# Patient Record
Sex: FEMALE | Race: WHITE | NOT HISPANIC OR LATINO | Employment: OTHER | URBAN - METROPOLITAN AREA
[De-identification: names, ages, dates, MRNs, and addresses within clinical notes are randomized per-mention and may not be internally consistent; named-entity substitution may affect disease eponyms.]

---

## 2017-01-25 ENCOUNTER — HOSPITAL ENCOUNTER (OUTPATIENT)
Dept: CT IMAGING | Facility: HOSPITAL | Age: 82
Discharge: HOME | End: 2017-01-25
Attending: INTERNAL MEDICINE | Admitting: INTERNAL MEDICINE

## 2017-01-25 DIAGNOSIS — C18.9 ADENOCARCINOMA OF COLON METASTATIC TO LIVER (HCC): ICD-10-CM

## 2017-01-25 DIAGNOSIS — C78.7 ADENOCARCINOMA OF COLON METASTATIC TO LIVER (HCC): ICD-10-CM

## 2017-01-25 DIAGNOSIS — C20 RECTAL CANCER (HCC): ICD-10-CM

## 2017-01-25 PROCEDURE — 74177 CT ABD & PELVIS W/CONTRAST: CPT

## 2017-01-25 PROCEDURE — 82565 ASSAY OF CREATININE: CPT

## 2017-01-25 PROCEDURE — 71260 CT THORAX DX C+: CPT

## 2017-01-25 PROCEDURE — 0 IOPAMIDOL 61 % SOLUTION: Performed by: INTERNAL MEDICINE

## 2017-01-25 RX ADMIN — IOPAMIDOL 95 ML: 612 INJECTION, SOLUTION INTRAVENOUS at 15:30

## 2017-01-26 LAB — CREAT BLDA-MCNC: 0.7 MG/DL (ref 0.6–1.3)

## 2017-02-01 ENCOUNTER — LAB (OUTPATIENT)
Dept: LAB | Facility: HOSPITAL | Age: 82
End: 2017-02-01

## 2017-02-01 ENCOUNTER — OFFICE VISIT (OUTPATIENT)
Dept: ONCOLOGY | Facility: CLINIC | Age: 82
End: 2017-02-01

## 2017-02-01 VITALS
SYSTOLIC BLOOD PRESSURE: 195 MMHG | HEIGHT: 59 IN | BODY MASS INDEX: 24.39 KG/M2 | HEART RATE: 84 BPM | RESPIRATION RATE: 16 BRPM | WEIGHT: 121 LBS | DIASTOLIC BLOOD PRESSURE: 82 MMHG | TEMPERATURE: 98.3 F

## 2017-02-01 DIAGNOSIS — C78.7 ADENOCARCINOMA OF COLON METASTATIC TO LIVER (HCC): ICD-10-CM

## 2017-02-01 DIAGNOSIS — C20 RECTAL CANCER (HCC): Primary | ICD-10-CM

## 2017-02-01 DIAGNOSIS — C18.9 ADENOCARCINOMA OF COLON METASTATIC TO LIVER (HCC): ICD-10-CM

## 2017-02-01 DIAGNOSIS — C20 RECTAL CANCER (HCC): ICD-10-CM

## 2017-02-01 LAB
ALBUMIN SERPL-MCNC: 4.2 G/DL (ref 3.2–4.8)
ALBUMIN/GLOB SERPL: 1.8 G/DL (ref 1.5–2.5)
ALP SERPL-CCNC: 143 U/L (ref 25–100)
ALT SERPL W P-5'-P-CCNC: 14 U/L (ref 7–40)
ANION GAP SERPL CALCULATED.3IONS-SCNC: 9 MMOL/L (ref 3–11)
AST SERPL-CCNC: 20 U/L (ref 0–33)
BILIRUB SERPL-MCNC: 0.7 MG/DL (ref 0.3–1.2)
BUN BLD-MCNC: 7 MG/DL (ref 9–23)
BUN/CREAT SERPL: 11.7 (ref 7–25)
CALCIUM SPEC-SCNC: 9.5 MG/DL (ref 8.7–10.4)
CEA SERPL-MCNC: 3.2 NG/ML (ref 0–2.5)
CHLORIDE SERPL-SCNC: 100 MMOL/L (ref 99–109)
CO2 SERPL-SCNC: 31 MMOL/L (ref 20–31)
CREAT BLD-MCNC: 0.6 MG/DL (ref 0.6–1.3)
ERYTHROCYTE [DISTWIDTH] IN BLOOD BY AUTOMATED COUNT: 14.5 % (ref 11.3–14.5)
GFR SERPL CREATININE-BSD FRML MDRD: 95 ML/MIN/1.73
GLOBULIN UR ELPH-MCNC: 2.4 GM/DL
GLUCOSE BLD-MCNC: 100 MG/DL (ref 70–100)
HCT VFR BLD AUTO: 38.3 % (ref 34.5–44)
HGB BLD-MCNC: 12.8 G/DL (ref 11.5–15.5)
LYMPHOCYTES # BLD AUTO: 1.7 10*3/MM3 (ref 0.6–4.8)
LYMPHOCYTES NFR BLD AUTO: 20.8 % (ref 24–44)
MCH RBC QN AUTO: 29.6 PG (ref 27–31)
MCHC RBC AUTO-ENTMCNC: 33.3 G/DL (ref 32–36)
MCV RBC AUTO: 88.9 FL (ref 80–99)
MONOCYTES # BLD AUTO: 0.5 10*3/MM3 (ref 0–1)
MONOCYTES NFR BLD AUTO: 5.8 % (ref 0–12)
NEUTROPHILS # BLD AUTO: 5.9 10*3/MM3 (ref 1.5–8.3)
NEUTROPHILS NFR BLD AUTO: 73.4 % (ref 41–71)
PLATELET # BLD AUTO: 278 10*3/MM3 (ref 150–450)
PMV BLD AUTO: 7.9 FL (ref 6–12)
POTASSIUM BLD-SCNC: 3.8 MMOL/L (ref 3.5–5.5)
PROT SERPL-MCNC: 6.6 G/DL (ref 5.7–8.2)
RBC # BLD AUTO: 4.31 10*6/MM3 (ref 3.89–5.14)
SODIUM BLD-SCNC: 140 MMOL/L (ref 132–146)
WBC NRBC COR # BLD: 8 10*3/MM3 (ref 3.5–10.8)

## 2017-02-01 PROCEDURE — 99213 OFFICE O/P EST LOW 20 MIN: CPT | Performed by: NURSE PRACTITIONER

## 2017-02-01 PROCEDURE — 85025 COMPLETE CBC W/AUTO DIFF WBC: CPT

## 2017-02-01 PROCEDURE — 82378 CARCINOEMBRYONIC ANTIGEN: CPT | Performed by: INTERNAL MEDICINE

## 2017-02-01 PROCEDURE — 80053 COMPREHEN METABOLIC PANEL: CPT | Performed by: INTERNAL MEDICINE

## 2017-02-01 PROCEDURE — 36415 COLL VENOUS BLD VENIPUNCTURE: CPT

## 2017-02-01 NOTE — PROGRESS NOTES
CHIEF COMPLAINT: Follow up for history of colon cancer    Problem List:  Oncology/Hematology History    1. Metastatic adenocarcinoma of the colon to liver: Status post segment 4  hepatectomy on 12/09/2014 for metastatic moderately differentiated colon cancer  per pathology with resection of the gallbladder. Postoperatively, hemoglobin  8.2 transfused to 11.7 and a CEA of 23.5. AST up to 505, , alkaline  phosphatase of 125, and bilirubin of 3.4 postoperatively.   a) Has a history of rectal carcinoma status post low anterior resection  06/16/2009, pathological stage I, T1b submucosal involvement with zero out of  21 lymph nodes, watched serially by Dr. Fitzpatrick with CEA up to 24.3 in October  prompting scanning that showed the liver lesion.   b) Began Xeloda 14 out of 21 days, cycle number 1 for a total of 8 planned  courses mid-01/2015.  c) Developed onychomycosis after the sixth course of Xeloda; seventh cycle  held 05/20/2015.   d) Additional testing on pathology was negative for KRAS and negative for NRAS  mutation, negative BRAF mutation. Microsatellite stable.   e) Restaging PET 03/25/2015 during therapy revealed stable right pericardial  cyst versus mass without change. Stable liver surgical margin and right  extrarenal pelvis distention with mild hydronephrosis and some uptake in the  endometrium at the area of retained intrauterine device.  f) Followup CT chest, abdomen and pelvis 06/17/2015: CT chest negative. CT  abdomen and pelvis unchanged appearance of the liver including focal ductal  dilatation within the anterior inferior margin adjacent to the partial  hepatectomy bed. Right hydronephrosis with enlargement of the right renal  pelvis. Thickening of the endometrium.  g) On 06/15/2015 CBC and CMP within normal limits. CEA 4.3.  h) Completed 6 courses of Xeloda, discontinued after onychomycosis, 05/2015.  i) Followup CT chest, abdomen and pelvis 03/14/2016 negative except for  questionable colitis  versus incomplete distention as well as stable UPJ  dilation and a possible endometrial abnormality. CEA was still creeping up to  5.2 with alkaline phosphatase 156, creatinine 0.58, with normal CBC.  2. History of hydronephrosis preoperatively on preoperative staging scanning  PET scan that showed a 6.8 cm mass in the liver.   i) On 09/16/2015: CT of the chest, abdomen, and pelvis negative for metastatic  disease.   j) Most recent CEA, 03/14/2016, was 5.2. On 12/15/2015, CEA 4.6 (previously  3.0 on 09/22/2015, 4.30 on 06/15/2015).        Rectal cancer    7/19/2016 Initial Diagnosis    Rectal cancer      Adenocarcinoma of colon metastatic to liver (Resolved)    12/9/2014 Initial Diagnosis    Adenocarcinoma of colon metastatic to liver      1/25/2017 Imaging    CT of the chest, abdomen and pelvis there is no evidence of metastatic disease seen  within the chest, abdomen or pelvis.         HISTORY OF PRESENT ILLNESS:  The patient is a 87 y.o. female, here for follow up on management of history of colon cancer.  The patient has been doing well since we saw her last with no significant change in her health.  Her daughter who is with her today, states that her mother is becoming a little more forgetful, she still continues to have difficulty with arthritis in her knees for which she receives injections and this is her biggest complaint.  She does continue to live independently with a lot of help from her children although the daughter states this may have to change in the near future.  Appetite has been normal, weight is stable.  No reported changes in her bowel or bladder habits.  They are concerned about a small skin lesion that is appeared under her left eye over the last few months.  She states that it is not tender, and is not bothersome just noticeable.      Past Medical History   Diagnosis Date   • Arthritis    • Cataract    • Colon cancer    • Colon cancer metastasized to liver    • Hearing loss    • Liver cancer  "   • Macular degeneration      Past Surgical History   Procedure Laterality Date   • Liver surgery  12/09/2014     Liver tumor, 1/2 was removed.   • Colon resection  06/16/2009     Low anterior resection   • Cataract extraction Bilateral    • Partial hysterectomy     • Gallbladder surgery       Resection of gallbladder       No Known Allergies    Family History and Social History reviewed and changed as necessary      REVIEW OF SYSTEM:   Review of Systems   Constitutional: Negative for appetite change, chills, diaphoresis, fatigue, fever and unexpected weight change.   HENT:   Negative for mouth sores, sore throat and trouble swallowing.    Eyes: Negative for icterus.   Respiratory: Negative for cough, hemoptysis and shortness of breath.    Cardiovascular: Negative for chest pain, leg swelling and palpitations.   Gastrointestinal: Negative for abdominal distention, abdominal pain, blood in stool, constipation, diarrhea, nausea and vomiting.   Endocrine: Negative for hot flashes.   Genitourinary: Negative for bladder incontinence, difficulty urinating, dysuria, frequency and hematuria.    Musculoskeletal: Negative for gait problem, neck pain and neck stiffness.   Skin: Negative for rash.   Neurological: Negative for dizziness, gait problem, headaches, light-headedness and numbness.   Hematological: Negative for adenopathy. Does not bruise/bleed easily.   Psychiatric/Behavioral: Negative for depression. The patient is not nervous/anxious.    All other systems reviewed and are negative except as stated above in history of present illness.       PHYSICAL EXAM    Vitals:    02/01/17 1313   BP: (!) 195/82   Pulse: 84   Resp: 16   Temp: 98.3 °F (36.8 °C)   Weight: 121 lb (54.9 kg)   Height: 59\" (149.9 cm)     Constitutional: Appears well-developed and well-nourished. No distress.   ECOG: (1) Restricted in physically strenuous activity, ambulatory and able to do work of light nature  HENT:   Head: Normocephalic. "   Mouth/Throat: Oropharynx is clear and moist.   Eyes: Conjunctivae are normal. Pupils are equal, round, and reactive. No scleral icterus.   Neck: Neck supple. No JVD present. No thyromegaly present.   Cardiovascular: Normal rate, regular rhythm and normal heart sounds.    Pulmonary/Chest: Breath sounds normal. No respiratory distress.   Abdominal: Soft. Exhibits no distension.   Musculoskeletal:Exhibits no edema, tenderness or deformity.   Neurological: Alert and oriented to person, place, and time. Exhibits normal muscle tone.   Skin: No ecchymosis, no petechiae and no rash noted. Not diaphoretic.  Small, round, smooth nodular lesion under the left eye orbit.  Psychiatric: Normal mood and affect.   Vitals reviewed.    Recent Results (from the past 168 hour(s))   CBC Auto Differential    Collection Time: 02/01/17  1:18 PM   Result Value Ref Range    WBC 8.00 3.50 - 10.80 10*3/mm3    RBC 4.31 3.89 - 5.14 10*6/mm3    Hemoglobin 12.8 11.5 - 15.5 g/dL    Hematocrit 38.3 34.5 - 44.0 %    RDW 14.5 11.3 - 14.5 %    MCV 88.9 80.0 - 99.0 fL    MCH 29.6 27.0 - 31.0 pg    MCHC 33.3 32.0 - 36.0 g/dL    MPV 7.9 6.0 - 12.0 fL    Platelets 278 150 - 450 10*3/mm3    Neutrophil % 73.4 (H) 41.0 - 71.0 %    Lymphocyte % 20.8 (L) 24.0 - 44.0 %    Monocyte % 5.8 0.0 - 12.0 %    Neutrophils, Absolute 5.90 1.50 - 8.30 10*3/mm3    Lymphocytes, Absolute 1.70 0.60 - 4.80 10*3/mm3    Monocytes, Absolute 0.50 0.00 - 1.00 10*3/mm3         Assessment/Plan     1.  History of metastatic colon cancer: Current CT of the chest abdomen and pelvis negative for any residual disease.  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  She has had no issues with her creatinine or kidney function, CMP from today is pending, however her creatinine on 1/25/17 at  the time of her CT scan last week was 0.7.  Her CEA from today is pending, I will call her daughter with those results.  She is feeling fine and has no new worrisome symptoms.  At this time I will plan on just seeing her back in 3 months with CBC, CMP and a CEA on return and we'll repeat her CT scans in 6 months.  She is about to turn 88 on February 3, her daughter states that she is having a little more forgetfulness.  At some point in the future we may want to consider backing off of routine surveillance in the absence of symptoms depending on the patient and the family's goals of care.  But for now we'll continue with the above plan.    2.  Skin lesion under her left eye: This is a smooth round lesion, did not have the classical appearance of a basal cell however I did recommend that they follow up with dermatology.       Errors in dictation may reflect use of voice recognition software and not all errors in transcription may have been detected prior to signing.    Dimple Christy, APRN    02/01/2017

## 2017-05-02 ENCOUNTER — TRANSCRIBE ORDERS (OUTPATIENT)
Dept: ADMINISTRATIVE | Facility: HOSPITAL | Age: 82
End: 2017-05-02

## 2017-05-02 DIAGNOSIS — G89.29 BILATERAL CHRONIC KNEE PAIN: Primary | ICD-10-CM

## 2017-05-02 DIAGNOSIS — M25.561 BILATERAL CHRONIC KNEE PAIN: Primary | ICD-10-CM

## 2017-05-02 DIAGNOSIS — M25.562 BILATERAL CHRONIC KNEE PAIN: Primary | ICD-10-CM

## 2017-05-15 ENCOUNTER — OFFICE VISIT (OUTPATIENT)
Dept: ONCOLOGY | Facility: CLINIC | Age: 82
End: 2017-05-15

## 2017-05-15 VITALS
WEIGHT: 121 LBS | DIASTOLIC BLOOD PRESSURE: 84 MMHG | HEART RATE: 98 BPM | TEMPERATURE: 97.6 F | BODY MASS INDEX: 24.44 KG/M2 | SYSTOLIC BLOOD PRESSURE: 148 MMHG | RESPIRATION RATE: 16 BRPM

## 2017-05-15 DIAGNOSIS — C20 RECTAL CANCER (HCC): Primary | ICD-10-CM

## 2017-05-15 DIAGNOSIS — R41.3 MEMORY LOSS: ICD-10-CM

## 2017-05-15 PROCEDURE — 99214 OFFICE O/P EST MOD 30 MIN: CPT | Performed by: NURSE PRACTITIONER

## 2017-05-25 ENCOUNTER — OFFICE VISIT (OUTPATIENT)
Dept: INTERNAL MEDICINE | Facility: CLINIC | Age: 82
End: 2017-05-25

## 2017-05-25 VITALS
HEIGHT: 59 IN | RESPIRATION RATE: 18 BRPM | TEMPERATURE: 96.9 F | BODY MASS INDEX: 24.19 KG/M2 | DIASTOLIC BLOOD PRESSURE: 80 MMHG | SYSTOLIC BLOOD PRESSURE: 192 MMHG | WEIGHT: 120 LBS

## 2017-05-25 DIAGNOSIS — Z00.00 HEALTH CARE MAINTENANCE: ICD-10-CM

## 2017-05-25 DIAGNOSIS — H61.22 IMPACTED CERUMEN OF LEFT EAR: ICD-10-CM

## 2017-05-25 DIAGNOSIS — R35.0 URINARY FREQUENCY: Primary | ICD-10-CM

## 2017-05-25 PROCEDURE — 99213 OFFICE O/P EST LOW 20 MIN: CPT | Performed by: PHYSICIAN ASSISTANT

## 2017-05-25 PROCEDURE — 69210 REMOVE IMPACTED EAR WAX UNI: CPT | Performed by: PHYSICIAN ASSISTANT

## 2017-06-01 ENCOUNTER — TELEPHONE (OUTPATIENT)
Dept: INTERNAL MEDICINE | Facility: CLINIC | Age: 82
End: 2017-06-01

## 2017-06-01 NOTE — TELEPHONE ENCOUNTER
----- Message from Leila Melgar sent at 6/1/2017 10:19 AM EDT -----  SON-MEERA [phone]    [facility] SAW PT LAST WEEK AND TOLD HER TO TAKE BP READINGS 2X DAY FOR  A WEEK.  SHE FORGOT TO DO THIS.  HAS AN APPT NEXT WEEK.  SON WILL KEEP UP WITH PT THIS WEEK TO GET HER TO DO THIS SO SHE WILL HAVE READINGS AT APPT

## 2017-06-05 ENCOUNTER — APPOINTMENT (OUTPATIENT)
Dept: MRI IMAGING | Facility: HOSPITAL | Age: 82
End: 2017-06-05

## 2017-06-06 ENCOUNTER — TELEPHONE (OUTPATIENT)
Dept: INTERNAL MEDICINE | Facility: CLINIC | Age: 82
End: 2017-06-06

## 2017-06-06 NOTE — TELEPHONE ENCOUNTER
These are okay.    Just continue to keep an eye on it for me and let me know if they are consistently higher than 140/90.

## 2017-06-06 NOTE — TELEPHONE ENCOUNTER
Patient and Milena (patients daughter in law) informed. States they will continue to monitor and will be here on 6/8/2017 for a follow up.

## 2017-06-08 ENCOUNTER — OFFICE VISIT (OUTPATIENT)
Dept: INTERNAL MEDICINE | Facility: CLINIC | Age: 82
End: 2017-06-08

## 2017-06-08 VITALS
TEMPERATURE: 98.1 F | DIASTOLIC BLOOD PRESSURE: 90 MMHG | RESPIRATION RATE: 18 BRPM | WEIGHT: 121 LBS | HEART RATE: 86 BPM | SYSTOLIC BLOOD PRESSURE: 166 MMHG | BODY MASS INDEX: 24.44 KG/M2

## 2017-06-08 DIAGNOSIS — R35.0 URINARY FREQUENCY: Primary | ICD-10-CM

## 2017-06-08 PROCEDURE — 99213 OFFICE O/P EST LOW 20 MIN: CPT | Performed by: PHYSICIAN ASSISTANT

## 2017-06-08 NOTE — PROGRESS NOTES
Subjective   Zainab Worthy is a 88 y.o. female.   Chief Complaint   Patient presents with   • Follow-up     blood pressure     History of Present Illness     Patient here with her son to follow up on her blood pressure.  She has been checking her BP at home over the past couple of weeks.  Her blood pressure is never consistently >140/90 at home.  Systolic readings are occasionally >140, but diastolics are never >90 (I reviewed her BP logs). She denies headaches, chest pain, soa, swelling. She does not wish to be on a medication unless absolutely necessary.      She took myrbetriq for about 3 days and did see some improvement, but then she stopped taking it as she thought her problem was better.    Needs a podiatry appt. Son, Chad, says he thinks she received a call, but wasn't able to make it to the phone in time and doesn't have a voicemail.     The following portions of the patient's history were reviewed and updated as appropriate: allergies, current medications, past family history, past medical history, past social history, past surgical history and problem list.    Review of Systems   Constitutional: Negative.    HENT: Negative.    Respiratory: Negative.    Cardiovascular: Negative.    Gastrointestinal: Negative.    Genitourinary: Positive for frequency. Negative for difficulty urinating, dysuria, flank pain, hematuria and urgency.   Musculoskeletal:        Knee pain   Psychiatric/Behavioral: Negative.        Objective   Physical Exam   Constitutional: She appears well-developed and well-nourished.   Cardiovascular: Normal rate, regular rhythm and normal heart sounds.    Pulmonary/Chest: Effort normal and breath sounds normal.   Psychiatric: She has a normal mood and affect. Her behavior is normal. Judgment and thought content normal.   Vitals reviewed.      Assessment/Plan   Zainab was seen today for follow-up.    Diagnoses and all orders for this visit:    Urinary frequency    Continue myrbetriq and follow up in  1 month.   She was unable to urinate today.      Call son at [phone] (Chad) with podiatry appt.

## 2017-06-14 ENCOUNTER — TELEPHONE (OUTPATIENT)
Dept: INTERNAL MEDICINE | Facility: CLINIC | Age: 82
End: 2017-06-14

## 2017-06-15 NOTE — TELEPHONE ENCOUNTER
Not sure if this has been done or not, but can you get patient a podiatry appt and call her son, Chad, with the details?    His number is at the bottom of my last note.

## 2017-06-16 NOTE — TELEPHONE ENCOUNTER
[facility] her know about [facility]--she should have gone on the 12th--did they say she did not have appt? Or should I call them to verify

## 2017-06-16 NOTE — TELEPHONE ENCOUNTER
Please call her son Chad.  He said that she had received a call, but didn't take down any information and doesn't have voicemail.

## 2017-11-22 ENCOUNTER — TELEPHONE (OUTPATIENT)
Dept: ONCOLOGY | Facility: CLINIC | Age: 82
End: 2017-11-22

## 2017-11-22 NOTE — TELEPHONE ENCOUNTER
----- Message from Ever Resendez sent at 11/22/2017  2:48 PM EST -----  Regarding: Vernell, patient fell, broke hip and at [facility]  Contact: [phone]    Val, patient's daughter called to tell us know that patient fell and broke pelvic bone and is in the hospital at [facility]. Val doesn't know if her mom is going to make it to her appointment on the 28th with Dr. Matt, but will call and let us know.

## 2017-11-24 ENCOUNTER — HOSPITAL ENCOUNTER (INPATIENT)
Facility: HOSPITAL | Age: 82
LOS: 2 days | Discharge: NURSING, REHAB OR LONG-TERM CARE | End: 2017-11-29
Attending: EMERGENCY MEDICINE | Admitting: INTERNAL MEDICINE

## 2017-11-24 ENCOUNTER — APPOINTMENT (OUTPATIENT)
Dept: CT IMAGING | Facility: HOSPITAL | Age: 82
End: 2017-11-24

## 2017-11-24 DIAGNOSIS — Z74.09 IMPAIRED FUNCTIONAL MOBILITY, BALANCE, GAIT, AND ENDURANCE: ICD-10-CM

## 2017-11-24 DIAGNOSIS — N39.0 BACTERIAL UTI: ICD-10-CM
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
## 2017-11-24 DIAGNOSIS — Z78.9 IMPAIRED MOBILITY AND ADLS: ICD-10-CM

## 2017-11-24 DIAGNOSIS — A49.9 BACTERIAL UTI: ICD-10-CM
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
## 2017-11-24 DIAGNOSIS — R52 INTRACTABLE PAIN: ICD-10-CM

## 2017-11-24 DIAGNOSIS — Z74.09 IMPAIRED MOBILITY AND ADLS: ICD-10-CM

## 2017-11-24 DIAGNOSIS — S32.810A MULTIPLE CLOSED FRACTURES OF PELVIS WITH STABLE DISRUPTION OF PELVIC RING, INITIAL ENCOUNTER (HCC): ICD-10-CM

## 2017-11-24 LAB
ALBUMIN SERPL-MCNC: 3.8 G/DL (ref 3.2–4.8)
ALBUMIN/GLOB SERPL: 1.4 G/DL (ref 1.5–2.5)
ALP SERPL-CCNC: 232 U/L (ref 25–100)
ALT SERPL W P-5'-P-CCNC: 16 U/L (ref 7–40)
ANION GAP SERPL CALCULATED.3IONS-SCNC: 10 MMOL/L (ref 3–11)
AST SERPL-CCNC: 19 U/L (ref 0–33)
BACTERIA UR QL AUTO: ABNORMAL /HPF
BASOPHILS # BLD AUTO: 0.03 10*3/MM3 (ref 0–0.2)
BASOPHILS NFR BLD AUTO: 0.2 % (ref 0–1)
BILIRUB SERPL-MCNC: 0.6 MG/DL (ref 0.3–1.2)
BILIRUB UR QL STRIP: NEGATIVE
BUN BLD-MCNC: 21 MG/DL (ref 9–23)
BUN/CREAT SERPL: 42 (ref 7–25)
CALCIUM SPEC-SCNC: 9.3 MG/DL (ref 8.7–10.4)
CHLORIDE SERPL-SCNC: 100 MMOL/L (ref 99–109)
CLARITY UR: ABNORMAL
CO2 SERPL-SCNC: 28 MMOL/L (ref 20–31)
COLOR UR: YELLOW
CREAT BLD-MCNC: 0.5 MG/DL (ref 0.6–1.3)
DEPRECATED RDW RBC AUTO: 45 FL (ref 37–54)
EOSINOPHIL # BLD AUTO: 0.17 10*3/MM3 (ref 0–0.3)
EOSINOPHIL NFR BLD AUTO: 1.4 % (ref 0–3)
ERYTHROCYTE [DISTWIDTH] IN BLOOD BY AUTOMATED COUNT: 13.8 % (ref 11.3–14.5)
GFR SERPL CREATININE-BSD FRML MDRD: 116 ML/MIN/1.73
GLOBULIN UR ELPH-MCNC: 2.8 GM/DL
GLUCOSE BLD-MCNC: 109 MG/DL (ref 70–100)
GLUCOSE UR STRIP-MCNC: NEGATIVE MG/DL
HCT VFR BLD AUTO: 37.9 % (ref 34.5–44)
HGB BLD-MCNC: 12.7 G/DL (ref 11.5–15.5)
HGB UR QL STRIP.AUTO: ABNORMAL
HYALINE CASTS UR QL AUTO: ABNORMAL /LPF
IMM GRANULOCYTES # BLD: 0.06 10*3/MM3 (ref 0–0.03)
IMM GRANULOCYTES NFR BLD: 0.5 % (ref 0–0.6)
KETONES UR QL STRIP: ABNORMAL
LEUKOCYTE ESTERASE UR QL STRIP.AUTO: ABNORMAL
LYMPHOCYTES # BLD AUTO: 1.19 10*3/MM3 (ref 0.6–4.8)
LYMPHOCYTES NFR BLD AUTO: 9.7 % (ref 24–44)
MCH RBC QN AUTO: 29.7 PG (ref 27–31)
MCHC RBC AUTO-ENTMCNC: 33.5 G/DL (ref 32–36)
MCV RBC AUTO: 88.8 FL (ref 80–99)
MONOCYTES # BLD AUTO: 1.1 10*3/MM3 (ref 0–1)
MONOCYTES NFR BLD AUTO: 8.9 % (ref 0–12)
NEUTROPHILS # BLD AUTO: 9.78 10*3/MM3 (ref 1.5–8.3)
NEUTROPHILS NFR BLD AUTO: 79.3 % (ref 41–71)
NITRITE UR QL STRIP: POSITIVE
PH UR STRIP.AUTO: 5.5 [PH] (ref 5–8)
PLATELET # BLD AUTO: 288 10*3/MM3 (ref 150–450)
PMV BLD AUTO: 9.5 FL (ref 6–12)
POTASSIUM BLD-SCNC: 3.9 MMOL/L (ref 3.5–5.5)
PROT SERPL-MCNC: 6.6 G/DL (ref 5.7–8.2)
PROT UR QL STRIP: ABNORMAL
RBC # BLD AUTO: 4.27 10*6/MM3 (ref 3.89–5.14)
RBC # UR: ABNORMAL /HPF
REF LAB TEST METHOD: ABNORMAL
SODIUM BLD-SCNC: 138 MMOL/L (ref 132–146)
SP GR UR STRIP: 1.02 (ref 1–1.03)
SQUAMOUS #/AREA URNS HPF: ABNORMAL /HPF
UROBILINOGEN UR QL STRIP: ABNORMAL
WBC NRBC COR # BLD: 12.33 10*3/MM3 (ref 3.5–10.8)
WBC UR QL AUTO: ABNORMAL /HPF

## 2017-11-24 PROCEDURE — 99285 EMERGENCY DEPT VISIT HI MDM: CPT

## 2017-11-24 PROCEDURE — 72192 CT PELVIS W/O DYE: CPT

## 2017-11-24 PROCEDURE — 93005 ELECTROCARDIOGRAM TRACING: CPT | Performed by: EMERGENCY MEDICINE

## 2017-11-24 PROCEDURE — 81001 URINALYSIS AUTO W/SCOPE: CPT | Performed by: EMERGENCY MEDICINE

## 2017-11-24 PROCEDURE — 87186 SC STD MICRODIL/AGAR DIL: CPT | Performed by: EMERGENCY MEDICINE

## 2017-11-24 PROCEDURE — 87086 URINE CULTURE/COLONY COUNT: CPT | Performed by: EMERGENCY MEDICINE

## 2017-11-24 PROCEDURE — 72131 CT LUMBAR SPINE W/O DYE: CPT

## 2017-11-24 PROCEDURE — 80053 COMPREHEN METABOLIC PANEL: CPT | Performed by: EMERGENCY MEDICINE

## 2017-11-24 PROCEDURE — 87077 CULTURE AEROBIC IDENTIFY: CPT | Performed by: EMERGENCY MEDICINE

## 2017-11-24 PROCEDURE — 85025 COMPLETE CBC W/AUTO DIFF WBC: CPT | Performed by: EMERGENCY MEDICINE

## 2017-11-24 RX ORDER — HYDROCODONE BITARTRATE AND ACETAMINOPHEN 5; 325 MG/1; MG/1
1 TABLET ORAL ONCE
Status: COMPLETED | OUTPATIENT
Start: 2017-11-24 | End: 2017-11-24

## 2017-11-24 RX ORDER — CELECOXIB 200 MG/1
200 CAPSULE ORAL DAILY
COMMUNITY
End: 2017-11-29 | Stop reason: HOSPADM

## 2017-11-24 RX ORDER — HYDROCODONE BITARTRATE AND ACETAMINOPHEN 5; 325 MG/1; MG/1
1 TABLET ORAL EVERY 6 HOURS PRN
COMMUNITY
End: 2018-09-10 | Stop reason: HOSPADM

## 2017-11-24 RX ORDER — CEFTRIAXONE SODIUM 1 G/50ML
1 INJECTION, SOLUTION INTRAVENOUS ONCE
Status: COMPLETED | OUTPATIENT
Start: 2017-11-24 | End: 2017-11-25

## 2017-11-24 RX ORDER — LISINOPRIL 20 MG/1
20 TABLET ORAL DAILY
COMMUNITY
End: 2017-11-29 | Stop reason: HOSPADM

## 2017-11-24 RX ORDER — SODIUM CHLORIDE 0.9 % (FLUSH) 0.9 %
10 SYRINGE (ML) INJECTION AS NEEDED
Status: DISCONTINUED | OUTPATIENT
Start: 2017-11-24 | End: 2017-11-29 | Stop reason: HOSPADM

## 2017-11-24 RX ADMIN — HYDROCODONE BITARTRATE AND ACETAMINOPHEN 1 TABLET: 5; 325 TABLET ORAL at 22:39
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
## 2017-11-25 PROBLEM — S32.9XXA CLOSED FRACTURE OF PELVIS (HCC): Status: ACTIVE | Noted: 2017-11-25
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
## 2017-11-25 PROBLEM — N39.0 UTI (URINARY TRACT INFECTION): Status: ACTIVE | Noted: 2017-11-25

## 2017-11-25 LAB
ALBUMIN SERPL-MCNC: 3.4 G/DL (ref 3.2–4.8)
ALBUMIN/GLOB SERPL: 1.4 G/DL (ref 1.5–2.5)
ALP SERPL-CCNC: 205 U/L (ref 25–100)
ALT SERPL W P-5'-P-CCNC: 14 U/L (ref 7–40)
ANION GAP SERPL CALCULATED.3IONS-SCNC: 9 MMOL/L (ref 3–11)
AST SERPL-CCNC: 19 U/L (ref 0–33)
BASOPHILS # BLD AUTO: 0.03 10*3/MM3 (ref 0–0.2)
BASOPHILS NFR BLD AUTO: 0.3 % (ref 0–1)
BILIRUB SERPL-MCNC: 0.5 MG/DL (ref 0.3–1.2)
BUN BLD-MCNC: 19 MG/DL (ref 9–23)
BUN/CREAT SERPL: 47.5 (ref 7–25)
CALCIUM SPEC-SCNC: 8.9 MG/DL (ref 8.7–10.4)
CHLORIDE SERPL-SCNC: 100 MMOL/L (ref 99–109)
CO2 SERPL-SCNC: 29 MMOL/L (ref 20–31)
CREAT BLD-MCNC: 0.4 MG/DL (ref 0.6–1.3)
DEPRECATED RDW RBC AUTO: 45.3 FL (ref 37–54)
EOSINOPHIL # BLD AUTO: 0.35 10*3/MM3 (ref 0–0.3)
EOSINOPHIL NFR BLD AUTO: 3.4 % (ref 0–3)
ERYTHROCYTE [DISTWIDTH] IN BLOOD BY AUTOMATED COUNT: 13.8 % (ref 11.3–14.5)
GFR SERPL CREATININE-BSD FRML MDRD: >150 ML/MIN/1.73
GLOBULIN UR ELPH-MCNC: 2.4 GM/DL
GLUCOSE BLD-MCNC: 97 MG/DL (ref 70–100)
HCT VFR BLD AUTO: 35.7 % (ref 34.5–44)
HGB BLD-MCNC: 11.4 G/DL (ref 11.5–15.5)
IMM GRANULOCYTES # BLD: 0.06 10*3/MM3 (ref 0–0.03)
IMM GRANULOCYTES NFR BLD: 0.6 % (ref 0–0.6)
LYMPHOCYTES # BLD AUTO: 1.44 10*3/MM3 (ref 0.6–4.8)
LYMPHOCYTES NFR BLD AUTO: 13.9 % (ref 24–44)
MCH RBC QN AUTO: 28.4 PG (ref 27–31)
MCHC RBC AUTO-ENTMCNC: 31.9 G/DL (ref 32–36)
MCV RBC AUTO: 89 FL (ref 80–99)
MONOCYTES # BLD AUTO: 0.99 10*3/MM3 (ref 0–1)
MONOCYTES NFR BLD AUTO: 9.5 % (ref 0–12)
NEUTROPHILS # BLD AUTO: 7.5 10*3/MM3 (ref 1.5–8.3)
NEUTROPHILS NFR BLD AUTO: 72.3 % (ref 41–71)
PLATELET # BLD AUTO: 277 10*3/MM3 (ref 150–450)
PMV BLD AUTO: 9.7 FL (ref 6–12)
POTASSIUM BLD-SCNC: 3.4 MMOL/L (ref 3.5–5.5)
POTASSIUM BLD-SCNC: 4.6 MMOL/L (ref 3.5–5.5)
PROT SERPL-MCNC: 5.8 G/DL (ref 5.7–8.2)
RBC # BLD AUTO: 4.01 10*6/MM3 (ref 3.89–5.14)
SODIUM BLD-SCNC: 138 MMOL/L (ref 132–146)
WBC NRBC COR # BLD: 10.37 10*3/MM3 (ref 3.5–10.8)

## 2017-11-25 PROCEDURE — 85025 COMPLETE CBC W/AUTO DIFF WBC: CPT | Performed by: INTERNAL MEDICINE

## 2017-11-25 PROCEDURE — 99220 PR INITIAL OBSERVATION CARE/DAY 70 MINUTES: CPT | Performed by: INTERNAL MEDICINE

## 2017-11-25 PROCEDURE — 80053 COMPREHEN METABOLIC PANEL: CPT | Performed by: INTERNAL MEDICINE

## 2017-11-25 PROCEDURE — 25010000002 CEFTRIAXONE PER 250 MG: Performed by: EMERGENCY MEDICINE

## 2017-11-25 PROCEDURE — 25010000002 CEFTRIAXONE PER 250 MG: Performed by: INTERNAL MEDICINE

## 2017-11-25 PROCEDURE — G0378 HOSPITAL OBSERVATION PER HR: HCPCS

## 2017-11-25 PROCEDURE — 84132 ASSAY OF SERUM POTASSIUM: CPT | Performed by: INTERNAL MEDICINE

## 2017-11-25 PROCEDURE — 25010000002 ENOXAPARIN PER 10 MG: Performed by: INTERNAL MEDICINE

## 2017-11-25 RX ORDER — POTASSIUM CHLORIDE 1.5 G/1.77G
40 POWDER, FOR SOLUTION ORAL AS NEEDED
Status: DISCONTINUED | OUTPATIENT
Start: 2017-11-25 | End: 2017-11-29 | Stop reason: HOSPADM

## 2017-11-25 RX ORDER — SODIUM CHLORIDE 9 MG/ML
50 INJECTION, SOLUTION INTRAVENOUS CONTINUOUS
Status: ACTIVE | OUTPATIENT
Start: 2017-11-25 | End: 2017-11-25

## 2017-11-25 RX ORDER — HYDROCODONE BITARTRATE AND ACETAMINOPHEN 7.5; 325 MG/1; MG/1
1 TABLET ORAL EVERY 6 HOURS PRN
Status: DISCONTINUED | OUTPATIENT
Start: 2017-11-25 | End: 2017-11-29 | Stop reason: HOSPADM

## 2017-11-25 RX ORDER — POTASSIUM CHLORIDE 750 MG/1
40 CAPSULE, EXTENDED RELEASE ORAL AS NEEDED
Status: DISCONTINUED | OUTPATIENT
Start: 2017-11-25 | End: 2017-11-29 | Stop reason: HOSPADM

## 2017-11-25 RX ORDER — LISINOPRIL 5 MG/1
2.5 TABLET ORAL
Status: DISCONTINUED | OUTPATIENT
Start: 2017-11-25 | End: 2017-11-29 | Stop reason: HOSPADM

## 2017-11-25 RX ORDER — FAMOTIDINE 20 MG/1
20 TABLET, FILM COATED ORAL 2 TIMES DAILY
Status: DISCONTINUED | OUTPATIENT
Start: 2017-11-25 | End: 2017-11-29 | Stop reason: HOSPADM

## 2017-11-25 RX ORDER — HYDROCODONE BITARTRATE AND ACETAMINOPHEN 5; 325 MG/1; MG/1
1 TABLET ORAL EVERY 6 HOURS PRN
Status: DISCONTINUED | OUTPATIENT
Start: 2017-11-25 | End: 2017-11-25

## 2017-11-25 RX ORDER — DOCUSATE SODIUM 100 MG/1
100 CAPSULE, LIQUID FILLED ORAL 2 TIMES DAILY
Status: DISCONTINUED | OUTPATIENT
Start: 2017-11-25 | End: 2017-11-29 | Stop reason: HOSPADM

## 2017-11-25 RX ORDER — ONDANSETRON 4 MG/1
4 TABLET, FILM COATED ORAL EVERY 6 HOURS PRN
Status: DISCONTINUED | OUTPATIENT
Start: 2017-11-25 | End: 2017-11-29 | Stop reason: HOSPADM

## 2017-11-25 RX ORDER — CEFTRIAXONE SODIUM 1 G/50ML
1 INJECTION, SOLUTION INTRAVENOUS EVERY 24 HOURS
Status: DISCONTINUED | OUTPATIENT
Start: 2017-11-25 | End: 2017-11-29

## 2017-11-25 RX ORDER — ONDANSETRON 2 MG/ML
4 INJECTION INTRAMUSCULAR; INTRAVENOUS EVERY 6 HOURS PRN
Status: DISCONTINUED | OUTPATIENT
Start: 2017-11-25 | End: 2017-11-29 | Stop reason: HOSPADM

## 2017-11-25 RX ORDER — MORPHINE SULFATE 4 MG/ML
2 INJECTION, SOLUTION INTRAMUSCULAR; INTRAVENOUS EVERY 6 HOURS PRN
Status: DISCONTINUED | OUTPATIENT
Start: 2017-11-25 | End: 2017-11-29 | Stop reason: HOSPADM

## 2017-11-25 RX ORDER — SODIUM CHLORIDE 0.9 % (FLUSH) 0.9 %
1-10 SYRINGE (ML) INJECTION AS NEEDED
Status: DISCONTINUED | OUTPATIENT
Start: 2017-11-25 | End: 2017-11-29 | Stop reason: HOSPADM

## 2017-11-25 RX ORDER — ACETAMINOPHEN 325 MG/1
650 TABLET ORAL EVERY 6 HOURS PRN
Status: DISCONTINUED | OUTPATIENT
Start: 2017-11-25 | End: 2017-11-29 | Stop reason: HOSPADM

## 2017-11-25 RX ADMIN — DOCUSATE SODIUM 100 MG: 100 CAPSULE, LIQUID FILLED ORAL at 18:02

## 2017-11-25 RX ADMIN — LISINOPRIL 2.5 MG: 5 TABLET ORAL at 08:57

## 2017-11-25 RX ADMIN — CEFTRIAXONE SODIUM 1 G: 1 INJECTION, SOLUTION INTRAVENOUS at 21:10

## 2017-11-25 RX ADMIN — POTASSIUM CHLORIDE 40 MEQ: 1.5 POWDER, FOR SOLUTION ORAL at 12:30

## 2017-11-25 RX ADMIN — FAMOTIDINE 20 MG: 20 TABLET, FILM COATED ORAL at 18:02

## 2017-11-25 RX ADMIN — ENOXAPARIN SODIUM 30 MG: 30 INJECTION SUBCUTANEOUS at 08:58

## 2017-11-25 RX ADMIN — HYDROCODONE BITARTRATE AND ACETAMINOPHEN 1 TABLET: 7.5; 325 TABLET ORAL at 23:36

## 2017-11-25 RX ADMIN — DICLOFENAC SODIUM 2 G: 10 GEL TOPICAL at 18:02

## 2017-11-25 RX ADMIN — POLYETHYLENE GLYCOL 3350 17 G: 17 POWDER, FOR SOLUTION ORAL at 08:58

## 2017-11-25 RX ADMIN — FAMOTIDINE 20 MG: 20 TABLET, FILM COATED ORAL at 08:58

## 2017-11-25 RX ADMIN — POTASSIUM CHLORIDE 40 MEQ: 1.5 POWDER, FOR SOLUTION ORAL at 16:44

## 2017-11-25 RX ADMIN — DOCUSATE SODIUM 100 MG: 100 CAPSULE, LIQUID FILLED ORAL at 08:58

## 2017-11-25 RX ADMIN — DICLOFENAC SODIUM 2 G: 10 GEL TOPICAL at 09:02

## 2017-11-25 RX ADMIN — CEFTRIAXONE SODIUM 1 G: 1 INJECTION, SOLUTION INTRAVENOUS at 00:00

## 2017-11-25 RX ADMIN — SODIUM CHLORIDE 50 ML/HR: 9 INJECTION, SOLUTION INTRAVENOUS at 03:24

## 2017-11-25 RX ADMIN — HYDROCODONE BITARTRATE AND ACETAMINOPHEN 1 TABLET: 7.5; 325 TABLET ORAL at 15:01

## 2017-11-26 PROCEDURE — 97166 OT EVAL MOD COMPLEX 45 MIN: CPT

## 2017-11-26 PROCEDURE — 97110 THERAPEUTIC EXERCISES: CPT

## 2017-11-26 PROCEDURE — G8987 SELF CARE CURRENT STATUS: HCPCS

## 2017-11-26 PROCEDURE — G0378 HOSPITAL OBSERVATION PER HR: HCPCS

## 2017-11-26 PROCEDURE — 99226 PR SBSQ OBSERVATION CARE/DAY 35 MINUTES: CPT | Performed by: INTERNAL MEDICINE

## 2017-11-26 PROCEDURE — G8979 MOBILITY GOAL STATUS: HCPCS

## 2017-11-26 PROCEDURE — G8978 MOBILITY CURRENT STATUS: HCPCS

## 2017-11-26 PROCEDURE — 97162 PT EVAL MOD COMPLEX 30 MIN: CPT

## 2017-11-26 PROCEDURE — 25010000002 ENOXAPARIN PER 10 MG: Performed by: INTERNAL MEDICINE

## 2017-11-26 PROCEDURE — 25010000002 CEFTRIAXONE PER 250 MG: Performed by: INTERNAL MEDICINE

## 2017-11-26 PROCEDURE — G8988 SELF CARE GOAL STATUS: HCPCS

## 2017-11-26 RX ORDER — BISACODYL 10 MG
10 SUPPOSITORY, RECTAL RECTAL DAILY PRN
Status: DISCONTINUED | OUTPATIENT
Start: 2017-11-26 | End: 2017-11-29 | Stop reason: HOSPADM

## 2017-11-26 RX ADMIN — HYDROCODONE BITARTRATE AND ACETAMINOPHEN 1 TABLET: 7.5; 325 TABLET ORAL at 21:15

## 2017-11-26 RX ADMIN — POLYETHYLENE GLYCOL 3350 17 G: 17 POWDER, FOR SOLUTION ORAL at 08:36

## 2017-11-26 RX ADMIN — FAMOTIDINE 20 MG: 20 TABLET, FILM COATED ORAL at 08:36

## 2017-11-26 RX ADMIN — DICLOFENAC SODIUM 2 G: 10 GEL TOPICAL at 08:36

## 2017-11-26 RX ADMIN — LISINOPRIL 2.5 MG: 5 TABLET ORAL at 08:36

## 2017-11-26 RX ADMIN — DOCUSATE SODIUM 100 MG: 100 CAPSULE, LIQUID FILLED ORAL at 17:31

## 2017-11-26 RX ADMIN — FAMOTIDINE 20 MG: 20 TABLET, FILM COATED ORAL at 17:32

## 2017-11-26 RX ADMIN — DICLOFENAC SODIUM 2 G: 10 GEL TOPICAL at 17:32

## 2017-11-26 RX ADMIN — ENOXAPARIN SODIUM 30 MG: 30 INJECTION SUBCUTANEOUS at 08:37

## 2017-11-26 RX ADMIN — DOCUSATE SODIUM 100 MG: 100 CAPSULE, LIQUID FILLED ORAL at 08:36

## 2017-11-26 RX ADMIN — CEFTRIAXONE SODIUM 1 G: 1 INJECTION, SOLUTION INTRAVENOUS at 19:50

## 2017-11-26 RX ADMIN — BISACODYL 10 MG: 10 SUPPOSITORY RECTAL at 18:44

## 2017-11-27 ENCOUNTER — TELEPHONE (OUTPATIENT)
Dept: ONCOLOGY | Facility: CLINIC | Age: 82
End: 2017-11-27

## 2017-11-27 PROBLEM — G93.41 METABOLIC ENCEPHALOPATHY: Status: ACTIVE | Noted: 2017-11-27

## 2017-11-27 LAB
BACTERIA SPEC AEROBE CULT: ABNORMAL
BACTERIA SPEC AEROBE CULT: ABNORMAL

## 2017-11-27 PROCEDURE — 97110 THERAPEUTIC EXERCISES: CPT

## 2017-11-27 PROCEDURE — 97116 GAIT TRAINING THERAPY: CPT

## 2017-11-27 PROCEDURE — 25010000002 ENOXAPARIN PER 10 MG: Performed by: INTERNAL MEDICINE

## 2017-11-27 PROCEDURE — 99221 1ST HOSP IP/OBS SF/LOW 40: CPT | Performed by: INTERNAL MEDICINE

## 2017-11-27 PROCEDURE — 25010000002 CEFTRIAXONE PER 250 MG: Performed by: INTERNAL MEDICINE

## 2017-11-27 RX ADMIN — FAMOTIDINE 20 MG: 20 TABLET, FILM COATED ORAL at 17:24

## 2017-11-27 RX ADMIN — LISINOPRIL 2.5 MG: 5 TABLET ORAL at 08:04

## 2017-11-27 RX ADMIN — ENOXAPARIN SODIUM 30 MG: 30 INJECTION SUBCUTANEOUS at 08:05

## 2017-11-27 RX ADMIN — DICLOFENAC SODIUM 2 G: 10 GEL TOPICAL at 08:05

## 2017-11-27 RX ADMIN — CEFTRIAXONE SODIUM 1 G: 1 INJECTION, SOLUTION INTRAVENOUS at 21:20

## 2017-11-27 RX ADMIN — DOCUSATE SODIUM 100 MG: 100 CAPSULE, LIQUID FILLED ORAL at 08:04

## 2017-11-27 RX ADMIN — FAMOTIDINE 20 MG: 20 TABLET, FILM COATED ORAL at 08:05

## 2017-11-27 RX ADMIN — HYDROCODONE BITARTRATE AND ACETAMINOPHEN 1 TABLET: 7.5; 325 TABLET ORAL at 21:20

## 2017-11-27 RX ADMIN — POLYETHYLENE GLYCOL 3350 17 G: 17 POWDER, FOR SOLUTION ORAL at 08:05

## 2017-11-27 RX ADMIN — DOCUSATE SODIUM 100 MG: 100 CAPSULE, LIQUID FILLED ORAL at 17:24

## 2017-11-27 NOTE — TELEPHONE ENCOUNTER
----- Message from Janice Ward sent at 11/27/2017  8:50 AM EST -----  Regarding: [facility] - PATIENT ADMITTED   Contact: [phone]  APOLLO THOMAS, DAUGHTER IN LAW CALLED TO LET DR CEVALLOS KNOW THAT PATIENT IS ADMITTED AT [facility], ROOM [address].    SHE WAS ADMITTED BECAUSE SHE HAS 3 CRACKED PELVIC BONES. SHE HAD BEEN COMPLAINING OF HIP FOR WEEKS BEFORE SHE FELL LAST WEEK.     THEY ARE CONCERNED AND WANTED TO KNOW IF DR. CEVALLOS COULD COME BY AND MAKE SURE THAT CANCER ISN'T THE CAUSE OF THE BROKEN BONES.

## 2017-11-28 PROCEDURE — 25010000002 CEFTRIAXONE PER 250 MG: Performed by: INTERNAL MEDICINE

## 2017-11-28 PROCEDURE — 25010000002 ENOXAPARIN PER 10 MG: Performed by: INTERNAL MEDICINE

## 2017-11-28 RX ADMIN — FAMOTIDINE 20 MG: 20 TABLET, FILM COATED ORAL at 18:07

## 2017-11-28 RX ADMIN — HYDROCODONE BITARTRATE AND ACETAMINOPHEN 1 TABLET: 7.5; 325 TABLET ORAL at 19:59

## 2017-11-28 RX ADMIN — LISINOPRIL 2.5 MG: 5 TABLET ORAL at 09:37

## 2017-11-28 RX ADMIN — DICLOFENAC SODIUM 2 G: 10 GEL TOPICAL at 18:08

## 2017-11-28 RX ADMIN — DOCUSATE SODIUM 100 MG: 100 CAPSULE, LIQUID FILLED ORAL at 09:37

## 2017-11-28 RX ADMIN — POLYETHYLENE GLYCOL 3350 17 G: 17 POWDER, FOR SOLUTION ORAL at 09:37

## 2017-11-28 RX ADMIN — DICLOFENAC SODIUM 2 G: 10 GEL TOPICAL at 09:39

## 2017-11-28 RX ADMIN — CEFTRIAXONE SODIUM 1 G: 1 INJECTION, SOLUTION INTRAVENOUS at 19:59

## 2017-11-28 RX ADMIN — ENOXAPARIN SODIUM 30 MG: 30 INJECTION SUBCUTANEOUS at 09:37

## 2017-11-28 RX ADMIN — ACETAMINOPHEN 650 MG: 325 TABLET, FILM COATED ORAL at 00:02

## 2017-11-28 RX ADMIN — HYDROCODONE BITARTRATE AND ACETAMINOPHEN 1 TABLET: 7.5; 325 TABLET ORAL at 05:50

## 2017-11-28 RX ADMIN — FAMOTIDINE 20 MG: 20 TABLET, FILM COATED ORAL at 09:37

## 2017-11-28 RX ADMIN — DOCUSATE SODIUM 100 MG: 100 CAPSULE, LIQUID FILLED ORAL at 18:07

## 2017-11-29 VITALS
SYSTOLIC BLOOD PRESSURE: 146 MMHG | BODY MASS INDEX: 24.22 KG/M2 | TEMPERATURE: 98 F | DIASTOLIC BLOOD PRESSURE: 72 MMHG | RESPIRATION RATE: 18 BRPM | HEIGHT: 61 IN | WEIGHT: 128.3 LBS | OXYGEN SATURATION: 95 % | HEART RATE: 86 BPM

## 2017-11-29 PROCEDURE — 25010000002 ENOXAPARIN PER 10 MG: Performed by: INTERNAL MEDICINE

## 2017-11-29 PROCEDURE — 97110 THERAPEUTIC EXERCISES: CPT

## 2017-11-29 PROCEDURE — 97116 GAIT TRAINING THERAPY: CPT

## 2017-11-29 RX ORDER — LISINOPRIL 2.5 MG/1
2.5 TABLET ORAL
Start: 2017-11-29 | End: 2018-09-10 | Stop reason: HOSPADM

## 2017-11-29 RX ORDER — ACETAMINOPHEN 325 MG/1
650 TABLET ORAL EVERY 6 HOURS PRN
Start: 2017-11-29

## 2017-11-29 RX ORDER — FAMOTIDINE 20 MG/1
20 TABLET, FILM COATED ORAL 2 TIMES DAILY
Start: 2017-11-29

## 2017-11-29 RX ORDER — CEFUROXIME AXETIL 250 MG/1
250 TABLET ORAL EVERY 12 HOURS SCHEDULED
Qty: 1 TABLET | Refills: 0
Start: 2017-11-29 | End: 2017-11-30

## 2017-11-29 RX ORDER — BISACODYL 10 MG
10 SUPPOSITORY, RECTAL RECTAL DAILY PRN
Start: 2017-11-29

## 2017-11-29 RX ORDER — PSEUDOEPHEDRINE HCL 30 MG
100 TABLET ORAL 2 TIMES DAILY
Start: 2017-11-29

## 2017-11-29 RX ORDER — CEFUROXIME AXETIL 250 MG/1
250 TABLET ORAL EVERY 12 HOURS SCHEDULED
Status: DISCONTINUED | OUTPATIENT
Start: 2017-11-29 | End: 2017-11-29 | Stop reason: HOSPADM

## 2017-11-29 RX ADMIN — ENOXAPARIN SODIUM 30 MG: 30 INJECTION SUBCUTANEOUS at 10:03

## 2017-11-29 RX ADMIN — POLYETHYLENE GLYCOL 3350 17 G: 17 POWDER, FOR SOLUTION ORAL at 10:03

## 2017-11-29 RX ADMIN — CEFUROXIME AXETIL 250 MG: 250 TABLET ORAL at 10:11

## 2017-11-29 RX ADMIN — FAMOTIDINE 20 MG: 20 TABLET, FILM COATED ORAL at 10:03

## 2017-11-29 RX ADMIN — DICLOFENAC SODIUM 2 G: 10 GEL TOPICAL at 10:04

## 2017-11-29 RX ADMIN — DOCUSATE SODIUM 100 MG: 100 CAPSULE, LIQUID FILLED ORAL at 10:03

## 2017-11-29 RX ADMIN — LISINOPRIL 2.5 MG: 5 TABLET ORAL at 10:03

## 2017-12-08 ENCOUNTER — TELEPHONE (OUTPATIENT)
Dept: ONCOLOGY | Facility: CLINIC | Age: 82
End: 2017-12-08

## 2017-12-08 NOTE — TELEPHONE ENCOUNTER
----- Message from Susan Guzman RN sent at 12/7/2017  3:35 PM EST -----  Regarding: REJI  Contact: [phone]      ----- Message -----     From: Janice Ward     Sent: 12/7/2017   3:31 PM       To: [facility]  Subject: [facility] - UPDATE FROM [facility]          [place] with [facility] CALLED TO NOTIFY DR. CEVALLOS THAT THEY HAD DID AN ADMISSION BUT PATIENT ENDED UP COMING TO [facility] AND THEN CURRENTLY AT [facility]. HOPEFULLY WHEN SHE GETS HOME, THEY WILL FOLLOW UP WITH HER AT HOME.

## 2018-02-07 ENCOUNTER — HOSPITAL ENCOUNTER (INPATIENT)
Facility: HOSPITAL | Age: 83
LOS: 4 days | Discharge: NURSING, REHAB OR LONG-TERM CARE | End: 2018-02-12
Attending: EMERGENCY MEDICINE | Admitting: FAMILY MEDICINE

## 2018-02-07 ENCOUNTER — APPOINTMENT (OUTPATIENT)
Dept: GENERAL RADIOLOGY | Facility: HOSPITAL | Age: 83
End: 2018-02-07

## 2018-02-07 DIAGNOSIS — F03.90 DEMENTIA WITHOUT BEHAVIORAL DISTURBANCE, UNSPECIFIED DEMENTIA TYPE: ICD-10-CM
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
## 2018-02-07 DIAGNOSIS — S72.001A CLOSED FRACTURE OF NECK OF RIGHT FEMUR, INITIAL ENCOUNTER (HCC): Primary | ICD-10-CM

## 2018-02-07 DIAGNOSIS — N39.0 ACUTE UTI: ICD-10-CM

## 2018-02-07 DIAGNOSIS — Z74.09 IMPAIRED MOBILITY AND ADLS: ICD-10-CM

## 2018-02-07 DIAGNOSIS — Z78.9 IMPAIRED MOBILITY AND ADLS: ICD-10-CM

## 2018-02-07 DIAGNOSIS — Z74.09 IMPAIRED FUNCTIONAL MOBILITY, BALANCE, GAIT, AND ENDURANCE: ICD-10-CM

## 2018-02-07 PROBLEM — R29.6 RECURRENT FALLS: Status: ACTIVE | Noted: 2018-02-07

## 2018-02-07 PROBLEM — C18.9 METASTATIC COLON CANCER IN FEMALE: Status: ACTIVE | Noted: 2018-02-07

## 2018-02-07 LAB
APTT PPP: 29.8 SECONDS (ref 24–31)
BASOPHILS # BLD AUTO: 0.01 10*3/MM3 (ref 0–0.2)
BASOPHILS NFR BLD AUTO: 0.1 % (ref 0–1)
BILIRUB UR QL STRIP: NEGATIVE
CLARITY UR: ABNORMAL
COLOR UR: YELLOW
DEPRECATED RDW RBC AUTO: 42.5 FL (ref 37–54)
EOSINOPHIL # BLD AUTO: 0.01 10*3/MM3 (ref 0–0.3)
EOSINOPHIL NFR BLD AUTO: 0.1 % (ref 0–3)
ERYTHROCYTE [DISTWIDTH] IN BLOOD BY AUTOMATED COUNT: 14 % (ref 11.3–14.5)
GLUCOSE UR STRIP-MCNC: NEGATIVE MG/DL
HCT VFR BLD AUTO: 34.9 % (ref 34.5–44)
HGB BLD-MCNC: 11.8 G/DL (ref 11.5–15.5)
HGB UR QL STRIP.AUTO: ABNORMAL
IMM GRANULOCYTES # BLD: 0.06 10*3/MM3 (ref 0–0.03)
IMM GRANULOCYTES NFR BLD: 0.4 % (ref 0–0.6)
INR PPP: 1.04
KETONES UR QL STRIP: ABNORMAL
LEUKOCYTE ESTERASE UR QL STRIP.AUTO: ABNORMAL
LYMPHOCYTES # BLD AUTO: 0.55 10*3/MM3 (ref 0.6–4.8)
LYMPHOCYTES NFR BLD AUTO: 3.8 % (ref 24–44)
MCH RBC QN AUTO: 28.2 PG (ref 27–31)
MCHC RBC AUTO-ENTMCNC: 33.8 G/DL (ref 32–36)
MCV RBC AUTO: 83.5 FL (ref 80–99)
MONOCYTES # BLD AUTO: 1.44 10*3/MM3 (ref 0–1)
MONOCYTES NFR BLD AUTO: 10 % (ref 0–12)
NEUTROPHILS # BLD AUTO: 12.4 10*3/MM3 (ref 1.5–8.3)
NEUTROPHILS NFR BLD AUTO: 85.6 % (ref 41–71)
NITRITE UR QL STRIP: POSITIVE
PH UR STRIP.AUTO: 5.5 [PH] (ref 5–8)
PLATELET # BLD AUTO: 236 10*3/MM3 (ref 150–450)
PMV BLD AUTO: 10.1 FL (ref 6–12)
PROT UR QL STRIP: ABNORMAL
PROTHROMBIN TIME: 11.3 SECONDS (ref 9.6–11.5)
RBC # BLD AUTO: 4.18 10*6/MM3 (ref 3.89–5.14)
SP GR UR STRIP: 1.02 (ref 1–1.03)
UROBILINOGEN UR QL STRIP: ABNORMAL
WBC NRBC COR # BLD: 14.47 10*3/MM3 (ref 3.5–10.8)

## 2018-02-07 PROCEDURE — 73552 X-RAY EXAM OF FEMUR 2/>: CPT

## 2018-02-07 PROCEDURE — 80053 COMPREHEN METABOLIC PANEL: CPT | Performed by: PHYSICIAN ASSISTANT

## 2018-02-07 PROCEDURE — 25010000002 ONDANSETRON PER 1 MG: Performed by: EMERGENCY MEDICINE

## 2018-02-07 PROCEDURE — 80053 COMPREHEN METABOLIC PANEL: CPT | Performed by: EMERGENCY MEDICINE

## 2018-02-07 PROCEDURE — 87186 SC STD MICRODIL/AGAR DIL: CPT | Performed by: EMERGENCY MEDICINE

## 2018-02-07 PROCEDURE — 81001 URINALYSIS AUTO W/SCOPE: CPT | Performed by: EMERGENCY MEDICINE

## 2018-02-07 PROCEDURE — 85610 PROTHROMBIN TIME: CPT | Performed by: EMERGENCY MEDICINE

## 2018-02-07 PROCEDURE — 99284 EMERGENCY DEPT VISIT MOD MDM: CPT

## 2018-02-07 PROCEDURE — 25010000002 HYDROMORPHONE PER 4 MG: Performed by: EMERGENCY MEDICINE

## 2018-02-07 PROCEDURE — 87077 CULTURE AEROBIC IDENTIFY: CPT | Performed by: EMERGENCY MEDICINE

## 2018-02-07 PROCEDURE — 85730 THROMBOPLASTIN TIME PARTIAL: CPT | Performed by: EMERGENCY MEDICINE

## 2018-02-07 PROCEDURE — P9612 CATHETERIZE FOR URINE SPEC: HCPCS

## 2018-02-07 PROCEDURE — 87086 URINE CULTURE/COLONY COUNT: CPT | Performed by: EMERGENCY MEDICINE

## 2018-02-07 PROCEDURE — 85025 COMPLETE CBC W/AUTO DIFF WBC: CPT | Performed by: EMERGENCY MEDICINE

## 2018-02-07 PROCEDURE — 93005 ELECTROCARDIOGRAM TRACING: CPT | Performed by: EMERGENCY MEDICINE

## 2018-02-07 PROCEDURE — 73502 X-RAY EXAM HIP UNI 2-3 VIEWS: CPT

## 2018-02-07 RX ORDER — ONDANSETRON 2 MG/ML
4 INJECTION INTRAMUSCULAR; INTRAVENOUS ONCE
Status: COMPLETED | OUTPATIENT
Start: 2018-02-07 | End: 2018-02-07

## 2018-02-07 RX ORDER — CEFTRIAXONE SODIUM 1 G/50ML
1 INJECTION, SOLUTION INTRAVENOUS ONCE
Status: COMPLETED | OUTPATIENT
Start: 2018-02-07 | End: 2018-02-08

## 2018-02-07 RX ORDER — HYDROMORPHONE HYDROCHLORIDE 1 MG/ML
0.5 INJECTION, SOLUTION INTRAMUSCULAR; INTRAVENOUS; SUBCUTANEOUS ONCE
Status: COMPLETED | OUTPATIENT
Start: 2018-02-07 | End: 2018-02-07

## 2018-02-07 RX ADMIN — ONDANSETRON 4 MG: 2 INJECTION INTRAMUSCULAR; INTRAVENOUS at 23:03

## 2018-02-07 RX ADMIN — HYDROMORPHONE HYDROCHLORIDE 0.5 MG: 10 INJECTION, SOLUTION INTRAMUSCULAR; INTRAVENOUS; SUBCUTANEOUS at 23:02

## 2018-02-08 ENCOUNTER — ANESTHESIA (OUTPATIENT)
Dept: PERIOP | Facility: HOSPITAL | Age: 83
End: 2018-02-08

## 2018-02-08 ENCOUNTER — APPOINTMENT (OUTPATIENT)
Dept: GENERAL RADIOLOGY | Facility: HOSPITAL | Age: 83
End: 2018-02-08

## 2018-02-08 ENCOUNTER — ANESTHESIA EVENT (OUTPATIENT)
Dept: PERIOP | Facility: HOSPITAL | Age: 83
End: 2018-02-08

## 2018-02-08 PROBLEM — S72.001A CLOSED FRACTURE OF NECK OF RIGHT FEMUR (HCC): Status: ACTIVE | Noted: 2018-02-08

## 2018-02-08 LAB
ALBUMIN SERPL-MCNC: 3 G/DL (ref 3.2–4.8)
ALBUMIN SERPL-MCNC: 3.2 G/DL (ref 3.2–4.8)
ALBUMIN/GLOB SERPL: 1.2 G/DL (ref 1.5–2.5)
ALBUMIN/GLOB SERPL: 1.3 G/DL (ref 1.5–2.5)
ALP SERPL-CCNC: 151 U/L (ref 25–100)
ALP SERPL-CCNC: 161 U/L (ref 25–100)
ALT SERPL W P-5'-P-CCNC: 37 U/L (ref 7–40)
ALT SERPL W P-5'-P-CCNC: 39 U/L (ref 7–40)
ANION GAP SERPL CALCULATED.3IONS-SCNC: 11 MMOL/L (ref 3–11)
ANION GAP SERPL CALCULATED.3IONS-SCNC: 7 MMOL/L (ref 3–11)
ANION GAP SERPL CALCULATED.3IONS-SCNC: 9 MMOL/L (ref 3–11)
AST SERPL-CCNC: 100 U/L (ref 0–33)
AST SERPL-CCNC: 103 U/L (ref 0–33)
BACTERIA UR QL AUTO: ABNORMAL /HPF
BILIRUB SERPL-MCNC: 0.7 MG/DL (ref 0.3–1.2)
BILIRUB SERPL-MCNC: 0.7 MG/DL (ref 0.3–1.2)
BUN BLD-MCNC: 17 MG/DL (ref 9–23)
BUN BLD-MCNC: 17 MG/DL (ref 9–23)
BUN BLD-MCNC: 18 MG/DL (ref 9–23)
BUN/CREAT SERPL: 24.3 (ref 7–25)
BUN/CREAT SERPL: 34 (ref 7–25)
BUN/CREAT SERPL: 36 (ref 7–25)
CALCIUM SPEC-SCNC: 7.9 MG/DL (ref 8.7–10.4)
CALCIUM SPEC-SCNC: 8.3 MG/DL (ref 8.7–10.4)
CALCIUM SPEC-SCNC: 8.5 MG/DL (ref 8.7–10.4)
CHLORIDE SERPL-SCNC: 95 MMOL/L (ref 99–109)
CHLORIDE SERPL-SCNC: 95 MMOL/L (ref 99–109)
CHLORIDE SERPL-SCNC: 96 MMOL/L (ref 99–109)
CO2 SERPL-SCNC: 22 MMOL/L (ref 20–31)
CO2 SERPL-SCNC: 24 MMOL/L (ref 20–31)
CO2 SERPL-SCNC: 24 MMOL/L (ref 20–31)
CREAT BLD-MCNC: 0.5 MG/DL (ref 0.6–1.3)
CREAT BLD-MCNC: 0.5 MG/DL (ref 0.6–1.3)
CREAT BLD-MCNC: 0.7 MG/DL (ref 0.6–1.3)
DEPRECATED RDW RBC AUTO: 43.5 FL (ref 37–54)
ERYTHROCYTE [DISTWIDTH] IN BLOOD BY AUTOMATED COUNT: 14.1 % (ref 11.3–14.5)
GFR SERPL CREATININE-BSD FRML MDRD: 116 ML/MIN/1.73
GFR SERPL CREATININE-BSD FRML MDRD: 116 ML/MIN/1.73
GFR SERPL CREATININE-BSD FRML MDRD: 79 ML/MIN/1.73
GLOBULIN UR ELPH-MCNC: 2.3 GM/DL
GLOBULIN UR ELPH-MCNC: 2.6 GM/DL
GLUCOSE BLD-MCNC: 105 MG/DL (ref 70–100)
GLUCOSE BLD-MCNC: 124 MG/DL (ref 70–100)
GLUCOSE BLD-MCNC: 125 MG/DL (ref 70–100)
HCT VFR BLD AUTO: 33.5 % (ref 34.5–44)
HGB BLD-MCNC: 10.9 G/DL (ref 11.5–15.5)
HOLD SPECIMEN: NORMAL
HOLD SPECIMEN: NORMAL
MCH RBC QN AUTO: 27.7 PG (ref 27–31)
MCHC RBC AUTO-ENTMCNC: 32.5 G/DL (ref 32–36)
MCV RBC AUTO: 85 FL (ref 80–99)
PLATELET # BLD AUTO: 238 10*3/MM3 (ref 150–450)
PMV BLD AUTO: 10.2 FL (ref 6–12)
POTASSIUM BLD-SCNC: 3.6 MMOL/L (ref 3.5–5.5)
POTASSIUM BLD-SCNC: 3.7 MMOL/L (ref 3.5–5.5)
POTASSIUM BLD-SCNC: 3.7 MMOL/L (ref 3.5–5.5)
PROT SERPL-MCNC: 5.3 G/DL (ref 5.7–8.2)
PROT SERPL-MCNC: 5.8 G/DL (ref 5.7–8.2)
RBC # BLD AUTO: 3.94 10*6/MM3 (ref 3.89–5.14)
RBC # UR: ABNORMAL /HPF
REF LAB TEST METHOD: ABNORMAL
SODIUM BLD-SCNC: 127 MMOL/L (ref 132–146)
SODIUM BLD-SCNC: 128 MMOL/L (ref 132–146)
SODIUM BLD-SCNC: 128 MMOL/L (ref 132–146)
SQUAMOUS #/AREA URNS HPF: ABNORMAL /HPF
WBC NRBC COR # BLD: 12.7 10*3/MM3 (ref 3.5–10.8)
WBC UR QL AUTO: ABNORMAL /HPF
WHOLE BLOOD HOLD SPECIMEN: NORMAL
WHOLE BLOOD HOLD SPECIMEN: NORMAL

## 2018-02-08 PROCEDURE — 25010000003 CEFAZOLIN IN DEXTROSE 2-4 GM/100ML-% SOLUTION: Performed by: ORTHOPAEDIC SURGERY

## 2018-02-08 PROCEDURE — 25010000002 CEFTRIAXONE PER 250 MG: Performed by: EMERGENCY MEDICINE

## 2018-02-08 PROCEDURE — 25010000002 PHENYLEPHRINE PER 1 ML: Performed by: ANESTHESIOLOGY

## 2018-02-08 PROCEDURE — 87040 BLOOD CULTURE FOR BACTERIA: CPT | Performed by: EMERGENCY MEDICINE

## 2018-02-08 PROCEDURE — C1776 JOINT DEVICE (IMPLANTABLE): HCPCS | Performed by: ORTHOPAEDIC SURGERY

## 2018-02-08 PROCEDURE — 25010000002 DEXAMETHASONE PER 1 MG: Performed by: ANESTHESIOLOGY

## 2018-02-08 PROCEDURE — 25010000002 ROPIVACAINE PER 1 MG: Performed by: ORTHOPAEDIC SURGERY

## 2018-02-08 PROCEDURE — C1713 ANCHOR/SCREW BN/BN,TIS/BN: HCPCS | Performed by: ORTHOPAEDIC SURGERY

## 2018-02-08 PROCEDURE — 80048 BASIC METABOLIC PNL TOTAL CA: CPT | Performed by: PHYSICIAN ASSISTANT

## 2018-02-08 PROCEDURE — 25010000002 HYDROMORPHONE PER 4 MG: Performed by: FAMILY MEDICINE

## 2018-02-08 PROCEDURE — 25010000002 FENTANYL CITRATE (PF) 100 MCG/2ML SOLUTION: Performed by: ANESTHESIOLOGY

## 2018-02-08 PROCEDURE — 25010000002 CEFTRIAXONE PER 250 MG: Performed by: PHYSICIAN ASSISTANT

## 2018-02-08 PROCEDURE — 25010000002 PROPOFOL 10 MG/ML EMULSION: Performed by: ANESTHESIOLOGY

## 2018-02-08 PROCEDURE — 99223 1ST HOSP IP/OBS HIGH 75: CPT | Performed by: FAMILY MEDICINE

## 2018-02-08 PROCEDURE — 27236 TREAT THIGH FRACTURE: CPT | Performed by: ORTHOPAEDIC SURGERY

## 2018-02-08 PROCEDURE — 85027 COMPLETE CBC AUTOMATED: CPT | Performed by: PHYSICIAN ASSISTANT

## 2018-02-08 PROCEDURE — 25010000002 MORPHINE PER 10 MG: Performed by: ORTHOPAEDIC SURGERY

## 2018-02-08 PROCEDURE — 25010000002 KETOROLAC TROMETHAMINE PER 15 MG: Performed by: ORTHOPAEDIC SURGERY

## 2018-02-08 PROCEDURE — 0SRR039 REPLACEMENT OF RIGHT HIP JOINT, FEMORAL SURFACE WITH CERAMIC SYNTHETIC SUBSTITUTE, CEMENTED, OPEN APPROACH: ICD-10-PCS | Performed by: ORTHOPAEDIC SURGERY

## 2018-02-08 PROCEDURE — 25010000002 ROPIVACAINE PER 1 MG: Performed by: ANESTHESIOLOGY

## 2018-02-08 PROCEDURE — 99222 1ST HOSP IP/OBS MODERATE 55: CPT | Performed by: ORTHOPAEDIC SURGERY

## 2018-02-08 PROCEDURE — 25010000002 ONDANSETRON PER 1 MG: Performed by: ANESTHESIOLOGY

## 2018-02-08 PROCEDURE — 72170 X-RAY EXAM OF PELVIS: CPT

## 2018-02-08 DEVICE — HD UHR BIPOL 28X44MM: Type: IMPLANTABLE DEVICE | Site: HIP | Status: FUNCTIONAL

## 2018-02-08 DEVICE — PRT HIP UNI/BIPOL STRYKER: Type: IMPLANTABLE DEVICE | Site: HIP | Status: FUNCTIONAL

## 2018-02-08 DEVICE — IMPLANTABLE DEVICE: Type: IMPLANTABLE DEVICE | Site: HIP | Status: FUNCTIONAL

## 2018-02-08 DEVICE — CMT BONE SIMPLEX RO FUL DOSE: Type: IMPLANTABLE DEVICE | Site: HIP | Status: FUNCTIONAL

## 2018-02-08 RX ORDER — DOCUSATE SODIUM 100 MG/1
100 CAPSULE, LIQUID FILLED ORAL 2 TIMES DAILY PRN
Status: DISCONTINUED | OUTPATIENT
Start: 2018-02-08 | End: 2018-02-12 | Stop reason: HOSPADM

## 2018-02-08 RX ORDER — HYDROMORPHONE HYDROCHLORIDE 1 MG/ML
0.5 INJECTION, SOLUTION INTRAMUSCULAR; INTRAVENOUS; SUBCUTANEOUS EVERY 4 HOURS PRN
Status: DISCONTINUED | OUTPATIENT
Start: 2018-02-08 | End: 2018-02-08

## 2018-02-08 RX ORDER — SODIUM CHLORIDE 0.9 % (FLUSH) 0.9 %
1-10 SYRINGE (ML) INJECTION AS NEEDED
Status: DISCONTINUED | OUTPATIENT
Start: 2018-02-08 | End: 2018-02-08 | Stop reason: HOSPADM

## 2018-02-08 RX ORDER — ROPIVACAINE HYDROCHLORIDE 2 MG/ML
INJECTION, SOLUTION EPIDURAL; INFILTRATION; PERINEURAL AS NEEDED
Status: DISCONTINUED | OUTPATIENT
Start: 2018-02-08 | End: 2018-02-08 | Stop reason: SURG

## 2018-02-08 RX ORDER — ONDANSETRON 4 MG/1
4 TABLET, FILM COATED ORAL EVERY 6 HOURS PRN
Status: DISCONTINUED | OUTPATIENT
Start: 2018-02-08 | End: 2018-02-12 | Stop reason: HOSPADM

## 2018-02-08 RX ORDER — ACETAMINOPHEN 325 MG/1
650 TABLET ORAL EVERY 4 HOURS PRN
Status: DISCONTINUED | OUTPATIENT
Start: 2018-02-08 | End: 2018-02-08 | Stop reason: SDUPTHER

## 2018-02-08 RX ORDER — ONDANSETRON 2 MG/ML
4 INJECTION INTRAMUSCULAR; INTRAVENOUS EVERY 6 HOURS PRN
Status: DISCONTINUED | OUTPATIENT
Start: 2018-02-08 | End: 2018-02-12 | Stop reason: HOSPADM

## 2018-02-08 RX ORDER — ACETAMINOPHEN 160 MG/5ML
650 SOLUTION ORAL EVERY 4 HOURS PRN
Status: DISCONTINUED | OUTPATIENT
Start: 2018-02-08 | End: 2018-02-12 | Stop reason: HOSPADM

## 2018-02-08 RX ORDER — HYDROMORPHONE HYDROCHLORIDE 1 MG/ML
0.5 INJECTION, SOLUTION INTRAMUSCULAR; INTRAVENOUS; SUBCUTANEOUS
Status: DISCONTINUED | OUTPATIENT
Start: 2018-02-08 | End: 2018-02-12 | Stop reason: HOSPADM

## 2018-02-08 RX ORDER — PROPOFOL 10 MG/ML
VIAL (ML) INTRAVENOUS AS NEEDED
Status: DISCONTINUED | OUTPATIENT
Start: 2018-02-08 | End: 2018-02-08 | Stop reason: SURG

## 2018-02-08 RX ORDER — ASPIRIN 325 MG
325 TABLET, DELAYED RELEASE (ENTERIC COATED) ORAL EVERY 12 HOURS SCHEDULED
Status: DISCONTINUED | OUTPATIENT
Start: 2018-02-09 | End: 2018-02-09

## 2018-02-08 RX ORDER — CEFAZOLIN SODIUM 2 G/100ML
2 INJECTION, SOLUTION INTRAVENOUS
Status: DISCONTINUED | OUTPATIENT
Start: 2018-02-08 | End: 2018-02-08 | Stop reason: HOSPADM

## 2018-02-08 RX ORDER — MEMANTINE HYDROCHLORIDE 10 MG/1
10 TABLET ORAL 2 TIMES DAILY
COMMUNITY

## 2018-02-08 RX ORDER — SODIUM CHLORIDE 9 MG/ML
100 INJECTION, SOLUTION INTRAVENOUS CONTINUOUS
Status: DISCONTINUED | OUTPATIENT
Start: 2018-02-08 | End: 2018-02-12 | Stop reason: HOSPADM

## 2018-02-08 RX ORDER — HYDROMORPHONE HYDROCHLORIDE 1 MG/ML
0.5 INJECTION, SOLUTION INTRAMUSCULAR; INTRAVENOUS; SUBCUTANEOUS
Status: DISCONTINUED | OUTPATIENT
Start: 2018-02-08 | End: 2018-02-08 | Stop reason: SDUPTHER

## 2018-02-08 RX ORDER — FAMOTIDINE 20 MG/1
20 TABLET, FILM COATED ORAL
Status: COMPLETED | OUTPATIENT
Start: 2018-02-08 | End: 2018-02-08

## 2018-02-08 RX ORDER — DONEPEZIL HYDROCHLORIDE 10 MG/1
10 TABLET, FILM COATED ORAL NIGHTLY
COMMUNITY

## 2018-02-08 RX ORDER — CEFTRIAXONE SODIUM 1 G/50ML
1 INJECTION, SOLUTION INTRAVENOUS EVERY 24 HOURS
Status: DISCONTINUED | OUTPATIENT
Start: 2018-02-08 | End: 2018-02-10

## 2018-02-08 RX ORDER — SODIUM CHLORIDE 0.9 % (FLUSH) 0.9 %
1-10 SYRINGE (ML) INJECTION AS NEEDED
Status: DISCONTINUED | OUTPATIENT
Start: 2018-02-08 | End: 2018-02-12 | Stop reason: HOSPADM

## 2018-02-08 RX ORDER — SODIUM CHLORIDE 9 MG/ML
INJECTION, SOLUTION INTRAVENOUS CONTINUOUS PRN
Status: DISCONTINUED | OUTPATIENT
Start: 2018-02-08 | End: 2018-02-08 | Stop reason: SURG

## 2018-02-08 RX ORDER — HYDROMORPHONE HYDROCHLORIDE 1 MG/ML
0.5 INJECTION, SOLUTION INTRAMUSCULAR; INTRAVENOUS; SUBCUTANEOUS
Status: DISCONTINUED | OUTPATIENT
Start: 2018-02-08 | End: 2018-02-08 | Stop reason: HOSPADM

## 2018-02-08 RX ORDER — ROPIVACAINE HYDROCHLORIDE 2 MG/ML
10 INJECTION, SOLUTION EPIDURAL; INFILTRATION CONTINUOUS
Status: DISCONTINUED | OUTPATIENT
Start: 2018-02-08 | End: 2018-02-12 | Stop reason: HOSPADM

## 2018-02-08 RX ORDER — ATRACURIUM BESYLATE 10 MG/ML
INJECTION, SOLUTION INTRAVENOUS AS NEEDED
Status: DISCONTINUED | OUTPATIENT
Start: 2018-02-08 | End: 2018-02-08 | Stop reason: SURG

## 2018-02-08 RX ORDER — HYDROCODONE BITARTRATE AND ACETAMINOPHEN 5; 325 MG/1; MG/1
1 TABLET ORAL EVERY 4 HOURS PRN
Status: DISCONTINUED | OUTPATIENT
Start: 2018-02-08 | End: 2018-02-12 | Stop reason: HOSPADM

## 2018-02-08 RX ORDER — MAGNESIUM HYDROXIDE 1200 MG/15ML
LIQUID ORAL AS NEEDED
Status: DISCONTINUED | OUTPATIENT
Start: 2018-02-08 | End: 2018-02-08 | Stop reason: HOSPADM

## 2018-02-08 RX ORDER — HYDROCODONE BITARTRATE AND ACETAMINOPHEN 5; 325 MG/1; MG/1
2 TABLET ORAL EVERY 4 HOURS PRN
Status: DISCONTINUED | OUTPATIENT
Start: 2018-02-08 | End: 2018-02-12 | Stop reason: HOSPADM

## 2018-02-08 RX ORDER — BISACODYL 10 MG
10 SUPPOSITORY, RECTAL RECTAL DAILY PRN
Status: DISCONTINUED | OUTPATIENT
Start: 2018-02-08 | End: 2018-02-08 | Stop reason: SDUPTHER

## 2018-02-08 RX ORDER — ONDANSETRON 2 MG/ML
INJECTION INTRAMUSCULAR; INTRAVENOUS AS NEEDED
Status: DISCONTINUED | OUTPATIENT
Start: 2018-02-08 | End: 2018-02-08 | Stop reason: SURG

## 2018-02-08 RX ORDER — FENTANYL CITRATE 50 UG/ML
INJECTION, SOLUTION INTRAMUSCULAR; INTRAVENOUS AS NEEDED
Status: DISCONTINUED | OUTPATIENT
Start: 2018-02-08 | End: 2018-02-08 | Stop reason: SURG

## 2018-02-08 RX ORDER — SODIUM CHLORIDE, SODIUM LACTATE, POTASSIUM CHLORIDE, CALCIUM CHLORIDE 600; 310; 30; 20 MG/100ML; MG/100ML; MG/100ML; MG/100ML
INJECTION, SOLUTION INTRAVENOUS CONTINUOUS PRN
Status: DISCONTINUED | OUTPATIENT
Start: 2018-02-08 | End: 2018-02-08 | Stop reason: SURG

## 2018-02-08 RX ORDER — CEFAZOLIN SODIUM 2 G/100ML
2 INJECTION, SOLUTION INTRAVENOUS EVERY 8 HOURS
Status: COMPLETED | OUTPATIENT
Start: 2018-02-08 | End: 2018-02-09

## 2018-02-08 RX ORDER — FENTANYL CITRATE 50 UG/ML
50 INJECTION, SOLUTION INTRAMUSCULAR; INTRAVENOUS
Status: DISCONTINUED | OUTPATIENT
Start: 2018-02-08 | End: 2018-02-08 | Stop reason: HOSPADM

## 2018-02-08 RX ORDER — DEXAMETHASONE SODIUM PHOSPHATE 4 MG/ML
INJECTION, SOLUTION INTRA-ARTICULAR; INTRALESIONAL; INTRAMUSCULAR; INTRAVENOUS; SOFT TISSUE AS NEEDED
Status: DISCONTINUED | OUTPATIENT
Start: 2018-02-08 | End: 2018-02-08 | Stop reason: SURG

## 2018-02-08 RX ORDER — NALOXONE HCL 0.4 MG/ML
0.1 VIAL (ML) INJECTION
Status: DISCONTINUED | OUTPATIENT
Start: 2018-02-08 | End: 2018-02-12 | Stop reason: HOSPADM

## 2018-02-08 RX ORDER — SODIUM CHLORIDE 9 MG/ML
75 INJECTION, SOLUTION INTRAVENOUS CONTINUOUS
Status: ACTIVE | OUTPATIENT
Start: 2018-02-08 | End: 2018-02-08

## 2018-02-08 RX ORDER — RANITIDINE 150 MG/1
150 TABLET ORAL 2 TIMES DAILY
COMMUNITY
End: 2018-09-10 | Stop reason: HOSPADM

## 2018-02-08 RX ORDER — BISACODYL 5 MG/1
10 TABLET, DELAYED RELEASE ORAL DAILY PRN
Status: DISCONTINUED | OUTPATIENT
Start: 2018-02-08 | End: 2018-02-12 | Stop reason: HOSPADM

## 2018-02-08 RX ORDER — BISACODYL 10 MG
10 SUPPOSITORY, RECTAL RECTAL DAILY PRN
Status: DISCONTINUED | OUTPATIENT
Start: 2018-02-08 | End: 2018-02-12 | Stop reason: HOSPADM

## 2018-02-08 RX ORDER — ACETAMINOPHEN 325 MG/1
650 TABLET ORAL EVERY 4 HOURS PRN
Status: DISCONTINUED | OUTPATIENT
Start: 2018-02-08 | End: 2018-02-12 | Stop reason: HOSPADM

## 2018-02-08 RX ORDER — LORAZEPAM 0.5 MG/1
0.5 TABLET ORAL EVERY 8 HOURS PRN
Status: ON HOLD | COMMUNITY
End: 2018-09-10

## 2018-02-08 RX ORDER — ACETAMINOPHEN 650 MG/1
650 SUPPOSITORY RECTAL EVERY 4 HOURS PRN
Status: DISCONTINUED | OUTPATIENT
Start: 2018-02-08 | End: 2018-02-12 | Stop reason: HOSPADM

## 2018-02-08 RX ORDER — LIDOCAINE HYDROCHLORIDE 10 MG/ML
INJECTION, SOLUTION INFILTRATION; PERINEURAL AS NEEDED
Status: DISCONTINUED | OUTPATIENT
Start: 2018-02-08 | End: 2018-02-08 | Stop reason: SURG

## 2018-02-08 RX ADMIN — SODIUM CHLORIDE, POTASSIUM CHLORIDE, SODIUM LACTATE AND CALCIUM CHLORIDE: 600; 310; 30; 20 INJECTION, SOLUTION INTRAVENOUS at 18:17

## 2018-02-08 RX ADMIN — SODIUM CHLORIDE 100 ML/HR: 9 INJECTION, SOLUTION INTRAVENOUS at 21:22

## 2018-02-08 RX ADMIN — SODIUM CHLORIDE: 9 INJECTION, SOLUTION INTRAVENOUS at 18:55

## 2018-02-08 RX ADMIN — CEFAZOLIN SODIUM 2 G: 2 INJECTION, SOLUTION INTRAVENOUS at 18:31

## 2018-02-08 RX ADMIN — ROPIVACAINE HYDROCHLORIDE 50 ML: 2 INJECTION, SOLUTION EPIDURAL; INFILTRATION at 16:56

## 2018-02-08 RX ADMIN — EPHEDRINE SULFATE 10 MG: 50 INJECTION INTRAMUSCULAR; INTRAVENOUS; SUBCUTANEOUS at 18:38

## 2018-02-08 RX ADMIN — DEXAMETHASONE SODIUM PHOSPHATE 8 MG: 4 INJECTION, SOLUTION INTRAMUSCULAR; INTRAVENOUS at 18:43

## 2018-02-08 RX ADMIN — PHENYLEPHRINE HYDROCHLORIDE 200 MCG: 10 INJECTION INTRAVENOUS at 18:37

## 2018-02-08 RX ADMIN — FENTANYL CITRATE 100 MCG: 50 INJECTION, SOLUTION INTRAMUSCULAR; INTRAVENOUS at 16:56

## 2018-02-08 RX ADMIN — FAMOTIDINE 20 MG: 20 TABLET, FILM COATED ORAL at 16:27

## 2018-02-08 RX ADMIN — TRANEXAMIC ACID 1000 MG: 100 INJECTION, SOLUTION INTRAVENOUS at 18:34

## 2018-02-08 RX ADMIN — LIDOCAINE HYDROCHLORIDE 20 MG: 10 INJECTION, SOLUTION INFILTRATION; PERINEURAL at 18:25

## 2018-02-08 RX ADMIN — TRANEXAMIC ACID 1000 MG: 100 INJECTION, SOLUTION INTRAVENOUS at 19:32

## 2018-02-08 RX ADMIN — CEFAZOLIN SODIUM 2 G: 2 INJECTION, SOLUTION INTRAVENOUS at 21:57

## 2018-02-08 RX ADMIN — HYDROMORPHONE HYDROCHLORIDE 0.5 MG: 10 INJECTION, SOLUTION INTRAMUSCULAR; INTRAVENOUS; SUBCUTANEOUS at 01:19

## 2018-02-08 RX ADMIN — FENTANYL CITRATE 50 MCG: 50 INJECTION, SOLUTION INTRAMUSCULAR; INTRAVENOUS at 18:23

## 2018-02-08 RX ADMIN — CEFTRIAXONE SODIUM 1 G: 1 INJECTION, SOLUTION INTRAVENOUS at 08:57

## 2018-02-08 RX ADMIN — PROPOFOL 60 MG: 10 INJECTION, EMULSION INTRAVENOUS at 18:25

## 2018-02-08 RX ADMIN — ONDANSETRON 4 MG: 2 INJECTION INTRAMUSCULAR; INTRAVENOUS at 19:51

## 2018-02-08 RX ADMIN — PHENYLEPHRINE HYDROCHLORIDE 100 MCG: 10 INJECTION INTRAVENOUS at 19:16

## 2018-02-08 RX ADMIN — ATRACURIUM BESYLATE 30 MG: 10 INJECTION, SOLUTION INTRAVENOUS at 18:25

## 2018-02-08 RX ADMIN — CEFTRIAXONE SODIUM 1 G: 1 INJECTION, SOLUTION INTRAVENOUS at 01:19

## 2018-02-08 RX ADMIN — HYDROMORPHONE HYDROCHLORIDE 0.5 MG: 10 INJECTION, SOLUTION INTRAMUSCULAR; INTRAVENOUS; SUBCUTANEOUS at 03:21

## 2018-02-08 RX ADMIN — SODIUM CHLORIDE 100 ML/HR: 9 INJECTION, SOLUTION INTRAVENOUS at 03:00

## 2018-02-08 NOTE — H&P
[facility] Medicine Services  HISTORY AND PHYSICAL    Patient Name: Zainab Worthy  : 2/3/1929  MRN: [id number]  Primary Care Physician: Carolyn Giraldo DO (Inactive)    Subjective   Subjective     Chief Complaint:  Right hip pain    HPI:  Zainab Worthy is a 89 y.o. female with a past medical history significant for recurrent falls, colon cancer with mets to liver, and hypertension who presents with right hip pain. Worse with weight bearing and movement. HPI limited secondary to patient disposition. She is hard of hearing with advanced dementia. Children and bedside relay history. State she is a resident of the [facility] and was found on the floor by the nurse at 4am. Has complained of right hip pain with difficulty in ambulation since then. The fall was unwitnessed. Patient does not remember how she fell, but family suspects she fell going to the bathroom. Typically uses walker for transportation. Imaging per the [facility] demonstrated acute right hip fracture. No complaints of recent illness. No cough, congestion, chest pain, fever, or N/V/D. No abdominal pain or dysuria. Sent to [facility] for further evaluation and treatment.    Records reviewed indicate patient was admitted 2017 secondary to mechanical fall. Imagining at this time demonstrated mildly displaced fractures of the inferior pubic rami as well as a nondisplaced fracture of the left sacral body. Orthopedics was consulted and recommended weightbearing as tolerated, therapy, and pain control. She was also evaluated by her oncologist, Dr. Matt and was ruled out for pathologic etiology. Patient also found to be confused and treated for UTI. She was discharged to [facility], and later transferred to long term rehab facility. Has been residing at the [facility] for nearly a week.    Of note, patient's family reports she has been started on Aricept, Ativan, and Namenda within the past month for memory and  dementia. Family also notes anorexia and has lost nearly 20 pounds within the past 10 weeks.    Emergency Department Evaluation; vitals stable. Glucose increased to 125. Sodium low at 128. Alk phos increased to 151. AST elevated at 100. WBC increased to 15,000. UTI demonstrates acute infection. Repeat imagining impressive for right femoral neck fracture.    Review of Systems   Constitutional: Negative for chills and fever.   HENT: Negative for congestion and trouble swallowing.    Eyes: Negative for photophobia and visual disturbance.   Respiratory: Negative for cough and shortness of breath.    Cardiovascular: Negative for chest pain and leg swelling.   Gastrointestinal: Negative for abdominal pain, diarrhea, nausea and vomiting.   Endocrine: Negative for cold intolerance and heat intolerance.   Genitourinary: Negative for dysuria and flank pain.   Musculoskeletal: Positive for arthralgias and gait problem. Negative for back pain.   Skin: Negative for pallor and rash.   Allergic/Immunologic: Negative for immunocompromised state.   Neurological: Positive for weakness. Negative for dizziness and headaches.   Hematological: Negative for adenopathy.   Psychiatric/Behavioral: Positive for confusion. Negative for agitation.          Otherwise 10-system ROS reviewed and is negative except as mentioned in the HPI.    Personal History     Past Medical History:   Diagnosis Date   • Arthritis    • Cataract    • Colon cancer    • Colon cancer metastasized to liver    • Hearing loss    • Liver cancer    • Macular degeneration    • Urine frequency        Past Surgical History:   Procedure Laterality Date   • CATARACT EXTRACTION Bilateral    • COLON RESECTION  06/16/2009    Low anterior resection   • GALLBLADDER SURGERY      Resection of gallbladder   • LIVER SURGERY  12/09/2014    Liver tumor, 1/2 was removed.   • PARTIAL HYSTERECTOMY         Family History: family history includes Arthritis in her other; Cervical cancer in her  other; Colon cancer in her other; Uterine cancer in her other.     Social History:  reports that she has never smoked. She has never used smokeless tobacco. She reports that she does not drink alcohol or use illicit drugs.  Social History     Social History Narrative       Medications:    (Not in a hospital admission)    No Known Allergies    Objective   Objective     Vital Signs:   Temp:  [98 °F (36.7 °C)] 98 °F (36.7 °C)  Heart Rate:  [103-110] 103  Resp:  [18] 18  BP: (129-133)/(77-99) 129/77        Physical Exam   Constitutional: No acute distress, awake, alert, hard of hearing, confused, and chronically ill appearing  Eyes: PERRLA, sclerae anicteric, no conjunctival injection  HENT: NCAT, mucous membranes dry  Neck: Supple, no thyromegaly, no lymphadenopathy, trachea midline  Respiratory: Clear to auscultation bilaterally, nonlabored respirations   Cardiovascular: RRR, no murmurs, rubs, or gallops, palpable pedal pulses bilaterally  Gastrointestinal: Positive bowel sounds, soft, nontender, nondistended  Musculoskeletal: No bilateral ankle edema, no clubbing or cyanosis to extremities  Tenderness to palpation at right hip, pulses palpable. Neurovascularly intact distal to injury  Psychiatric: Appropriate affect, cooperative  Neurologic: Oriented x 3, strength symmetric in all extremities, Cranial Nerves grossly intact to confrontation, speech clear  Skin: No rashes      Results Reviewed:  I have personally reviewed current lab, radiology, and data and agree.      Results from last 7 days  Lab Units 02/07/18  2308   WBC 10*3/mm3 14.47*   HEMOGLOBIN g/dL 11.8   HEMATOCRIT % 34.9   PLATELETS 10*3/mm3 236   INR  1.04           Invalid input(s):  ALKPHOS, TROPONININT  CrCl cannot be calculated (Patient's most recent sCr result is older than the maximum 30 days allowed.).  Brief Urine Lab Results  (Last result in the past 365 days)      Color   Clarity   Blood   Leuk Est   Nitrite   Protein   CREAT   Urine HCG         02/07/18 2315 Yellow Cloudy(A) Moderate (2+)(A) Large (3+)(A) Positive(A) 30 mg/dL (1+)(A)             No results found for: BNP  No results found for: PHART  Imaging Results (last 24 hours)     Procedure Component Value Units Date/Time    XR Femur 2 View Right [[id number]] Collected:  02/07/18 2237     Updated:  02/07/18 2320    Narrative:       EXAM:    XR Right Femur, 2 Views    CLINICAL HISTORY:    89 years old, female; Injury or trauma; Fall; Initial encounter; Fracture,   traumatic; Closed fracture; Hip; Right    TECHNIQUE:    Frontal and lateral views of the right femur.    COMPARISON:    No relevant prior studies available.    FINDINGS:    Bones/joints:  Transcervical fracture of the right femoral neck.  No other   fracture or dislocation.    Soft tissues:  Unremarkable.      Impression:         Right femoral neck fracture.    THIS DOCUMENT HAS BEEN ELECTRONICALLY SIGNED BY AGATA MARTINEZ MD    XR Hip With or Without Pelvis 2 - 3 View Right [[id number]] Collected:  02/07/18 2237     Updated:  02/07/18 2321    Narrative:       EXAM:    XR Right Hip With Pelvis When Performed, 2 or 3 Views    CLINICAL HISTORY:    89 years old, female; Injury or trauma; Fall; Initial encounter; Fracture of   pelvis & hip; Right; Traumatic fracture; Neck of femur, midcervical; Closed   fracture    TECHNIQUE:    Two or three views of the right hip, with pelvis when performed.    COMPARISON:    CT PELVIS WO CONTRAST 2017-11-24 21:17    FINDINGS:    Bones/joints:  Bilateral inferior and right superior pubic ramus fractures   with callus formation.  Transcervical fracture of the right femoral neck.  No   dislocation.    Soft tissues:  Unremarkable.      Impression:         Right femoral neck fracture.    THIS DOCUMENT HAS BEEN ELECTRONICALLY SIGNED BY AGATA MARTINEZ MD             Assessment/Plan   Assessment / Plan     Hospital Problem List     * (Principal)Fracture of femoral neck, right    UTI (urinary tract infection)     Hypertension    Dementia    Metastatic colon cancer in female    Recurrent falls            Assessment & Plan:  1. Fracture of right femoral neck:   - likely secondary to mechanical fall. History of recurrent falls  - Ortho to see in am, patient discussed with Dr. Dominguez  - PT/OT treat and eval  - pain control as needed  - am labs  - NPO for now with saline 75 cc/hr X 12 hours    2. UTI:  - no history of resistant loren.  - administered rocephin in ed. Will continue.    3. Hypertension:  - stable and controlled. continue lisinopril.    4. Dementia:  - continue new meds  - nutrition eval.     5. Metastatic colon cancer to liver:  - S/P chemo and partial liver lobectomy   - in remission and followed by Dr. Matt    DVT prophylaxis: mechanical    CODE STATUS:  Full     Admission Status:  I believe this patient meets INPATIENT status due to the need for care which can only be reasonably provided in an hospital setting such as aggressive/expedited ancillary services and/or consultation services, the necessity for IV medications, close physician monitoring and/or the possible need for procedures.  In such, I feel patient’s risk for adverse outcomes and need for care warrant INPATIENT evaluation and predict the patient’s care encounter to likely last beyond 2 midnights.      Eleuterio Boateng PA-C  02/08/18   12:01 AM      Brief Attending Admission Attestation     I have seen and examined the patient, performing an independent face-to-face diagnostic evaluation with plan of care reviewed and developed with the advanced practice clinician (APC).      Brief Summary Statement/HPI:   Zainab Worthy is a 89 y.o. female with h/o dementia. Patient is currently a resident of the [facility] as of Friday of last week.  She was living at home until approximately 10 weeks ago when she sustained a pelvic fracture.  It resulted in admission to [facility] and then to [facility].  Her disposition at time of discharge was to assisted  living at the [facility].  Patient according to family had been found by assisted living staff lying in the floor.  She had gotten up to go to the Restroom by Herself.   Family states she has had limited ambulation since Her Pelvic Fracture and is very dependent on her Rollator Walker.  Her fall was unwitnessed.  Patient had x-ray at the [facility] that noted a fracture and therefore she was sent to [facility] emergency department for further evaluation.  In the emergency department she was found to have a right femoral neck fracture on x-ray.  Orthopedics was consulted by the emergency room physician and patient will be admitted to telemetry and evaluated by Ortho in am.         Attending Physical Exam:  Constitutional: No acute distress, drowsy, responds to verbal stimuli  Eyes: PERRLA, sclerae anicteric, no conjunctival injection  HENT: NCAT, mucous membranes dry  Neck: Supple, no thyromegaly, no lymphadenopathy, trachea midline  Respiratory: Clear to auscultation bilaterally, nonlabored respirations   Cardiovascular: RRR, no murmurs, rubs, or gallops, palpable pedal pulses bilaterally  Gastrointestinal: Positive bowel sounds, soft, nontender, nondistended  Musculoskeletal: No bilateral ankle edema, tender over the right proximal thigh laterally.   Psychiatric: lethargic from pain meds, no agitation.   Neurologic: Oriented only to self, Cranial Nerves grossly intact to confrontation, speech clear but limited.   Skin: No rashes      Brief Assessment/Plan :  See above for further detailed assessment and plan developed with APC which I have reviewed and/or edited.    I believe this patient meets INPATIENT status due to the need for care which can only be reasonably provided in an hospital setting such as aggressive/expedited ancillary services and/or consultation services, the necessity for IV medications, close physician monitoring and/or the possible need for procedures.  In such, I feel patient’s risk for  adverse outcomes and need for care warrant INPATIENT evaluation and predict the patient’s care encounter to likely last beyond 2 midnights.    Dorothea Gotti DO  02/08/18  7:27 AM

## 2018-02-08 NOTE — ANESTHESIA PROCEDURE NOTES
Airway  Urgency: elective    End Time:2/8/2018 6:34 PM  Airway not difficult    General Information and Staff    Patient location during procedure: OR  Anesthesiologist: SHRADDHA NAVARRO    Indications and Patient Condition  Indications for airway management: airway protection    Preoxygenated: yes  MILS not maintained throughout  Mask difficulty assessment: 1 - vent by mask    Final Airway Details  Final airway type: endotracheal airway      Successful airway: ETT  Cuffed: yes   Successful intubation technique: direct laryngoscopy  Endotracheal tube insertion site: oral  Blade: Tessie  Blade size: #3  ETT size: 7.0 mm  Cormack-Lehane Classification: grade I - full view of glottis  Placement verified by: chest auscultation and capnometry   Measured from: lips  ETT to lips (cm): 20  Number of attempts at approach: 1    Additional Comments  Negative epigastric sounds, Breath sound equal bilaterally with symmetric chest rise and fall

## 2018-02-08 NOTE — PLAN OF CARE
Problem: Patient Care Overview (Adult)  Goal: Plan of Care Review  Outcome: Ongoing (interventions implemented as appropriate)   02/08/18    Coping/Psychosocial Response Interventions   Plan Of Care Reviewed With patient   Patient Care Overview   Progress no change   Outcome Evaluation   Outcome Summary/Follow up Plan 89 y o fell R hip fx VS stable pain does not complain of pain she is incontinent of urine. Went for surgical repair at approx 1600.

## 2018-02-08 NOTE — PROGRESS NOTES
Malnutrition Severity Assessment    Patient Name:  Zainab Worthy  YOB: 1929  MRN: [id number]  Admit Date:  2/7/2018         Comments:  Based on pt  recent wt loss and poor PO intake, pt meets criteria for severe malnutrition r/t acute illness/injury.  Physical assessment not completed to determine fat and muscle wasting- however it is not required for malnutrition diagnosis.  MD please attest and include in pt dx as you deem appropriate.     Malnutrition Type: Acute Illness/Injury Malnutrition     Malnutrition Type (last 8 hours)      Malnutrition Severity Assessment       02/08/18 1325    Malnutrition Severity Assessment    Malnutrition Type Acute Illness/Injury Malnutrition      02/08/18 1325    Weight Status (Acute)    Weight Loss Severe (>7.5% / 3 mo)   15.6% x 3 months       02/08/18 1325    Energy Intake Status (Acute)    Energy Intake Severe (< or equal to 50% / > or equal to 5d)   Poor intake x 3months r/t multiple hospital admissions and overall decline.           Electronically signed by:  Jennifer Villeda RD  02/08/18 1:39 PM

## 2018-02-08 NOTE — PROGRESS NOTES
Adult Nutrition  Assessment/PES    Patient Name:  Zainab Worthy  YOB: 1929  MRN: [id number]  Admit Date:  2/7/2018    Assessment Date:  2/8/2018    Comments:  Nutrition assessment based on nutrition screen of weight loss and poor appetite prior to admit.  Pt confused at time of visit and unable to provide any information.  Spoke with son via phone who reports that patient has not been eating well since early Nov when patient had a fall and had to be admitted to multiple different facilities.  Used to refuse most meals provided and sometimes had difficulty chewing meals.  He is not aware of patient being offered any nutr'l supplements.       Recommendations:  · Start soft diet once able to have PO diet.  · Monitor for any swallowing/ chewing issues and need to SLP eval  · Will provide Boost + with meals for caloric support; anticipate patient will continue to have poor appetite during hospital admission.  · Start MVI daily  · [facility] staff to assist with meal and to encourage intake as needed.         Reason for Assessment       02/08/18 1306    Reason for Assessment    Reason For Assessment/Visit identified at risk by screening criteria    Identified At Risk By Screening Criteria unintentional loss of 10 lbs or more in the past 2 mos;reduced oral intake over the last month    Time Spent (min) 20    Diagnosis Diagnosis    Neurological Dementia    Oncology Other (comment)   h/o colon CA with liver mets.  In remession     Ortho Fracture   plan for surgery today.    Principal Problem:    Fracture of femoral neck, right  Active Problems:    Hypertension    UTI (urinary tract infection)    Dementia    Metastatic colon cancer in female    Recurrent falls    Closed fracture of neck of right femur               Nutrition/Diet History       02/08/18 1309    Nutrition/Diet History    Factors Affecting Nutritional Intake Factors    Reported/Observed By RD/Tech    Other Per [facility] admission data base and MD note patient  with recent weight loss and poor appetite.  Pt confused at time of visit and unable to answer any questions.  Spoke with patient son via phone in regards to patient weigth loss and poor appetite. Per son weight loss and poor appetite r/t mutliple hospital admission and overall decline            Anthropometrics       02/08/18 1311    Anthropometrics (Special Considerations)    Height Used for Calculations 1.524 m (5')    Weight Used for Calculations 49 kg (108 lb)   bed weight.     Usual Body Weight (UBW)    Usual Body Weight 58.1 kg (128 lb)    Weight Loss 9.072 kg (20 lb)    % Weight Loss  15.6 %    Weight Loss Time Frame 3 months             Labs/Tests/Procedures/Meds       02/08/18 1322    Labs/Tests/Procedures/Meds    Labs/Tests Review Reviewed;Na+    Medication Review Reviewed, pertinent   IVF @ 100ml/hr                 Nutrition Prescription Ordered       02/08/18 1325    Nutrition Prescription PO    Current PO Diet NPO                Malnutrition Severity Assessment       02/08/18 1325    Malnutrition Severity Assessment    Malnutrition Type Acute Illness/Injury Malnutrition    Weight Status (Acute)    Weight Loss Severe (>7.5% / 3 mo)   15.6% x 3 months     Energy Intake Status (Acute)    Energy Intake Severe (< or equal to 50% / > or equal to 5d)   Poor intake x 3months r/t multiple hospital admissions and overall decline.         Problem/Interventions:        Problem 1       02/08/18 1328    Nutrition Diagnoses Problem 1    Problem 1 Malnutrition    Etiology (related to) Other (comment)   weight loss; poor appetite.     Signs/Symptoms (evidenced by) Unintended Weight Change;Report of Mnimal PO Intake    Unintended Weight Change Loss    Number of Pounds Lost 20    Weight loss time period 3 months                     Intervention Goal       02/08/18 1329    Intervention Goal    General Nutrition support treatment    PO Initiate feeding   as medically appropriate             Nutrition Intervention        02/08/18 1329    Nutrition Intervention    RD/Tech Action Follow Tx progress;Care plan reviewd;Supplement provided            Nutrition Prescription       02/08/18 1329    Nutrition Prescription PO    PO Prescription --   start mech soft diet with think liquids per discussion with patient son.  Trial of Boost + BID with meals             Education/Evaluation       02/08/18 1330    Monitor/Evaluation    Monitor Per protocol;Symptoms        Electronically signed by:  Jennifer Villeda RD  02/08/18 1:30 PM

## 2018-02-08 NOTE — PROGRESS NOTES
Continued Stay Note  [facility]     Patient Name: Zainab Worthy  MRN: [id number]  Today's Date: 2/8/2018    Admit Date: 2/7/2018          Discharge Plan       02/08/18 1125    Case Management/Social Work Plan    Plan Rehab    Patient/Family In Agreement With Plan yes    Additional Comments Talked to Ms. Worthy at .  She was pleasantly confused.  Called her son and talked to him about discharge plan.  She has been at [facility] for 2 weeks in assisted living apt.  She has been at [facility] and [facility] in the last 5 months.  Her son states he knows she needs a higher level care at the [facility] now.  May qualify for Skilled bed there after 3 day in pt., stay.  Her son was in agreement with transferring her back to [facility] in a skilled bed.  She uses a RW and w/c there.  Her PCP is Carolyn Giraldo.  [facility] are in the process of getting her a w/c.   Goal is to transfer to rehab/skilled bed at [facility].  Called D[facility].  She said she would have a skilled bed for her to return on Monday 2/12 if medically ready.                Discharge Codes     None            Rober Green RN

## 2018-02-08 NOTE — ANESTHESIA PREPROCEDURE EVALUATION
Anesthesia Evaluation     NPO Solid Status: > 8 hours  NPO Liquid Status: > 8 hours           Airway   TM distance: >3 FB  Neck ROM: full  no difficulty expected  Dental          Pulmonary    (+) decreased breath sounds,   (-) not a smokerAsthma: lungs clear.  Cardiovascular     ECG reviewed  Rhythm: regular  Rate: normal    (+) hypertension,   (-) pacemaker, angina, cardiac stentsCAD: abnormal EKG.      Neuro/Psych  (-) seizures, TIA, CVA  GI/Hepatic/Renal/Endo    (+)  liver disease,   (-) no renal disease, diabetes    Musculoskeletal     Abdominal    Substance History      OB/GYN          Other        ROS/Med Hx Other: Hyponatremia  today                Anesthesia Plan    ASA 3     general   (GA  Fascia iliaca)  intravenous induction

## 2018-02-08 NOTE — ED PROVIDER NOTES
Subjective   HPI Comments: Zainab Worthy is a 89 y.o.female who presents to the emergency department after an unwitnessed fall at 0400 this morning. The patient's family believes that she had gotten up to go to the bathroom in the middle of the night when she fell. Staff at the nursing home found her on the floor this morning. She was apparently lynig on her left side but was complaining of pain in her right leg and knee. The patient had an x-ray at the facility and was told that she had a partially displaced right femoral head frcture. The patient denies any chest pain or difficulty breathing. She has no abdominal pain. She denies cough, congestion, or other cold symptoms. There are no other acute complaints at this time.      Patient is a 89 y.o. female presenting with fall.   History provided by:  Patient and relative  Fall   Mechanism of injury: fall    Injury location:  Pelvis  Pelvic injury location:  R hip  Incident location:  [facility]  Time since incident:  16 hours  Arrived directly from scene: no    Fall:     Fall occurred:  Unable to specify    Impact surface:  Unable to specify    Point of impact:  Unable to specify    Entrapped after fall: no    Suspicion of alcohol use: no    Suspicion of drug use: no    Prior to arrival data:     Loss of consciousness: no      Amnesic to event: no    Associated symptoms: no abdominal pain, no back pain, no chest pain, no difficulty breathing, no headaches, no loss of consciousness, no nausea, no neck pain and no vomiting        Review of Systems   Constitutional: Negative for chills and fever.   HENT: Negative for congestion, rhinorrhea, sore throat and trouble swallowing.    Respiratory: Negative for cough and shortness of breath.    Cardiovascular: Negative for chest pain.   Gastrointestinal: Negative for abdominal pain, diarrhea, nausea and vomiting.   Genitourinary: Negative for difficulty urinating, dysuria, frequency, hematuria and urgency.   Musculoskeletal:  Positive for arthralgias (right knee). Negative for back pain and neck pain.   Neurological: Negative for dizziness, loss of consciousness, weakness and headaches.   All other systems reviewed and are negative.      Past Medical History:   Diagnosis Date   • Arthritis    • Cataract    • Colon cancer    • Colon cancer metastasized to liver    • Hearing loss    • Liver cancer    • Macular degeneration    • Urine frequency        No Known Allergies    Past Surgical History:   Procedure Laterality Date   • CATARACT EXTRACTION Bilateral    • COLON RESECTION  06/16/2009    Low anterior resection   • GALLBLADDER SURGERY      Resection of gallbladder   • LIVER SURGERY  12/09/2014    Liver tumor, 1/2 was removed.   • PARTIAL HYSTERECTOMY         Family History   Problem Relation Age of Onset   • Arthritis Other    • Cervical cancer Other    • Uterine cancer Other    • Colon cancer Other        Social History     Social History   • Marital status:      Spouse name: N/A   • Number of children: 3   • Years of education: N/A     Occupational History   • homemaker       Social History Main Topics   • Smoking status: Never Smoker   • Smokeless tobacco: Never Used   • Alcohol use No   • Drug use: No   • Sexual activity: No     Other Topics Concern   • Not on file     Social History Narrative         Objective   Physical Exam   Constitutional: She is oriented to person, place, and time. She appears well-developed and well-nourished. No distress.   HENT:   Head: Normocephalic and atraumatic.   Nose: Nose normal.   Mouth/Throat: Oropharynx is clear and moist.   No signs of cranial trauma.   Eyes: Conjunctivae are normal. No scleral icterus.   Neck: Normal range of motion. Neck supple.   Cardiovascular: Normal rate, regular rhythm and normal heart sounds.    No murmur heard.  Pulmonary/Chest: Effort normal and breath sounds normal. No respiratory distress.   Abdominal: Soft. Bowel sounds are normal. She exhibits no mass. There  is no tenderness. There is no rebound and no guarding.   Musculoskeletal: She exhibits no edema.   Pelvis is stable.   Neurological: She is alert and oriented to person, place, and time.   Skin: Skin is warm and dry. No erythema.   Psychiatric: She has a normal mood and affect. Her behavior is normal.   Nursing note and vitals reviewed.      Procedures         ED Course  ED Course   Comment By Time   Dr. Acevedo paged Dr. Dominguez. Margie Cohen 02/07 2322   Dr. Acevedo paged the hospitalist for admission. Margie Cohen 02/07 2331   Dr. Acevedo discussed the case in detail with the hospitalist Dr. Gotti who will admit the patient. Margie Cohen 02/07 2332     Recent Results (from the past 24 hour(s))   CBC Auto Differential    Collection Time: 02/07/18 11:08 PM   Result Value Ref Range    WBC 14.47 (H) 3.50 - 10.80 10*3/mm3    RBC 4.18 3.89 - 5.14 10*6/mm3    Hemoglobin 11.8 11.5 - 15.5 g/dL    Hematocrit 34.9 34.5 - 44.0 %    MCV 83.5 80.0 - 99.0 fL    MCH 28.2 27.0 - 31.0 pg    MCHC 33.8 32.0 - 36.0 g/dL    RDW 14.0 11.3 - 14.5 %    RDW-SD 42.5 37.0 - 54.0 fl    MPV 10.1 6.0 - 12.0 fL    Platelets 236 150 - 450 10*3/mm3    Neutrophil % 85.6 (H) 41.0 - 71.0 %    Lymphocyte % 3.8 (L) 24.0 - 44.0 %    Monocyte % 10.0 0.0 - 12.0 %    Eosinophil % 0.1 0.0 - 3.0 %    Basophil % 0.1 0.0 - 1.0 %    Immature Grans % 0.4 0.0 - 0.6 %    Neutrophils, Absolute 12.40 (H) 1.50 - 8.30 10*3/mm3    Lymphocytes, Absolute 0.55 (L) 0.60 - 4.80 10*3/mm3    Monocytes, Absolute 1.44 (H) 0.00 - 1.00 10*3/mm3    Eosinophils, Absolute 0.01 0.00 - 0.30 10*3/mm3    Basophils, Absolute 0.01 0.00 - 0.20 10*3/mm3    Immature Grans, Absolute 0.06 (H) 0.00 - 0.03 10*3/mm3   Urinalysis With / Culture If Indicated - Urine, Catheter    Collection Time: 02/07/18 11:15 PM   Result Value Ref Range    Color, UA Yellow Yellow, Straw    Appearance, UA Cloudy (A) Clear    pH, UA 5.5 5.0 - 8.0    Specific Gravity, UA 1.018 1.001 - 1.030    Glucose, UA  "Negative Negative    Ketones, UA Trace (A) Negative    Bilirubin, UA Negative Negative    Blood, UA Moderate (2+) (A) Negative    Protein, UA 30 mg/dL (1+) (A) Negative    Leuk Esterase, UA Large (3+) (A) Negative    Nitrite, UA Positive (A) Negative    Urobilinogen, UA 1.0 E.U./dL 0.2 - 1.0 E.U./dL     Note: In addition to lab results from this visit, the labs listed above may include labs taken at another facility or during a different encounter within the last 24 hours. Please correlate lab times with ED admission and discharge times for further clarification of the services performed during this visit.    XR Hip With or Without Pelvis 2 - 3 View Right   Final Result     Right femoral neck fracture.      THIS DOCUMENT HAS BEEN ELECTRONICALLY SIGNED BY AGATA MARTINEZ MD      XR Femur 2 View Right   Final Result     Right femoral neck fracture.      THIS DOCUMENT HAS BEEN ELECTRONICALLY SIGNED BY AGATA MARTINEZ MD        Vitals:    02/07/18 2214 02/07/18 2215 02/07/18 2230   BP: 133/99 133/99 129/77   Patient Position: Lying     Pulse: 110  103   Resp: 18     Temp: 98 °F (36.7 °C)     SpO2: 97% 99% 96%   Weight: 48.5 kg (107 lb)     Height: 160 cm (63\")       Medications   cefTRIAXone (ROCEPHIN) IVPB 1 g (not administered)   HYDROmorphone (DILAUDID) injection 0.5 mg (0.5 mg Intravenous Given 2/7/18 2302)   ondansetron (ZOFRAN) injection 4 mg (4 mg Intravenous Given 2/7/18 2303)     ECG/EMG Results (last 24 hours)     Procedure Component Value Units Date/Time    ECG 12 Lead [[id number]] Collected:  02/07/18 2214     Updated:  02/07/18 2217                        MDM    Final diagnoses:   Closed fracture of neck of right femur, initial encounter   Acute UTI   Dementia without behavioral disturbance, unspecified dementia type   Fall, initial encounter       Documentation assistance provided by joana Cohen.  Information recorded by the joana was done at my direction and has been verified and validated by " me.     Margie Cohen  02/07/18 2245       Margie Cohen  02/07/18 4660       Nick Acevedo MD  02/07/18 1785

## 2018-02-08 NOTE — ANESTHESIA PROCEDURE NOTES
Peripheral Block    Patient location during procedure: pre-op  Stop time: 2/8/2018 5:08 PM  Reason for block: procedure for pain and at surgeon's request  Performed by  Anesthesiologist: SHRADDHA NAVARRO  Preanesthetic Checklist  Completed: patient identified, site marked, surgical consent, pre-op evaluation, timeout performed, IV checked, risks and benefits discussed and monitors and equipment checked  Prep:  Sterile barriers:cap, gloves and mask  Prep: ChloraPrep  Patient monitoring: blood pressure monitoring, continuous pulse oximetry and EKG  Procedure    Guidance:ultrasound guided  Images:still images obtained    Laterality:right  Block Type:fascia iliaca catheter  Injection Technique:catheter  Needle Type:echogenic  Needle Gauge:18 G    Catheter Size:20 G (20g)  Cath Depth at skin: 16 cm  Medications  Preservative Free Saline:10ml  Local Injected:ropivacaine 0.2% Local Amount Injected:50mL  Post Assessment  Injection Assessment: negative aspiration for heme, no paresthesia on injection and incremental injection  Patient Tolerance:comfortable throughout block  Complications:no  Additional Notes  Procedure:                 Pt placed in supine position.   The insertion site was prepped in sterile fashion with Chlorapreop and clear plastic drapes.  Analgesia was provided by skin infiltration at insertion site with Lidocaine 1% 3mls.  A B-Hinton 18 g , 4 inch echogenic Touhy needle was advance In-plane under ultrasound guidance. The   Anterior superior Iliac crest was initially visualized and the probe was directed slightly medially and slightly towards the umbilicus.  The course of the needle was tracked over the sartorius muscle through the fascia Iliacus and into the anterior portion of the Iliacus muscle.  Major vessels where identified and avoided as where structures of the peritoneal cavity.  LA injection was made incrementally in 1-5ml amounts spread was visualized superiorly below fascia iliacus.  Injection  was completed with negative aspiration of blood and negative intravascular injection.  Injection pressures where normal or minimal resistance.  A 20 g B-Hinton wire styleted catheter was then advance thru the needle and very easily placed in a superior or cephalad direction.  The catheter was secured at insertion site with skin afix , mastisol, steristreps.  A CHG tegaderm dressing was placed over the insertion site and the nerve catheter labeled and capped.  Thank You.

## 2018-02-08 NOTE — PROGRESS NOTES
"    [facility] Medicine Services  PROGRESS NOTE    Patient Name: Zainab Worthy  : 2/3/1929  MRN: [id number]    Date of Admission: 2018  Length of Stay: 0  Primary Care Physician: Carolyn Giraldo DO (Inactive)    Subjective   Subjective     CC:  Found down at [place] with R hip pain.     HPI:  \"I'm hungry - I don't know why they havent brought me anything to eat.\"  Does not remember that she is scheduled for surgery.   No pain.     Review of Systems   UTO due to confusion    Otherwise ROS is negative except as mentioned in the HPI.    Objective   Objective     Vital Signs:   Temp:  [97.8 °F (36.6 °C)-98 °F (36.7 °C)] 98 °F (36.7 °C)  Heart Rate:  [] 92  Resp:  [12-18] 14  BP: (108-144)/(54-99) 144/66        Physical Exam:  Gen:  WD/WN, alert, pleasant  Neuro: alert and confsued, clear speech, follows simplencommands, grossly nonfocal  HEENT:  NC/AT PERRL, OP benign  Neck:  Supple, no LAD  Heart RRR no murmur, rub, or gallop  Lungs CTA nonlabored  Abd:  Soft, nontender, no rebound or guarding, pos BS  Extrem:  No c/c/e.  Tender R groin    Results Reviewed:  I have personally reviewed current lab, radiology, and data and agree.      Results from last 7 days  Lab Units 18  0520 18  2308   WBC 10*3/mm3 12.70* 14.47*   HEMOGLOBIN g/dL 10.9* 11.8   HEMATOCRIT % 33.5* 34.9   PLATELETS 10*3/mm3 238 236   INR   --  1.04       Results from last 7 days  Lab Units 18  0520 18  2308   SODIUM mmol/L 128* 128*  127*   POTASSIUM mmol/L 3.7 3.7  3.6   CHLORIDE mmol/L 95* 95*  96*   CO2 mmol/L 24.0 22.0  24.0   BUN mg/dL 17 17  18   CREATININE mg/dL 0.70 0.50*  0.50*   GLUCOSE mg/dL 105* 124*  125*   CALCIUM mg/dL 8.3* 7.9*  8.5*   ALT (SGPT) U/L  --  37  39   AST (SGOT) U/L  --  100*  103*     Estimated Creatinine Clearance: 37.1 mL/min (by C-G formula based on Cr of 0.7).  No results found for: BNP  No results found for: PHART    Microbiology Results Abnormal     " Procedure Component Value - Date/Time    Blood Culture - Blood, [[id number]]  (Normal) Collected:  02/08/18 0030    Lab Status:  Preliminary result Specimen:  Blood from Arm, Right Updated:  02/08/18 1316     Blood Culture No growth at less than 24 hours    Blood Culture - Blood, [[id number]]  (Normal) Collected:  02/08/18 0102    Lab Status:  Preliminary result Specimen:  Blood from Arm, Right Updated:  02/08/18 1316     Blood Culture No growth at less than 24 hours          Imaging Results (last 24 hours)     Procedure Component Value Units Date/Time    XR Femur 2 View Right [[id number]] Collected:  02/07/18 2237     Updated:  02/07/18 2320    Narrative:       EXAM:    XR Right Femur, 2 Views    CLINICAL HISTORY:    89 years old, female; Injury or trauma; Fall; Initial encounter; Fracture,   traumatic; Closed fracture; Hip; Right    TECHNIQUE:    Frontal and lateral views of the right femur.    COMPARISON:    No relevant prior studies available.    FINDINGS:    Bones/joints:  Transcervical fracture of the right femoral neck.  No other   fracture or dislocation.    Soft tissues:  Unremarkable.      Impression:         Right femoral neck fracture.    THIS DOCUMENT HAS BEEN ELECTRONICALLY SIGNED BY AGATA MARTINEZ MD    XR Hip With or Without Pelvis 2 - 3 View Right [[id number]] Collected:  02/07/18 2237     Updated:  02/07/18 2321    Narrative:       EXAM:    XR Right Hip With Pelvis When Performed, 2 or 3 Views    CLINICAL HISTORY:    89 years old, female; Injury or trauma; Fall; Initial encounter; Fracture of   pelvis & hip; Right; Traumatic fracture; Neck of femur, midcervical; Closed   fracture    TECHNIQUE:    Two or three views of the right hip, with pelvis when performed.    COMPARISON:    CT PELVIS WO CONTRAST 2017-11-24 21:17    FINDINGS:    Bones/joints:  Bilateral inferior and right superior pubic ramus fractures   with callus formation.  Transcervical fracture of the right femoral neck.  No   dislocation.     Soft tissues:  Unremarkable.      Impression:         Right femoral neck fracture.    THIS DOCUMENT HAS BEEN ELECTRONICALLY SIGNED BY AGATA MARTINEZ MD             I have reviewed the medications.    Assessment/Plan   Assessment / Plan     Hospital Problem List     * (Principal)Fracture of femoral neck, right    Hypertension    UTI (urinary tract infection)    Dementia    Metastatic colon cancer in female    Recurrent falls    Closed fracture of neck of right femur             Brief Hospital Course to date:  Zainab Worthy is a 89 y.o. female with R femoral neck fx. Recnet pelvic fx, been at the [place], got up without asking for assistance and was found on floor.       Assessment & Plan:  1. Fracture of right femoral neck:   - likely secondary to mechanical fall. History of recurrent falls  - Ortho surgery today planned    2. UTI:  - no history of resistant loren.  - administered rocephin in ed. Will continue.     3. Hypertension:  - stable and controlled. continue lisinopril.     4. Dementia:  - continue new meds  - nutrition eval.      HypoNA  -- new since November  -- continue saline, check tomorrow.     5. Metastatic colon cancer to liver:  - S/P chemo and partial liver lobectomy   - in remission and followed by Dr. Matt     DVT prophylaxis: mechanical            CODE STATUS: Full Code    Disposition: I expect the patient to be discharged to SNF in ?? days.    Petrona Bui MD  02/08/18  2:54 PM

## 2018-02-08 NOTE — CONSULTS
Orthopedic Consult      Patient: Zainab Worthy    Date of Admission: 2/7/2018 10:12 PM    YOB: 1929    Medical Record Number: [id number]    Attending Physician: Petrona Bui MD    Consulting Physician: Fernando Dominguez MD      Chief Complaint(s): Closed fracture of neck of right femur, initial encounter [S72.001A]      History of Present Illness: 89 y.o. female admitted to [facility] with Closed fracture of neck of right femur, initial encounter [S72.001A]. I was consulted for further evaluation and treatment of right closed femoral neck fracture.  This 89-year-old female sustained an unwitnessed fall at her assisted living facility around 4:00 yesterday morning.  She was found by the staff and was complaining of pain in her right hip region.  She was seen at [facility] ER and diagnosed with a right femoral neck fracture.  She denies any pain elsewhere apart from the hip.  She denies any nausea or vomiting.  She is somewhat demented and is unable to rate her pain.  She currently appears comfortable.  Mobilization of the hip increases her pain.  She denies any numbness or tingling in the extremity.        No Known Allergies     Home Medications:  Prescriptions Prior to Admission   Medication Sig Dispense Refill Last Dose   • donepezil (ARICEPT) 10 MG tablet Take 10 mg by mouth Every Night.      • LORazepam (ATIVAN) 0.5 MG tablet Take 0.5 mg by mouth Every 8 (Eight) Hours As Needed for Anxiety.      • memantine (NAMENDA) 10 MG tablet Take 10 mg by mouth 2 (Two) Times a Day.      • raNITIdine (ZANTAC) 150 MG tablet Take 150 mg by mouth 2 (Two) Times a Day.      • tuberculin (APLISOL) 5 UNIT/0.1ML injection Inject  into the skin 1 (One) Time.      • acetaminophen (TYLENOL) 325 MG tablet Take 2 tablets by mouth Every 6 (Six) Hours As Needed for Mild Pain .      • bisacodyl (DULCOLAX) 10 MG suppository Insert 1 suppository into the rectum Daily As Needed for Constipation.      • diclofenac  (VOLTAREN) 1 % gel gel   0 Taking   • docusate sodium 100 MG capsule Take 100 mg by mouth 2 (Two) Times a Day.      • famotidine (PEPCID) 20 MG tablet Take 1 tablet by mouth 2 (Two) Times a Day.      • HYDROcodone-acetaminophen (NORCO) 5-325 MG per tablet Take 1 tablet by mouth Every 6 (Six) Hours As Needed for Moderate Pain .      • lisinopril (PRINIVIL,ZESTRIL) 2.5 MG tablet Take 1 tablet by mouth Daily.      • polyethylene glycol (MIRALAX) pack packet Take 17 g by mouth Daily.          Current Medications:  Scheduled Meds:  ceFAZolin 2 g Intravenous 60 Min Pre-Op   ceftriaxone 1 g Intravenous Q24H   tranexamic acid 1,000 mg Topical Once   tranexamic acid 1,000 mg Topical Once     Continuous Infusions:  sodium chloride 100 mL/hr Last Rate: 100 mL/hr (02/08/18 0300)   sodium chloride 75 mL/hr      PRN Meds:.•  acetaminophen  •  bisacodyl  •  HYDROmorphone  •  sodium chloride  •  sodium chloride    Past Medical History:   Diagnosis Date   • Anorexia    • Arthritis    • Asthma    • Cataract    • Colon cancer    • Colon cancer metastasized to liver    • DDD (degenerative disc disease), lumbar    • Hearing loss    • History of transfusion    • Hypertension    • Liver cancer    • Macular degeneration    • Onychomycosis    • Urine frequency         Past Surgical History:   Procedure Laterality Date   • CATARACT EXTRACTION Bilateral    • COLON RESECTION  06/16/2009    Low anterior resection   • GALLBLADDER SURGERY      Resection of gallbladder   • LIVER SURGERY  12/09/2014    Liver tumor, 1/2 was removed.   • PARTIAL HYSTERECTOMY          Social History     Occupational History   • homemaker       Social History Main Topics   • Smoking status: Never Smoker   • Smokeless tobacco: Never Used   • Alcohol use No   • Drug use: No   • Sexual activity: No    Social History     Social History Narrative        Family History   Problem Relation Age of Onset   • Arthritis Other    • Cervical cancer Other    • Uterine cancer Other   "  • Colon cancer Other        Review of Systems:   General: negative for - chills, fatigue, fever, malaise or night sweats  Psychological: negative for - behavioral disorder, hostility, irritability, mood swings, obsessive thoughts or suicidal ideation  Ophthalmic: negative for - blurry vision, double vision or eye pain  HEENT: negative for - headaches, hearing change, sinus pain, sore throat or visual changes  Hematological and Lymphatic: negative for - bleeding problems, jaundice, night sweats, pallor or swollen lymph nodes  Endocrine: negative for - malaise/lethargy, mood swings, palpitations, polydipsia/polyuria, skin changes or unexpected weight changes  Respiratory: negative for - cough, shortness of breath or wheezing  Cardiovascular: negative for - chest pain, dyspnea on exertion, palpitations or shortness of breath  Gastrointestinal: negative for - abdominal pain, change in bowel habits, change in stools, constipation, gas/bloating or stool incontinence  Genito-Urinary: negative for - dysuria, genital discharge, nocturia, pelvic pain or scrotal mass/pain  Musculoskeletal: positive for - pain in hip - right  Neurological: negative for - behavioral changes, headaches, speech problems or visual changes  Dermatological: negative for hair changes, lumps, pruritus and skin lesion changes    Physical Exam: 89 y.o. female    GENERAL APPEARANCE: in no acute distress and confused  Vitals:    02/07/18 2230 02/07/18 2300 02/08/18 0144 02/08/18 0349   BP: 129/77 123/75 108/55 114/54   BP Location:   Left arm Right arm   Patient Position:   Lying Lying   Pulse: 103  94 84   Resp:   16 14   Temp:   97.8 °F (36.6 °C) 97.8 °F (36.6 °C)   TempSrc:   Oral Oral   SpO2: 96%  95% 96%   Weight:   49.3 kg (108 lb 9.6 oz)    Height:   152.4 cm (60\")      PSYCH: normal affect  HEAD: Normocephalic, without obvious abnormality, atraumatic  EYES: No icterus, corneas clear, PERRLA  CARDIOVASCULAR: pulses palpable and equal bilaterally. " Capillary refill less than 2 seconds  LUNGS:  breathing nonlabored  ABDOMEN: Soft, nontender, nondistended  BACK: No C-T- L spine tenderness  EXTREMITIES: no clubbing, cyanosis  MUSCULOSKELETAL:    Left upper extremity: Full painless range of motion of shoulder, elbow, wrist, and digits.  Nontender to palpation throughout the extremity.  Intact EPL, FPL, EDC, FDP, FDS, interosseous, wrist flexion, wrist extension, biceps, triceps, and deltoid. Sensation intact light touch to median, radial, ulnar, and axillary nerves. Palpable radial pulse.  Skin is intact without significant lesions or wounds.    Right upper extremity: Full painless range of motion of shoulder, elbow, wrist, and digits. Nontender to palpation throughout the extremity.  Intact EPL, FPL, EDC, FDP, FDS, interosseous, wrist flexion, wrist extension, biceps, triceps, and deltoid. Sensation intact light touch to median, radial, ulnar, and axillary nerves. Palpable radial pulse.  Skin is intact without significant lesions or wounds.    Pelvis: Stable to AP and lateral compression.    Left lower extremity: Full, painless range of motion of hip, knee, ankle, toes.  Nontender to palpation throughout the extremity. Intact EHL, FHL, tibialis anterior, and gastrocsoleus. Sensation intact to light touch to deep peroneal, superficial peroneal, sural, saphenous, tibial nerves. Palpable DP and PT pulses. Skin - intact without evidence of breakdown. Edema - none.    Right lower extremity: Full, painless range of motion of ankle, toes. Range of motion is limited at hip and knee secondary to pain localized to hip region. Pain with palpation of proximal femur and hip region.  Nontender to palpation from mid femur distally throughout the remainder of the extremity.. Intact EHL, FHL, tibialis anterior, and gastrocsoleus. Sensation intact to light touch to deep peroneal, superficial peroneal, sural, saphenous, tibial nerves. Palpable DP and PT pulses. Skin - intact  without evidence of breakdown. Edema - none.      Diagnostic Tests:    Admission on 02/07/2018   Component Date Value Ref Range Status   • Glucose 02/07/2018 125* 70 - 100 mg/dL Final   • BUN 02/07/2018 18  9 - 23 mg/dL Final   • Creatinine 02/07/2018 0.50* 0.60 - 1.30 mg/dL Final   • Sodium 02/07/2018 127* 132 - 146 mmol/L Final   • Potassium 02/07/2018 3.6  3.5 - 5.5 mmol/L Final   • Chloride 02/07/2018 96* 99 - 109 mmol/L Final   • CO2 02/07/2018 24.0  20.0 - 31.0 mmol/L Final   • Calcium 02/07/2018 8.5* 8.7 - 10.4 mg/dL Final   • Total Protein 02/07/2018 5.8  5.7 - 8.2 g/dL Final   • Albumin 02/07/2018 3.20  3.20 - 4.80 g/dL Final   • ALT (SGPT) 02/07/2018 39  7 - 40 U/L Final   • AST (SGOT) 02/07/2018 103* 0 - 33 U/L Final   • Alkaline Phosphatase 02/07/2018 151* 25 - 100 U/L Final   • Total Bilirubin 02/07/2018 0.7  0.3 - 1.2 mg/dL Final   • eGFR Non  Amer 02/07/2018 116  >60 mL/min/1.73 Final   • Globulin 02/07/2018 2.6  gm/dL Final   • A/G Ratio 02/07/2018 1.2* 1.5 - 2.5 g/dL Final   • BUN/Creatinine Ratio 02/07/2018 36.0* 7.0 - 25.0 Final   • Anion Gap 02/07/2018 7.0  3.0 - 11.0 mmol/L Final   • Protime 02/07/2018 11.3  9.6 - 11.5 Seconds Final   • INR 02/07/2018 1.04   Final   • PTT 02/07/2018 29.8  24.0 - 31.0 seconds Final   • WBC 02/07/2018 14.47* 3.50 - 10.80 10*3/mm3 Final   • RBC 02/07/2018 4.18  3.89 - 5.14 10*6/mm3 Final   • Hemoglobin 02/07/2018 11.8  11.5 - 15.5 g/dL Final   • Hematocrit 02/07/2018 34.9  34.5 - 44.0 % Final   • MCV 02/07/2018 83.5  80.0 - 99.0 fL Final   • MCH 02/07/2018 28.2  27.0 - 31.0 pg Final   • MCHC 02/07/2018 33.8  32.0 - 36.0 g/dL Final   • RDW 02/07/2018 14.0  11.3 - 14.5 % Final   • RDW-SD 02/07/2018 42.5  37.0 - 54.0 fl Final   • MPV 02/07/2018 10.1  6.0 - 12.0 fL Final   • Platelets 02/07/2018 236  150 - 450 10*3/mm3 Final   • Neutrophil % 02/07/2018 85.6* 41.0 - 71.0 % Final   • Lymphocyte % 02/07/2018 3.8* 24.0 - 44.0 % Final   • Monocyte % 02/07/2018 10.0   0.0 - 12.0 % Final   • Eosinophil % 02/07/2018 0.1  0.0 - 3.0 % Final   • Basophil % 02/07/2018 0.1  0.0 - 1.0 % Final   • Immature Grans % 02/07/2018 0.4  0.0 - 0.6 % Final   • Neutrophils, Absolute 02/07/2018 12.40* 1.50 - 8.30 10*3/mm3 Final   • Lymphocytes, Absolute 02/07/2018 0.55* 0.60 - 4.80 10*3/mm3 Final   • Monocytes, Absolute 02/07/2018 1.44* 0.00 - 1.00 10*3/mm3 Final   • Eosinophils, Absolute 02/07/2018 0.01  0.00 - 0.30 10*3/mm3 Final   • Basophils, Absolute 02/07/2018 0.01  0.00 - 0.20 10*3/mm3 Final   • Immature Grans, Absolute 02/07/2018 0.06* 0.00 - 0.03 10*3/mm3 Final   • Color, UA 02/07/2018 Yellow  Yellow, Straw Final   • Appearance, UA 02/07/2018 Cloudy* Clear Final   • pH, UA 02/07/2018 5.5  5.0 - 8.0 Final   • Specific Gravity, UA 02/07/2018 1.018  1.001 - 1.030 Final   • Glucose, UA 02/07/2018 Negative  Negative Final   • Ketones, UA 02/07/2018 Trace* Negative Final   • Bilirubin, UA 02/07/2018 Negative  Negative Final   • Blood, UA 02/07/2018 Moderate (2+)* Negative Final   • Protein, UA 02/07/2018 30 mg/dL (1+)* Negative Final   • Leuk Esterase, UA 02/07/2018 Large (3+)* Negative Final   • Nitrite, UA 02/07/2018 Positive* Negative Final   • Urobilinogen, UA 02/07/2018 1.0 E.U./dL  0.2 - 1.0 E.U./dL Final   • Extra Tube 02/07/2018 hold for add-on   Final    Auto resulted   • Extra Tube 02/07/2018 Hold for add-ons.   Final    Auto resulted.   • Extra Tube 02/07/2018 hold for add-on   Final    Auto resulted   • Extra Tube 02/07/2018 Hold for add-ons.   Final    Auto resulted.   • RBC, UA 02/07/2018 3-6* None Seen, 0-2 /HPF Final   • WBC, UA 02/07/2018 31-50* None Seen /HPF Final   • Bacteria, UA 02/07/2018 2+* None Seen, Trace /HPF Final   • Squamous Epithelial Cells, UA 02/07/2018 3-6* None Seen, 0-2 /HPF Final   • Methodology 02/07/2018 Automated Microscopy   Final   • Glucose 02/07/2018 124* 70 - 100 mg/dL Final   • BUN 02/07/2018 17  9 - 23 mg/dL Final   • Creatinine 02/07/2018 0.50* 0.60  - 1.30 mg/dL Final   • Sodium 02/07/2018 128* 132 - 146 mmol/L Final   • Potassium 02/07/2018 3.7  3.5 - 5.5 mmol/L Final   • Chloride 02/07/2018 95* 99 - 109 mmol/L Final   • CO2 02/07/2018 22.0  20.0 - 31.0 mmol/L Final   • Calcium 02/07/2018 7.9* 8.7 - 10.4 mg/dL Final   • Total Protein 02/07/2018 5.3* 5.7 - 8.2 g/dL Final   • Albumin 02/07/2018 3.00* 3.20 - 4.80 g/dL Final   • ALT (SGPT) 02/07/2018 37  7 - 40 U/L Final   • AST (SGOT) 02/07/2018 100* 0 - 33 U/L Final   • Alkaline Phosphatase 02/07/2018 161* 25 - 100 U/L Final   • Total Bilirubin 02/07/2018 0.7  0.3 - 1.2 mg/dL Final   • eGFR Non  Amer 02/07/2018 116  >60 mL/min/1.73 Final   • Globulin 02/07/2018 2.3  gm/dL Final   • A/G Ratio 02/07/2018 1.3* 1.5 - 2.5 g/dL Final   • BUN/Creatinine Ratio 02/07/2018 34.0* 7.0 - 25.0 Final   • Anion Gap 02/07/2018 11.0  3.0 - 11.0 mmol/L Final   • Glucose 02/08/2018 105* 70 - 100 mg/dL Final   • BUN 02/08/2018 17  9 - 23 mg/dL Final   • Creatinine 02/08/2018 0.70  0.60 - 1.30 mg/dL Final   • Sodium 02/08/2018 128* 132 - 146 mmol/L Final   • Potassium 02/08/2018 3.7  3.5 - 5.5 mmol/L Final   • Chloride 02/08/2018 95* 99 - 109 mmol/L Final   • CO2 02/08/2018 24.0  20.0 - 31.0 mmol/L Final   • Calcium 02/08/2018 8.3* 8.7 - 10.4 mg/dL Final   • eGFR Non  Amer 02/08/2018 79  >60 mL/min/1.73 Final   • BUN/Creatinine Ratio 02/08/2018 24.3  7.0 - 25.0 Final   • Anion Gap 02/08/2018 9.0  3.0 - 11.0 mmol/L Final   • WBC 02/08/2018 12.70* 3.50 - 10.80 10*3/mm3 Final   • RBC 02/08/2018 3.94  3.89 - 5.14 10*6/mm3 Final   • Hemoglobin 02/08/2018 10.9* 11.5 - 15.5 g/dL Final   • Hematocrit 02/08/2018 33.5* 34.5 - 44.0 % Final   • MCV 02/08/2018 85.0  80.0 - 99.0 fL Final   • MCH 02/08/2018 27.7  27.0 - 31.0 pg Final   • MCHC 02/08/2018 32.5  32.0 - 36.0 g/dL Final   • RDW 02/08/2018 14.1  11.3 - 14.5 % Final   • RDW-SD 02/08/2018 43.5  37.0 - 54.0 fl Final   • MPV 02/08/2018 10.2  6.0 - 12.0 fL Final   • Platelets  02/08/2018 238  150 - 450 10*3/mm3 Final       Xr Femur 2 View Right    Result Date: 2/7/2018  Narrative: EXAM:   XR Right Femur, 2 Views CLINICAL HISTORY:   89 years old, female; Injury or trauma; Fall; Initial encounter; Fracture, traumatic; Closed fracture; Hip; Right TECHNIQUE:   Frontal and lateral views of the right femur. COMPARISON:   No relevant prior studies available. FINDINGS:   Bones/joints:  Transcervical fracture of the right femoral neck.  No other fracture or dislocation.   Soft tissues:  Unremarkable.     Impression:   Right femoral neck fracture. THIS DOCUMENT HAS BEEN ELECTRONICALLY SIGNED BY AGATA MARTINEZ MD    Xr Hip With Or Without Pelvis 2 - 3 View Right    Result Date: 2/7/2018  Narrative: EXAM:   XR Right Hip With Pelvis When Performed, 2 or 3 Views CLINICAL HISTORY:   89 years old, female; Injury or trauma; Fall; Initial encounter; Fracture of pelvis & hip; Right; Traumatic fracture; Neck of femur, midcervical; Closed fracture TECHNIQUE:   Two or three views of the right hip, with pelvis when performed. COMPARISON:   CT PELVIS WO CONTRAST 2017-11-24 21:17 FINDINGS:   Bones/joints:  Bilateral inferior and right superior pubic ramus fractures with callus formation.  Transcervical fracture of the right femoral neck.  No dislocation.   Soft tissues:  Unremarkable.     Impression:   Right femoral neck fracture. THIS DOCUMENT HAS BEEN ELECTRONICALLY SIGNED BY AGATA MARTINEZ MD    Radiographs of the right hip and pelvis were personally reviewed.  Radiographs demonstrated a displaced right femoral neck fracture.  Patient has diffuse osteoporosis and osteopenia.    Assessment:  Patient Active Problem List   Diagnosis   • Gonalgia   • Hypertrophic polyarthritis   • Allergic rhinitis, seasonal   • Skin growth   • Endometrial cancer   • Post-operative state   • Rectal cancer   • Hydronephrosis   • Hypertension   • Anorexia   • Onychomycosis   • IUD complication   • Memory loss   • Urinary frequency   •  Fall at home   • Closed fracture of pelvis   • UTI (urinary tract infection)   • Metabolic encephalopathy   • Fracture of femoral neck, right   • Dementia   • Metastatic colon cancer in female   • Recurrent falls   • Closed fracture of neck of right femur           Plan:  The patient has clinical and radiographic evidence of a displaced right femoral neck fracture.  I had an extensive discussion with the patient/family regarding treatment options for this injury taking into account the nature of the diagnosis, the patient's prior level of cognitive and physical function, medical comorbidities, and goals for treatment.  Operative and nonoperative options as well as alternatives were presented and the risks and benefits of each option were discussed.  After extensive discussion, the patient/family elected to proceed with a right hip hemiarthroplasty with the goal to improve patient function, decrease pain, and restore mobility. The risks, benefits, potential complications, and alternatives were again discussed with the patient in detail. Risks included but were not limited to bleeding, infection, anesthesia risks, damage to neurovascular structures, osteolysis, aseptic loosening, instability, dislocation, pain, continued pain, iatrogenic fracture, leg length discrepancy, hip dislocation, possible need for future surgery including the potential for amputation, blood clots, myocardial infarction, stroke, and death. Khalida-operative blood management and the potential for blood transfusion were discussed with risks and options clearly outlined. Specific details of the surgical procedure, hospitalization, recovery, rehabilitation, and long-term precautions were also presented. Pre-operative teaching was provided. Implant/prosthesis selection was outlined, and the many options available were explained; the final choice will be made at the time of the procedure to match the anatomy and condition of the bone, ligaments,  tendons, and muscles. Given this instruction, the patient elected to proceed with the right hip lonnie-arthroplasty.     Nothing by mouth  Plan for OR today for right hip hemiarthroplasty  Obtain informed consent  Treatment of urinary tract infection by hospitalist team.    Date: 2/8/2018    Fernando Dominguez MD

## 2018-02-08 NOTE — NURSING NOTE
WOC nurse consulted r/t low Chapin score of 14. Pt is scheduled for right hip hemiarthroplasty today for femoral neck fracture. NEW bed ordered from [facility] due to high skin risk and immobility.

## 2018-02-08 NOTE — PLAN OF CARE
Problem: Patient Care Overview (Adult)  Goal: Plan of Care Review  Outcome: Ongoing (interventions implemented as appropriate)    Goal: Adult Individualization and Mutuality  Outcome: Ongoing (interventions implemented as appropriate)    Goal: Discharge Needs Assessment  Outcome: Ongoing (interventions implemented as appropriate)      Problem: Orthopaedic Fracture (Adult)  Goal: Signs and Symptoms of Listed Potential Problems Will be Absent or Manageable (Orthopaedic Fracture)  Outcome: Ongoing (interventions implemented as appropriate)      Problem: Fall Risk (Adult)  Goal: Identify Related Risk Factors and Signs and Symptoms  Outcome: Ongoing (interventions implemented as appropriate)    Goal: Absence of Falls  Outcome: Ongoing (interventions implemented as appropriate)      Problem: Pressure Ulcer Risk (Chapin Scale) (Adult,Obstetrics,Pediatric)  Goal: Identify Related Risk Factors and Signs and Symptoms  Outcome: Ongoing (interventions implemented as appropriate)    Goal: Skin Integrity  Outcome: Ongoing (interventions implemented as appropriate)

## 2018-02-08 NOTE — PROGRESS NOTES
Discharge Planning Assessment  [facility]     Patient Name: Zainab Worthy  MRN: [id number]  Today's Date: 2/8/2018    Admit Date: 2/7/2018          Discharge Needs Assessment       02/08/18 1123    Living Environment    Living Arrangements --   Was in an assisted living apt. at [facility].      Home Accessibility no concerns    Stair Railings at Home none    Type of Financial/Environmental Concern none    Transportation Available car;family or friend will provide    Living Environment    Provides Primary Care For no one, unable/limited ability to care for self    Quality Of Family Relationships supportive    Able to Return to Prior Living Arrangements yes    Discharge Needs Assessment    Concerns To Be Addressed adjustment to diagnosis/illness concerns;basic needs concerns    Readmission Within The Last 30 Days no previous admission in last 30 days    Equipment Currently Used at Home walker, rolling;wheelchair    Discharge Facility/Level Of Care Needs nursing facility, skilled    Current Discharge Risk cognitively impaired;dependent with mobility/activities of daily living            Discharge Plan       02/08/18 1125    Case Management/Social Work Plan    Plan Rehab    Patient/Family In Agreement With Plan yes    Additional Comments Talked to Ms. Worthy at .  She was pleasantly confused.  Called her son and talked to him about discharge plan.  She has been at [facility] for 2 weeks in assisted living apt.  She has been at [facility] and [facility] in the last 5 months.  Her son states he knows she needs a higher level care at [facility] now.  May qualify for Skilled bed there after 3 day in pt., stay.  Her son was in agreement with transferring her back to [facility] in a skilled bed.  She uses a RW and w/c there.  Her PCP is Carolyn Giraldo.  [facility] are in the process of getting her a w/c.   Goal is to transfer to rehab/skilled bed at [facility].          Discharge Placement      No information found                Demographic Summary       02/08/18 1122    Referral Information    Admission Type inpatient    Arrived From skilled nursing facility            Functional Status       02/08/18 1106    Functional Status Current    Ambulation 4-->completely dependent    Transferring 4-->completely dependent    Toileting 3-->assistive equipment and person    Bathing 3-->assistive equipment and person    Dressing 2-->assistive person    Eating 2-->assistive person    Communication 0-->understands/communicates without difficulty    Swallowing (if score 2 or more for any item, consult Rehab Services) 0-->swallows foods/liquids without difficulty    Functional Status Prior    Ambulation 2-->assistive person    Transferring 2-->assistive person    Toileting 2-->assistive person    Bathing 2-->assistive person    Dressing 0-->independent    Eating 0-->independent    Communication 0-->understands/communicates without difficulty    Swallowing 0-->swallows foods/liquids without difficulty    IADL    Medications assistive person    Meal Preparation assistive person    Housekeeping assistive person    Laundry assistive person    Shopping assistive person    Oral Care independent            Psychosocial     None            Abuse/Neglect     None            Legal     None            Substance Abuse     None            Patient Forms     None          Rober Green RN

## 2018-02-09 LAB
ANION GAP SERPL CALCULATED.3IONS-SCNC: 11 MMOL/L (ref 3–11)
BUN BLD-MCNC: 15 MG/DL (ref 9–23)
BUN/CREAT SERPL: 30 (ref 7–25)
CALCIUM SPEC-SCNC: 8.4 MG/DL (ref 8.7–10.4)
CHLORIDE SERPL-SCNC: 101 MMOL/L (ref 99–109)
CO2 SERPL-SCNC: 21 MMOL/L (ref 20–31)
CREAT BLD-MCNC: 0.5 MG/DL (ref 0.6–1.3)
DEPRECATED RDW RBC AUTO: 44.4 FL (ref 37–54)
ERYTHROCYTE [DISTWIDTH] IN BLOOD BY AUTOMATED COUNT: 14.1 % (ref 11.3–14.5)
GFR SERPL CREATININE-BSD FRML MDRD: 116 ML/MIN/1.73
GLUCOSE BLD-MCNC: 181 MG/DL (ref 70–100)
HCT VFR BLD AUTO: 34.2 % (ref 34.5–44)
HGB BLD-MCNC: 11.2 G/DL (ref 11.5–15.5)
MCH RBC QN AUTO: 28.2 PG (ref 27–31)
MCHC RBC AUTO-ENTMCNC: 32.7 G/DL (ref 32–36)
MCV RBC AUTO: 86.1 FL (ref 80–99)
PLATELET # BLD AUTO: 222 10*3/MM3 (ref 150–450)
PMV BLD AUTO: 10.1 FL (ref 6–12)
POTASSIUM BLD-SCNC: 4 MMOL/L (ref 3.5–5.5)
RBC # BLD AUTO: 3.97 10*6/MM3 (ref 3.89–5.14)
SODIUM BLD-SCNC: 133 MMOL/L (ref 132–146)
WBC NRBC COR # BLD: 11.71 10*3/MM3 (ref 3.5–10.8)

## 2018-02-09 PROCEDURE — 25010000003 CEFAZOLIN IN DEXTROSE 2-4 GM/100ML-% SOLUTION: Performed by: ORTHOPAEDIC SURGERY

## 2018-02-09 PROCEDURE — 97166 OT EVAL MOD COMPLEX 45 MIN: CPT

## 2018-02-09 PROCEDURE — 85027 COMPLETE CBC AUTOMATED: CPT | Performed by: INTERNAL MEDICINE

## 2018-02-09 PROCEDURE — 25010000002 ENOXAPARIN PER 10 MG: Performed by: ORTHOPAEDIC SURGERY

## 2018-02-09 PROCEDURE — 25010000002 ONDANSETRON PER 1 MG: Performed by: ORTHOPAEDIC SURGERY

## 2018-02-09 PROCEDURE — 80048 BASIC METABOLIC PNL TOTAL CA: CPT | Performed by: ORTHOPAEDIC SURGERY

## 2018-02-09 PROCEDURE — 99024 POSTOP FOLLOW-UP VISIT: CPT | Performed by: ORTHOPAEDIC SURGERY

## 2018-02-09 PROCEDURE — 99232 SBSQ HOSP IP/OBS MODERATE 35: CPT | Performed by: INTERNAL MEDICINE

## 2018-02-09 PROCEDURE — 97162 PT EVAL MOD COMPLEX 30 MIN: CPT

## 2018-02-09 PROCEDURE — 25010000002 CEFTRIAXONE PER 250 MG: Performed by: PHYSICIAN ASSISTANT

## 2018-02-09 PROCEDURE — 97116 GAIT TRAINING THERAPY: CPT

## 2018-02-09 PROCEDURE — 97110 THERAPEUTIC EXERCISES: CPT

## 2018-02-09 RX ORDER — LORAZEPAM 0.5 MG/1
0.5 TABLET ORAL EVERY 8 HOURS PRN
Status: DISCONTINUED | OUTPATIENT
Start: 2018-02-09 | End: 2018-02-12 | Stop reason: HOSPADM

## 2018-02-09 RX ORDER — DONEPEZIL HYDROCHLORIDE 10 MG/1
10 TABLET, FILM COATED ORAL NIGHTLY
Status: DISCONTINUED | OUTPATIENT
Start: 2018-02-09 | End: 2018-02-12 | Stop reason: HOSPADM

## 2018-02-09 RX ORDER — MEMANTINE HYDROCHLORIDE 10 MG/1
10 TABLET ORAL DAILY
Status: DISCONTINUED | OUTPATIENT
Start: 2018-02-09 | End: 2018-02-12 | Stop reason: HOSPADM

## 2018-02-09 RX ORDER — FAMOTIDINE 20 MG/1
20 TABLET, FILM COATED ORAL DAILY
Status: DISCONTINUED | OUTPATIENT
Start: 2018-02-09 | End: 2018-02-12 | Stop reason: HOSPADM

## 2018-02-09 RX ADMIN — ONDANSETRON 4 MG: 2 INJECTION INTRAMUSCULAR; INTRAVENOUS at 00:57

## 2018-02-09 RX ADMIN — CEFAZOLIN SODIUM 2 G: 2 INJECTION, SOLUTION INTRAVENOUS at 05:55

## 2018-02-09 RX ADMIN — LORAZEPAM 0.5 MG: 0.5 TABLET ORAL at 22:56

## 2018-02-09 RX ADMIN — ENOXAPARIN SODIUM 40 MG: 40 INJECTION SUBCUTANEOUS at 08:10

## 2018-02-09 RX ADMIN — ACETAMINOPHEN 650 MG: 325 TABLET, FILM COATED ORAL at 08:10

## 2018-02-09 RX ADMIN — ONDANSETRON 4 MG: 2 INJECTION INTRAMUSCULAR; INTRAVENOUS at 06:35

## 2018-02-09 RX ADMIN — BISACODYL 10 MG: 5 TABLET, COATED ORAL at 20:02

## 2018-02-09 RX ADMIN — CEFTRIAXONE SODIUM 1 G: 1 INJECTION, SOLUTION INTRAVENOUS at 08:10

## 2018-02-09 RX ADMIN — SODIUM CHLORIDE 100 ML/HR: 9 INJECTION, SOLUTION INTRAVENOUS at 12:12

## 2018-02-09 RX ADMIN — DOCUSATE SODIUM 100 MG: 100 CAPSULE, LIQUID FILLED ORAL at 20:02

## 2018-02-09 RX ADMIN — MEMANTINE 10 MG: 10 TABLET ORAL at 12:17

## 2018-02-09 RX ADMIN — FAMOTIDINE 20 MG: 20 TABLET, FILM COATED ORAL at 12:12

## 2018-02-09 RX ADMIN — DONEPEZIL HYDROCHLORIDE 10 MG: 10 TABLET, FILM COATED ORAL at 20:02

## 2018-02-09 NOTE — OP NOTE
OPERATIVE REPORT     DATE OF PROCEDURE: 2/8/18     SURGEON: Fernando Dominguez M.D.     ASSISTANT(S): Circulator: Logan Garcia RN  Scrub Person: Shayne Amor  Vendor Representative: Deondre Bryant  Nursing Assistant: Dasha Dhillon; Elza Rosen  Assistant: Carol Patricia PA-C    Note-PA was utilized during the case to facilitate positioning the patient, exposure, retraction, placement of final components and definitive closure.      PREOPERATIVE DIAGNOSIS: Right displaced femoral neck fracture     POSTOPERATIVE DIAGNOSIS: same     PROCEDURE: Right Hip Hemiarthroplasty     SURGICAL DETAILS:     APPROACH: Posterior     ANESTHESIA: General plus local particular block     PREOPERATIVE ANTIBIOTICS: Ancef 2 g IV     TRANEXAMIC ACID: IV     ESTIMATED BLOOD LOSS: 225 cc     SPECIMENS: Femoral head     IMPLANTS:   : Rocawear   Femoral component: Size 6 132° Omnifit ag cemented stem, 13 mm distal centralizer   Femoral head: 44 bipolar shell, 28-2.5 mm ceramic C taper   Cement: 2 bags of simplex P low-viscosity without antibiotics    DRAINS: None     LOCAL INJECTION: 1 cc Toradol 30mg/ml, 4 cc duramorph 2mg/ml, 20 cc 0.5% ropivicaine, 20 cc 0.5% lidocaine with 1:200,000 epinephrine, 15 cc preservative free normal saline     MODIFIER(S): None     COMPLICATIONS: None apparent     INDICATIONS FOR PROCEDURE: Miss Worthy is an 89-year-old female who sustained a mechanical fall at her assisted living facility resulting in the above mentioned injury.  I had an extensive discussion with the patient and family regarding treatment options for this injury.  Nonoperative treatment was discussed but is likely associated with high morbidity mortality due to limited mobilization.  With the patient's advanced age and the displaced nature of the fracture, hemiarthroplasty was recommended.  The risks and benefits of this procedure were discussed with the patient and family and they agreed to proceed with  hemiarthroplasty as described.  The risks, benefits and potential complications of the hemiarthroplasty surgery were discussed with the patient in detail to include but not limited to infection, bleeding, anesthesia risks, sciatic nerve palsy, instability/dislocation, limb length discrepancy, aseptic loosening, osteolysis, blood clots, continued pain, iatrogenic fracture, possible need for blood transfusion, possible need for further procedures, myocardial infarction, stroke, and death. Specific details of the procedure, hospitalization, recovery, rehabilitation, and long-term precautions were also provided. Pre-operative teaching was provided. Implant/prosthesis selection was outlined, and the many options available were explained; the final choice will be made at the time of the procedure to match the anatomy and condition of the bone, ligaments, tendons, and muscles. Understanding of all topics was conveyed to me by the patient, and consent was given to proceed with a right hip hemiarthroplasty.     INTRAOPERATIVE FINDINGS: Comminuted femoral neck fracture     PROCEDURE: The patient was identified in the preoperative holding area. The operative site was confirmed and marked. LONNY hose and a sequential compression device were placed on the nonoperative leg. The risks, benefits, and alternatives to surgery were again confirmed with the patient and the patient wished to proceed. The patient was brought to the operating room and placed on the operating room table in the supine position. A huddle was performed with the patient and all vital surgical team members to confirm the correct operative site, procedure, anesthesia type, and operative plan with the patient. After anesthesia was performed, the patient was positioned in the lateral decubitus position on the pegboard and secured with the operative side up. An axillary roll was placed in the axilla and all bony prominences and pressure points were checked and  padded. A relative leg length assessment was carried out and markers were placed for intraoperative assessment. Intravenous antibiotic prophylaxis was given and confirmed with the anesthesia team.     The operative leg was prepped and draped in the usual sterile fashion. A surgical time out was performed immediately preceding the incision with all personnel in the operating room to again confirm patient identity, the correct operative site and extremity, correct radiographic studies, availability of appropriate surgical equipment and agreement on the planned procedure. A posterolateral approach to the hip was performed through an incision centered over the greater trochanter. The incision was carried through the subcutaneous tissue to the underlying fascia laurence and gluteus jitendra fascia, which were incised and split posteriorly over the trochanter in the direction of the fibers. Hemostasis was obtained with electrocautery. The Charnley retractor was placed after carefully palpating the sciatic nerve which was protected throughout the case. A standard posterior approach to the hip was performed by releasing the piriformis, short external rotators and posterior capsule and reflecting them posteriorly as a rectangular flap. The femoral neck osteotomy was made using a sagittal saw to freshen up the fracture, and the head was removed from the acetabulum.     Attention was then turned to the acetabulum. Retractors were placed circumferentially for wide acetabular exposure. The labrum remained intact. The acetabulum was appropriately sized using the sizing templates for the bipolar head.     Attention was then turned to the femur. The leg was positioned so access to the femoral canal did not result in soft-tissue injury. The femoral preparation was started with a box osteotome. The medullary cavity of the femur was then entered and opened with power reamers. Femoral broaches were then employed in an incremental fashion up  to the final size, targeting 15-20 degrees of anteversion. The final broach was fully seated, had reasonable rotational and axial stability, and was seated at the appropriate height in relation to the greater trochanter and the preoperative plan. A trial head was placed and a trial reduction was done. Excellent stability and range of motion was achieved without impingement at any position. Leg lengths were re-created within millimeters based on the markers and relative measurement. The trials were then dislocated and removed. The wound was copiously irrigated, and the canal was prepped for cementing the prosthesis into place.  A cement restrictor was placed into the femoral canal followed by irrigation and drying of the canal.  The femoral component with the distal centralizer was assembled on the back table.  The cement was then mixed using third generation technique.  Using the cement gun, the canal was filled and pressurized with cement followed by placement of the permanent femoral stem, which was impacted down in approximately 15-20 degrees of anteversion. Excess cement was removed and the femoral component was held in place until the cement had set.  The stem was then tested and found to be stable to axial and rotational force in all planes. The permanent femoral head was then impacted on the clean trunnion. The socket and wound were irrigated, suctioned, and inspected for debris. The final reduction was performed, and again the hip was stable in all planes without impingement when stressed to the extremes.     The wound was irrigated with dilute betadine solution as well as saline, and hemostasis obtained with electrocautery. The analgesic cocktail was injected in the pericapsular tissues. The posterior capsule, piriformis, and short external rotators were repaired utilizing #2 Ticron through drill holes in the greater trochanter. The sciatic nerve was palpated and found to be intact and the wound was  irrigated. Instrument and sponge counts were completed and confirmed correct. The fascia laurence and gluteal fascia were closed with interrupted #1 Vicryl suture and oversewn with a #2 Stratafix. The deep subcutaneous tissue was closed with running #1 Stratafix suture and the superficial subcutaneous tissue with interrupted 2-0 Vicryl suture. A 3-0 monocryl running stitch was used to close skin followed by skin glue adhesive to seal the wound. A silver impregnated dressing was then placed, followed by LONNY hose and a sequential compression device to the operative limb, followed by an abduction pillow. The patient was then returned to a supine position on the operating room table. The patient was sufficiently recovered from anesthesia, transferred to a hospital bed and taken to the PACU in stable condition.     One gram (1000 mg) of intravenous tranexamic acid was administered prior to incision. A second one gram (1000 mg) intravenous dose was given prior to wound closure.     No apparent complications occurred during the procedure. Instrument, sponge and needle counts were correct x 2.     The patient underwent risk stratification preoperatively and Lovenox was chosen for DVT prophylaxis. Delay in starting chemical prophylaxis for 23 hours from surgical incision was over concerns for hematoma formation and wound related issues.     POST OPERATIVE PLAN:   Protected weight bearing as tolerated   Posterior hip precautions x 6 weeks   PT/OT for mobilization and medical equipment needs   23 hours perioperative antibiotic prophylaxis   Pain control with PO/IV meds   Keep silver dressing in place for 7 days post op. Change dressing only if saturated.   LONNY hose and SCD's to bilateral lower extremities   Social work for discharge planning needs   Follow up in 3 weeks for post-operative wound check with XR AP pelvis.

## 2018-02-09 NOTE — PLAN OF CARE
Problem: Patient Care Overview (Adult)  Goal: Plan of Care Review  Outcome: Ongoing (interventions implemented as appropriate)   02/09/18 0926   Coping/Psychosocial Response Interventions   Plan Of Care Reviewed With patient   Patient Care Overview   Progress improving   Outcome Evaluation   Outcome Summary/Follow up Plan OT eval complete. Pt requires modA for bed mobility and modAx2 for transfers. Pt requires Jovita for toileting. Recommend discharge to SNF rehab when medically ready.        Problem: Inpatient Occupational Therapy  Goal: Bed Mobility Goal LTG- OT  Outcome: Ongoing (interventions implemented as appropriate)   02/09/18 0926   Bed Mobility OT LTG   Bed Mobility OT LTG, Date Established 02/09/18   Bed Mobility OT LTG, Time to Achieve 5 days   Bed Mobility OT LTG, Activity Type supine to sit/sit to supine;scoot/bridge   Bed Mobility OT LTG, [place] Level minimum assist (75% patient effort);verbal cues required   Bed Mobility OT LTG, Outcome goal ongoing     Goal: Transfer Training Goal 1 LTG- OT  Outcome: Ongoing (interventions implemented as appropriate)   02/09/18 0926   Transfer Training OT LTG   Transfer Training OT LTG, Date Established 02/09/18   Transfer Training OT LTG, Time to Achieve 5 days   Transfer Training OT LTG, Activity Type sit to stand/stand to sit;toilet   Transfer Training OT LTG, [place] Level minimum assist (75% patient effort);verbal cues required   Transfer Training OT LTG, Assist Device walker, rolling   Transfer Training OT LTG, Outcome goal ongoing     Goal: Static Standing Balance Goal LTG- OT  Outcome: Ongoing (interventions implemented as appropriate)   02/09/18 0926   Static Standing Balance OT LTG   Static Standing Balance OT LTG, Date Established 02/09/18   Static Standing Balance OT LTG, Time to Achieve 5 days   Static Standing Balance OT LTG, [place] Level minimum assist (75% patient effort);verbal cues required   Static Standing Balance OT LTG, Outcome  goal ongoing     Goal: Toileting Goal LTG- OT  Outcome: Ongoing (interventions implemented as appropriate)   02/09/18 0926   Toileting OT LTG   Toileting Goal OT LTG, Date Established 02/09/18   Toileting Goal OT LTG, Time to Achieve 5 days   Toileting Goal OT LTG, Activity Type Pt will complete post toilet hygiene and clothing management    Toileting Goal OT LTG, [place] Level verbal cues required;contact guard assist   Toileting Goal OT LTG, Outcome goal ongoing

## 2018-02-09 NOTE — PLAN OF CARE
Problem: Patient Care Overview (Adult)  Goal: Plan of Care Review  Outcome: Ongoing (interventions implemented as appropriate)   02/09/18 1413   Coping/Psychosocial Response Interventions   Plan Of Care Reviewed With patient   Outcome Evaluation   Outcome Summary/Follow up Plan INCREASED DISTANCE AMBULATED. GOOD PAIN CONTROL. RECOMMEND SNF AT D/C. AMBULATED 100 FEET WITH R WALKER AND MOD ASSIST OF 2.        Problem: Inpatient Physical Therapy  Goal: Bed Mobility Goal LTG- PT  Outcome: Ongoing (interventions implemented as appropriate)   02/09/18 1150 02/09/18 1413   Bed Mobility PT LTG   Bed Mobility PT LTG, Time to Achieve 2 wks --    Bed Mobility PT LTG, Activity Type all bed mobility --    Bed Mobility PT LTG, [place] Level minimum assist (75% patient effort) --    Bed Mobility PT Goal LTG, Assist Device bed rails --    Bed Mobility PT LTG, Outcome --  goal ongoing     Goal: Transfer Training Goal 1 LTG- PT  Outcome: Ongoing (interventions implemented as appropriate)   02/09/18 1150 02/09/18 1413   Transfer Training PT LTG   Transfer Training PT LTG, Time to Achieve 2 wks --    Transfer Training PT LTG, Activity Type all transfers --    Transfer Training PT LTG, [place] Level contact guard assist --    Transfer Training PT LTG, Assist Device walker, rolling --    Transfer Training PT LTG, Outcome --  goal ongoing     Goal: Gait Training Goal LTG- PT  Outcome: Ongoing (interventions implemented as appropriate)   02/09/18 1150 02/09/18 1413   Gait Training PT LTG   Gait Training Goal PT LTG, Time to Achieve 2 wks --    Gait Training Goal PT LTG, [place] Level minimum assist (75% patient effort) --    Gait Training Goal PT LTG, Assist Device walker, rolling --    Gait Training Goal PT LTG, Distance to Achieve 250 --    Gait Training Goal PT LTG, Outcome --  goal ongoing

## 2018-02-09 NOTE — THERAPY EVALUATION
Acute Care - Occupational Therapy Initial Evaluation  [facility]     Patient Name: Zainab Worthy  : 2/3/1929  MRN: [id number]  Today's Date: 2018  Onset of Illness/Injury or Date of Surgery Date: 18  Date of Referral to OT: 18  Referring Physician: FOSTER    Admit Date: 2018       ICD-10-CM ICD-9-CM   1. Closed fracture of neck of right femur, initial encounter S72.001A 820.8   2. Acute UTI N39.0 599.0   3. Dementia without behavioral disturbance, unspecified dementia type F03.90 294.20   4. Fall, initial encounter W19.XXXA E888.9   5. Impaired mobility and ADLs Z74.09 799.89   6. Impaired functional mobility, balance, gait, and endurance Z74.09 V49.89     Patient Active Problem List   Diagnosis   • Gonalgia   • Hypertrophic polyarthritis   • Allergic rhinitis, seasonal   • Skin growth   • Endometrial cancer   • Post-operative state   • Rectal cancer   • Hydronephrosis   • Hypertension   • Anorexia   • Onychomycosis   • IUD complication   • Memory loss   • Urinary frequency   • Fall at home   • Closed fracture of pelvis   • UTI (urinary tract infection)   • Metabolic encephalopathy   • Fracture of femoral neck, right   • Dementia   • Metastatic colon cancer in female   • Recurrent falls   • Closed fracture of neck of right femur     Past Medical History:   Diagnosis Date   • Anorexia    • Arthritis    • Asthma    • Cataract    • Colon cancer    • Colon cancer metastasized to liver    • DDD (degenerative disc disease), lumbar    • Hearing loss    • History of transfusion    • Hypertension    • Liver cancer    • Macular degeneration    • Onychomycosis    • Urine frequency      Past Surgical History:   Procedure Laterality Date   • CATARACT EXTRACTION Bilateral    • COLON RESECTION  2009    Low anterior resection   • GALLBLADDER SURGERY      Resection of gallbladder   • LIVER SURGERY  2014    Liver tumor,  was removed.   • PARTIAL HYSTERECTOMY            OT ASSESSMENT FLOWSHEET  (last 72 hours)      OT Evaluation       02/09/18 0955 02/09/18 0818 02/09/18 0800 02/08/18 1356 02/08/18 1123    Rehab Evaluation    Document Type evaluation   COMPLETED CHART REVIEW [id number].   -CD evaluation  -HK       Subjective Information agree to therapy;no complaints   PLEASANTLY CONFUSED.   -CD agree to therapy;complains of;pain  -HK       Evaluation Not Performed    unable to evaluate, medical status change   plan for OR today; will evaluate pt after surgical management.   -KR     Patient Effort, Rehab Treatment good  -CD good  -HK       Symptoms Noted During/After Treatment none  -CD none  -HK       Symptoms Noted Comment VERY [place], HAS HEARING AIDES.   -CD Pt [place] and has hearing aidss  -HK       General Information    Patient Profile Review yes  -CD yes  -HK       Onset of Illness/Injury or Date of Surgery Date 02/07/18  -CD 02/07/18  -HK       Referring Physician MINI  -JANA Bui MD  -HK       General Observations PT SITTING UIC UPON ARRIVAL ON EXIT ALARM AND WITH LE'S ELEVATED. PT ON RA AND HAS IV. BANDAGES INTACT AT R HIP.   -CD Pt received supine in bed with IV intact and no visitors at bedside.   -HK       Pertinent History Of Current Problem Pt is a 89 YOF who presented to the ED with R hip pain. Pt was found on the floor around 4am by her nurse. Pt does not remember how she fell. Pt has PMG significant for recurrent falls, colon cancer with mets to liver, and hypertension. Pt found to have R hip fx and is POD #1 s/p R hip hemiarthroplasty.   -CD Pt is a 89 YOF who presented to the ED with R hip pain. Pt was found on the floor around 4am by her nurse. Pt does not remember how she fell. Pt has PMG significant for recurrent falls, colon cancer with mets to liver, and hypertension. Pt found to have R hip fx and is POD #1 s/p R hip hemiarthroplasty.   -HK       Precautions/Limitations fall precautions;hip precautions- right   CONFUSION.   -CD fall precautions;other (see comments);hip precautions- right    confusion  -HK       Prior Level of Function  --   unclear; pt is confused and no family present   -HK       Equipment Currently Used at Home  walker, rolling;wheelchair  -HK   walker, rolling;wheelchair  -    Plans/Goals Discussed With  patient;agreed upon  -HK       Risks Reviewed  patient:;LOB;nausea/vomiting;increased discomfort;dizziness;change in vital signs;increased drainage;lines disloged  -HK       Benefits Reviewed  patient:;improve function;increase independence;increase strength;increase balance;decrease pain;decrease risk of DVT;improve skin integrity;increase knowledge  -HK       Barriers to Rehab  cognitive status  -HK       Living Environment    Lives With  facility resident  -HK       Living Arrangements  assisted living  -HK   --   Was in an assisted living apt. at [facility].    -HH    Home Accessibility  no concerns  -HK   no concerns  -HH    Stair Railings at Home     none  -HH    Type of Financial/Environmental Concern     none  -HH    Transportation Available     car;family or friend will provide  -HH    Living Environment Comment  Pt is a resident at the [facility]       Clinical Impression    Date of Referral to OT  02/08/18  -       OT Diagnosis  Decreased independence with ADLS and Mobility  -HK       Patient/Family Goals Statement  Pt would like to improve and return to the [facility]       Criteria for Skilled Therapeutic Interventions Met  yes;treatment indicated  -HK       Rehab Potential  good, to achieve stated therapy goals  -HK       Therapy Frequency  daily   per priority policy  -HK       Anticipated Equipment Needs At Discharge  other (see comments)   TBD  -HK       Anticipated Discharge Disposition  skilled nursing facility   Pt will require skilled rehab  -HK       Vital Signs    Pre Systolic BP Rehab  152  -HK       Pre Treatment Diastolic BP  97  -HK       Post Systolic BP Rehab  163  -HK       Post Treatment Diastolic BP  74  -HK       Pretreatment  Heart Rate (beats/min)  89  -HK       Posttreatment Heart Rate (beats/min)  93  -HK       Pre Patient Position  Supine  -HK       Intra Patient Position  Standing  -HK       Post Patient Position  Sitting  -HK       Pain Assessment    Pain Assessment  Villasenor-Garcia FACES  -HK       Villasenor-Garcia FACES Pain Rating  4  -HK       Pain Type  Surgical pain  -HK       Pain Location  Hip  -HK       Pain Orientation  Right  -HK       Pain Intervention(s)  Repositioned;Ambulation/increased activity  -HK       Response to Interventions  Pt tolerated  -HK       Vision Assessment/Intervention    Visual Impairment  WFL  -HK       Cognitive Assessment/Intervention    Current Cognitive/Communication Assessment  impaired  -HK       Orientation Status  oriented to;person;situation;disoriented to;place;time  -HK       Follows Commands/Answers Questions  75% of the time;able to follow single-step instructions;needs cueing;needs increased time;needs repetition  -HK       Personal Safety  moderate impairment;decreased awareness, need for assist;decreased awareness, need for safety;decreased insight to deficits  -HK       Personal Safety Interventions  fall prevention program maintained;gait belt;nonskid shoes/slippers when out of bed  -HK       ROM (Range of Motion)    General ROM  upper extremity range of motion deficits identified  -HK       General ROM Detail  pt with decreased shoulder flexion grossly 90 degrees  -HK       MMT (Manual Muscle Testing)    General MMT Assessment  upper extremity strength deficits identified  -HK       General MMT Assessment Detail  BUE estimated 3+/5. Pt unablet to follow directions for MMT  -HK       Muscle Tone Assessment    Muscle Tone Assessment   Bilateral Upper Extremities;Bilateral Lower Extremities  -NT      Bilateral Upper Extremities Muscle Tone Assessment   associated movements noted  -NT      Bilateral Lower Extremities Muscle Tone Assessment   mildly decreased tone  -NT      Bed Mobility,  Assessment/Treatment    Bed Mobility, Scoot/Bridge, [place]  minimum assist (75% patient effort);verbal cues required  -HK       Bed Mob, Supine to Sit, [place]  moderate assist (50% patient effort);verbal cues required  -HK       Bed Mob, Sit to Supine, [place]  not tested  -HK       Bed Mobility, Safety Issues  decreased use of arms for pushing/pulling;decreased use of legs for bridging/pushing  -HK       Bed Mobility, Impairments  strength decreased;impaired balance;pain  -HK       Bed Mobility, Comment  Pt advanced to EOB with modA  to bring legs to EOB. Pt requires verbal cues for sequencing and safe hand placement.   -HK       Transfer Assessment/Treatment    Transfers, Sit-Stand [place]  moderate assist (50% patient effort);2 person assist required;verbal cues required  -HK       Transfers, Stand-Sit [place]  moderate assist (50% patient effort);2 person assist required;verbal cues required  -HK       Transfers, Sit-Stand-Sit, Assist Device  rolling walker  -HK       Toilet Transfer, [place]  moderate assist (50% patient effort);2 person assist required;verbal cues required  -HK       Toilet Transfer, Assistive Device  bedside commode without drop arms;rolling walker  -HK       Transfer, Safety Issues  step length decreased;weight-shifting ability decreased;sequencing ability decreased;balance decreased during turns  -HK       Transfer, Impairments  strength decreased;impaired balance;pain  -HK       Transfer, Comment  Pt requires verbal cues for sequencing and safe hand placement.  -HK       Functional Mobility    Functional Mobility- Ind. Level  minimum assist (75% patient effort);2 person assist required;verbal cues required  -HK       Functional Mobility- Device  rolling walker  -HK       Functional Mobility-Distance (Feet)  5  -HK       Functional Mobility- Safety Issues  step length decreased;weight-shifting ability decreased;sequencing ability decreased;balance decreased  during turns  -HK       Functional Mobility- Comment  Pt took steps to BSC and to chair.   -HK       ADL Assessment/Intervention    Additional Documentation  Self-Feeding Assessment/Training (Group)  -HK       Toileting Assessment/Training    Toileting Assess/Train, Assistive Device  bedside commode;other (see comments)   RW  -HK       Toileting Assess/Train, Position  sitting  -HK       Toileting Assess/Train, Indepen Level  minimum assist (75% patient effort);verbal cues required  -HK       Toileting Assess/Train, Impairments  strength decreased;impaired balance;pain  -HK       Toileting Assess/Train, Comment  Pt required Jovita for clothing management but completed post toilet hygeine with supervision.   -HK       Self-Feeding Assessment/Training    Self-Feeding Assess/Train, Comment  Pt requires total meal set up and reminders to eat  -HK       Motor Skills/Interventions    Additional Documentation  Balance Skills Training (Group)  -HK       Balance Skills Training    Sitting-Level of Assistance  Contact guard  -HK       Sitting-Balance Support  Right upper extremity supported;Left upper extremity supported;Feet supported  -HK       Standing-Level of Assistance  Minimum assistance;x2  -HK       Static Standing Balance Support  assistive device  -HK       Positioning and Restraints    Pre-Treatment Position  in bed  -HK       Post Treatment Position  chair  -HK       In Chair  notified nsg;reclined;call light within reach;encouraged to call for assist;exit alarm on;with nsg  -HK         02/08/18 1112 02/08/18 1106 02/08/18 0618 02/08/18 0140       Rehab Evaluation    Evaluation Not Performed unable to evaluate, medical status change   plan for OR today for surgical management of right femoral neck FX  -AR        General Information    Equipment Currently Used at Home    wheelchair;walker, rolling  -AD     Living Environment    Lives With    facility resident  -AD     Living Arrangements    assisted living  -AD      Home Accessibility    no concerns  -AD     Stair Railings at Home    none  -AD     Type of Financial/Environmental Concern    none  -AD     Transportation Available   car;family or friend will provide  -AD car;family or friend will provide  -AD     Living Environment Comment    family very happy with [facility]  -AD     Functional Level Prior    Ambulation  2-->assistive person  -HH  2-->assistive person  -AD     Transferring  2-->assistive person  -HH  2-->assistive person  -AD     Toileting  2-->assistive person  -HH  2-->assistive person  -AD     Bathing  2-->assistive person  -HH  2-->assistive person  -AD     Dressing  0-->independent  -HH  0-->independent  -AD     Eating  0-->independent  -HH  0-->independent  -AD     Communication  0-->understands/communicates without difficulty  -HH  0-->understands/communicates without difficulty  -AD     Swallowing  0-->swallows foods/liquids without difficulty  -HH  0-->swallows foods/liquids without difficulty  -AD     Prior Functional Level Comment    fair  -AD       User Key  (r) = Recorded By, (t) = Taken By, (c) = Cosigned By    Initials Name Effective Dates    CD Floresita Griffin, PT 06/19/15 -     AR Kelsey Blue, OT 06/22/15 -     HH Rober Green, MICAELA 03/14/16 -     AD Kika Woodruff, RN 06/16/16 -     NT Tess Giraldo, RN 03/31/17 -     KR Nataliya Roque, PT 09/25/17 -     HK Maricel Sandoval, OT 09/28/17 -            Occupational Therapy Education     Title: PT OT SLP Therapies (Active)     Topic: Occupational Therapy (Active)     Point: ADL training (Active)    Description: Instruct learner(s) on proper safety adaptation and remediation techniques during self care or transfers.   Instruct in proper use of assistive devices.    Learning Progress Summary    Learner Readiness Method Response Comment Documented by Status   Patient Acceptance E NR Pt educated on ADL retraining, safety precautions, and appropriate body mechanics.  02/09/18 0923 Active     Acceptance TB DU  TP 02/08/18 1755 Done               Point: Precautions (Active)    Description: Instruct learner(s) on prescribed precautions during self-care and functional transfers.    Learning Progress Summary    Learner Readiness Method Response Comment Documented by Status   Patient Acceptance E NR Pt educated on ADL retraining, safety precautions, and appropriate body mechanics.  02/09/18 0923 Active    Acceptance TB DU  TP 02/08/18 1755 Done               Point: Body mechanics (Active)    Description: Instruct learner(s) on proper positioning and spine alignment during self-care, functional mobility activities and/or exercises.    Learning Progress Summary    Learner Readiness Method Response Comment Documented by Status   Patient Acceptance E NR Pt educated on ADL retraining, safety precautions, and appropriate body mechanics.  02/09/18 0923 Active    Acceptance TB DU  TP 02/08/18 1755 Done                      User Key     Initials Effective Dates Name Provider Type Discipline     07/10/17 -  Milena Torres, RN Registered Nurse Nurse     09/28/17 -  Maricel Sandoval OT Occupational Therapist OT                  OT Recommendation and Plan  Anticipated Equipment Needs At Discharge: other (see comments) (TBD)  Anticipated Discharge Disposition: skilled nursing facility (Pt will require skilled rehab)  Therapy Frequency: daily (per priority policy)  Plan of Care Review  Plan Of Care Reviewed With: patient  Progress: improving  Outcome Summary/Follow up Plan: OT eval complete. Pt requires modA for bed mobility and modAx2 for transfers. Pt requires Jovita for toileting. Recommend discharge to SNF rehab when medically ready.           OT Goals       02/09/18 0926          Bed Mobility OT LTG    Bed Mobility OT LTG, Date Established 02/09/18  -HK      Bed Mobility OT LTG, Time to Achieve 5 days  -HK      Bed Mobility OT LTG, Activity Type supine to sit/sit to supine;scoot/bridge  -HK      Bed Mobility OT LTG,  [place] Level minimum assist (75% patient effort);verbal cues required  -HK      Bed Mobility OT LTG, Outcome goal ongoing  -HK      Transfer Training OT LTG    Transfer Training OT LTG, Date Established 02/09/18  -HK      Transfer Training OT LTG, Time to Achieve 5 days  -HK      Transfer Training OT LTG, Activity Type sit to stand/stand to sit;toilet  -HK      Transfer Training OT LTG, [place] Level minimum assist (75% patient effort);verbal cues required  -HK      Transfer Training OT LTG, Assist Device walker, rolling  -HK      Transfer Training OT LTG, Outcome goal ongoing  -HK      Static Standing Balance OT LTG    Static Standing Balance OT LTG, Date Established 02/09/18  -HK      Static Standing Balance OT LTG, Time to Achieve 5 days  -HK      Static Standing Balance OT LTG, [place] Level minimum assist (75% patient effort);verbal cues required  -HK      Static Standing Balance OT LTG, Outcome goal ongoing  -HK      Toileting OT LTG    Toileting Goal OT LTG, Date Established 02/09/18  -HK      Toileting Goal OT LTG, Time to Achieve 5 days  -HK      Toileting Goal OT LTG, Activity Type Pt will complete post toilet hygiene and clothing management   -HK      Toileting Goal OT LTG, [place] Level verbal cues required;contact guard assist  -HK      Toileting Goal OT LTG, Outcome goal ongoing  -HK        User Key  (r) = Recorded By, (t) = Taken By, (c) = Cosigned By    Initials Name Provider Type    HK Maricel Sandoval, OT Occupational Therapist                Outcome Measures       02/09/18 0818          How much help from another is currently needed...    Putting on and taking off regular lower body clothing? 1  -HK      Bathing (including washing, rinsing, and drying) 2  -HK      Toileting (which includes using toilet bed pan or urinal) 3  -HK      Putting on and taking off regular upper body clothing 3  -HK      Taking care of personal grooming (such as brushing teeth) 3  -HK      Eating meals 3   -HK      Score 15  -HK      Functional Assessment    Outcome Measure Options AM-PAC 6 Clicks Daily Activity (OT)  -HK        User Key  (r) = Recorded By, (t) = Taken By, (c) = Cosigned By    Initials Name Provider Type     Maricel Sandoval OT Occupational Therapist          Time Calculation:   OT Start Time: 0818    Therapy Charges for Today     Code Description Service Date Service Provider Modifiers Qty    [id number]  OT EVAL MOD COMPLEXITY 4 2/9/2018 Maricel Sandoval OT GO 1    [id number]  OT THER SUPP EA 15 MIN 2/9/2018 Maricel Sandoval OT GO 2               Maricel Sandoval OT  2/9/2018

## 2018-02-09 NOTE — THERAPY TREATMENT NOTE
Acute Care - Physical Therapy Treatment Note  [facility]     Patient Name: Zainab Worthy  : 2/3/1929  MRN: [id number]  Today's Date: 2018  Onset of Illness/Injury or Date of Surgery Date: 18  Date of Referral to PT: 18  Referring Physician: TINA    Admit Date: 2018    Visit Dx:    ICD-10-CM ICD-9-CM   1. Closed fracture of neck of right femur, initial encounter S72.001A 820.8   2. Acute UTI N39.0 599.0   3. Dementia without behavioral disturbance, unspecified dementia type F03.90 294.20   4. Fall, initial encounter W19.XXXA E888.9   5. Impaired mobility and ADLs Z74.09 799.89   6. Impaired functional mobility, balance, gait, and endurance Z74.09 V49.89     Patient Active Problem List   Diagnosis   • Gonalgia   • Hypertrophic polyarthritis   • Allergic rhinitis, seasonal   • Skin growth   • Endometrial cancer   • Post-operative state   • Rectal cancer   • Hydronephrosis   • Hypertension   • Anorexia   • Onychomycosis   • IUD complication   • Memory loss   • Urinary frequency   • Fall at home   • Closed fracture of pelvis   • UTI (urinary tract infection)   • Metabolic encephalopathy   • Fracture of femoral neck, right   • Dementia   • Metastatic colon cancer in female   • Recurrent falls   • Closed fracture of neck of right femur               Adult Rehabilitation Note       18 1335          Rehab Assessment/Intervention    Discipline physical therapist  -CD      Document Type therapy note (daily note)  -CD      Subjective Information agree to therapy   WANTS TO USE THE BATHROOM.   -CD      Patient Effort, Rehab Treatment good  -CD      Symptoms Noted During/After Treatment other (see comments)   PT UNABLE TO URINATE- NSG IN TO SCAN BLADDER AFTER P.T.   -CD      Symptoms Noted Comment [place]-  PLEASANTLY CONFUSED.   -CD      Precautions/Limitations fall precautions;hip precautions- right   EXIT ALARM, CONFUSION.   -CD      Recorded by [CD] Floresita Griffin, PT      Vital Signs    Pre  Systolic BP Rehab --   VSS  -CD      Recorded by [CD] Floresita Griffin, PT      Pain Assessment    Pain Assessment Villasenor-Garcia FACES  -CD      Villasenor-Garcia FACES Pain Rating 0  -CD      Pain Type --   PT DENIES PAIN.   -CD      Recorded by [CD] Floresita Griffin, PT      Bed Mobility, Assessment/Treatment    Bed Mobility, Assistive Device draw sheet;head of bed elevated  -CD      Bed Mob, Supine to Sit, [place] maximum assist (25% patient effort);2 person assist required;verbal cues required  -CD      Recorded by [CD] Floresita Griffin, PT      Transfer Assessment/Treatment    Transfers, Sit-Stand [place] minimum assist (75% patient effort);2 person assist required;verbal cues required  -CD      Transfers, Stand-Sit [place] moderate assist (50% patient effort);2 person assist required;verbal cues required  -CD      Transfers, Sit-Stand-Sit, Assist Device rolling walker  -CD      Toilet Transfer, [place] minimum assist (75% patient effort);2 person assist required;verbal cues required  -CD      Toilet Transfer, Assistive Device rolling walker;bedside commode without drop arms  -CD      Transfer, Safety Issues sequencing ability decreased  -CD      Transfer, Impairments impaired balance;strength decreased  -CD      Transfer, Comment MAX CUES FOR HAND PLACEMENT.   -CD      Recorded by [CD] Floresita Griffin, PT      Gait Assessment/Treatment    Gait, [place] Level moderate assist (50% patient effort);2 person assist required;verbal cues required  -CD      Gait, Assistive Device rolling walker  -CD      Gait, Distance (Feet) 100   CHAIR IN TOW.   -CD      Gait, Gait Deviations gabe decreased;forward flexed posture;step length decreased;weight-shifting ability decreased;right:;antalgic  -CD      Gait, Safety Issues step length decreased;weight-shifting ability decreased;balance decreased during turns  -CD      Gait, Impairments strength decreased;impaired balance  -CD      Gait, Comment CUES TO WIDEN HILDA AND TO  INCREASE STEP LENGTH. PT ADVANCING R LE INDEPENDENTLY.   -CD      Recorded by [CD] Floresita Griffin PT      Motor Skills/Interventions    Additional Documentation Balance Skills Training (Group)  -CD      Recorded by [CD] Floresita Griffin PT      Balance Skills Training    Sitting-Level of Assistance Contact guard   MORE IMPULSIVE THIS AFTERNOON.   -CD      Sitting-Balance Support Right upper extremity supported;Left upper extremity supported;Feet supported  -CD      Standing-Level of Assistance Minimum assistance  -CD      Static Standing Balance Support assistive device  -CD      Standing-Balance Activities Weight Shift R-L  -CD      Standing Balance # of Minutes 2  -CD      Recorded by [CD] Floresita Griffin PT      Positioning and Restraints    Pre-Treatment Position in bed  -CD      Post Treatment Position chair  -CD      In Chair reclined;call light within reach;encouraged to call for assist;legs elevated;on mechanical lift sling;waffle cushion   NSG IN TO SCAN BLADDER AND AGREES TO SET EXIT ALARM.   -CD      Recorded by [CD] Floresita Griffin PT        User Key  (r) = Recorded By, (t) = Taken By, (c) = Cosigned By    Initials Name Effective Dates    CD Floresita Griffin PT 06/19/15 -                 IP PT Goals       02/09/18 1413 02/09/18 1150       Bed Mobility PT LTG    Bed Mobility PT LTG, Time to Achieve  2 wks  -CD     Bed Mobility PT LTG, Activity Type  all bed mobility  -CD     Bed Mobility PT LTG, [place] Level  minimum assist (75% patient effort)  -CD     Bed Mobility PT Goal  LTG, Assist Device  bed rails  -CD     Bed Mobility PT LTG, Outcome goal ongoing  -CD      Transfer Training PT LTG    Transfer Training PT LTG, Time to Achieve  2 wks  -CD     Transfer Training PT LTG, Activity Type  all transfers  -CD     Transfer Training PT LTG, [place] Level  contact guard assist  -CD     Transfer Training PT LTG, Assist Device  walker, rolling  -CD     Transfer Training PT LTG, Outcome goal ongoing  -CD       Gait Training PT LTG    Gait Training Goal PT LTG, Time to Achieve  2 wks  -CD     Gait Training Goal PT LTG, [place] Level  minimum assist (75% patient effort)  -CD     Gait Training Goal PT LTG, Assist Device  walker, rolling  -CD     Gait Training Goal PT LTG, Distance to Achieve  250  -CD     Gait Training Goal PT LTG, Outcome goal ongoing  -CD        User Key  (r) = Recorded By, (t) = Taken By, (c) = Cosigned By    Initials Name Provider Type    JANA Griffin, PT Physical Therapist          Physical Therapy Education     Title: PT OT SLP Therapies (Active)     Topic: Physical Therapy (Active)     Point: Mobility training (Active)    Learning Progress Summary    Learner Readiness Method Response Comment Documented by Status   Patient Acceptance E NR,NL SAFETY WITH MOBILITY, PRECAUTIONS, GAIT TRAINING AND TF TRAINING WITH R WALKER.  02/09/18 1412 Active    Acceptance E NR,NL PT HAS DEMENTIA. FAMILY NOT PRESENT. REVIEW POSTERIOR HIP PRECAUTIONS, TRANSFER/GAIT TRAINING WITH R WALKER, THER EX. CD 02/09/18 1149 Active    Acceptance TB DU  TP 02/08/18 1755 Done               Point: Home exercise program (Active)    Learning Progress Summary    Learner Readiness Method Response Comment Documented by Status   Patient Acceptance E NR,NL SAFETY WITH MOBILITY, PRECAUTIONS, GAIT TRAINING AND TF TRAINING WITH R WALKER.  02/09/18 1412 Active    Acceptance E NR,NL PT HAS DEMENTIA. FAMILY NOT PRESENT. REVIEW POSTERIOR HIP PRECAUTIONS, TRANSFER/GAIT TRAINING WITH R WALKER, THER EX. CD 02/09/18 1149 Active    Acceptance TB DU  TP 02/08/18 1755 Done               Point: Body mechanics (Active)    Learning Progress Summary    Learner Readiness Method Response Comment Documented by Status   Patient Acceptance E NR,NL SAFETY WITH MOBILITY, PRECAUTIONS, GAIT TRAINING AND TF TRAINING WITH R WALKER.  02/09/18 1412 Active    Acceptance E NR,NL PT HAS DEMENTIA. FAMILY NOT PRESENT. REVIEW POSTERIOR HIP PRECAUTIONS,  TRANSFER/GAIT TRAINING WITH R WALKER, THER EX. CD 02/09/18 1149 Active    Acceptance TB DU  TP 02/08/18 1755 Done               Point: Precautions (Active)    Learning Progress Summary    Learner Readiness Method Response Comment Documented by Status   Patient Acceptance E NR,NL SAFETY WITH MOBILITY, PRECAUTIONS, GAIT TRAINING AND TF TRAINING WITH R WALKER. CD 02/09/18 1412 Active    Acceptance E NR,NL PT HAS DEMENTIA. FAMILY NOT PRESENT. REVIEW POSTERIOR HIP PRECAUTIONS, TRANSFER/GAIT TRAINING WITH R WALKER, THER EX. CD 02/09/18 1149 Active    Acceptance TB DU  TP 02/08/18 1755 Done                      User Key     Initials Effective Dates Name Provider Type Discipline    CD 06/19/15 -  Floresita Griffin, PT Physical Therapist PT    TP 07/10/17 -  Milena Torres RN Registered Nurse Nurse                    PT Recommendation and Plan  Anticipated Discharge Disposition: skilled nursing facility  Planned Therapy Interventions: balance training, bed mobility training, gait training, home exercise program, strengthening, transfer training, patient/family education  PT Frequency: 2 times/day  Plan of Care Review  Plan Of Care Reviewed With: patient  Outcome Summary/Follow up Plan: INCREASED DISTANCE AMBULATED. GOOD PAIN CONTROL. RECOMMEND SNF AT D/C. AMBULATED 100 FEET WITH R WALKER AND MOD ASSIST OF 2.           Outcome Measures       02/09/18 1335 02/09/18 0955 02/09/18 0818    How much help from another person do you currently need...    Turning from your back to your side while in flat bed without using bedrails? 2  -CD 2  -CD     Moving from lying on back to sitting on the side of a flat bed without bedrails? 2  -CD 2  -CD     Moving to and from a bed to a chair (including a wheelchair)? 2  -CD 2  -CD     Standing up from a chair using your arms (e.g., wheelchair, bedside chair)? 2  -CD 2  -CD     Climbing 3-5 steps with a railing? 1  -CD 1  -CD     To walk in hospital room? 2  -CD 2  -CD     AM-PAC 6 Clicks Score 11   -CD 11  -CD     How much help from another is currently needed...    Putting on and taking off regular lower body clothing?   1  -HK    Bathing (including washing, rinsing, and drying)   2  -HK    Toileting (which includes using toilet bed pan or urinal)   3  -HK    Putting on and taking off regular upper body clothing   3  -HK    Taking care of personal grooming (such as brushing teeth)   3  -HK    Eating meals   3  -HK    Score   15  -HK    Functional Assessment    Outcome Measure Options AM-PAC 6 Clicks Basic Mobility (PT)  -CD AM-PAC 6 Clicks Basic Mobility (PT)  -CD AM-PAC 6 Clicks Daily Activity (OT)  -HK      User Key  (r) = Recorded By, (t) = Taken By, (c) = Cosigned By    Initials Name Provider Type    JANA Griffin, PT Physical Therapist    HK Maricel Sandoval, OT Occupational Therapist           Time Calculation:         PT Charges       02/09/18 1415 02/09/18 1154       Time Calculation    Start Time 1335  - 0955  -CD     PT Received On 02/09/18  -CD 02/09/18  -CD     PT Goal Re-Cert Due Date 02/19/18  -CD 02/19/18  -CD     Time Calculation- PT    Total Timed Code Minutes- PT 25 minute(s)  -CD 8 minute(s)  -CD       User Key  (r) = Recorded By, (t) = Taken By, (c) = Cosigned By    Initials Name Provider Type    JANA Griffin, PT Physical Therapist          Therapy Charges for Today     Code Description Service Date Service Provider Modifiers Qty    01854008755 HC GAIT TRAINING EA 15 MIN 2/9/2018 Floresita Griffin, PT GP 1    90599491957 HC PT EVAL MOD COMPLEXITY 4 2/9/2018 Floresita Griffin, PT GP 1    89465429322 HC GAIT TRAINING EA 15 MIN 2/9/2018 Floresita Griffin, PT GP 1    12703729919 HC PT THER PROC EA 15 MIN 2/9/2018 Floresita Griffin, PT GP 1    48009116556 HC PT THER SUPP EA 15 MIN 2/9/2018 Floresita Griffin, PT GP 2          PT G-Codes  Outcome Measure Options: AM-PAC 6 Clicks Basic Mobility (PT)    Floresita Griffin, PT  2/9/2018

## 2018-02-09 NOTE — DISCHARGE INSTRUCTIONS
"DISCHARGE INSTRUCTIONS   Dr. Dominguez     Total Hip Replacement/Hip Hemiarthroplasty     Wound Care   1) Keep wound / incision area clean and dry.   2) Dressing to remain in place until post-operative day 7. Upon dressing removal, assess for wound drainage. If no drainage is present, keep wound / incision area open to air as much as possible. If drainage is present, place sterile dressing to cover wound and assess daily. If drainage continues to occur after post-operative day 14, call the office for an urgent appointment. (You should be seen in the clinic within 1-2 days of calling).   3) No baths or swimming until otherwise instructed. The wound must remain dry for 10 days after surgery. After 10 days, you may begin to shower only if no drainage is present. No submerging the wound under standing water until cleared by your physician (no baths, hot tubs, swimming pools, etc). Sponge baths are the best way to perform personal hygiene while at the same time protecting the wound from moisture.   4) Prior to showering, the wound must remain dry for 72 consecutive hours (no drainage whatsoever) prior to showering. If the wound drains or spots, the clock \"resets\" - make sure the wound has been drainage-free for 72 consecutive hours.   5) Once you are allowed to get the wound wet, please use gentle soap to wash the wound area. DO NOT aggressively scrub the wound with a washcloth or bath sponge. Please visually inspect your wound(s) at least once daily. If the wound(s) are in a difficult to see location, please use a mirror or have someone else assist with visual inspection.   6) No scrubbing the wound. You may \"pad dry\" the wound, but do not rub, as this may open up the wound and pre-dispose to wound infection.   7) Do not apply lotions or creams to incision site, unless instructed otherwise.   8) Observe for redness, swelling, or drainage. Please call the clinic immediately if you have fevers, chills with warmth/redness " "surrounding wound site or if you notice pus drainage from the wound site     Activity   No heavy lifting objects greater than 10 pounds.   No driving while on narcotic pain medication.   No submerging wound under standing water (pool, bath tub, etc.) until otherwise instructed.   You may be protected weightbearing as tolerated on your operative (right lower) extremity   Use a walker for ambulation for at least 2 weeks after surgery.  May wean from walker after 2 weeks if approved by your therapist.   Posterior hip precautions for 6 weeks: No bending the hip past 90 degrees. Do not allow the leg to cross the midline of your body (adduction). No twisting motions. Ask your physical therapist to review these precautions with you.     Blood Clot Prophylaxis   (Aspirin vs. Lovenox administration is determined by your surgeon and tailored to your specific risk profile. You will be discharged with either of these medications, but not both.) You will need to complete a total 4 week course of enteric coated aspirin 325 mg twice daily, in order to minimize your risk of blood clots following surgery. You will be supplied with a prescription to obtain this. You will need to compete a total 2 week course of Lovenox after surgery, in order to minimize the risk of blood clots following surgery. This requires a single shot in the abdomen, to be taken once daily. You will be supplied with the prescription to obtain this. Prior to your discharge from the hospital, the nursing staff will instruct you on self-administration of the Lovenox, if you will be returning directly home from the hospital.     Discharge Pain Medications   You will be given a prescription for pain medication. You should start taking this the same day after your surgery. Wean off as tolerated. Do not wait to take the pain medication until the pain is severe, as it will be difficult to \"catch up\" once this occurs. The pain medication usually reaches its full effect " ~1 hour after ingesting. If you have been sent home on Valium, this medication works well for muscle spasms. If you have been sent home on Colace, this medication should be taken until you are off all narcotic (i.e. Norco, Percocet, Oxycodone, etc) pain medications, in order to prevent constipation. Percocet or Norco have Tylenol in their ingredient lists. You must be careful not to exceed 4,000mg (4 grams) of Tylenol, from all sources, within a single 24-hr period. This means that you may not take more than 12 Percocets or Norcos within a 24-hr period. Do NOT take Regular or Extra Strength Tylenol when taking your Percocet or Norco medications. Some common side effects of the narcotic pain medications (Percocet, Oxycodone, Norco, etc) include nausea and itching. Benadryl is a great over the counter medication that helps calm your stomach, decreases your anxiety levels, and minimizes the itching. You can easily purchase this at your local pharmacy as an over-the-counter medication. Please abide by the instructions as printed on the bottle. If your nausea persists, make sure to take small amounts of crackers or other lighter foods.     Follow-Up   Follow-up with Dr. Dominguez's office in 3 weeks from the surgery date for a post-operative evaluation. Have the following xrays done upon arrival to the follow-up appointment: AP pelvis. Please call Dr. Dominguez's office at [phone] for orthopaedic appointments or questions.

## 2018-02-09 NOTE — THERAPY EVALUATION
Acute Care - Physical Therapy Initial Evaluation  [facility]     Patient Name: Zainab Worthy  : 2/3/1929  MRN: [id number]  Today's Date: 2018   Onset of Illness/Injury or Date of Surgery Date: 18  Date of Referral to PT: 18  Referring Physician: TINA      Admit Date: 2018     Visit Dx:    ICD-10-CM ICD-9-CM   1. Closed fracture of neck of right femur, initial encounter S72.001A 820.8   2. Acute UTI N39.0 599.0   3. Dementia without behavioral disturbance, unspecified dementia type F03.90 294.20   4. Fall, initial encounter W19.XXXA E888.9   5. Impaired mobility and ADLs Z74.09 799.89   6. Impaired functional mobility, balance, gait, and endurance Z74.09 V49.89     Patient Active Problem List   Diagnosis   • Gonalgia   • Hypertrophic polyarthritis   • Allergic rhinitis, seasonal   • Skin growth   • Endometrial cancer   • Post-operative state   • Rectal cancer   • Hydronephrosis   • Hypertension   • Anorexia   • Onychomycosis   • IUD complication   • Memory loss   • Urinary frequency   • Fall at home   • Closed fracture of pelvis   • UTI (urinary tract infection)   • Metabolic encephalopathy   • Fracture of femoral neck, right   • Dementia   • Metastatic colon cancer in female   • Recurrent falls   • Closed fracture of neck of right femur     Past Medical History:   Diagnosis Date   • Anorexia    • Arthritis    • Asthma    • Cataract    • Colon cancer    • Colon cancer metastasized to liver    • DDD (degenerative disc disease), lumbar    • Hearing loss    • History of transfusion    • Hypertension    • Liver cancer    • Macular degeneration    • Onychomycosis    • Urine frequency      Past Surgical History:   Procedure Laterality Date   • CATARACT EXTRACTION Bilateral    • COLON RESECTION  2009    Low anterior resection   • GALLBLADDER SURGERY      Resection of gallbladder   • LIVER SURGERY  2014    Liver tumor,  was removed.   • PARTIAL HYSTERECTOMY            PT ASSESSMENT  (last 72 hours)      PT Evaluation       02/09/18 0955 02/09/18 0818    Rehab Evaluation    Document Type evaluation   COMPLETED CHART REVIEW [id number].   -CD evaluation  -HK    Subjective Information agree to therapy;no complaints   PLEASANTLY CONFUSED.   -CD agree to therapy;complains of;pain  -HK    Patient Effort, Rehab Treatment good  -CD good  -HK    Symptoms Noted During/After Treatment none  -CD none  -HK    Symptoms Noted Comment VERY [place], HAS HEARING AIDES.   -CD Pt [place] and has hearing aidss  -HK    General Information    Patient Profile Review yes  -CD yes  -HK    Onset of Illness/Injury or Date of Surgery Date 02/07/18  -CD 02/07/18  -HK    Referring Physician TINA  -CD MD Ramya  -HK    General Observations PT SITTING UIC UPON ARRIVAL ON EXIT ALARM AND WITH LE'S ELEVATED. PT ON RA AND HAS IV. BANDAGES INTACT AT R HIP.   -CD Pt received supine in bed with IV intact and no visitors at bedside.   -HK    Pertinent History Of Current Problem Pt is a 89 YOF who presented to the ED with R hip pain. Pt was found on the floor around 4am by her nurse. Pt does not remember how she fell. Pt has PMG significant for recurrent falls, colon cancer with mets to liver, and hypertension. Pt found to have R hip fx and is POD #1 s/p R hip hemiarthroplasty.   -CD Pt is a 89 YOF who presented to the ED with R hip pain. Pt was found on the floor around 4am by her nurse. Pt does not remember how she fell. Pt has PMG significant for recurrent falls, colon cancer with mets to liver, and hypertension. Pt found to have R hip fx and is POD #1 s/p R hip hemiarthroplasty.   -HK    Precautions/Limitations fall precautions;hip precautions- right   CONFUSION.   -CD fall precautions;other (see comments);hip precautions- right   confusion  -HK    Prior Level of Function --   APPARENTLY WAS AT [facility] AND USED ROLLATOR. FAMILY NOT PRESENT.  -CD --   unclear; pt is confused and no family present   -HK    Equipment Currently Used at Home  walker, rolling  -CD walker, rolling;wheelchair  -    Plans/Goals Discussed With patient;agreed upon  -CD patient;agreed upon  -HK    Risks Reviewed patient:;LOB;increased discomfort;change in vital signs;lines disloged  -CD patient:;LOB;nausea/vomiting;increased discomfort;dizziness;change in vital signs;increased drainage;lines disloged  -HK    Benefits Reviewed patient:;improve function;increase independence;increase strength;increase balance;increase knowledge  -CD patient:;improve function;increase independence;increase strength;increase balance;decrease pain;decrease risk of DVT;improve skin integrity;increase knowledge  -HK    Barriers to Rehab cognitive status  -CD cognitive status  -HK    Living Environment    Lives With facility resident  -CD facility resident  -    Living Arrangements assisted living  -CD assisted living  -    Home Accessibility  no concerns  -HK    Living Environment Comment IN HOSPITAL WITH PELVIC FX 11/2017, WENT TO [facility] AND WAS THEN D/C'D TO [facility]. THERE 2 WEEKS PRIOR TO THIS FALL/ADMIT. PER CM, PLAN IS TO D/D TO SKILLED BED FOR REHAB AT [place]..   -CD Pt is a resident at the [facility]    Clinical Impression    Date of Referral to PT 02/08/18  -CD     PT Diagnosis IMPAIRED FUNCTIONAL MOBILITY, S/P R FEMORAL NECK FRACTURE WITH HEMIARTHROPLASTY.   -CD     Patient/Family Goals Statement DID NOT STATE, CONFUSED.   -CD     Criteria for Skilled Therapeutic Interventions Met yes  -CD     Rehab Potential good, to achieve stated therapy goals  -CD     Vital Signs    Pre Systolic BP Rehab --   VSS  -  -HK    Pre Treatment Diastolic BP  97  -HK    Post Systolic BP Rehab  163  -HK    Post Treatment Diastolic BP  74  -HK    Pretreatment Heart Rate (beats/min)  89  -HK    Posttreatment Heart Rate (beats/min)  93  -HK    Pre Patient Position  Supine  -HK    Intra Patient Position  Standing  -HK    Post Patient Position  Sitting  -HK    Pain Assessment    Pain Assessment Villasenor-Garcia FACES   -CD Villasenor-Garcia FACES  -HK    Villasenor-Garcia FACES Pain Rating 0   0 PRE, 4 INTRA WITH GAIT , 0 POST.   -CD 4  -HK    Pain Type Surgical pain  -CD Surgical pain  -HK    Pain Location Hip  -CD Hip  -HK    Pain Orientation Right  -CD Right  -HK    Pain Intervention(s) Repositioned;Ambulation/increased activity;Rest  -CD Repositioned;Ambulation/increased activity  -HK    Response to Interventions PT TOLERATED.   -CD Pt tolerated  -HK    Vision Assessment/Intervention    Visual Impairment  WFL  -HK    Cognitive Assessment/Intervention    Current Cognitive/Communication Assessment impaired  -CD impaired  -HK    Orientation Status  oriented to;person;situation;disoriented to;place;time  -HK    Follows Commands/Answers Questions 75% of the time;able to follow single-step instructions;needs cueing;needs increased time;needs repetition  -CD 75% of the time;able to follow single-step instructions;needs cueing;needs increased time;needs repetition  -HK    Personal Safety moderate impairment;decreased awareness, need for assist;decreased awareness, need for safety;decreased insight to deficits  -CD moderate impairment;decreased awareness, need for assist;decreased awareness, need for safety;decreased insight to deficits  -HK    Personal Safety Interventions fall prevention program maintained;nonskid shoes/slippers when out of bed;supervised activity;muscle strengthening facilitated;gait belt   R POSTERIOR HIP PRECAUTIONS.   -CD fall prevention program maintained;gait belt;nonskid shoes/slippers when out of bed  -HK    ROM (Range of Motion)    General ROM no range of motion deficits identified   R HIP LIMITED TO 90 DEGREES FLEX PER PRECAUTIONS.   -CD upper extremity range of motion deficits identified  -HK    General ROM Detail  pt with decreased shoulder flexion grossly 90 degrees  -HK    MMT (Manual Muscle Testing)    General MMT Assessment lower extremity strength deficits identified   R LE 4/5, R HIP NT, L LEL 4/5.   -CD upper  extremity strength deficits identified  -HK    General MMT Assessment Detail  BUE estimated 3+/5. Pt unablet to follow directions for MMT  -HK    Bed Mobility, Assessment/Treatment    Bed Mobility, Scoot/Bridge, [place]  minimum assist (75% patient effort);verbal cues required  -HK    Bed Mob, Supine to Sit, [place]  moderate assist (50% patient effort);verbal cues required  -HK    Bed Mob, Sit to Supine, [place]  not tested  -HK    Bed Mobility, Safety Issues  decreased use of arms for pushing/pulling;decreased use of legs for bridging/pushing  -HK    Bed Mobility, Impairments  strength decreased;impaired balance;pain  -HK    Bed Mobility, Comment PT UIC AND RETURED TO CHAIR.   -CD Pt advanced to EOB with modA  to bring legs to EOB. Pt requires verbal cues for sequencing and safe hand placement.   -HK    Transfer Assessment/Treatment    Transfers, Sit-Stand [place] moderate assist (50% patient effort);verbal cues required   MIN ASSIST WITH CUES TO SCOOT HIPS TO EOB.   -CD moderate assist (50% patient effort);2 person assist required;verbal cues required  -HK    Transfers, Stand-Sit [place] moderate assist (50% patient effort);verbal cues required  -CD moderate assist (50% patient effort);2 person assist required;verbal cues required  -HK    Transfers, Sit-Stand-Sit, Assist Device rolling walker  -CD rolling walker  -HK    Toilet Transfer, [place]  moderate assist (50% patient effort);2 person assist required;verbal cues required  -HK    Toilet Transfer, Assistive Device  bedside commode without drop arms;rolling walker  -HK    Transfer, Safety Issues sequencing ability decreased  -CD step length decreased;weight-shifting ability decreased;sequencing ability decreased;balance decreased during turns  -HK    Transfer, Impairments impaired balance;strength decreased  -CD strength decreased;impaired balance;pain  -HK    Transfer, Comment CUES FOR HAND PLACEMENT.   -CD Pt requires verbal  cues for sequencing and safe hand placement.  -HK    Gait Assessment/Treatment    Gait, [place] Level moderate assist (50% patient effort);verbal cues required  -CD     Gait, Assistive Device rolling walker  -CD     Gait, Distance (Feet) 5  -CD     Gait, Gait Deviations antalgic;right:;gabe decreased;step length decreased;weight-shifting ability decreased  -CD     Gait, Safety Issues step length decreased;weight-shifting ability decreased  -CD     Gait, Impairments impaired balance;strength decreased  -CD     Gait, Comment DISTANCE LIMITED TO IV LINE LENGTH AND NO ADDITIONAL ASSIST AVAILABLE. PULLED RECLINER UP BEHIND PT TO SIT.   -CD     Motor Skills/Interventions    Additional Documentation Balance Skills Training (Group)  -CD Balance Skills Training (Group)  -HK    Balance Skills Training    Sitting-Level of Assistance Close supervision  -CD Contact guard  -HK    Sitting-Balance Support Right upper extremity supported;Left upper extremity supported;Feet supported  -CD Right upper extremity supported;Left upper extremity supported;Feet supported  -HK    Standing-Level of Assistance Minimum assistance  -CD Minimum assistance;x2  -HK    Static Standing Balance Support assistive device  -CD assistive device  -HK    Standing-Balance Activities Weight Shift R-L   NO BUCKLING ON R.   -CD     Standing Balance # of Minutes 2  -CD     Therapy Exercises    Right Lower Extremity 10 reps;AAROM:;AROM:;sitting;ankle pumps/circles;hip abduction/adduction;heel slides;LAQ;hip flexion   MAINTAINING R POSTERIOR HIP PRECAUTIONS.   -CD     Left Lower Extremity AROM:;10 reps;sitting;ankle pumps/circles;hip flexion;LAQ  -CD     Positioning and Restraints    Pre-Treatment Position sitting in chair/recliner  -CD in bed  -HK    Post Treatment Position chair  -CD chair  -HK    In Chair notified nsg;reclined;call light within reach;encouraged to call for assist;exit alarm on;pillow between legs;legs elevated;LUE elevated;on  mechanical lift sling;waffle cushion   NSG NOTIFIED OF CURRENT MOBILITY STATUS AND REC'S FOR D/C.   -CD notified nsg;reclined;call light within reach;encouraged to call for assist;exit alarm on;with nsg  -HK      02/09/18 0800 02/08/18 1356    Rehab Evaluation    Evaluation Not Performed  unable to evaluate, medical status change   plan for OR today; will evaluate pt after surgical management.   -KR    Muscle Tone Assessment    Muscle Tone Assessment Bilateral Upper Extremities;Bilateral Lower Extremities  -NT     Bilateral Upper Extremities Muscle Tone Assessment associated movements noted  -NT     Bilateral Lower Extremities Muscle Tone Assessment mildly decreased tone  -NT       02/08/18 1123 02/08/18 1112    Rehab Evaluation    Evaluation Not Performed  unable to evaluate, medical status change   plan for OR today for surgical management of right femoral neck FX  -AR    General Information    Equipment Currently Used at Home walker, rolling;wheelchair  -HH     Living Environment    Living Arrangements --   Was in an assisted living apt. at [facility].    -HH     Home Accessibility no concerns  -HH     Stair Railings at Home none  -HH     Type of Financial/Environmental Concern none  -HH     Transportation Available car;family or friend will provide  -HH       02/08/18 0618 02/08/18 0140    General Information    Equipment Currently Used at Home  wheelchair;walker, rolling  -AD    Living Environment    Lives With  facility resident  -AD    Living Arrangements  assisted living  -AD    Home Accessibility  no concerns  -AD    Stair Railings at Home  none  -AD    Type of Financial/Environmental Concern  none  -AD    Transportation Available car;family or friend will provide  -AD car;family or friend will provide  -AD    Living Environment Comment  family very happy with [place]  -AD      User Key  (r) = Recorded By, (t) = Taken By, (c) = Cosigned By    Initials Name Provider Type    JANA Griffin,  PT Physical Therapist    AR Kelsey Blue, OT Occupational Therapist    HH Rober Green, RN Case Manager    AD Kika Woodruff, RN Registered Nurse    NT Tess Giraldo, RN Registered Nurse    KR Nataliya Roque, PT Physical Therapist    HK Maricel Sandoval, OT Occupational Therapist          Physical Therapy Education     Title: PT OT SLP Therapies (Active)     Topic: Physical Therapy (Active)     Point: Mobility training (Active)    Learning Progress Summary    Learner Readiness Method Response Comment Documented by Status   Patient Acceptance E NR,NL PT HAS DEMENTIA. FAMILY NOT PRESENT. REVIEW POSTERIOR HIP PRECAUTIONS, TRANSFER/GAIT TRAINING WITH R WALKER, THER EX. CD 02/09/18 1149 Active    Acceptance TB DU  TP 02/08/18 1755 Done               Point: Home exercise program (Active)    Learning Progress Summary    Learner Readiness Method Response Comment Documented by Status   Patient Acceptance E NR,NL PT HAS DEMENTIA. FAMILY NOT PRESENT. REVIEW POSTERIOR HIP PRECAUTIONS, TRANSFER/GAIT TRAINING WITH R WALKER, THER EX. CD 02/09/18 1149 Active    Acceptance TB DU  TP 02/08/18 1755 Done               Point: Body mechanics (Active)    Learning Progress Summary    Learner Readiness Method Response Comment Documented by Status   Patient Acceptance E NR,NL PT HAS DEMENTIA. FAMILY NOT PRESENT. REVIEW POSTERIOR HIP PRECAUTIONS, TRANSFER/GAIT TRAINING WITH R WALKER, THER EX. CD 02/09/18 1149 Active    Acceptance TB DU  TP 02/08/18 1755 Done               Point: Precautions (Active)    Learning Progress Summary    Learner Readiness Method Response Comment Documented by Status   Patient Acceptance E NR,NL PT HAS DEMENTIA. FAMILY NOT PRESENT. REVIEW POSTERIOR HIP PRECAUTIONS, TRANSFER/GAIT TRAINING WITH R WALKER, THER EX. CD 02/09/18 1149 Active    Acceptance TB DU  TP 02/08/18 1755 Done                      User Key     Initials Effective Dates Name Provider Type Discipline     06/19/15 -  Floresita Griffin, PT  Physical Therapist PT    TP 07/10/17 -  Milena Torres, RN Registered Nurse Nurse                PT Recommendation and Plan  Anticipated Discharge Disposition: skilled nursing facility  Planned Therapy Interventions: balance training, bed mobility training, gait training, home exercise program, strengthening, transfer training, patient/family education  PT Frequency: 2 times/day  Plan of Care Review  Plan Of Care Reviewed With: patient  Outcome Summary/Follow up Plan: PT PRESENTS WITH EVOLVING SYMPTOMS S/P R HIP HEMIARTHROPLASTY TO INCLUDE WEAKNESS,, IMPAIRED BALANCE AND DECLINE IN FUNCTIONAL MOBILITY. PT HAS DEMENTIA  AT BASELINE AND IS PLEASANTLY CONFUSED BUT COOPERATIVE WITH THERAPY. APPEARS TO HAVE GOOD PAIN CONTROL. AMBULATED 5 FEET WITH R WALKER AND MOD ASSIST. RECOMMEND SNF AT D/C.            IP PT Goals       02/09/18 1150          Bed Mobility PT LTG    Bed Mobility PT LTG, Time to Achieve 2 wks  -CD      Bed Mobility PT LTG, Activity Type all bed mobility  -CD      Bed Mobility PT LTG, [place] Level minimum assist (75% patient effort)  -CD      Bed Mobility PT Goal  LTG, Assist Device bed rails  -CD      Transfer Training PT LTG    Transfer Training PT LTG, Time to Achieve 2 wks  -CD      Transfer Training PT LTG, Activity Type all transfers  -CD      Transfer Training PT LTG, [place] Level contact guard assist  -CD      Transfer Training PT LTG, Assist Device walker, rolling  -CD      Gait Training PT LTG    Gait Training Goal PT LTG, Time to Achieve 2 wks  -CD      Gait Training Goal PT LTG, [place] Level minimum assist (75% patient effort)  -CD      Gait Training Goal PT LTG, Assist Device walker, rolling  -CD      Gait Training Goal PT LTG, Distance to Achieve 250  -CD        User Key  (r) = Recorded By, (t) = Taken By, (c) = Cosigned By    Initials Name Provider Type    JANA Griffin PT Physical Therapist                Outcome Measures       02/09/18 0955 02/09/18 0818       How  much help from another person do you currently need...    Turning from your back to your side while in flat bed without using bedrails? 2  -CD      Moving from lying on back to sitting on the side of a flat bed without bedrails? 2  -CD      Moving to and from a bed to a chair (including a wheelchair)? 2  -CD      Standing up from a chair using your arms (e.g., wheelchair, bedside chair)? 2  -CD      Climbing 3-5 steps with a railing? 1  -CD      To walk in hospital room? 2  -CD      AM-PAC 6 Clicks Score 11  -CD      How much help from another is currently needed...    Putting on and taking off regular lower body clothing?  1  -HK     Bathing (including washing, rinsing, and drying)  2  -HK     Toileting (which includes using toilet bed pan or urinal)  3  -HK     Putting on and taking off regular upper body clothing  3  -HK     Taking care of personal grooming (such as brushing teeth)  3  -HK     Eating meals  3  -HK     Score  15  -HK     Functional Assessment    Outcome Measure Options AM-PAC 6 Clicks Basic Mobility (PT)  -CD AM-PAC 6 Clicks Daily Activity (OT)  -HK       User Key  (r) = Recorded By, (t) = Taken By, (c) = Cosigned By    Initials Name Provider Type    CD Floresita Griffin, PT Physical Therapist    HK Maricel Sandoval, OT Occupational Therapist           Time Calculation:         PT Charges       02/09/18 1154          Time Calculation    Start Time 0955  -CD      PT Received On 02/09/18  -      PT Goal Re-Cert Due Date 02/19/18  -CD      Time Calculation- PT    Total Timed Code Minutes- PT 8 minute(s)  -CD        User Key  (r) = Recorded By, (t) = Taken By, (c) = Cosigned By    Initials Name Provider Type    JANA Griffin, PT Physical Therapist          Therapy Charges for Today     Code Description Service Date Service Provider Modifiers Qty    13870655464 HC GAIT TRAINING EA 15 MIN 2/9/2018 Floresita Griffin, PT GP 1    [id number] HC PT EVAL MOD COMPLEXITY 4 2/9/2018 Floresita Griffin, PT GP 1          PT  G-Codes  Outcome Measure Options: AM-PAC 6 Clicks Basic Mobility (PT)      Floresita Griffin, PT  2/9/2018

## 2018-02-09 NOTE — PLAN OF CARE
Problem: Patient Care Overview (Adult)  Goal: Plan of Care Review  Outcome: Ongoing (interventions implemented as appropriate)   02/09/18 1150   Coping/Psychosocial Response Interventions   Plan Of Care Reviewed With patient   Outcome Evaluation   Outcome Summary/Follow up Plan PT PRESENTS WITH EVOLVING SYMPTOMS S/P R HIP HEMIARTHROPLASTY TO INCLUDE WEAKNESS,, IMPAIRED BALANCE AND DECLINE IN FUNCTIONAL MOBILITY. PT HAS DEMENTIA AT BASELINE AND IS PLEASANTLY CONFUSED BUT COOPERATIVE WITH THERAPY. APPEARS TO HAVE GOOD PAIN CONTROL. AMBULATED 5 FEET WITH R WALKER AND MOD ASSIST. RECOMMEND SNF AT D/C.        Problem: Inpatient Physical Therapy  Goal: Bed Mobility Goal LTG- PT  Outcome: Ongoing (interventions implemented as appropriate)   02/09/18 1150   Bed Mobility PT LTG   Bed Mobility PT LTG, Time to Achieve 2 wks   Bed Mobility PT LTG, Activity Type all bed mobility   Bed Mobility PT LTG, [place] Level minimum assist (75% patient effort)   Bed Mobility PT Goal LTG, Assist Device bed rails     Goal: Transfer Training Goal 1 LTG- PT  Outcome: Ongoing (interventions implemented as appropriate)   02/09/18 1150   Transfer Training PT LTG   Transfer Training PT LTG, Time to Achieve 2 wks   Transfer Training PT LTG, Activity Type all transfers   Transfer Training PT LTG, [place] Level contact guard assist   Transfer Training PT LTG, Assist Device walker, rolling     Goal: Gait Training Goal LTG- PT  Outcome: Ongoing (interventions implemented as appropriate)   02/09/18 1150   Gait Training PT LTG   Gait Training Goal PT LTG, Time to Achieve 2 wks   Gait Training Goal PT LTG, [place] Level minimum assist (75% patient effort)   Gait Training Goal PT LTG, Assist Device walker, rolling   Gait Training Goal PT LTG, Distance to Achieve 250

## 2018-02-09 NOTE — PLAN OF CARE
Problem: Patient Care Overview (Adult)  Goal: Plan of Care Review  Outcome: Ongoing (interventions implemented as appropriate)   02/08/18 2112 02/09/18 0321   Coping/Psychosocial Response Interventions   Plan Of Care Reviewed With patient --    Patient Care Overview   Progress --  no change   Outcome Evaluation   Outcome Summary/Follow up Plan --  Patient returned to floor from PACU at approximately 2100 2/8. Experienced short period of increased confusion around 0245. Vitals stable. Has not complained of pain as of this point, will continue to monitor. Patient is able to actively dorsiflex and plantarflex bilaterally and can move toes.        Problem: Orthopaedic Fracture (Adult)  Goal: Signs and Symptoms of Listed Potential Problems Will be Absent or Manageable (Orthopaedic Fracture)  Outcome: Ongoing (interventions implemented as appropriate)      Problem: Fall Risk (Adult)  Goal: Absence of Falls  Outcome: Ongoing (interventions implemented as appropriate)      Problem: Pressure Ulcer Risk ([place] Scale) (Adult,Obstetrics,Pediatric)  Goal: Skin Integrity  Outcome: Ongoing (interventions implemented as appropriate)      Problem: Perioperative Period (Adult)  Goal: Signs and Symptoms of Listed Potential Problems Will be Absent or Manageable (Perioperative Period)  Outcome: Ongoing (interventions implemented as appropriate)      Problem: Fractured Hip (Adult)  Goal: Signs and Symptoms of Listed Potential Problems Will be Absent or Manageable (Fractured Hip)  Outcome: Ongoing (interventions implemented as appropriate)

## 2018-02-09 NOTE — BRIEF OP NOTE
HIP HEMIARTHROPLASTY  Progress Note    Zainab SALMERON Deacon  2/7/2018 - 2/8/2018    Pre-op Diagnosis:   Closed fracture of neck of right femur, initial encounter [S72.001A]       Post-Op Diagnosis Codes:     * Closed fracture of neck of right femur, initial encounter [S72.001A]    Procedure/CPT® Codes:  ME FEMORAL FX, OPEN TX [79060]    Procedure(s):  HIP HEMIARTHROPLASTY    Surgeon(s):  Fernando Dominguez MD    Anesthesia: Choice    Staff:   Circulator: Logan Garcia RN  Scrub Person: Shayne Amor  Vendor Representative: Deondre Bryant  Nursing Assistant: Dasha Rosen  Assistant: Carol Patricia PA-C    Estimated Blood Loss: 225 mL    Urine Voided: * No values recorded between 2/8/2018  6:17 PM and 2/8/2018  8:05 PM *    Specimens:                A: femoral head      Drains:           Findings: Comminuted right femoral neck fracture    Complications: None apparent      Fernando Dominguez MD     Date: 2/8/2018  Time: 8:05 PM

## 2018-02-09 NOTE — PROGRESS NOTES
"  SUBJECTIVE  Patient resting comfortably.  Pain controlled.    OBJECTIVE  Temp (24hrs), Av.8 °F (36.6 °C), Min:97.1 °F (36.2 °C), Max:98.3 °F (36.8 °C)    Blood pressure 139/70, pulse 84, temperature 98.1 °F (36.7 °C), temperature source Oral, resp. rate 16, height 152.4 cm (60\"), weight 49.3 kg (108 lb 9.6 oz), SpO2 95 %, not currently breastfeeding.    Lab Results (last 24 hours)     Procedure Component Value Units Date/Time    Blood Culture - Blood, [[id number]]  (Normal) Collected:  18 0030    Specimen:  Blood from Arm, Right Updated:  18 011     Blood Culture No growth at 24 hours    Blood Culture - Blood, [[id number]]  (Normal) Collected:  18 0102    Specimen:  Blood from Arm, Right Updated:  18 0116     Blood Culture No growth at 24 hours    Basic Metabolic Panel [[id number]] Collected:  18 0556    Specimen:  Blood Updated:  18 0722            PHYSICAL EXAM  Right lower extremity:Dressing clean, dry and intact.  Minimal pain with logroll of hip.  Intact EHL, FHL, tibialis anterior, and gastrocsoleus. Sensation intact to light touch to deep peroneal, superficial peroneal, sural, saphenous, tibial nerves. 2+ palpable DP and PT pulses.       Principal Problem:    Fracture of femoral neck, right  Active Problems:    Hypertension    UTI (urinary tract infection)    Dementia    Metastatic colon cancer in female    Recurrent falls    Closed fracture of neck of right femur      PLAN / DISPOSITION:  1 Day Post-Op right hip hemiarthroplasty    Protected weight bearing as tolerated right lower extremity, posterior hip precautions ×6 weeks   Pain control  PT/OT for post op mobilization and medical equipment needs   23 hours perioperative antibiotic prophylaxis   TEDs and SCD's bilateral lower extremities   Lovenox for DVT prophylaxis   Social work for discharge planning.   Dressing to remain in place for 7 days. May remove on POD#7. If no drainage, may shower on POD#10. No " submerging wound in water. If drainage is noted, sterile dressing should be placed and wound checked daily. No showering until wound has remained dry for 72 consecutive hours.   Follow up in 3 weeks for re-assessment.      Fernando Dominguez MD  02/09/18  7:23 AM

## 2018-02-09 NOTE — PLAN OF CARE
"Problem: Patient Care Overview (Adult)  Goal: Plan of Care Review  Outcome: Ongoing (interventions implemented as appropriate)   02/09/18 1827   Coping/Psychosocial Response Interventions   Plan Of Care Reviewed With patient;daughter;son   Patient Care Overview   Progress progress toward functional goals as expected   Outcome Evaluation   Outcome Summary/Follow up Plan Pts cognition has varied throughout shift, pleasant but alternating from being oriented to disoriented. At times pt states \" i know my memory is an issue\". Pt is able to stand to pivot with walker/gait belt x1-2 assit. Ambulated in santoro with therapy today and did well. No c/o pain all shift. Has voided on BSC this shift, continent. Urine cloudy and straw colored; receiving IV abx for current UTI. Dentures/Hearing aids in place; pt needs glasses but doesnt have. Plan is to return to [facility] at d/c.       Problem: Orthopaedic Fracture (Adult)  Goal: Signs and Symptoms of Listed Potential Problems Will be Absent or Manageable (Orthopaedic Fracture)  Outcome: Ongoing (interventions implemented as appropriate)   02/09/18 1827   Orthopaedic Fracture   Problems Assessed (Orthopaedic Fracture) all   Problems Present (Orthopaedic Fracture) functional deficit/ self-care deficit       Problem: Fall Risk (Adult)  Goal: Identify Related Risk Factors and Signs and Symptoms  Outcome: Ongoing (interventions implemented as appropriate)   02/09/18 1827   Fall Risk   Fall Risk: Related Risk Factors confusion/agitation;bladder function altered   Fall Risk: Signs and Symptoms presence of risk factors         "

## 2018-02-09 NOTE — PROGRESS NOTES
[facility] Medicine Services  PROGRESS NOTE    Patient Name: Zainab Worthy  : 2/3/1929  MRN: [id number]    Date of Admission: 2018  Length of Stay: 1  Primary Care Physician: Carolyn Giraldo DO (Inactive)    Subjective   Subjective     CC:  Found down at [place] with R hip pain.     HPI:  Pleasantly confused  Per RN, she pulled out IV five times last night!  Denies pain, says she doesn't feel quite right.  Remembers she had surgery yesterday    Review of Systems   UTO due to confusion    Otherwise ROS is negative except as mentioned in the HPI.    Objective   Objective     Vital Signs:   Temp:  [97.1 °F (36.2 °C)-98.3 °F (36.8 °C)] 97.5 °F (36.4 °C)  Heart Rate:  [] 88  Resp:  [14-22] 16  BP: (119-167)/(59-89) 167/85        Physical Exam:  Gen:  WD/WN, alert, pleasant.  Up in chair and jumping happily from one topic to the next  Neuro: alert and confsued, clear speech, follows simple commands, grossly nonfocal  HEENT:  NC/AT PERRL, OP benign  Neck:  Supple, no LAD  Heart RRR no murmur, rub, or gallop  Lungs CTA nonlabored  Abd:  Soft, nontender, no rebound or guarding, pos BS  Extrem:  No c/c/e.  Tender R groin; incision not examined    Results Reviewed:  I have personally reviewed current lab, radiology, and data and agree.      Results from last 7 days  Lab Units 18  0556 18  0518  2308   WBC 10*3/mm3 11.71* 12.70* 14.47*   HEMOGLOBIN g/dL 11.2* 10.9* 11.8   HEMATOCRIT % 34.2* 33.5* 34.9   PLATELETS 10*3/mm3 222 238 236   INR   --   --  1.04       Results from last 7 days  Lab Units 18  0556 18  0520 18  2308   SODIUM mmol/L 133 128* 128*  127*   POTASSIUM mmol/L 4.0 3.7 3.7  3.6   CHLORIDE mmol/L 101 95* 95*  96*   CO2 mmol/L 21.0 24.0 22.0  24.0   BUN mg/dL 15 17 17  18   CREATININE mg/dL 0.50* 0.70 0.50*  0.50*   GLUCOSE mg/dL 181* 105* 124*  125*   CALCIUM mg/dL 8.4* 8.3* 7.9*  8.5*   ALT (SGPT) U/L  --   --  37  39   AST  (SGOT) U/L  --   --  100*  103*     Estimated Creatinine Clearance: 37.1 mL/min (by C-G formula based on Cr of 0.5).  No results found for: BNP  No results found for: PHART    Microbiology Results Abnormal     Procedure Component Value - Date/Time    Urine Culture - Urine, Urine, Clean Catch [[id number]]  (Abnormal) Collected:  02/07/18 2315    Lab Status:  Preliminary result Specimen:  Urine from Urine, Catheter Updated:  02/09/18 0812     Urine Culture --      >100,000 CFU/mL Gram Negative Bacilli (A)    Blood Culture - Blood, [[id number]]  (Normal) Collected:  02/08/18 0030    Lab Status:  Preliminary result Specimen:  Blood from Arm, Right Updated:  02/09/18 0116     Blood Culture No growth at 24 hours    Blood Culture - Blood, [[id number]]  (Normal) Collected:  02/08/18 0102    Lab Status:  Preliminary result Specimen:  Blood from Arm, Right Updated:  02/09/18 0116     Blood Culture No growth at 24 hours          Imaging Results (last 24 hours)     Procedure Component Value Units Date/Time    XR Pelvis 1 or 2 View [[id number]] Collected:  02/09/18 0904     Updated:  02/09/18 0904    Narrative:       EXAMINATION: XR PELVIS 1 OR 2 VW-02/08/2018:      INDICATION: Postop Hip Arthroplasty; S72.001A-Fracture of unspecified  part of neck of right femur, initial encounter for closed fracture;  N39.0-Urinary tract infection, site not specified; F03.90-Unspecified  dementia without behavioral disturbance; W19.XXXA-Unspecified fall,  initial encounter.      COMPARISON: NONE.     FINDINGS: AP view of the pelvis demonstrates a right hip arthroplasty.  There is no evidence of acute fracture or dislocation. There is no  loosening of the prosthesis. There are old bilateral superior and  inferior pubic rami fractures.           Impression:       Expected postoperative findings. Bilateral old pubic rami  fractures are noted.     D:  02/09/2018  E:  02/09/2018                      I have reviewed the medications.    Assessment/Plan    Assessment / Plan     Hospital Problem List     * (Principal)Fracture of femoral neck, right    Hypertension    UTI (urinary tract infection)    Dementia    Metastatic colon cancer in female    Recurrent falls    Closed fracture of neck of right femur             Brief Hospital Course to date:  Zainab Worthy is a 89 y.o. female with R femoral neck fx. Recnet pelvic fx, been at the [place], got up without asking for assistance and was found on floor.       Assessment & Plan:  1. Fracture of right femoral neck:   - POD 1   -- WBAT    2. UTI:  - CTX.  GNB growing  -- pulled out 5 iv's last night!  Consider changing to IM or PO if further trouble.      3. Hypertension:  - stable and controlled. continue lisinopril.     4. Dementia:  - continue new meds  - nutrition eval.      HypoNA  -- better after saline    5. Metastatic colon cancer to liver:  - S/P chemo and partial liver lobectomy   - in remission and followed by Dr. Matt     DVT prophylaxis: mechanical            CODE STATUS: Full Code    Disposition: I expect the patient to be discharged to SNF in ?? days.    Petrona Bui MD  02/09/18  11:50 AM

## 2018-02-09 NOTE — ANESTHESIA POSTPROCEDURE EVALUATION
Patient: Zainab Worthy    Procedure Summary     Date Anesthesia Start Anesthesia Stop Room / Location    02/08/18 1810  [facility] / [facility]       Procedure Diagnosis Surgeon Provider    HIP HEMIARTHROPLASTY (Right Hip) Closed fracture of neck of right femur, initial encounter  (Closed fracture of neck of right femur, initial encounter [S72.001A]) MD Aguilar Baez MD          Anesthesia Type: general  Last vitals  BP   138/65 (02/08/18 1632)   Temp   97.9 °F (36.6 °C) (02/08/18 1632)   Pulse   83 (02/08/18 1632)   Resp   22 (02/08/18 1632)     SpO2   97 % (02/08/18 1632)     Post Anesthesia Care and Evaluation    Patient location during evaluation: PACU  Level of consciousness: awake  Pain score: 2  Pain management: adequate  Anesthetic complications: No anesthetic complications  PONV Status: none  Cardiovascular status: acceptable  Respiratory status: acceptable  Hydration status: acceptable    Comments: Extubated awake in the OR, to RR, report to RN, stable

## 2018-02-10 LAB
ANION GAP SERPL CALCULATED.3IONS-SCNC: 8 MMOL/L (ref 3–11)
BACTERIA SPEC AEROBE CULT: ABNORMAL
BACTERIA SPEC AEROBE CULT: ABNORMAL
BUN BLD-MCNC: 12 MG/DL (ref 9–23)
BUN/CREAT SERPL: 30 (ref 7–25)
CALCIUM SPEC-SCNC: 7.9 MG/DL (ref 8.7–10.4)
CHLORIDE SERPL-SCNC: 103 MMOL/L (ref 99–109)
CO2 SERPL-SCNC: 25 MMOL/L (ref 20–31)
CREAT BLD-MCNC: 0.4 MG/DL (ref 0.6–1.3)
DEPRECATED RDW RBC AUTO: 43.6 FL (ref 37–54)
ERYTHROCYTE [DISTWIDTH] IN BLOOD BY AUTOMATED COUNT: 14.2 % (ref 11.3–14.5)
GFR SERPL CREATININE-BSD FRML MDRD: 150 ML/MIN/1.73
GLUCOSE BLD-MCNC: 95 MG/DL (ref 70–100)
GLUCOSE BLDC GLUCOMTR-MCNC: 105 MG/DL (ref 70–130)
HCT VFR BLD AUTO: 30.3 % (ref 34.5–44)
HGB BLD-MCNC: 9.9 G/DL (ref 11.5–15.5)
MCH RBC QN AUTO: 27.6 PG (ref 27–31)
MCHC RBC AUTO-ENTMCNC: 32.7 G/DL (ref 32–36)
MCV RBC AUTO: 84.4 FL (ref 80–99)
PLATELET # BLD AUTO: 252 10*3/MM3 (ref 150–450)
PMV BLD AUTO: 10.1 FL (ref 6–12)
POTASSIUM BLD-SCNC: 3.1 MMOL/L (ref 3.5–5.5)
RBC # BLD AUTO: 3.59 10*6/MM3 (ref 3.89–5.14)
SODIUM BLD-SCNC: 136 MMOL/L (ref 132–146)
WBC NRBC COR # BLD: 10.92 10*3/MM3 (ref 3.5–10.8)

## 2018-02-10 PROCEDURE — 25010000002 ENOXAPARIN PER 10 MG: Performed by: ORTHOPAEDIC SURGERY

## 2018-02-10 PROCEDURE — 85027 COMPLETE CBC AUTOMATED: CPT | Performed by: INTERNAL MEDICINE

## 2018-02-10 PROCEDURE — 97116 GAIT TRAINING THERAPY: CPT

## 2018-02-10 PROCEDURE — 97110 THERAPEUTIC EXERCISES: CPT

## 2018-02-10 PROCEDURE — 25010000002 CEFTRIAXONE PER 250 MG: Performed by: PHYSICIAN ASSISTANT

## 2018-02-10 PROCEDURE — 82962 GLUCOSE BLOOD TEST: CPT

## 2018-02-10 PROCEDURE — 99233 SBSQ HOSP IP/OBS HIGH 50: CPT | Performed by: HOSPITALIST

## 2018-02-10 PROCEDURE — 99024 POSTOP FOLLOW-UP VISIT: CPT | Performed by: ORTHOPAEDIC SURGERY

## 2018-02-10 PROCEDURE — 80048 BASIC METABOLIC PNL TOTAL CA: CPT | Performed by: ORTHOPAEDIC SURGERY

## 2018-02-10 RX ORDER — LIDOCAINE HYDROCHLORIDE 10 MG/ML
INJECTION, SOLUTION EPIDURAL; INFILTRATION; INTRACAUDAL; PERINEURAL
Status: COMPLETED
Start: 2018-02-10 | End: 2018-02-10

## 2018-02-10 RX ORDER — CEFTRIAXONE 1 G/1
1 INJECTION, POWDER, FOR SOLUTION INTRAMUSCULAR; INTRAVENOUS EVERY 24 HOURS
Status: DISCONTINUED | OUTPATIENT
Start: 2018-02-10 | End: 2018-02-12 | Stop reason: HOSPADM

## 2018-02-10 RX ADMIN — ENOXAPARIN SODIUM 40 MG: 40 INJECTION SUBCUTANEOUS at 09:11

## 2018-02-10 RX ADMIN — CEFTRIAXONE 1 G: 1 INJECTION, POWDER, FOR SOLUTION INTRAMUSCULAR; INTRAVENOUS at 11:07

## 2018-02-10 RX ADMIN — LORAZEPAM 0.5 MG: 0.5 TABLET ORAL at 20:18

## 2018-02-10 RX ADMIN — DONEPEZIL HYDROCHLORIDE 10 MG: 10 TABLET, FILM COATED ORAL at 20:18

## 2018-02-10 RX ADMIN — LIDOCAINE HYDROCHLORIDE: 10 INJECTION, SOLUTION EPIDURAL; INFILTRATION; INTRACAUDAL; PERINEURAL at 10:00

## 2018-02-10 RX ADMIN — FAMOTIDINE 20 MG: 20 TABLET, FILM COATED ORAL at 09:11

## 2018-02-10 RX ADMIN — BISACODYL 10 MG: 5 TABLET, COATED ORAL at 20:18

## 2018-02-10 RX ADMIN — MEMANTINE 10 MG: 10 TABLET ORAL at 09:11

## 2018-02-10 RX ADMIN — DOCUSATE SODIUM 100 MG: 100 CAPSULE, LIQUID FILLED ORAL at 20:18

## 2018-02-10 NOTE — THERAPY TREATMENT NOTE
Acute Care - Physical Therapy Treatment Note  [facility]     Patient Name: Zainab Worthy  : 2/3/1929  MRN: [id number]  Today's Date: 2/10/2018  Onset of Illness/Injury or Date of Surgery Date: 18  Date of Referral to PT: 18  Referring Physician: TINA    Admit Date: 2018    Visit Dx:    ICD-10-CM ICD-9-CM   1. Closed fracture of neck of right femur, initial encounter S72.001A 820.8   2. Acute UTI N39.0 599.0   3. Dementia without behavioral disturbance, unspecified dementia type F03.90 294.20   4. Fall, initial encounter W19.XXXA E888.9   5. Impaired mobility and ADLs Z74.09 799.89   6. Impaired functional mobility, balance, gait, and endurance Z74.09 V49.89     Patient Active Problem List   Diagnosis   • Gonalgia   • Hypertrophic polyarthritis   • Allergic rhinitis, seasonal   • Skin growth   • Endometrial cancer   • Post-operative state   • Rectal cancer   • Hydronephrosis   • Hypertension   • Anorexia   • Onychomycosis   • IUD complication   • Memory loss   • Urinary frequency   • Fall at home   • Closed fracture of pelvis   • UTI (urinary tract infection)   • Metabolic encephalopathy   • Fracture of femoral neck, right   • Dementia   • Metastatic colon cancer in female   • Recurrent falls   • Closed fracture of neck of right femur               Adult Rehabilitation Note       02/10/18 1003 18 1335       Rehab Assessment/Intervention    Discipline physical therapist  -SH physical therapist  -CD     Document Type therapy note (daily note)  -SH therapy note (daily note)  -CD     Subjective Information agree to therapy   RN consents to PT.  -SH agree to therapy   WANTS TO USE THE BATHROOM.   -CD     Patient Effort, Rehab Treatment adequate  -SH good  -CD     Symptoms Noted During/After Treatment other (see comments)   Confusion  -SH other (see comments)   PT UNABLE TO URINATE- NSG IN TO SCAN BLADDER AFTER P.T.   -CD     Symptoms Noted Comment  [place]-  PLEASANTLY CONFUSED.   -CD      Precautions/Limitations fall precautions;hip precautions- right  - fall precautions;hip precautions- right   EXIT ALARM, CONFUSION.   -CD     Recorded by [SH] Elza Palomo, PT [CD] Floresita Griffin, PT     Vital Signs    Pre Systolic BP Rehab  --   VSS  -CD     Recorded by  [CD] Floresita Griffin, PT     Pain Assessment    Pain Assessment Villasenor-Garcia FACES  - Villasenor-Baker FACES  -CD     Villasenor-Garcia FACES Pain Rating 0  - 0  -CD     Post Pain Score 4  -      Pain Type Acute pain  - --   PT DENIES PAIN.   -CD     Pain Location Hip  -SH      Pain Orientation Right  -      Pain Intervention(s) Repositioned;Ambulation/increased activity  -      Recorded by [SH] Elza Palomo, PT [CD] Floresita Griffin, PT     Cognitive Assessment/Intervention    Current Cognitive/Communication Assessment impaired  -      Orientation Status oriented to;person;disoriented to;place;time;situation   Upon entry pt naked, reported going to grocery.  -      Follows Commands/Answers Questions able to follow single-step instructions;75% of the time;needs cueing;needs increased time;needs repetition  -      Personal Safety moderate impairment;decreased awareness, need for assist;decreased insight to deficits;decreased awareness, need for safety  -      Personal Safety Interventions gait belt;fall prevention program maintained;nonskid shoes/slippers when out of bed  -      Recorded by [SH] Elza Palomo, PT      Bed Mobility, Assessment/Treatment    Bed Mobility, Assistive Device draw sheet;head of bed elevated;bed rails  - draw sheet;head of bed elevated  -     Bed Mob, Supine to Sit, [place] maximum assist (25% patient effort);2 person assist required;verbal cues required;nonverbal cues required (demo/gesture)  - maximum assist (25% patient effort);2 person assist required;verbal cues required  -     Bed Mobility, Safety Issues cognitive deficits limit understanding;decreased use of arms for  pushing/pulling;decreased use of legs for bridging/pushing  -      Bed Mobility, Impairments strength decreased;impaired balance;pain  -      Bed Mobility, Comment VC to move B LE to edge of bed, pt reported inability to move R LE. Pt required vc for sequencing, technique, and hand placement.  -      Recorded by [SH] Elza Palomo, PT [CD] Floresita Griffin, PT     Transfer Assessment/Treatment    Transfers, Bed-Chair-Bed, Assist Device rolling walker  -      Transfers, Sit-Stand [place] moderate assist (50% patient effort);2 person assist required;verbal cues required;nonverbal cues required (demo/gesture)  - minimum assist (75% patient effort);2 person assist required;verbal cues required  -     Transfers, Stand-Sit [place] nonverbal cues required (demo/gesture);verbal cues required;moderate assist (50% patient effort);2 person assist required  - moderate assist (50% patient effort);2 person assist required;verbal cues required  -CD     Transfers, Sit-Stand-Sit, Assist Device rolling walker  - rolling walker  -CD     Toilet Transfer, [place]  minimum assist (75% patient effort);2 person assist required;verbal cues required  -CD     Toilet Transfer, Assistive Device  rolling walker;bedside commode without drop arms  -     Transfer, Safety Issues sequencing ability decreased;step length decreased;weight-shifting ability decreased;knees buckling  - sequencing ability decreased  -CD     Transfer, Impairments pain;impaired balance;strength decreased  - impaired balance;strength decreased  -CD     Transfer, Comment Pt performed sit to stand from EOB, R knee buckling limiting standing tolerance. Pt edu in hand placement for sit/stand.  - MAX CUES FOR HAND PLACEMENT.   -CD     Recorded by [SH] Elza Palomo, PT [CD] Floresita Griffin, PT     Gait Assessment/Treatment    Gait, [place] Level moderate assist (50% patient effort);2 person assist required;verbal cues required;nonverbal  cues required (demo/gesture)  - moderate assist (50% patient effort);2 person assist required;verbal cues required  -CD     Gait, Assistive Device rolling walker  - rolling walker  -CD     Gait, Distance (Feet) 4  - 100   CHAIR IN TOW.   -CD     Gait, Gait Pattern Analysis swing-to gait  -      Gait, Gait Deviations right:;antalgic;gabe decreased;decreased heel strike;forward flexed posture;knee buckling;step length decreased  - gabe decreased;forward flexed posture;step length decreased;weight-shifting ability decreased;right:;antalgic  -CD     Gait, Safety Issues balance decreased during turns;sequencing ability decreased;step length decreased;weight-shifting ability decreased  - step length decreased;weight-shifting ability decreased;balance decreased during turns  -     Gait, Impairments strength decreased;impaired balance;pain  - strength decreased;impaired balance  -CD     Gait, Comment Pt unable to tolerated weightbearing this session. Steps to chair only.  - CUES TO WIDEN HILDA AND TO INCREASE STEP LENGTH. PT ADVANCING R LE INDEPENDENTLY.   -CD     Recorded by [SH] Elza Palomo, PT [CD] Floresita Griffin, PT     Motor Skills/Interventions    Additional Documentation  Balance Skills Training (Group)  -CD     Recorded by  [CD] Floresita Griffin, PT     Balance Skills Training    Sitting-Level of Assistance  Contact guard   MORE IMPULSIVE THIS AFTERNOON.   -CD     Sitting-Balance Support  Right upper extremity supported;Left upper extremity supported;Feet supported  -CD     Standing-Level of Assistance  Minimum assistance  -CD     Static Standing Balance Support  assistive device  -CD     Standing-Balance Activities  Weight Shift R-L  -CD     Standing Balance # of Minutes  2  -CD     Recorded by  [CD] Floresita Griffin, PT     Therapy Exercises    Bilateral Lower Extremities AAROM:;10 reps;sitting;ankle pumps/circles;hip abduction/adduction;hip flexion;LAQ  -SH      Bilateral Upper Extremity  AROM:;10 reps;sitting;elbow flexion/extension;shoulder extension/flexion  -SH      Recorded by [SH] Elza Palomo, PT      Positioning and Restraints    Pre-Treatment Position in bed  -SH in bed  -CD     Post Treatment Position chair  - chair  -CD     In Chair notified nsg;reclined;call light within reach;encouraged to call for assist;exit alarm on;on mechanical lift sling;R waffle boot  -SH reclined;call light within reach;encouraged to call for assist;legs elevated;on mechanical lift sling;waffle cushion   NSG IN TO SCAN BLADDER AND AGREES TO SET EXIT ALARM.   -CD     Recorded by [SH] Elza Palomo, PT [CD] Floresita Griffin, PT       User Key  (r) = Recorded By, (t) = Taken By, (c) = Cosigned By    Initials Name Effective Dates    CD Floresita Griffin, PT 06/19/15 -     SH Elza Palomo, PT 06/19/15 -                 IP PT Goals       02/09/18 1413 02/09/18 1150       Bed Mobility PT LTG    Bed Mobility PT LTG, Time to Achieve  2 wks  -CD     Bed Mobility PT LTG, Activity Type  all bed mobility  -CD     Bed Mobility PT LTG, [place] Level  minimum assist (75% patient effort)  -CD     Bed Mobility PT Goal  LTG, Assist Device  bed rails  -CD     Bed Mobility PT LTG, Outcome goal ongoing  -CD      Transfer Training PT LTG    Transfer Training PT LTG, Time to Achieve  2 wks  -CD     Transfer Training PT LTG, Activity Type  all transfers  -CD     Transfer Training PT LTG, [place] Level  contact guard assist  -CD     Transfer Training PT LTG, Assist Device  walker, rolling  -CD     Transfer Training PT LTG, Outcome goal ongoing  -CD      Gait Training PT LTG    Gait Training Goal PT LTG, Time to Achieve  2 wks  -CD     Gait Training Goal PT LTG, [place] Level  minimum assist (75% patient effort)  -CD     Gait Training Goal PT LTG, Assist Device  walker, rolling  -CD     Gait Training Goal PT LTG, Distance to Achieve  250  -CD     Gait Training Goal PT LTG, Outcome goal ongoing  -CD        User Key  (r)  = Recorded By, (t) = Taken By, (c) = Cosigned By    Initials Name Provider Type     Floresita Griffin, PT Physical Therapist          Physical Therapy Education     Title: PT OT SLP Therapies (Active)     Topic: Physical Therapy (Active)     Point: Mobility training (Active)    Learning Progress Summary    Learner Readiness Method Response Comment Documented by Status   Patient Acceptance E,D NR Gait training.  02/10/18 1141 Active    Acceptance E NR,NL SAFETY WITH MOBILITY, PRECAUTIONS, GAIT TRAINING AND TF TRAINING WITH R WALKER.  02/09/18 1412 Active    Acceptance E NR,NL PT HAS DEMENTIA. FAMILY NOT PRESENT. REVIEW POSTERIOR HIP PRECAUTIONS, TRANSFER/GAIT TRAINING WITH R WALKER, THER EX.  02/09/18 1149 Active    Acceptance TB DU  TP 02/08/18 1755 Done               Point: Home exercise program (Active)    Learning Progress Summary    Learner Readiness Method Response Comment Documented by Status   Patient Acceptance E,D NR Gait training.  02/10/18 1141 Active    Acceptance E NR,NL SAFETY WITH MOBILITY, PRECAUTIONS, GAIT TRAINING AND TF TRAINING WITH R WALKER.  02/09/18 1412 Active    Acceptance E NR,NL PT HAS DEMENTIA. FAMILY NOT PRESENT. REVIEW POSTERIOR HIP PRECAUTIONS, TRANSFER/GAIT TRAINING WITH R WALKER, THER EX.  02/09/18 1149 Active    Acceptance TB DU  TP 02/08/18 1755 Done               Point: Body mechanics (Active)    Learning Progress Summary    Learner Readiness Method Response Comment Documented by Status   Patient Acceptance E,D NR Gait training.  02/10/18 1141 Active    Acceptance E NR,NL SAFETY WITH MOBILITY, PRECAUTIONS, GAIT TRAINING AND TF TRAINING WITH R WALKER.  02/09/18 1412 Active    Acceptance E NR,NL PT HAS DEMENTIA. FAMILY NOT PRESENT. REVIEW POSTERIOR HIP PRECAUTIONS, TRANSFER/GAIT TRAINING WITH R WALKER, THER EX.  02/09/18 1149 Active    Acceptance TB DU  TP 02/08/18 1755 Done               Point: Precautions (Active)    Learning Progress Summary    Learner Readiness  Method Response Comment Documented by Status   Patient Acceptance E,D NR Gait training.  02/10/18 1141 Active    Acceptance E NR,NL SAFETY WITH MOBILITY, PRECAUTIONS, GAIT TRAINING AND TF TRAINING WITH R WALKER.  02/09/18 1412 Active    Acceptance E NR,NL PT HAS DEMENTIA. FAMILY NOT PRESENT. REVIEW POSTERIOR HIP PRECAUTIONS, TRANSFER/GAIT TRAINING WITH R WALKER, THER EX.  02/09/18 1149 Active    Acceptance TB DU  TP 02/08/18 1755 Done                      User Key     Initials Effective Dates Name Provider Type Discipline     06/19/15 -  Floresita Griffin, PT Physical Therapist PT     06/19/15 -  Elza Palomo, PT Physical Therapist PT     07/10/17 -  Milena Torres, RN Registered Nurse Nurse                    PT Recommendation and Plan  Anticipated Discharge Disposition: skilled nursing facility  Planned Therapy Interventions: balance training, bed mobility training, gait training, home exercise program, strengthening, transfer training, patient/family education  PT Frequency: 2 times/day  Plan of Care Review  Plan Of Care Reviewed With: patient  Outcome Summary/Follow up Plan: Pt ambulated 4' with RW with mod A x 2.          Outcome Measures       02/10/18 1003 02/09/18 1335 02/09/18 0955    How much help from another person do you currently need...    Turning from your back to your side while in flat bed without using bedrails? 2  - 2  -CD 2  -CD    Moving from lying on back to sitting on the side of a flat bed without bedrails? 2  - 2  -CD 2  -CD    Moving to and from a bed to a chair (including a wheelchair)? 2  - 2  -CD 2  -CD    Standing up from a chair using your arms (e.g., wheelchair, bedside chair)? 2  - 2  -CD 2  -CD    Climbing 3-5 steps with a railing? 1  - 1  -CD 1  -CD    To walk in hospital room? 2  - 2  -CD 2  -CD    AM-PAC 6 Clicks Score 11  - 11  -CD 11  -CD    Functional Assessment    Outcome Measure Options AM-PAC 6 Clicks Basic Mobility (PT)  - AM-PAC 6 Clicks Basic  Mobility (PT)  -CD AM-PAC 6 Clicks Basic Mobility (PT)  -CD      02/09/18 0818          How much help from another is currently needed...    Putting on and taking off regular lower body clothing? 1  -HK      Bathing (including washing, rinsing, and drying) 2  -HK      Toileting (which includes using toilet bed pan or urinal) 3  -HK      Putting on and taking off regular upper body clothing 3  -HK      Taking care of personal grooming (such as brushing teeth) 3  -HK      Eating meals 3  -HK      Score 15  -HK      Functional Assessment    Outcome Measure Options AM-PAC 6 Clicks Daily Activity (OT)  -HK        User Key  (r) = Recorded By, (t) = Taken By, (c) = Cosigned By    Initials Name Provider Type     Floresita Griffin, PT Physical Therapist     Elza Palomo, PT Physical Therapist    HK Maricel Sandoval, OT Occupational Therapist           Time Calculation:         PT Charges       02/10/18 1143          Time Calculation    Start Time 1003  -SH      PT Received On 02/10/18  -      PT Goal Re-Cert Due Date 02/19/18  -      Time Calculation- PT    Total Timed Code Minutes- PT 23 minute(s)  -        User Key  (r) = Recorded By, (t) = Taken By, (c) = Cosigned By    Initials Name Provider Type     Elza Palomo, PT Physical Therapist          Therapy Charges for Today     Code Description Service Date Service Provider Modifiers Qty    94186508956 HC PT THER PROC EA 15 MIN 2/10/2018 Elza Palomo, PT GP 1    16625347325 HC PT THER SUPP EA 15 MIN 2/10/2018 Elza Palomo, PT GP 1    60458642871 HC GAIT TRAINING EA 15 MIN 2/10/2018 Elza Palomo, PT GP 1          PT G-Codes  Outcome Measure Options: AM-PAC 6 Clicks Basic Mobility (PT)    Elza Palomo, PT  2/10/2018
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
## 2018-02-10 NOTE — PLAN OF CARE
Problem: Patient Care Overview (Adult)  Goal: Plan of Care Review  Outcome: Ongoing (interventions implemented as appropriate)   02/10/18 1141   Coping/Psychosocial Response Interventions   Plan Of Care Reviewed With patient   Outcome Evaluation   Outcome Summary/Follow up Plan Pt ambulated 4' with RW with mod A x 2.       Problem: Inpatient Physical Therapy  Goal: Bed Mobility Goal LTG- PT  Outcome: Ongoing (interventions implemented as appropriate)   02/09/18 1150 02/09/18 1413   Bed Mobility PT LTG   Bed Mobility PT LTG, Time to Achieve 2 wks --    Bed Mobility PT LTG, Activity Type all bed mobility --    Bed Mobility PT LTG, [place] Level minimum assist (75% patient effort) --    Bed Mobility PT Goal LTG, Assist Device bed rails --    Bed Mobility PT LTG, Outcome --  goal ongoing     Goal: Transfer Training Goal 1 LTG- PT  Outcome: Ongoing (interventions implemented as appropriate)   02/09/18 1150 02/09/18 1413   Transfer Training PT LTG   Transfer Training PT LTG, Time to Achieve 2 wks --    Transfer Training PT LTG, Activity Type all transfers --    Transfer Training PT LTG, [place] Level contact guard assist --    Transfer Training PT LTG, Assist Device walker, rolling --    Transfer Training PT LTG, Outcome --  goal ongoing     Goal: Gait Training Goal LTG- PT  Outcome: Ongoing (interventions implemented as appropriate)   02/09/18 1150 02/09/18 1413   Gait Training PT LTG   Gait Training Goal PT LTG, Time to Achieve 2 wks --    Gait Training Goal PT LTG, [place] Level minimum assist (75% patient effort) --    Gait Training Goal PT LTG, Assist Device walker, rolling --    Gait Training Goal PT LTG, Distance to Achieve 250 --    Gait Training Goal PT LTG, Outcome --  goal ongoing

## 2018-02-10 NOTE — THERAPY TREATMENT NOTE
Acute Care - Physical Therapy Treatment Note  [facility]     Patient Name: Zainab Worthy  : 2/3/1929  MRN: [id number]  Today's Date: 2/10/2018  Onset of Illness/Injury or Date of Surgery Date: 18  Date of Referral to PT: 18  Referring Physician: TINA    Admit Date: 2018    Visit Dx:    ICD-10-CM ICD-9-CM   1. Closed fracture of neck of right femur, initial encounter S72.001A 820.8   2. Acute UTI N39.0 599.0   3. Dementia without behavioral disturbance, unspecified dementia type F03.90 294.20   4. Fall, initial encounter W19.XXXA E888.9   5. Impaired mobility and ADLs Z74.09 799.89   6. Impaired functional mobility, balance, gait, and endurance Z74.09 V49.89     Patient Active Problem List   Diagnosis   • Gonalgia   • Hypertrophic polyarthritis   • Allergic rhinitis, seasonal   • Skin growth   • Endometrial cancer   • Post-operative state   • Rectal cancer   • Hydronephrosis   • Hypertension   • Anorexia   • Onychomycosis   • IUD complication   • Memory loss   • Urinary frequency   • Fall at home   • Closed fracture of pelvis   • UTI (urinary tract infection)   • Metabolic encephalopathy   • Fracture of femoral neck, right   • Dementia   • Metastatic colon cancer in female   • Recurrent falls   • Closed fracture of neck of right femur               Adult Rehabilitation Note       02/10/18 1500 02/10/18 1003 18 1335    Rehab Assessment/Intervention    Discipline physical therapist  -SH physical therapist  -SH physical therapist  -CD    Document Type therapy note (daily note)  -SH therapy note (daily note)  - therapy note (daily note)  -CD    Subjective Information agree to therapy  -SH agree to therapy   RN consents to PT.  -SH agree to therapy   WANTS TO USE THE BATHROOM.   -CD    Patient Effort, Rehab Treatment adequate  -SH adequate  -SH good  -CD    Symptoms Noted During/After Treatment  other (see comments)   Confusion  -SH other (see comments)   PT UNABLE TO URINATE- NSG IN TO SCAN  BLADDER AFTER P.T.   -CD    Symptoms Noted Comment   [place]-  PLEASANTLY CONFUSED.   -CD    Precautions/Limitations fall precautions;hip precautions- right  -SH fall precautions;hip precautions- right  -SH fall precautions;hip precautions- right   EXIT ALARM, CONFUSION.   -CD    Recorded by [SH] Elza Palomo, PT [SH] Elza Palomo, PT [CD] Floresita Griffin, PT    Vital Signs    Pre Systolic BP Rehab   --   VSS  -CD    Recorded by   [CD] Floresita Griffin, PT    Pain Assessment    Pain Assessment Villasenor-Garcia FACES  -SH Villasenor-Baker FACES  -SH Villasenor-Baker FACES  -CD    Villasenor-Garcia FACES Pain Rating 0  -SH 0  -SH 0  -CD    Pain Score 2  -SH      Post Pain Score 2  -SH 4  -SH     Pain Type Acute pain  - Acute pain  - --   PT DENIES PAIN.   -CD    Pain Location Hip  -SH Hip  -SH     Pain Orientation Right  -SH Right  -SH     Pain Intervention(s) Repositioned;Ambulation/increased activity  -SH Repositioned;Ambulation/increased activity  -SH     Recorded by [SH] Elza Palomo, PT [SH] Elza Palomo, PT [CD] Floresita Griffin, PT    Cognitive Assessment/Intervention    Current Cognitive/Communication Assessment impaired  - impaired  -SH     Orientation Status oriented to;person;disoriented to;place;time;situation  - oriented to;person;disoriented to;place;time;situation   Upon entry pt naked, reported going to grocery.  -     Follows Commands/Answers Questions able to follow single-step instructions;75% of the time;needs cueing;needs increased time;needs repetition  - able to follow single-step instructions;75% of the time;needs cueing;needs increased time;needs repetition  -     Personal Safety moderate impairment;decreased awareness, need for assist;decreased awareness, need for safety;decreased insight to deficits  - moderate impairment;decreased awareness, need for assist;decreased insight to deficits;decreased awareness, need for safety  -     Personal Safety Interventions fall prevention program  maintained;gait belt;nonskid shoes/slippers when out of bed  - gait belt;fall prevention program maintained;nonskid shoes/slippers when out of bed  -     Recorded by [SH] Elza Palomo, PT [] Elza Palomo, PT     Bed Mobility, Assessment/Treatment    Bed Mobility, Assistive Device bed rails;draw sheet  - draw sheet;head of bed elevated;bed rails  - draw sheet;head of bed elevated  -    Bed Mob, Supine to Sit, [place] verbal cues required;nonverbal cues required (demo/gesture);maximum assist (25% patient effort);2 person assist required  - maximum assist (25% patient effort);2 person assist required;verbal cues required;nonverbal cues required (demo/gesture)  - maximum assist (25% patient effort);2 person assist required;verbal cues required  -    Bed Mob, Sit to Supine, [place] maximum assist (25% patient effort);1 person + 1 person to manage equipment;verbal cues required;nonverbal cues required (demo/gesture)  -      Bed Mobility, Safety Issues cognitive deficits limit understanding;decreased use of arms for pushing/pulling;decreased use of legs for bridging/pushing  - cognitive deficits limit understanding;decreased use of arms for pushing/pulling;decreased use of legs for bridging/pushing  -     Bed Mobility, Impairments strength decreased;impaired balance;pain  - strength decreased;impaired balance;pain  -     Bed Mobility, Comment VC for sequencing  - VC to move B LE to edge of bed, pt reported inability to move R LE. Pt required vc for sequencing, technique, and hand placement.  -     Recorded by [SH] Elza Palomo, PT [SH] Elza Palomo, PT [CD] Floresita Griffin PT    Transfer Assessment/Treatment    Transfers, Bed-Chair-Bed, Assist Device  rolling walker  -     Transfers, Sit-Stand [place] moderate assist (50% patient effort);2 person assist required;nonverbal cues required (demo/gesture);verbal cues required  - moderate assist (50% patient  effort);2 person assist required;verbal cues required;nonverbal cues required (demo/gesture)  - minimum assist (75% patient effort);2 person assist required;verbal cues required  -CD    Transfers, Stand-Sit [place] verbal cues required;nonverbal cues required (demo/gesture);moderate assist (50% patient effort);2 person assist required  - nonverbal cues required (demo/gesture);verbal cues required;moderate assist (50% patient effort);2 person assist required  - moderate assist (50% patient effort);2 person assist required;verbal cues required  -CD    Transfers, Sit-Stand-Sit, Assist Device rolling walker  - rolling walker  - rolling walker  -CD    Toilet Transfer, [place]   minimum assist (75% patient effort);2 person assist required;verbal cues required  -CD    Toilet Transfer, Assistive Device   rolling walker;bedside commode without drop arms  -CD    Transfer, Safety Issues  sequencing ability decreased;step length decreased;weight-shifting ability decreased;knees buckling  - sequencing ability decreased  -CD    Transfer, Impairments strength decreased;pain  - pain;impaired balance;strength decreased  - impaired balance;strength decreased  -CD    Transfer, Comment Sit to stand performed at EOB.  - Pt performed sit to stand from EOB, R knee buckling limiting standing tolerance. Pt edu in hand placement for sit/stand.  - MAX CUES FOR HAND PLACEMENT.   -CD    Recorded by [SH] Elza Palomo, PT [SH] Elza Palomo, PT [CD] Floresita Griffin, PT    Gait Assessment/Treatment    Gait, [place] Level  moderate assist (50% patient effort);2 person assist required;verbal cues required;nonverbal cues required (demo/gesture)  - moderate assist (50% patient effort);2 person assist required;verbal cues required  -CD    Gait, Assistive Device  rolling walker  - rolling walker  -CD    Gait, Distance (Feet)  4  - 100   CHAIR IN TOW.   -CD    Gait, Gait Pattern Analysis  swing-to gait  -      Gait, Gait Deviations  right:;antalgic;gabe decreased;decreased heel strike;forward flexed posture;knee buckling;step length decreased  - gabe decreased;forward flexed posture;step length decreased;weight-shifting ability decreased;right:;antalgic  -CD    Gait, Safety Issues  balance decreased during turns;sequencing ability decreased;step length decreased;weight-shifting ability decreased  - step length decreased;weight-shifting ability decreased;balance decreased during turns  -CD    Gait, Impairments  strength decreased;impaired balance;pain  - strength decreased;impaired balance  -CD    Gait, Comment Pt unable to take steps away from EOB.  - Pt unable to tolerated weightbearing this session. Steps to chair only.  - CUES TO WIDEN HILDA AND TO INCREASE STEP LENGTH. PT ADVANCING R LE INDEPENDENTLY.   -CD    Recorded by [SH] Elza Palomo, PT [SH] Elza Palomo, PT [CD] Floresita Griffin, PT    Motor Skills/Interventions    Additional Documentation   Balance Skills Training (Group)  -CD    Recorded by   [CD] Floresita Griffin, PT    Balance Skills Training    Sitting-Level of Assistance   Contact guard   MORE IMPULSIVE THIS AFTERNOON.   -CD    Sitting-Balance Support   Right upper extremity supported;Left upper extremity supported;Feet supported  -CD    Standing-Level of Assistance Moderate assistance;x2  -SH  Minimum assistance  -CD    Static Standing Balance Support assistive device  -  assistive device  -CD    Standing-Balance Activities Weight Shift A-P;Weight Shift R-L   R LE buckling  -SH  Weight Shift R-L  -CD    Standing Balance # of Minutes 2  -SH  2  -CD    Recorded by [SH] Elza Palomo, PT  [CD] Floresita Griffin, PT    Therapy Exercises    Bilateral Lower Extremities AAROM:;10 reps;supine;ankle pumps/circles;hip abduction/adduction;heel slides  - AAROM:;10 reps;sitting;ankle pumps/circles;hip abduction/adduction;hip flexion;LAQ  -SH     Bilateral Upper Extremity  AROM:;10  reps;sitting;elbow flexion/extension;shoulder extension/flexion  -SH     Recorded by [SH] Elza Palomo, PT [SH] Elza Palomo, PT     Positioning and Restraints    Pre-Treatment Position in bed  -SH in bed  -SH in bed  -CD    Post Treatment Position bed  -SH chair  -SH chair  -CD    In Bed supine;call light within reach;encouraged to call for assist;exit alarm on;with family/caregiver;waffle boots/both  -SH      In Chair  notified nsg;reclined;call light within reach;encouraged to call for assist;exit alarm on;on mechanical lift sling;R waffle boot  -SH reclined;call light within reach;encouraged to call for assist;legs elevated;on mechanical lift sling;waffle cushion   NSG IN TO SCAN BLADDER AND AGREES TO SET EXIT ALARM.   -CD    Recorded by [SH] Elza Palomo, PT [SH] Elza Palomo, PT [CD] Floresita Griffin, PT      User Key  (r) = Recorded By, (t) = Taken By, (c) = Cosigned By    Initials Name Effective Dates    CD Floresita Griffin, PT 06/19/15 -     SH Elza Palomo, PT 06/19/15 -                 IP PT Goals       02/09/18 1413 02/09/18 1150       Bed Mobility PT LTG    Bed Mobility PT LTG, Time to Achieve  2 wks  -CD     Bed Mobility PT LTG, Activity Type  all bed mobility  -CD     Bed Mobility PT LTG, [place] Level  minimum assist (75% patient effort)  -CD     Bed Mobility PT Goal  LTG, Assist Device  bed rails  -CD     Bed Mobility PT LTG, Outcome goal ongoing  -CD      Transfer Training PT LTG    Transfer Training PT LTG, Time to Achieve  2 wks  -CD     Transfer Training PT LTG, Activity Type  all transfers  -CD     Transfer Training PT LTG, [place] Level  contact guard assist  -CD     Transfer Training PT LTG, Assist Device  walker, rolling  -CD     Transfer Training PT LTG, Outcome goal ongoing  -CD      Gait Training PT LTG    Gait Training Goal PT LTG, Time to Achieve  2 wks  -CD     Gait Training Goal PT LTG, [place] Level  minimum assist (75% patient effort)  -CD     Gait  Training Goal PT LTG, Assist Device  walker, rolling  -CD     Gait Training Goal PT LTG, Distance to Achieve  250  -CD     Gait Training Goal PT LTG, Outcome goal ongoing  -CD        User Key  (r) = Recorded By, (t) = Taken By, (c) = Cosigned By    Initials Name Provider Type    JANA Griffin, PT Physical Therapist          Physical Therapy Education     Title: PT OT SLP Therapies (Active)     Topic: Physical Therapy (Active)     Point: Mobility training (Active)    Learning Progress Summary    Learner Readiness Method Response Comment Documented by Status   Patient Acceptance E NR   02/10/18 1558 Active    Acceptance E,D NR Gait training.  02/10/18 1141 Active    Acceptance E NR,NL SAFETY WITH MOBILITY, PRECAUTIONS, GAIT TRAINING AND TF TRAINING WITH R WALKER.  02/09/18 1412 Active    Acceptance E NR,NL PT HAS DEMENTIA. FAMILY NOT PRESENT. REVIEW POSTERIOR HIP PRECAUTIONS, TRANSFER/GAIT TRAINING WITH R WALKER, THER EX.  02/09/18 1149 Active    Acceptance TB DU  TP 02/08/18 1755 Done               Point: Home exercise program (Active)    Learning Progress Summary    Learner Readiness Method Response Comment Documented by Status   Patient Acceptance E NR   02/10/18 1558 Active    Acceptance E,D NR Gait training.  02/10/18 1141 Active    Acceptance E NR,NL SAFETY WITH MOBILITY, PRECAUTIONS, GAIT TRAINING AND TF TRAINING WITH R WALKER.  02/09/18 1412 Active    Acceptance E NR,NL PT HAS DEMENTIA. FAMILY NOT PRESENT. REVIEW POSTERIOR HIP PRECAUTIONS, TRANSFER/GAIT TRAINING WITH R WALKER, THER EX.  02/09/18 1149 Active    Acceptance TB DU  TP 02/08/18 1755 Done               Point: Body mechanics (Active)    Learning Progress Summary    Learner Readiness Method Response Comment Documented by Status   Patient Acceptance E NR   02/10/18 1558 Active    Acceptance E,D NR Gait training.  02/10/18 1141 Active    Acceptance E NR,NL SAFETY WITH MOBILITY, PRECAUTIONS, GAIT TRAINING AND TF TRAINING WITH R  WALKER.  02/09/18 1412 Active    Acceptance E NR,NL PT HAS DEMENTIA. FAMILY NOT PRESENT. REVIEW POSTERIOR HIP PRECAUTIONS, TRANSFER/GAIT TRAINING WITH R WALKER, THER EX.  02/09/18 1149 Active    Acceptance TB DU  TP 02/08/18 1755 Done               Point: Precautions (Active)    Learning Progress Summary    Learner Readiness Method Response Comment Documented by Status   Patient Acceptance E NR   02/10/18 1558 Active    Acceptance E,D NR Gait training.  02/10/18 1141 Active    Acceptance E NR,NL SAFETY WITH MOBILITY, PRECAUTIONS, GAIT TRAINING AND TF TRAINING WITH R WALKER.  02/09/18 1412 Active    Acceptance E NR,NL PT HAS DEMENTIA. FAMILY NOT PRESENT. REVIEW POSTERIOR HIP PRECAUTIONS, TRANSFER/GAIT TRAINING WITH R WALKER, THER EX.  02/09/18 1149 Active    Acceptance TB DU   02/08/18 1755 Done                      User Key     Initials Effective Dates Name Provider Type Discipline     06/19/15 -  Floresita Griffin, PT Physical Therapist PT     06/19/15 -  Elza Palomo, PT Physical Therapist PT     07/10/17 -  Milena Torres RN Registered Nurse Nurse                    PT Recommendation and Plan  Anticipated Discharge Disposition: skilled nursing facility  Planned Therapy Interventions: balance training, bed mobility training, gait training, home exercise program, strengthening, transfer training, patient/family education  PT Frequency: 2 times/day  Plan of Care Review  Plan Of Care Reviewed With: patient  Progress: no change  Outcome Summary/Follow up Plan: Pt ambulated 4' with RW with mod A x 2.          Outcome Measures       02/10/18 1500 02/10/18 1003 02/09/18 1335    How much help from another person do you currently need...    Turning from your back to your side while in flat bed without using bedrails? 2  -SH 2  -SH 2  -CD    Moving from lying on back to sitting on the side of a flat bed without bedrails? 2  -SH 2  -SH 2  -CD    Moving to and from a bed to a chair (including a  wheelchair)? 2  -SH 2  -SH 2  -CD    Standing up from a chair using your arms (e.g., wheelchair, bedside chair)? 2  -SH 2  -SH 2  -CD    Climbing 3-5 steps with a railing? 1  -SH 1  -SH 1  -CD    To walk in hospital room? 2  -SH 2  -SH 2  -CD    AM-PAC 6 Clicks Score 11  -SH 11  -SH 11  -CD    Functional Assessment    Outcome Measure Options AM-PAC 6 Clicks Basic Mobility (PT)  -SH AM-PAC 6 Clicks Basic Mobility (PT)  -SH AM-PAC 6 Clicks Basic Mobility (PT)  -CD      02/09/18 0955 02/09/18 0818       How much help from another person do you currently need...    Turning from your back to your side while in flat bed without using bedrails? 2  -CD      Moving from lying on back to sitting on the side of a flat bed without bedrails? 2  -CD      Moving to and from a bed to a chair (including a wheelchair)? 2  -CD      Standing up from a chair using your arms (e.g., wheelchair, bedside chair)? 2  -CD      Climbing 3-5 steps with a railing? 1  -CD      To walk in hospital room? 2  -CD      AM-PAC 6 Clicks Score 11  -CD      How much help from another is currently needed...    Putting on and taking off regular lower body clothing?  1  -HK     Bathing (including washing, rinsing, and drying)  2  -HK     Toileting (which includes using toilet bed pan or urinal)  3  -HK     Putting on and taking off regular upper body clothing  3  -HK     Taking care of personal grooming (such as brushing teeth)  3  -HK     Eating meals  3  -HK     Score  15  -HK     Functional Assessment    Outcome Measure Options AM-PAC 6 Clicks Basic Mobility (PT)  -CD AM-PAC 6 Clicks Daily Activity (OT)  -HK       User Key  (r) = Recorded By, (t) = Taken By, (c) = Cosigned By    Initials Name Provider Type    CD Floresita Griffin, PT Physical Therapist     Elza Palomo, PT Physical Therapist     Maricel Sandoval, OT Occupational Therapist           Time Calculation:         PT Charges       02/10/18 1559 02/10/18 1143       Time Calculation    Start Time  1500  - 1003  -SH     PT Received On 02/10/18  - 02/10/18  -     PT Goal Re-Cert Due Date 02/19/18  - 02/19/18  -     Time Calculation- PT    Total Timed Code Minutes- PT 15 minute(s)  -SH 23 minute(s)  -       User Key  (r) = Recorded By, (t) = Taken By, (c) = Cosigned By    Initials Name Provider Type     Elza Palomo, PT Physical Therapist          Therapy Charges for Today     Code Description Service Date Service Provider Modifiers Qty    18447686892 HC PT THER PROC EA 15 MIN 2/10/2018 Elza Palomo, PT GP 1    02136511481 HC PT THER SUPP EA 15 MIN 2/10/2018 Elza Palomo, PT GP 1    41091704996 HC GAIT TRAINING EA 15 MIN 2/10/2018 Elza Palomo, PT GP 1    64587925911 HC PT THER PROC EA 15 MIN 2/10/2018 Elza Palomo, PT GP 1    47917080383 HC PT THER SUPP EA 15 MIN 2/10/2018 Elza Palomo, PT GP 1          PT G-Codes  Outcome Measure Options: AM-PAC 6 Clicks Basic Mobility (PT)    Elza Palomo, PT  2/10/2018

## 2018-02-10 NOTE — PLAN OF CARE
Problem: Patient Care Overview (Adult)  Goal: Plan of Care Review  Outcome: Ongoing (interventions implemented as appropriate)   02/10/18 1558   Coping/Psychosocial Response Interventions   Plan Of Care Reviewed With patient   Patient Care Overview   Progress no change       Problem: Inpatient Physical Therapy  Goal: Bed Mobility Goal LTG- PT  Outcome: Ongoing (interventions implemented as appropriate)   02/09/18 1150 02/09/18 1413   Bed Mobility PT LTG   Bed Mobility PT LTG, Time to Achieve 2 wks --    Bed Mobility PT LTG, Activity Type all bed mobility --    Bed Mobility PT LTG, [place] Level minimum assist (75% patient effort) --    Bed Mobility PT Goal LTG, Assist Device bed rails --    Bed Mobility PT LTG, Outcome --  goal ongoing     Goal: Transfer Training Goal 1 LTG- PT  Outcome: Ongoing (interventions implemented as appropriate)   02/09/18 1150 02/09/18 1413   Transfer Training PT LTG   Transfer Training PT LTG, Time to Achieve 2 wks --    Transfer Training PT LTG, Activity Type all transfers --    Transfer Training PT LTG, [place] Level contact guard assist --    Transfer Training PT LTG, Assist Device walker, rolling --    Transfer Training PT LTG, Outcome --  goal ongoing     Goal: Gait Training Goal LTG- PT  Outcome: Ongoing (interventions implemented as appropriate)   02/09/18 1150 02/09/18 1413   Gait Training PT LTG   Gait Training Goal PT LTG, Time to Achieve 2 wks --    Gait Training Goal PT LTG, [place] Level minimum assist (75% patient effort) --    Gait Training Goal PT LTG, Assist Device walker, rolling --    Gait Training Goal PT LTG, Distance to Achieve 250 --    Gait Training Goal PT LTG, Outcome --  goal ongoing

## 2018-02-10 NOTE — PROGRESS NOTES
"  SUBJECTIVE  Patient resting comfortably.  Noticing slightly more pain in hip at surgical incision area.  Pleasantly confused this morning.    OBJECTIVE  Temp (24hrs), Av °F (36.7 °C), Min:97.7 °F (36.5 °C), Max:98.2 °F (36.8 °C)    Blood pressure 170/82, pulse 108, temperature 97.8 °F (36.6 °C), temperature source Axillary, resp. rate 16, height 152.4 cm (60\"), weight 49.3 kg (108 lb 9.6 oz), SpO2 93 %, not currently breastfeeding.    Lab Results (last 24 hours)     Procedure Component Value Units Date/Time    Blood Culture - Blood, [[id number]]  (Normal) Collected:  18 0030    Specimen:  Blood from Arm, Right Updated:  02/10/18 011     Blood Culture No growth at 2 days    Blood Culture - Blood, [[id number]]  (Normal) Collected:  18 010    Specimen:  Blood from Arm, Right Updated:  02/10/18 0116     Blood Culture No growth at 2 days    CBC (No Diff) [[id number]]  (Abnormal) Collected:  02/10/18 0503    Specimen:  Blood Updated:  02/10/18 0610     WBC 10.92 (H) 10*3/mm3      RBC 3.59 (L) 10*6/mm3      Hemoglobin 9.9 (L) g/dL      Hematocrit 30.3 (L) %      MCV 84.4 fL      MCH 27.6 pg      MCHC 32.7 g/dL      RDW 14.2 %      RDW-SD 43.6 fl      MPV 10.1 fL      Platelets 252 10*3/mm3     Basic Metabolic Panel [[id number]]  (Abnormal) Collected:  02/10/18 0503    Specimen:  Blood Updated:  02/10/18 0641     Glucose 95 mg/dL      BUN 12 mg/dL      Creatinine 0.40 (L) mg/dL      Sodium 136 mmol/L       Verified by repeat analysis.         Potassium 3.1 (L) mmol/L      Chloride 103 mmol/L      CO2 25.0 mmol/L      Calcium 7.9 (L) mg/dL      eGFR Non African Amer 150 mL/min/1.73      BUN/Creatinine Ratio 30.0 (H)     Anion Gap 8.0 mmol/L     Narrative:       [employer] Guidelines    Stage     Description        GFR  1         Normal or High     90+  2         Mild decrease      60-89  3         Moderate decrease  30-59  4         Severe decrease    15-29  5         Kidney failure     <15    "         PHYSICAL EXAM  Right lower extremity:Dressing clean, dry and intact.  Mild pain with logroll of hip.  Intact EHL, FHL, tibialis anterior, and gastrocsoleus. Sensation intact to light touch to deep peroneal, superficial peroneal, sural, saphenous, tibial nerves. 2+ palpable DP and PT pulses.       Principal Problem:    Fracture of femoral neck, right  Active Problems:    Hypertension    UTI (urinary tract infection)    Dementia    Metastatic colon cancer in female    Recurrent falls    Closed fracture of neck of right femur      PLAN / DISPOSITION:  2 Day Post-Op right hip hemiarthroplasty    Protected weight bearing as tolerated right lower extremity, posterior hip precautions ×6 weeks   Pain control  PT/OT for post op mobilization and medical equipment needs   TEDs and SCD's bilateral lower extremities   Lovenox for DVT prophylaxis   Social work for discharge planning.   Dressing to remain in place for 7 days. May remove on POD#7. If no drainage, may shower on POD#10. No submerging wound in water. If drainage is noted, sterile dressing should be placed and wound checked daily. No showering until wound has remained dry for 72 consecutive hours.   Follow up in 3 weeks for re-assessment.      Fernando Dominguez MD  02/10/18  9:32 AM

## 2018-02-10 NOTE — PROGRESS NOTES
[facility] Medicine Services  PROGRESS NOTE    Patient Name: Zainab Worthy  : 2/3/1929  MRN: [id number]    Date of Admission: 2018  Length of Stay: 2  Primary Care Physician: Carolyn Giraldo DO (Inactive)    Subjective   Subjective     CC:  F/U right hip fracture    HPI:  Patient sitting up and eating breakfast. No complaints other than urinary urgency.     Review of Systems  CV-no chest pain  Resp-no dyspnea  GI-no N/V,no abd pain    Otherwise ROS is negative except as mentioned in the HPI.    Objective   Objective     Vital Signs:   Temp:  [97.7 °F (36.5 °C)-98.2 °F (36.8 °C)] 97.8 °F (36.6 °C)  Heart Rate:  [] 108  Resp:  [16-18] 16  BP: (154-170)/(74-85) 170/82        Physical Exam:  Gen-no acute distress  CV-RRR, S1 S2 normal, no m/r/g  Resp-CTAB, no wheezes  Abd-soft, NT, ND, +BS  Ext-no edema  Neuro-alert, awake, interactive, but confused; no focal deficits  Psych-appropriate mood      Results Reviewed:  I have personally reviewed current lab, radiology, and data and agree.      Results from last 7 days  Lab Units 02/10/18  0503 18  0556 18  0518  2308   WBC 10*3/mm3 10.92* 11.71* 12.70* 14.47*   HEMOGLOBIN g/dL 9.9* 11.2* 10.9* 11.8   HEMATOCRIT % 30.3* 34.2* 33.5* 34.9   PLATELETS 10*3/mm3 252 222 238 236   INR   --   --   --  1.04       Results from last 7 days  Lab Units 02/10/18  0503 18  0556 18  0520 18  2308   SODIUM mmol/L 136 133 128* 128*  127*   POTASSIUM mmol/L 3.1* 4.0 3.7 3.7  3.6   CHLORIDE mmol/L 103 101 95* 95*  96*   CO2 mmol/L 25.0 21.0 24.0 22.0  24.0   BUN mg/dL 12 15 17 17  18   CREATININE mg/dL 0.40* 0.50* 0.70 0.50*  0.50*   GLUCOSE mg/dL 95 181* 105* 124*  125*   CALCIUM mg/dL 7.9* 8.4* 8.3* 7.9*  8.5*   ALT (SGPT) U/L  --   --   --  37  39   AST (SGOT) U/L  --   --   --  100*  103*     Estimated Creatinine Clearance: 37.1 mL/min (by C-G formula based on Cr of 0.4).  No results found for:  BNP  No results found for: PHART    Microbiology Results Abnormal     Procedure Component Value - Date/Time    Urine Culture - Urine, Urine, Clean Catch [[id number]]  (Abnormal)  (Susceptibility) Collected:  02/07/18 2315    Lab Status:  Final result Specimen:  Urine from Urine, Catheter Updated:  02/10/18 1156     Urine Culture --      >100,000 CFU/mL Escherichia coli (A)    Susceptibility      Escherichia coli     KANIKA     Ampicillin >16 ug/ml Resistant     Ampicillin + Sulbactam 16/8 ug/ml Intermediate     Aztreonam <=8 ug/ml Susceptible     Cefepime <=8 ug/ml Susceptible     Cefotaxime <=2 ug/ml Susceptible     Ceftriaxone <=8 ug/ml Susceptible     Cefuroxime sodium <=4 ug/ml Susceptible     Cephalothin >16 ug/ml Resistant     Ertapenem <=1 ug/ml Susceptible     Gentamicin <=4 ug/ml Susceptible     Levofloxacin <=2 ug/ml Susceptible     Meropenem <=1 ug/ml Susceptible     Nitrofurantoin <=32 ug/ml Susceptible     Piperacillin + Tazobactam <=16 ug/ml Susceptible     Tetracycline >8 ug/ml Resistant     Tobramycin <=4 ug/ml Susceptible     Trimethoprim + Sulfamethoxazole <=2/38 ug/ml Susceptible                    Blood Culture - Blood, [[id number]]  (Normal) Collected:  02/08/18 0030    Lab Status:  Preliminary result Specimen:  Blood from Arm, Right Updated:  02/10/18 0116     Blood Culture No growth at 2 days    Blood Culture - Blood, [[id number]]  (Normal) Collected:  02/08/18 0102    Lab Status:  Preliminary result Specimen:  Blood from Arm, Right Updated:  02/10/18 0116     Blood Culture No growth at 2 days          Imaging Results (last 24 hours)     Procedure Component Value Units Date/Time    XR Pelvis 1 or 2 View [[id number]] Collected:  02/09/18 0904     Updated:  02/09/18 1411    Narrative:       EXAMINATION: XR PELVIS 1 OR 2 VW-02/08/2018:      INDICATION: Postop Hip Arthroplasty; S72.001A-Fracture of unspecified  part of neck of right femur, initial encounter for closed fracture;  N39.0-Urinary tract  infection, site not specified; F03.90-Unspecified  dementia without behavioral disturbance; W19.XXXA-Unspecified fall,  initial encounter.      COMPARISON: NONE.     FINDINGS: AP view of the pelvis demonstrates a right hip arthroplasty.  There is no evidence of acute fracture or dislocation. There is no  loosening of the prosthesis. There are old bilateral superior and  inferior pubic rami fractures.           Impression:       Expected postoperative findings. Bilateral old pubic rami  fractures are noted.     D:  02/09/2018  E:  02/09/2018     This report was finalized on 2/9/2018 2:09 PM by Dr. Aguilar Ayala MD.                I have reviewed the medications.    Assessment/Plan   Assessment / Plan     Hospital Problem List     * (Principal)Fracture of femoral neck, right    Hypertension    UTI (urinary tract infection)    Dementia    Metastatic colon cancer in female    Recurrent falls    Closed fracture of neck of right femur             Brief Hospital Course to date:  Zainab Worthy is a 89 y.o. female presents after being found on the floor at the [facility] complaining of right hip pain. Found to have a right femoral neck fracture.       Assessment & Plan:  --s/p OR 2/8/18 with Dr. Dominguez for right hip hemiarthroplasty. WBAT on RLE. Lovenox for DVT PPx. Remove dressing on POD #7 (2/15/18). Follow up in 3 weeks.  --Continue PT/OT.  --Pain control.  --Continue Rocephin Day 3 for UTI. Urine culture with E.coli. Plan 7 day course, can switch to PO Ceftin at discharge.  --Plan for transfer to the [facility] on Monday 2/12/18?     DVT Prophylaxis:  Lovenox    CODE STATUS: Full Code    Disposition: I expect the patient to be discharged to rehab on 2/12/18     Petra Romero MD  02/10/18  1:26 PM

## 2018-02-10 NOTE — PLAN OF CARE
Problem: Patient Care Overview (Adult)  Goal: Plan of Care Review  Outcome: Ongoing (interventions implemented as appropriate)   02/10/18 1805 02/10/18 1845   Coping/Psychosocial Response Interventions   Plan Of Care Reviewed With patient --    Patient Care Overview   Progress --  progress toward functional goals is gradual   Outcome Evaluation   Outcome Summary/Follow up Plan --  Pt. pleasantly confused. No c/o pain except for with movement. Pt. got up to chair today with assist x 2 , and rolling walker.        Problem: Fall Risk (Adult)  Goal: Identify Related Risk Factors and Signs and Symptoms  Outcome: Outcome(s) achieved Date Met: 02/10/18    Goal: Absence of Falls   02/09/18 0321   Fall Risk (Adult)   Absence of Falls making progress toward outcome       Problem: Pressure Ulcer Risk (Chapin Scale) (Adult,Obstetrics,Pediatric)  Goal: Identify Related Risk Factors and Signs and Symptoms  Outcome: Outcome(s) achieved Date Met: 02/10/18   02/08/18 0618   Pressure Ulcer Risk (Chapin Scale)   Related Risk Factors (Pressure Ulcer Risk (Chapin Scale)) body weight extremes;cognitive impairment;infection;mobility impaired;mental impairment     Goal: Skin Integrity   02/09/18 0321   Pressure Ulcer Risk (Chapin Scale) (Adult,Obstetrics,Pediatric)   Skin Integrity making progress toward outcome

## 2018-02-11 LAB
ANION GAP SERPL CALCULATED.3IONS-SCNC: 5 MMOL/L (ref 3–11)
BUN BLD-MCNC: 9 MG/DL (ref 9–23)
BUN/CREAT SERPL: 30 (ref 7–25)
CALCIUM SPEC-SCNC: 7.7 MG/DL (ref 8.7–10.4)
CHLORIDE SERPL-SCNC: 103 MMOL/L (ref 99–109)
CO2 SERPL-SCNC: 28 MMOL/L (ref 20–31)
CREAT BLD-MCNC: 0.3 MG/DL (ref 0.6–1.3)
GFR SERPL CREATININE-BSD FRML MDRD: >150 ML/MIN/1.73
GLUCOSE BLD-MCNC: 94 MG/DL (ref 70–100)
HCT VFR BLD AUTO: 27.2 % (ref 34.5–44)
HGB BLD-MCNC: 9 G/DL (ref 11.5–15.5)
MAGNESIUM SERPL-MCNC: 1.7 MG/DL (ref 1.3–2.7)
POTASSIUM BLD-SCNC: 3 MMOL/L (ref 3.5–5.5)
SODIUM BLD-SCNC: 136 MMOL/L (ref 132–146)

## 2018-02-11 PROCEDURE — 99232 SBSQ HOSP IP/OBS MODERATE 35: CPT | Performed by: NURSE PRACTITIONER

## 2018-02-11 PROCEDURE — 85014 HEMATOCRIT: CPT | Performed by: ORTHOPAEDIC SURGERY

## 2018-02-11 PROCEDURE — 25010000002 CEFTRIAXONE PER 250 MG: Performed by: PHYSICIAN ASSISTANT

## 2018-02-11 PROCEDURE — 25010000002 ENOXAPARIN PER 10 MG: Performed by: ORTHOPAEDIC SURGERY

## 2018-02-11 PROCEDURE — 97110 THERAPEUTIC EXERCISES: CPT

## 2018-02-11 PROCEDURE — 83735 ASSAY OF MAGNESIUM: CPT | Performed by: HOSPITALIST

## 2018-02-11 PROCEDURE — 85018 HEMOGLOBIN: CPT | Performed by: ORTHOPAEDIC SURGERY

## 2018-02-11 PROCEDURE — 80048 BASIC METABOLIC PNL TOTAL CA: CPT | Performed by: ORTHOPAEDIC SURGERY

## 2018-02-11 PROCEDURE — 97116 GAIT TRAINING THERAPY: CPT

## 2018-02-11 RX ORDER — POTASSIUM CHLORIDE 7.45 MG/ML
10 INJECTION INTRAVENOUS
Status: DISCONTINUED | OUTPATIENT
Start: 2018-02-11 | End: 2018-02-12 | Stop reason: HOSPADM

## 2018-02-11 RX ORDER — MAGNESIUM SULFATE HEPTAHYDRATE 40 MG/ML
2 INJECTION, SOLUTION INTRAVENOUS AS NEEDED
Status: DISCONTINUED | OUTPATIENT
Start: 2018-02-11 | End: 2018-02-12 | Stop reason: HOSPADM

## 2018-02-11 RX ORDER — POTASSIUM CHLORIDE 1.5 G/1.77G
40 POWDER, FOR SOLUTION ORAL AS NEEDED
Status: DISCONTINUED | OUTPATIENT
Start: 2018-02-11 | End: 2018-02-12 | Stop reason: HOSPADM

## 2018-02-11 RX ORDER — POTASSIUM CHLORIDE 750 MG/1
40 CAPSULE, EXTENDED RELEASE ORAL AS NEEDED
Status: DISCONTINUED | OUTPATIENT
Start: 2018-02-11 | End: 2018-02-12 | Stop reason: HOSPADM

## 2018-02-11 RX ORDER — MAGNESIUM SULFATE HEPTAHYDRATE 40 MG/ML
4 INJECTION, SOLUTION INTRAVENOUS AS NEEDED
Status: DISCONTINUED | OUTPATIENT
Start: 2018-02-11 | End: 2018-02-12 | Stop reason: HOSPADM

## 2018-02-11 RX ADMIN — ACETAMINOPHEN 650 MG: 325 TABLET, FILM COATED ORAL at 20:52

## 2018-02-11 RX ADMIN — ONDANSETRON 4 MG: 4 TABLET, FILM COATED ORAL at 08:57

## 2018-02-11 RX ADMIN — POTASSIUM CHLORIDE 40 MEQ: 750 CAPSULE, EXTENDED RELEASE ORAL at 11:21

## 2018-02-11 RX ADMIN — ACETAMINOPHEN 650 MG: 325 TABLET, FILM COATED ORAL at 08:57

## 2018-02-11 RX ADMIN — DONEPEZIL HYDROCHLORIDE 10 MG: 10 TABLET, FILM COATED ORAL at 20:52

## 2018-02-11 RX ADMIN — MEMANTINE 10 MG: 10 TABLET ORAL at 08:57

## 2018-02-11 RX ADMIN — FAMOTIDINE 20 MG: 20 TABLET, FILM COATED ORAL at 08:58

## 2018-02-11 RX ADMIN — CEFTRIAXONE 1 G: 1 INJECTION, POWDER, FOR SOLUTION INTRAMUSCULAR; INTRAVENOUS at 11:25

## 2018-02-11 RX ADMIN — POTASSIUM CHLORIDE 40 MEQ: 1.5 POWDER, FOR SOLUTION ORAL at 15:36

## 2018-02-11 RX ADMIN — POTASSIUM CHLORIDE 40 MEQ: 750 CAPSULE, EXTENDED RELEASE ORAL at 20:52

## 2018-02-11 RX ADMIN — ENOXAPARIN SODIUM 40 MG: 40 INJECTION SUBCUTANEOUS at 11:25

## 2018-02-11 NOTE — THERAPY TREATMENT NOTE
Acute Care - Physical Therapy Treatment Note  [facility]     Patient Name: Zainab Worthy  : 2/3/1929  MRN: [id number]  Today's Date: 2018  Onset of Illness/Injury or Date of Surgery Date: 18  Date of Referral to PT: 18  Referring Physician: TINA    Admit Date: 2018    Visit Dx:    ICD-10-CM ICD-9-CM   1. Closed fracture of neck of right femur, initial encounter S72.001A 820.8   2. Acute UTI N39.0 599.0   3. Dementia without behavioral disturbance, unspecified dementia type F03.90 294.20   4. Fall, initial encounter W19.XXXA E888.9   5. Impaired mobility and ADLs Z74.09 799.89   6. Impaired functional mobility, balance, gait, and endurance Z74.09 V49.89     Patient Active Problem List   Diagnosis   • Gonalgia   • Hypertrophic polyarthritis   • Allergic rhinitis, seasonal   • Skin growth   • Endometrial cancer   • Post-operative state   • Rectal cancer   • Hydronephrosis   • Hypertension   • Anorexia   • Onychomycosis   • IUD complication   • Memory loss   • Urinary frequency   • Fall at home   • Closed fracture of pelvis   • UTI (urinary tract infection)   • Metabolic encephalopathy   • Fracture of femoral neck, right   • Dementia   • Metastatic colon cancer in female   • Recurrent falls   • Closed fracture of neck of right femur               Adult Rehabilitation Note       18 1006 02/10/18 1500 02/10/18 1003    Rehab Assessment/Intervention    Discipline physical therapist  -SH physical therapist  -SH physical therapist  -SH    Document Type therapy note (daily note)  -SH therapy note (daily note)  -SH therapy note (daily note)  -SH    Subjective Information agree to therapy  -SH agree to therapy  -SH agree to therapy   RN consents to PT.  -SH    Patient Effort, Rehab Treatment good  -SH adequate  -SH adequate  -SH    Symptoms Noted During/After Treatment   other (see comments)   Confusion  -SH    Symptoms Noted Comment [place], pleasantly confused.  -SH      Precautions/Limitations  fall precautions;hip precautions- right  -SH fall precautions;hip precautions- right  -SH fall precautions;hip precautions- right  -SH    Recorded by [SH] Elza Palomo, PT [SH] Elza Palomo, PT [SH] Elza Palomo, PT    Pain Assessment    Pain Assessment Villasenor-Garcia FACES  -SH Villasenor-Baker FACES  -SH Villasenor-Baker FACES  -SH    Villasenor-Garcia FACES Pain Rating 0  -SH 0  -SH 0  -SH    Pain Score 2  -SH 2  -SH     Post Pain Score 2  -SH 2  -SH 4  -SH    Pain Type Acute pain  -SH Acute pain  -SH Acute pain  -SH    Pain Location Hip  -SH Hip  -SH Hip  -SH    Pain Orientation Right  -SH Right  -SH Right  -SH    Pain Intervention(s) Repositioned;Ambulation/increased activity  -SH Repositioned;Ambulation/increased activity  -SH Repositioned;Ambulation/increased activity  -SH    Response to Interventions tolerated  -SH      Recorded by [SH] Elza Palomo, PT [SH] Elza Palmoo, PT [SH] Elza Palomo, PT    Cognitive Assessment/Intervention    Current Cognitive/Communication Assessment impaired  -SH impaired  -SH impaired  -SH    Orientation Status oriented to;person  -SH oriented to;person;disoriented to;place;time;situation  -SH oriented to;person;disoriented to;place;time;situation   Upon entry pt naked, reported going to grocery.  -SH    Follows Commands/Answers Questions able to follow single-step instructions;75% of the time;needs cueing;needs increased time;needs repetition  -SH able to follow single-step instructions;75% of the time;needs cueing;needs increased time;needs repetition  -SH able to follow single-step instructions;75% of the time;needs cueing;needs increased time;needs repetition  -SH    Personal Safety moderate impairment;decreased awareness, need for safety;decreased awareness, need for assist;decreased insight to deficits  -SH moderate impairment;decreased awareness, need for assist;decreased awareness, need for safety;decreased insight to deficits  -SH moderate impairment;decreased  awareness, need for assist;decreased insight to deficits;decreased awareness, need for safety  -    Personal Safety Interventions fall prevention program maintained;gait belt;nonskid shoes/slippers when out of bed  - fall prevention program maintained;gait belt;nonskid shoes/slippers when out of bed  - gait belt;fall prevention program maintained;nonskid shoes/slippers when out of bed  -    Recorded by [SH] Elza Palomo, PT [SH] Elza Palomo, PT [] Elza Palomo, PT    Bed Mobility, Assessment/Treatment    Bed Mobility, Assistive Device bed rails;head of bed elevated;draw sheet  - bed rails;draw sheet  - draw sheet;head of bed elevated;bed rails  -    Bed Mobility, Scoot/Bridge, [place] minimum assist (75% patient effort);verbal cues required;nonverbal cues required (demo/gesture)  -      Bed Mob, Supine to Sit, [place] moderate assist (50% patient effort);2 person assist required;verbal cues required  - verbal cues required;nonverbal cues required (demo/gesture);maximum assist (25% patient effort);2 person assist required  - maximum assist (25% patient effort);2 person assist required;verbal cues required;nonverbal cues required (demo/gesture)  -    Bed Mob, Sit to Supine, [place]  maximum assist (25% patient effort);1 person + 1 person to manage equipment;verbal cues required;nonverbal cues required (demo/gesture)  -     Bed Mobility, Safety Issues decreased use of arms for pushing/pulling;cognitive deficits limit understanding;decreased use of legs for bridging/pushing  - cognitive deficits limit understanding;decreased use of arms for pushing/pulling;decreased use of legs for bridging/pushing  - cognitive deficits limit understanding;decreased use of arms for pushing/pulling;decreased use of legs for bridging/pushing  -    Bed Mobility, Impairments strength decreased;impaired balance;pain  - strength decreased;impaired balance;pain  - strength  decreased;impaired balance;pain  -SH    Bed Mobility, Comment VC for sequencing and hand placement. Increased alertness and active participation this session.  - VC for sequencing  - VC to move B LE to edge of bed, pt reported inability to move R LE. Pt required vc for sequencing, technique, and hand placement.  -SH    Recorded by [SH] Elza Palomo, PT [SH] Elza Palomo, PT [SH] Elza Palomo, PT    Transfer Assessment/Treatment    Transfers, Bed-Chair-Bed, Assist Device   rolling walker  -    Transfers, Sit-Stand [place] moderate assist (50% patient effort);2 person assist required;nonverbal cues required (demo/gesture);verbal cues required  - moderate assist (50% patient effort);2 person assist required;nonverbal cues required (demo/gesture);verbal cues required  - moderate assist (50% patient effort);2 person assist required;verbal cues required;nonverbal cues required (demo/gesture)  -    Transfers, Stand-Sit [place] verbal cues required;nonverbal cues required (demo/gesture);moderate assist (50% patient effort);2 person assist required  - verbal cues required;nonverbal cues required (demo/gesture);moderate assist (50% patient effort);2 person assist required  - nonverbal cues required (demo/gesture);verbal cues required;moderate assist (50% patient effort);2 person assist required  -    Transfers, Sit-Stand-Sit, Assist Device rolling walker  -SH rolling walker  -SH rolling walker  -SH    Transfer, Safety Issues   sequencing ability decreased;step length decreased;weight-shifting ability decreased;knees buckling  -    Transfer, Impairments strength decreased;pain  - strength decreased;pain  - pain;impaired balance;strength decreased  -    Transfer, Comment Pt performed sit to stand from EOB, cues to push up from bed vs pulling up on RW.  -SH Sit to stand performed at EOB.  - Pt performed sit to stand from EOB, R knee buckling limiting standing tolerance. Pt edu in  hand placement for sit/stand.  -    Recorded by [] Elza Palomo, PT [] Elza Palomo, PT [] Elza Palomo, PT    Gait Assessment/Treatment    Gait, [place] Level minimum assist (75% patient effort);verbal cues required  -  moderate assist (50% patient effort);2 person assist required;verbal cues required;nonverbal cues required (demo/gesture)  -    Gait, Assistive Device rolling walker  -  rolling walker  -    Gait, Distance (Feet) 8  -SH  4  -SH    Gait, Gait Pattern Analysis swing-to gait  -  swing-to gait  -    Gait, Gait Deviations gabe decreased;decreased heel strike;step length decreased  -  right:;antalgic;gabe decreased;decreased heel strike;forward flexed posture;knee buckling;step length decreased  -    Gait, Safety Issues step length decreased;weight-shifting ability decreased  -  balance decreased during turns;sequencing ability decreased;step length decreased;weight-shifting ability decreased  -    Gait, Impairments strength decreased;pain  -  strength decreased;impaired balance;pain  -    Gait, Comment Pt demonstrated significant improvement this session.  - Pt unable to take steps away from EOB.  - Pt unable to tolerated weightbearing this session. Steps to chair only.  -    Recorded by [] Elza Palomo, PT [] Elza Palomo PT [] Elza Palomo PT    Balance Skills Training    Standing-Level of Assistance Minimum assistance  - Moderate assistance;x2  -SH     Static Standing Balance Support assistive device  - assistive device  -     Standing-Balance Activities --   static stand to address BM  - Weight Shift A-P;Weight Shift R-L   R LE buckling  -     Standing Balance # of Minutes 3  -SH 2  -SH     Recorded by [] Elza Palomo, PT [] Elza Palomo PT     Therapy Exercises    Bilateral Lower Extremities  AAROM:;10 reps;supine;ankle pumps/circles;hip abduction/adduction;heel slides  - AAROM:;10  reps;sitting;ankle pumps/circles;hip abduction/adduction;hip flexion;LAQ  -SH    Bilateral Upper Extremity   AROM:;10 reps;sitting;elbow flexion/extension;shoulder extension/flexion  -    Recorded by  [SH] Elza Palomo, PT [] Elza Palomo, PT    Positioning and Restraints    Pre-Treatment Position in bed  -SH in bed  -SH in bed  -SH    Post Treatment Position chair  -SH bed  -SH chair  -SH    In Bed  supine;call light within reach;encouraged to call for assist;exit alarm on;with family/caregiver;waffle boots/both  -SH     In Chair reclined;call light within reach;encouraged to call for assist;exit alarm on;waffle boot/both  -SH  notified nsg;reclined;call light within reach;encouraged to call for assist;exit alarm on;on mechanical lift sling;R waffle boot  -    Recorded by [] Elza Palomo, PT [] Elza Plaomo, PT [] Elza Palomo, PT      02/09/18 1335          Rehab Assessment/Intervention    Discipline physical therapist  -CD      Document Type therapy note (daily note)  -CD      Subjective Information agree to therapy   WANTS TO USE THE BATHROOM.   -CD      Patient Effort, Rehab Treatment good  -CD      Symptoms Noted During/After Treatment other (see comments)   PT UNABLE TO URINATE- NSG IN TO SCAN BLADDER AFTER P.T.   -CD      Symptoms Noted Comment [place]-  PLEASANTLY CONFUSED.   -CD      Precautions/Limitations fall precautions;hip precautions- right   EXIT ALARM, CONFUSION.   -CD      Recorded by [CD] Floresita Griffin, PT      Vital Signs    Pre Systolic BP Rehab --   VSS  -CD      Recorded by [CD] Floresita Griffin, PT      Pain Assessment    Pain Assessment Villasenor-Garcia FACES  -CD      Villasenor-Garcia FACES Pain Rating 0  -CD      Pain Type --   PT DENIES PAIN.   -CD      Recorded by [CD] Floresita Griffin, PT      Bed Mobility, Assessment/Treatment    Bed Mobility, Assistive Device draw sheet;head of bed elevated  -CD      Bed Mob, Supine to Sit, [place] maximum assist (25% patient effort);2  person assist required;verbal cues required  -CD      Recorded by [CD] Floresita Grififn, PT      Transfer Assessment/Treatment    Transfers, Sit-Stand [place] minimum assist (75% patient effort);2 person assist required;verbal cues required  -CD      Transfers, Stand-Sit [place] moderate assist (50% patient effort);2 person assist required;verbal cues required  -CD      Transfers, Sit-Stand-Sit, Assist Device rolling walker  -CD      Toilet Transfer, [place] minimum assist (75% patient effort);2 person assist required;verbal cues required  -CD      Toilet Transfer, Assistive Device rolling walker;bedside commode without drop arms  -CD      Transfer, Safety Issues sequencing ability decreased  -CD      Transfer, Impairments impaired balance;strength decreased  -CD      Transfer, Comment MAX CUES FOR HAND PLACEMENT.   -CD      Recorded by [CD] Floresita Griffin, PT      Gait Assessment/Treatment    Gait, [place] Level moderate assist (50% patient effort);2 person assist required;verbal cues required  -CD      Gait, Assistive Device rolling walker  -CD      Gait, Distance (Feet) 100   CHAIR IN TOW.   -CD      Gait, Gait Deviations gabe decreased;forward flexed posture;step length decreased;weight-shifting ability decreased;right:;antalgic  -CD      Gait, Safety Issues step length decreased;weight-shifting ability decreased;balance decreased during turns  -CD      Gait, Impairments strength decreased;impaired balance  -CD      Gait, Comment CUES TO WIDEN HILDA AND TO INCREASE STEP LENGTH. PT ADVANCING R LE INDEPENDENTLY.   -CD      Recorded by [CD] Floresita Griffin, PT      Motor Skills/Interventions    Additional Documentation Balance Skills Training (Group)  -CD      Recorded by [CD] Floresita Griffin PT      Balance Skills Training    Sitting-Level of Assistance Contact guard   MORE IMPULSIVE THIS AFTERNOON.   -CD      Sitting-Balance Support Right upper extremity supported;Left upper extremity supported;Feet  supported  -CD      Standing-Level of Assistance Minimum assistance  -CD      Static Standing Balance Support assistive device  -CD      Standing-Balance Activities Weight Shift R-L  -CD      Standing Balance # of Minutes 2  -CD      Recorded by [CD] Floresita Griffin PT      Positioning and Restraints    Pre-Treatment Position in bed  -CD      Post Treatment Position chair  -CD      In Chair reclined;call light within reach;encouraged to call for assist;legs elevated;on mechanical lift sling;waffle cushion   NSG IN TO SCAN BLADDER AND AGREES TO SET EXIT ALARM.   -CD      Recorded by [CD] Floresita Griffin PT        User Key  (r) = Recorded By, (t) = Taken By, (c) = Cosigned By    Initials Name Effective Dates    CD Floresita Griffin, PT 06/19/15 -     SH Elza Palomo, PT 06/19/15 -                 IP PT Goals       02/09/18 1413 02/09/18 1150       Bed Mobility PT LTG    Bed Mobility PT LTG, Time to Achieve  2 wks  -CD     Bed Mobility PT LTG, Activity Type  all bed mobility  -CD     Bed Mobility PT LTG, [place] Level  minimum assist (75% patient effort)  -CD     Bed Mobility PT Goal  LTG, Assist Device  bed rails  -CD     Bed Mobility PT LTG, Outcome goal ongoing  -CD      Transfer Training PT LTG    Transfer Training PT LTG, Time to Achieve  2 wks  -CD     Transfer Training PT LTG, Activity Type  all transfers  -CD     Transfer Training PT LTG, [place] Level  contact guard assist  -CD     Transfer Training PT LTG, Assist Device  walker, rolling  -CD     Transfer Training PT LTG, Outcome goal ongoing  -CD      Gait Training PT LTG    Gait Training Goal PT LTG, Time to Achieve  2 wks  -CD     Gait Training Goal PT LTG, [place] Level  minimum assist (75% patient effort)  -CD     Gait Training Goal PT LTG, Assist Device  walker, rolling  -CD     Gait Training Goal PT LTG, Distance to Achieve  250  -CD     Gait Training Goal PT LTG, Outcome goal ongoing  -CD        User Key  (r) = Recorded By, (t) = Taken By,  (c) = Cosigned By    Initials Name Provider Type    JANA Griffin, PT Physical Therapist          Physical Therapy Education     Title: PT OT SLP Therapies (Active)     Topic: Physical Therapy (Done)     Point: Mobility training (Done)    Learning Progress Summary    Learner Readiness Method Response Comment Documented by Status   Patient Acceptance E VU   02/11/18 1035 Done    Acceptance E NR   02/10/18 1558 Active    Acceptance E,D NR Gait training.  02/10/18 1141 Active    Acceptance E NR,NL SAFETY WITH MOBILITY, PRECAUTIONS, GAIT TRAINING AND TF TRAINING WITH R WALKER.  02/09/18 1412 Active    Acceptance E NR,NL PT HAS DEMENTIA. FAMILY NOT PRESENT. REVIEW POSTERIOR HIP PRECAUTIONS, TRANSFER/GAIT TRAINING WITH R WALKER, THER EX.  02/09/18 1149 Active    Acceptance TB DU  TP 02/08/18 1755 Done               Point: Home exercise program (Done)    Learning Progress Summary    Learner Readiness Method Response Comment Documented by Status   Patient Acceptance E VU   02/11/18 1035 Done    Acceptance E NR   02/10/18 1558 Active    Acceptance E,D NR Gait training.  02/10/18 1141 Active    Acceptance E NR,NL SAFETY WITH MOBILITY, PRECAUTIONS, GAIT TRAINING AND TF TRAINING WITH R WALKER.  02/09/18 1412 Active    Acceptance E NR,NL PT HAS DEMENTIA. FAMILY NOT PRESENT. REVIEW POSTERIOR HIP PRECAUTIONS, TRANSFER/GAIT TRAINING WITH R WALKER, THER EX.  02/09/18 1149 Active    Acceptance TB DU  TP 02/08/18 1755 Done               Point: Body mechanics (Done)    Learning Progress Summary    Learner Readiness Method Response Comment Documented by Status   Patient Acceptance E VU   02/11/18 1035 Done    Acceptance E NR   02/10/18 1558 Active    Acceptance E,D NR Gait training.  02/10/18 1141 Active    Acceptance E NR,NL SAFETY WITH MOBILITY, PRECAUTIONS, GAIT TRAINING AND TF TRAINING WITH R WALKER.  02/09/18 1412 Active    Acceptance E NR,NL PT HAS DEMENTIA. FAMILY NOT PRESENT. REVIEW POSTERIOR HIP  PRECAUTIONS, TRANSFER/GAIT TRAINING WITH R WALKER, THER EX.  02/09/18 1149 Active    Acceptance TB DU  TP 02/08/18 1755 Done               Point: Precautions (Done)    Learning Progress Summary    Learner Readiness Method Response Comment Documented by Status   Patient Acceptance E VU   02/11/18 1035 Done    Acceptance E NR   02/10/18 1558 Active    Acceptance E,D NR Gait training.  02/10/18 1141 Active    Acceptance E NR,NL SAFETY WITH MOBILITY, PRECAUTIONS, GAIT TRAINING AND TF TRAINING WITH R WALKER.  02/09/18 1412 Active    Acceptance E NR,NL PT HAS DEMENTIA. FAMILY NOT PRESENT. REVIEW POSTERIOR HIP PRECAUTIONS, TRANSFER/GAIT TRAINING WITH R WALKER, THER EX.  02/09/18 1149 Active    Acceptance TB DU  TP 02/08/18 1755 Done                      User Key     Initials Effective Dates Name Provider Type Discipline     06/19/15 -  Floresita Griffin, PT Physical Therapist PT     06/19/15 -  Elza Palomo, PT Physical Therapist PT     07/10/17 -  Milena Torres, RN Registered Nurse Nurse                    PT Recommendation and Plan  Anticipated Discharge Disposition: skilled nursing facility  Planned Therapy Interventions: balance training, bed mobility training, gait training, home exercise program, strengthening, transfer training, patient/family education  PT Frequency: 2 times/day  Plan of Care Review  Plan Of Care Reviewed With: patient  Progress: improving  Outcome Summary/Follow up Plan: Pt ambulated 8' with RW with min A.          Outcome Measures       02/11/18 1006 02/10/18 1500 02/10/18 1003    How much help from another person do you currently need...    Turning from your back to your side while in flat bed without using bedrails? 2  -SH 2  -SH 2  -SH    Moving from lying on back to sitting on the side of a flat bed without bedrails? 2  -SH 2  -SH 2  -SH    Moving to and from a bed to a chair (including a wheelchair)? 2  -SH 2  -SH 2  -SH    Standing up from a chair using your arms (e.g.,  wheelchair, bedside chair)? 2  -SH 2  -SH 2  -SH    Climbing 3-5 steps with a railing? 1  -SH 1  -SH 1  -SH    To walk in hospital room? 2  -SH 2  -SH 2  -SH    AM-PAC 6 Clicks Score 11  -SH 11  -SH 11  -SH    Functional Assessment    Outcome Measure Options AM-PAC 6 Clicks Basic Mobility (PT)  -SH AM-PAC 6 Clicks Basic Mobility (PT)  -SH AM-PAC 6 Clicks Basic Mobility (PT)  -SH      02/09/18 1335 02/09/18 0955 02/09/18 0818    How much help from another person do you currently need...    Turning from your back to your side while in flat bed without using bedrails? 2  -CD 2  -CD     Moving from lying on back to sitting on the side of a flat bed without bedrails? 2  -CD 2  -CD     Moving to and from a bed to a chair (including a wheelchair)? 2  -CD 2  -CD     Standing up from a chair using your arms (e.g., wheelchair, bedside chair)? 2  -CD 2  -CD     Climbing 3-5 steps with a railing? 1  -CD 1  -CD     To walk in hospital room? 2  -CD 2  -CD     AM-PAC 6 Clicks Score 11  -CD 11  -CD     How much help from another is currently needed...    Putting on and taking off regular lower body clothing?   1  -HK    Bathing (including washing, rinsing, and drying)   2  -HK    Toileting (which includes using toilet bed pan or urinal)   3  -HK    Putting on and taking off regular upper body clothing   3  -HK    Taking care of personal grooming (such as brushing teeth)   3  -HK    Eating meals   3  -HK    Score   15  -HK    Functional Assessment    Outcome Measure Options AM-PAC 6 Clicks Basic Mobility (PT)  -CD AM-PAC 6 Clicks Basic Mobility (PT)  -CD AM-PAC 6 Clicks Daily Activity (OT)  -HK      User Key  (r) = Recorded By, (t) = Taken By, (c) = Cosigned By    Initials Name Provider Type    CD Floresita Griffin, PT Physical Therapist     Elza Paolmo, PT Physical Therapist     Maricel Sandoval, OT Occupational Therapist           Time Calculation:         PT Charges       02/11/18 1037          Time Calculation    Start Time  1006  -      PT Received On 02/11/18  -      PT Goal Re-Cert Due Date 02/19/18  -      Time Calculation- PT    Total Timed Code Minutes- PT 23 minute(s)  -        User Key  (r) = Recorded By, (t) = Taken By, (c) = Cosigned By    Initials Name Provider Type     Elza Palomo, PT Physical Therapist          Therapy Charges for Today     Code Description Service Date Service Provider Modifiers Qty    75032435945 HC PT THER PROC EA 15 MIN 2/10/2018 Elza Palomo, PT GP 1    56030905419 HC PT THER SUPP EA 15 MIN 2/10/2018 Elza Palomo, PT GP 1    50963555292 HC GAIT TRAINING EA 15 MIN 2/10/2018 Elza Palomo, PT GP 1    23731552144 HC PT THER PROC EA 15 MIN 2/10/2018 Elza Palomo, PT GP 1    07473244096 HC PT THER SUPP EA 15 MIN 2/10/2018 Elza Palomo, PT GP 1    57957620289 HC GAIT TRAINING EA 15 MIN 2/11/2018 Elza Palomo, PT GP 1    22966168986 HC PT THER PROC EA 15 MIN 2/11/2018 Elza Palomo, PT GP 1    91738307541 HC PT THER SUPP EA 15 MIN 2/11/2018 Elza Palomo, PT GP 2          PT G-Codes  Outcome Measure Options: AM-PAC 6 Clicks Basic Mobility (PT)    Elza Palomo, PT  2/11/2018

## 2018-02-11 NOTE — PLAN OF CARE
Problem: Patient Care Overview (Adult)  Goal: Plan of Care Review  Outcome: Ongoing (interventions implemented as appropriate)   02/11/18 1036   Coping/Psychosocial Response Interventions   Plan Of Care Reviewed With patient   Patient Care Overview   Progress improving   Outcome Evaluation   Outcome Summary/Follow up Plan Pt ambulated 8' with RW with min A.       Problem: Inpatient Physical Therapy  Goal: Bed Mobility Goal LTG- PT  Outcome: Ongoing (interventions implemented as appropriate)   02/09/18 1150 02/09/18 1413   Bed Mobility PT LTG   Bed Mobility PT LTG, Time to Achieve 2 wks --    Bed Mobility PT LTG, Activity Type all bed mobility --    Bed Mobility PT LTG, [place]arn[place] Level minimum assist (75% patient effort) --    Bed Mobility PT Goal LTG, Assist Device bed rails --    Bed Mobility PT LTG, Outcome --  goal ongoing     Goal: Transfer Training Goal 1 LTG- PT  Outcome: Ongoing (interventions implemented as appropriate)   02/09/18 1150 02/09/18 1413   Transfer Training PT LTG   Transfer Training PT LTG, Time to Achieve 2 wks --    Transfer Training PT LTG, Activity Type all transfers --    Transfer Training PT LTG, [place]ett Level contact guard assist --    Transfer Training PT LTG, Assist Device walker, rolling --    Transfer Training PT LTG, Outcome --  goal ongoing     Goal: Gait Training Goal LTG- PT  Outcome: Ongoing (interventions implemented as appropriate)   02/09/18 1150 02/09/18 1413   Gait Training PT LTG   Gait Training Goal PT LTG, Time to Achieve 2 wks --    Gait Training Goal PT LTG, [place]ett Level minimum assist (75% patient effort) --    Gait Training Goal PT LTG, Assist Device walker, rolling --    Gait Training Goal PT LTG, Distance to Achieve 250 --    Gait Training Goal PT LTG, Outcome --  goal ongoing

## 2018-02-11 NOTE — PROGRESS NOTES
"/60 (BP Location: Right arm, Patient Position: Lying)  Pulse 71  Temp 98 °F (36.7 °C) (Temporal Artery )   Resp 16  Ht 152.4 cm (60\")  Wt 49.3 kg (108 lb 9.6 oz)  SpO2 96%  Breastfeeding? No  BMI 21.21 kg/m2    Lab Results (last 24 hours)     Procedure Component Value Units Date/Time    Urine Culture - Urine, Urine, Clean Catch [[id number]]  (Abnormal)  (Susceptibility) Collected:  02/07/18 2315    Specimen:  Urine from Urine, Catheter Updated:  02/10/18 1156     Urine Culture --      >100,000 CFU/mL Escherichia coli (A)    Susceptibility      Escherichia coli     KANIKA     Ampicillin >16 ug/ml Resistant     Ampicillin + Sulbactam 16/8 ug/ml Intermediate     Aztreonam <=8 ug/ml Susceptible     Cefepime <=8 ug/ml Susceptible     Cefotaxime <=2 ug/ml Susceptible     Ceftriaxone <=8 ug/ml Susceptible     Cefuroxime sodium <=4 ug/ml Susceptible     Cephalothin >16 ug/ml Resistant     Ertapenem <=1 ug/ml Susceptible     Gentamicin <=4 ug/ml Susceptible     Levofloxacin <=2 ug/ml Susceptible     Meropenem <=1 ug/ml Susceptible     Nitrofurantoin <=32 ug/ml Susceptible     Piperacillin + Tazobactam <=16 ug/ml Susceptible     Tetracycline >8 ug/ml Resistant     Tobramycin <=4 ug/ml Susceptible     Trimethoprim + Sulfamethoxazole <=2/38 ug/ml Susceptible                    POC Glucose Once [[id number]]  (Normal) Collected:  02/10/18 2103    Specimen:  Blood Updated:  02/10/18 2105     Glucose 105 mg/dL     Narrative:       Meter: [id number] : [id number] Bre Streeter    Blood Culture - Blood, [[id number]]  (Normal) Collected:  02/08/18 0030    Specimen:  Blood from Arm, Right Updated:  02/11/18 0116     Blood Culture No growth at 3 days    Blood Culture - Blood, [[id number]]  (Normal) Collected:  02/08/18 0102    Specimen:  Blood from Arm, Right Updated:  02/11/18 0116     Blood Culture No growth at 3 days    Hemoglobin & Hematocrit, Blood [[id number]]  (Abnormal) Collected:  02/11/18 0553    Specimen:  Blood " Updated:  02/11/18 0633     Hemoglobin 9.0 (L) g/dL      Hematocrit 27.2 (L) %     Basic Metabolic Panel [[id number]]  (Abnormal) Collected:  02/11/18 0553    Specimen:  Blood Updated:  02/11/18 0651     Glucose 94 mg/dL      BUN 9 mg/dL      Creatinine 0.30 (L) mg/dL      Sodium 136 mmol/L      Potassium 3.0 (L) mmol/L      Chloride 103 mmol/L      CO2 28.0 mmol/L      Calcium 7.7 (L) mg/dL      eGFR Non African Amer >150 mL/min/1.73      BUN/Creatinine Ratio 30.0 (H)     Anion Gap 5.0 mmol/L     Narrative:       [employer] Guidelines    Stage     Description        GFR  1         Normal or High     90+  2         Mild decrease      60-89  3         Moderate decrease  30-59  4         Severe decrease    15-29  5         Kidney failure     <15    Magnesium [[id number]]  (Normal) Collected:  02/11/18 0553    Specimen:  Blood Updated:  02/11/18 0803     Magnesium 1.7 mg/dL           Imaging Results (last 24 hours)     ** No results found for the last 24 hours. **          Patient Care Team:  Carolyn Giraldo DO (Inactive) as PCP - General  Carolyn Giraldo DO (Inactive) as PCP - Family Medicine  Rachel Santana PA-C as PCP - Claims Attributed    SUBJECTIVE: She denies pain.  Resting comfortably.    PHYSICAL EXAM  Right hip Aquacel dressing clean, dry and intact  Thigh and calf soft nontender  Neurovascular intact distally       Principal Problem:    Fracture of femoral neck, right  Active Problems:    Hypertension    UTI (urinary tract infection)    Dementia    Metastatic colon cancer in female    Recurrent falls    Closed fracture of neck of right femur      PLAN / DISPOSITION:3 Day Post-Op right hip hemiarthroplasty     Protected weight bearing as tolerated right lower extremity, posterior hip precautions ×6 weeks   Pain control  PT/OT for post op mobilization and medical equipment needs   TEDs and SCD's bilateral lower extremities   Lovenox for DVT prophylaxis   Social work for  discharge planning.   Dressing to remain in place for 7 days. May remove on POD#7. If no drainage, may shower on POD#10. No submerging wound in water. If drainage is noted, sterile dressing should be placed and wound checked daily. No showering until wound has remained dry for 72 consecutive hours.   Follow up in 3 weeks for re-assessment.    Ja Hauser MD  02/11/18  9:57 AM

## 2018-02-11 NOTE — PROGRESS NOTES
[facility] Medicine Services  PROGRESS NOTE    Patient Name: Zainab Worthy  : 2/3/1929  MRN: [id number]    Date of Admission: 2018  Length of Stay: 3  Primary Care Physician: Carolyn Giraldo DO (Inactive)    Subjective   Subjective     CC:  F/U right hip fracture    HPI:  Late note entry saw patient at 1030 this am. Patient sitting up in chair in NAD. Only complaint is she feels tired.  Per nurse no acute events overnight.     Review of Systems  CV-no chest pain  Resp-no dyspnea  GI-no N/V,no abd pain    Otherwise ROS is negative except as mentioned in the HPI.    Objective   Objective     Vital Signs:   Temp:  [98 °F (36.7 °C)-98.5 °F (36.9 °C)] 98 °F (36.7 °C)  Heart Rate:  [71-98] 71  Resp:  [16-18] 16  BP: (127-162)/(60-77) 127/60        Physical Exam:  Gen-no acute distress  CV-RRR, S1 S2 normal, no m/r/g  Resp-CTAB, no wheezes  Abd-soft, NT, ND, +BS  Ext-no edema  Neuro-alert, sleeping but arouses easily to voice, interactive, but confused; no focal deficits  Psych-appropriate mood      Results Reviewed:  I have personally reviewed current lab, radiology, and data and agree.      Results from last 7 days  Lab Units 18  0553 02/10/18  0503 18  0556 18  0520 18  2308   WBC 10*3/mm3  --  10.92* 11.71* 12.70* 14.47*   HEMOGLOBIN g/dL 9.0* 9.9* 11.2* 10.9* 11.8   HEMATOCRIT % 27.2* 30.3* 34.2* 33.5* 34.9   PLATELETS 10*3/mm3  --  252 222 238 236   INR   --   --   --   --  1.04       Results from last 7 days  Lab Units 18  0553 02/10/18  0503 18  0556  18  2308   SODIUM mmol/L 136 136 133  < > 128*  127*   POTASSIUM mmol/L 3.0* 3.1* 4.0  < > 3.7  3.6   CHLORIDE mmol/L 103 103 101  < > 95*  96*   CO2 mmol/L 28.0 25.0 21.0  < > 22.0  24.0   BUN mg/dL 9 12 15  < > 17  18   CREATININE mg/dL 0.30* 0.40* 0.50*  < > 0.50*  0.50*   GLUCOSE mg/dL 94 95 181*  < > 124*  125*   CALCIUM mg/dL 7.7* 7.9* 8.4*  < > 7.9*  8.5*   ALT (SGPT) U/L  --    --   --   --  37  39   AST (SGOT) U/L  --   --   --   --  100*  103*   < > = values in this interval not displayed.  Estimated Creatinine Clearance: 37.1 mL/min (by C-G formula based on Cr of 0.3).  No results found for: BNP  No results found for: PHART    Microbiology Results Abnormal     Procedure Component Value - Date/Time    Blood Culture - Blood, [[id number]]  (Normal) Collected:  02/08/18 0030    Lab Status:  Preliminary result Specimen:  Blood from Arm, Right Updated:  02/11/18 0116     Blood Culture No growth at 3 days    Blood Culture - Blood, [[id number]]  (Normal) Collected:  02/08/18 0102    Lab Status:  Preliminary result Specimen:  Blood from Arm, Right Updated:  02/11/18 0116     Blood Culture No growth at 3 days    Urine Culture - Urine, Urine, Clean Catch [[id number]]  (Abnormal)  (Susceptibility) Collected:  02/07/18 2315    Lab Status:  Final result Specimen:  Urine from Urine, Catheter Updated:  02/10/18 1156     Urine Culture --      >100,000 CFU/mL Escherichia coli (A)    Susceptibility      Escherichia coli     KANIKA     Ampicillin >16 ug/ml Resistant     Ampicillin + Sulbactam 16/8 ug/ml Intermediate     Aztreonam <=8 ug/ml Susceptible     Cefepime <=8 ug/ml Susceptible     Cefotaxime <=2 ug/ml Susceptible     Ceftriaxone <=8 ug/ml Susceptible     Cefuroxime sodium <=4 ug/ml Susceptible     Cephalothin >16 ug/ml Resistant     Ertapenem <=1 ug/ml Susceptible     Gentamicin <=4 ug/ml Susceptible     Levofloxacin <=2 ug/ml Susceptible     Meropenem <=1 ug/ml Susceptible     Nitrofurantoin <=32 ug/ml Susceptible     Piperacillin + Tazobactam <=16 ug/ml Susceptible     Tetracycline >8 ug/ml Resistant     Tobramycin <=4 ug/ml Susceptible     Trimethoprim + Sulfamethoxazole <=2/38 ug/ml Susceptible                          Imaging Results (last 24 hours)     ** No results found for the last 24 hours. **             I have reviewed the medications.    Assessment/Plan   Assessment / Plan     Hospital  Problem List     * (Principal)Fracture of femoral neck, right    Hypertension    UTI (urinary tract infection)    Dementia    Metastatic colon cancer in female    Recurrent falls    Closed fracture of neck of right femur             Brief Hospital Course to date:  Zainab Worthy is a 89 y.o. female presents after being found on the floor at the [facility] complaining of right hip pain. Found to have a right femoral neck fracture.       Assessment & Plan:  --s/p OR 2/8/18 with Dr. Dominguez for right hip hemiarthroplasty. WBAT on RLE. Lovenox for DVT PPx. Remove dressing on POD #7 (2/15/18). Follow up in 3 weeks.  --Continue PT/OT.  --Pain control.  --Continue Rocephin Day 4 for UTI. Urine culture with E.coli. Plan 7 day course, can switch to PO Ceftin at discharge.  --Plan for transfer to the [facility] on Monday 2/12/18?   -- post op anemia check H/H in am   -- hypokalemia replace per protocol, replace mag and recheck in am     DVT Prophylaxis:  Lovenox    CODE STATUS: Full Code    Disposition: I expect the patient to be discharged to rehab on 2/12/18     Hayley Olivo, APRN  02/11/18  11:51 AM

## 2018-02-11 NOTE — PLAN OF CARE
Problem: Patient Care Overview (Adult)  Goal: Plan of Care Review  Outcome: Ongoing (interventions implemented as appropriate)   02/11/18 8191   Coping/Psychosocial Response Interventions   Plan Of Care Reviewed With patient   Patient Care Overview   Progress improving   Outcome Evaluation   Outcome Summary/Follow up Plan VS stable. Normal sinus rhythm with occassional PVC's. Pt not complained of pain today. Incontinent.Remains weak but seeing improvement.

## 2018-02-11 NOTE — PLAN OF CARE
Problem: Patient Care Overview (Adult)  Goal: Plan of Care Review  Outcome: Ongoing (interventions implemented as appropriate)   02/11/18 1334   Coping/Psychosocial Response Interventions   Plan Of Care Reviewed With patient   Patient Care Overview   Progress improving   Outcome Evaluation   Outcome Summary/Follow up Plan Pt ambulated 8' with Rw with min A x 2.        Problem: Inpatient Physical Therapy  Goal: Bed Mobility Goal LTG- PT  Outcome: Ongoing (interventions implemented as appropriate)   02/09/18 1150 02/09/18 1413   Bed Mobility PT LTG   Bed Mobility PT LTG, Time to Achieve 2 wks --    Bed Mobility PT LTG, Activity Type all bed mobility --    Bed Mobility PT LTG, [place] Level minimum assist (75% patient effort) --    Bed Mobility PT Goal LTG, Assist Device bed rails --    Bed Mobility PT LTG, Outcome --  goal ongoing     Goal: Transfer Training Goal 1 LTG- PT  Outcome: Ongoing (interventions implemented as appropriate)   02/09/18 1150 02/09/18 1413   Transfer Training PT LTG   Transfer Training PT LTG, Time to Achieve 2 wks --    Transfer Training PT LTG, Activity Type all transfers --    Transfer Training PT LTG, Garvin Level contact guard assist --    Transfer Training PT LTG, Assist Device walker, rolling --    Transfer Training PT LTG, Outcome --  goal ongoing     Goal: Gait Training Goal LTG- PT  Outcome: Ongoing (interventions implemented as appropriate)   02/09/18 1150 02/09/18 1413   Gait Training PT LTG   Gait Training Goal PT LTG, Time to Achieve 2 wks --    Gait Training Goal PT LTG, Garvin Level minimum assist (75% patient effort) --    Gait Training Goal PT LTG, Assist Device walker, rolling --    Gait Training Goal PT LTG, Distance to Achieve 250 --    Gait Training Goal PT LTG, Outcome --  goal ongoing

## 2018-02-11 NOTE — PLAN OF CARE
Problem: Patient Care Overview (Adult)  Goal: Plan of Care Review  Outcome: Ongoing (interventions implemented as appropriate)   02/10/18 1805 02/11/18 0317   Coping/Psychosocial Response Interventions   Plan Of Care Reviewed With patient --    Patient Care Overview   Progress --  no change   Outcome Evaluation   Outcome Summary/Follow up Plan --  Patient rested very well this shift. Still having incontinent episodes. Small bowel movement this shift.

## 2018-02-11 NOTE — THERAPY TREATMENT NOTE
Acute Care - Physical Therapy Treatment Note  [facility]     Patient Name: Zainab Worthy  : 2/3/1929  MRN: [id number]  Today's Date: 2018  Onset of Illness/Injury or Date of Surgery Date: 18  Date of Referral to PT: 18  Referring Physician: TINA    Admit Date: 2018    Visit Dx:    ICD-10-CM ICD-9-CM   1. Closed fracture of neck of right femur, initial encounter S72.001A 820.8   2. Acute UTI N39.0 599.0   3. Dementia without behavioral disturbance, unspecified dementia type F03.90 294.20   4. Fall, initial encounter W19.XXXA E888.9   5. Impaired mobility and ADLs Z74.09 799.89   6. Impaired functional mobility, balance, gait, and endurance Z74.09 V49.89     Patient Active Problem List   Diagnosis   • Gonalgia   • Hypertrophic polyarthritis   • Allergic rhinitis, seasonal   • Skin growth   • Endometrial cancer   • Post-operative state   • Rectal cancer   • Hydronephrosis   • Hypertension   • Anorexia   • Onychomycosis   • IUD complication   • Memory loss   • Urinary frequency   • Fall at home   • Closed fracture of pelvis   • UTI (urinary tract infection)   • Metabolic encephalopathy   • Fracture of femoral neck, right   • Dementia   • Metastatic colon cancer in female   • Recurrent falls   • Closed fracture of neck of right femur               Adult Rehabilitation Note       18 1306 18 1006 02/10/18 1500    Rehab Assessment/Intervention    Discipline physical therapist  -SH physical therapist  -SH physical therapist  -SH    Document Type therapy note (daily note)  -SH therapy note (daily note)  - therapy note (daily note)  -SH    Subjective Information agree to therapy  -SH agree to therapy  -SH agree to therapy  -SH    Patient Effort, Rehab Treatment good  -SH good  -SH adequate  -SH    Symptoms Noted During/After Treatment none  -SH      Symptoms Noted Comment [place] pleasantly confused  -SH [place], pleasantly confused.  -SH     Precautions/Limitations fall precautions;hip  precautions- right  -SH fall precautions;hip precautions- right  -SH fall precautions;hip precautions- right  -SH    Recorded by [SH] Elza Palomo, PT [SH] Elza Palomo, PT [SH] Elza Palomo, PT    Pain Assessment    Pain Assessment Villasenor-Garcia FACES  -SH Villasenor-Baker FACES  -SH Villasenor-Baker FACES  -SH    Villasenor-Garcia FACES Pain Rating 0  -SH 0  -SH 0  -SH    Pain Score 0  -SH 2  -SH 2  -SH    Post Pain Score 0  -SH 2  -SH 2  -SH    Pain Type  Acute pain  -SH Acute pain  -SH    Pain Location  Hip  -SH Hip  -SH    Pain Orientation  Right  -SH Right  -SH    Pain Intervention(s)  Repositioned;Ambulation/increased activity  -SH Repositioned;Ambulation/increased activity  -SH    Response to Interventions  tolerated  -SH     Recorded by [SH] Elza Palomo, PT [SH] Elza Palomo, PT [SH] Elza Palomo, PT    Cognitive Assessment/Intervention    Current Cognitive/Communication Assessment impaired  -SH impaired  -SH impaired  -SH    Orientation Status oriented to;person  -SH oriented to;person  -SH oriented to;person;disoriented to;place;time;situation  -SH    Follows Commands/Answers Questions able to follow single-step instructions;75% of the time;needs increased time;needs cueing;needs repetition  -SH able to follow single-step instructions;75% of the time;needs cueing;needs increased time;needs repetition  -SH able to follow single-step instructions;75% of the time;needs cueing;needs increased time;needs repetition  -SH    Personal Safety moderate impairment;decreased awareness, need for assist;decreased awareness, need for safety;decreased insight to deficits  -SH moderate impairment;decreased awareness, need for safety;decreased awareness, need for assist;decreased insight to deficits  -SH moderate impairment;decreased awareness, need for assist;decreased awareness, need for safety;decreased insight to deficits  -SH    Personal Safety Interventions fall prevention program maintained;gait belt;nonskid  shoes/slippers when out of bed  - fall prevention program maintained;gait belt;nonskid shoes/slippers when out of bed  - fall prevention program maintained;gait belt;nonskid shoes/slippers when out of bed  -    Recorded by [SH] Elza Palomo, PT [SH] Elza Palomo, PT [SH] Elza Palomo, PT    Bed Mobility, Assessment/Treatment    Bed Mobility, Assistive Device  bed rails;head of bed elevated;draw sheet  - bed rails;draw sheet  -    Bed Mobility, Scoot/Bridge, [place]  minimum assist (75% patient effort);verbal cues required;nonverbal cues required (demo/gesture)  -     Bed Mob, Supine to Sit, [place]  moderate assist (50% patient effort);2 person assist required;verbal cues required  - verbal cues required;nonverbal cues required (demo/gesture);maximum assist (25% patient effort);2 person assist required  -    Bed Mob, Sit to Supine, [place] maximum assist (25% patient effort);2 person assist required;verbal cues required  -  maximum assist (25% patient effort);1 person + 1 person to manage equipment;verbal cues required;nonverbal cues required (demo/gesture)  -    Bed Mobility, Safety Issues cognitive deficits limit understanding;decreased use of arms for pushing/pulling;decreased use of legs for bridging/pushing  - decreased use of arms for pushing/pulling;cognitive deficits limit understanding;decreased use of legs for bridging/pushing  - cognitive deficits limit understanding;decreased use of arms for pushing/pulling;decreased use of legs for bridging/pushing  -    Bed Mobility, Impairments strength decreased  -SH strength decreased;impaired balance;pain  - strength decreased;impaired balance;pain  -    Bed Mobility, Comment Pt edu in correct sequence.  - VC for sequencing and hand placement. Increased alertness and active participation this session.  - VC for sequencing  -    Recorded by [SH] Elza Palomo, PT [SH] Elza Palomo, PT [SH]  Elza Palomo, PT    Transfer Assessment/Treatment    Transfers, Sit-Stand [place] verbal cues required;moderate assist (50% patient effort);2 person assist required  - moderate assist (50% patient effort);2 person assist required;nonverbal cues required (demo/gesture);verbal cues required  - moderate assist (50% patient effort);2 person assist required;nonverbal cues required (demo/gesture);verbal cues required  -    Transfers, Stand-Sit [place] moderate assist (50% patient effort);2 person assist required;verbal cues required  -SH verbal cues required;nonverbal cues required (demo/gesture);moderate assist (50% patient effort);2 person assist required  -SH verbal cues required;nonverbal cues required (demo/gesture);moderate assist (50% patient effort);2 person assist required  -SH    Transfers, Sit-Stand-Sit, Assist Device rolling walker  -SH rolling walker  -SH rolling walker  -SH    Transfer, Impairments strength decreased  -SH strength decreased;pain  -SH strength decreased;pain  -    Transfer, Comment Pt performed sit to stand from chair with edu for safe hand placement, wants to pull up on RW.  -SH Pt performed sit to stand from EOB, cues to push up from bed vs pulling up on RW.  -SH Sit to stand performed at EOB.  -SH    Recorded by [SH] Elza Palomo, PT [SH] Elza Palomo, PT [SH] Elza Palomo, PT    Gait Assessment/Treatment    Gait, [place] Level minimum assist (75% patient effort);2 person assist required;verbal cues required;nonverbal cues required (demo/gesture)  - minimum assist (75% patient effort);verbal cues required  -     Gait, Assistive Device rolling walker  - rolling walker  -     Gait, Distance (Feet) 8  -SH 8  -SH     Gait, Gait Pattern Analysis swing-to gait  - swing-to gait  -     Gait, Gait Deviations antalgic;right:;gabe decreased;decreased heel strike;forward flexed posture;step length decreased  -SH gabe decreased;decreased heel  strike;step length decreased  -     Gait, Safety Issues balance decreased during turns;sequencing ability decreased;step length decreased;weight-shifting ability decreased  - step length decreased;weight-shifting ability decreased  -     Gait, Impairments strength decreased  - strength decreased;pain  -     Gait, Comment Pt took steps from chair to EOB, side stepped to head of bed with RW.  - Pt demonstrated significant improvement this session.  - Pt unable to take steps away from EOB.  -    Recorded by [] Elza Palomo, PT [] Elza Palomo, PT [] Elza Palomo, PT    Balance Skills Training    Standing-Level of Assistance  Minimum assistance  - Moderate assistance;x2  -    Static Standing Balance Support  assistive device  - assistive device  -    Standing-Balance Activities  --   static stand to address BM  - Weight Shift A-P;Weight Shift R-L   R LE buckling  -    Standing Balance # of Minutes  3  -SH 2  -SH    Recorded by  [] Elza Palomo, PT [] Elza Palomo PT    Therapy Exercises    Bilateral Lower Extremities AAROM:;10 reps;supine;heel slides;hip abduction/adduction;ankle pumps/circles  -  AAROM:;10 reps;supine;ankle pumps/circles;hip abduction/adduction;heel slides  -    Recorded by [] Elza Palomo, PT  [] Elza Palomo PT    Positioning and Restraints    Pre-Treatment Position sitting in chair/recliner  - in bed  - in bed  -SH    Post Treatment Position bed  - chair  - bed  -SH    In Bed supine;call light within reach;encouraged to call for assist;exit alarm on;SCD pump applied;waffle boots/both  -SH  supine;call light within reach;encouraged to call for assist;exit alarm on;with family/caregiver;waffle boots/both  -SH    In Chair  reclined;call light within reach;encouraged to call for assist;exit alarm on;waffle boot/both  -SH     Recorded by [] Elza Palomo, PT [] Elza Palomo, PT [] Elza Palomo, PT       02/10/18 1003 02/09/18 1335       Rehab Assessment/Intervention    Discipline physical therapist  - physical therapist  -CD     Document Type therapy note (daily note)  - therapy note (daily note)  -CD     Subjective Information agree to therapy   RN consents to PT.  -SH agree to therapy   WANTS TO USE THE BATHROOM.   -CD     Patient Effort, Rehab Treatment adequate  - good  -CD     Symptoms Noted During/After Treatment other (see comments)   Confusion  - other (see comments)   PT UNABLE TO URINATE- NSG IN TO SCAN BLADDER AFTER P.T.   -CD     Symptoms Noted Comment  [place]-  PLEASANTLY CONFUSED.   -CD     Precautions/Limitations fall precautions;hip precautions- right  - fall precautions;hip precautions- right   EXIT ALARM, CONFUSION.   -CD     Recorded by [SH] Elza Palomo, PT [CD] Floresita Griffin, PT     Vital Signs    Pre Systolic BP Rehab  --   VSS  -CD     Recorded by  [CD] Floresita Griffin, PT     Pain Assessment    Pain Assessment Villasenor-Garcia FACES  - Villasenor-Baker FACES  -CD     Villasenor-Garcia FACES Pain Rating 0  - 0  -CD     Post Pain Score 4  -      Pain Type Acute pain  - --   PT DENIES PAIN.   -CD     Pain Location Hip  -      Pain Orientation Right  -      Pain Intervention(s) Repositioned;Ambulation/increased activity  -      Recorded by [SH] Elza Palomo, PT [CD] Floresita Griffin, PT     Cognitive Assessment/Intervention    Current Cognitive/Communication Assessment impaired  -      Orientation Status oriented to;person;disoriented to;place;time;situation   Upon entry pt naked, reported going to grocery.  -      Follows Commands/Answers Questions able to follow single-step instructions;75% of the time;needs cueing;needs increased time;needs repetition  -      Personal Safety moderate impairment;decreased awareness, need for assist;decreased insight to deficits;decreased awareness, need for safety  -      Personal Safety Interventions gait belt;fall prevention program  maintained;nonskid shoes/slippers when out of bed  -      Recorded by [SH] Elza Palomo, PT      Bed Mobility, Assessment/Treatment    Bed Mobility, Assistive Device draw sheet;head of bed elevated;bed rails  - draw sheet;head of bed elevated  -     Bed Mob, Supine to Sit, [place] maximum assist (25% patient effort);2 person assist required;verbal cues required;nonverbal cues required (demo/gesture)  - maximum assist (25% patient effort);2 person assist required;verbal cues required  -     Bed Mobility, Safety Issues cognitive deficits limit understanding;decreased use of arms for pushing/pulling;decreased use of legs for bridging/pushing  -      Bed Mobility, Impairments strength decreased;impaired balance;pain  -      Bed Mobility, Comment VC to move B LE to edge of bed, pt reported inability to move R LE. Pt required vc for sequencing, technique, and hand placement.  -      Recorded by [SH] Elza Palomo, PT [CD] Floresita Griffin, PT     Transfer Assessment/Treatment    Transfers, Bed-Chair-Bed, Assist Device rolling walker  -      Transfers, Sit-Stand [place] moderate assist (50% patient effort);2 person assist required;verbal cues required;nonverbal cues required (demo/gesture)  - minimum assist (75% patient effort);2 person assist required;verbal cues required  -     Transfers, Stand-Sit [place] nonverbal cues required (demo/gesture);verbal cues required;moderate assist (50% patient effort);2 person assist required  - moderate assist (50% patient effort);2 person assist required;verbal cues required  -     Transfers, Sit-Stand-Sit, Assist Device rolling walker  - rolling walker  -     Toilet Transfer, [place]  minimum assist (75% patient effort);2 person assist required;verbal cues required  -CD     Toilet Transfer, Assistive Device  rolling walker;bedside commode without drop arms  -     Transfer, Safety Issues sequencing ability decreased;step length  decreased;weight-shifting ability decreased;knees buckling  - sequencing ability decreased  -     Transfer, Impairments pain;impaired balance;strength decreased  - impaired balance;strength decreased  -CD     Transfer, Comment Pt performed sit to stand from EOB, R knee buckling limiting standing tolerance. Pt edu in hand placement for sit/stand.  - MAX CUES FOR HAND PLACEMENT.   -CD     Recorded by [SH] Elza Palomo, PT [CD] Floresita Griffin, PT     Gait Assessment/Treatment    Gait, [place] Level moderate assist (50% patient effort);2 person assist required;verbal cues required;nonverbal cues required (demo/gesture)  - moderate assist (50% patient effort);2 person assist required;verbal cues required  -     Gait, Assistive Device rolling walker  - rolling walker  -CD     Gait, Distance (Feet) 4  - 100   CHAIR IN TOW.   -CD     Gait, Gait Pattern Analysis swing-to gait  -      Gait, Gait Deviations right:;antalgic;gabe decreased;decreased heel strike;forward flexed posture;knee buckling;step length decreased  - gabe decreased;forward flexed posture;step length decreased;weight-shifting ability decreased;right:;antalgic  -CD     Gait, Safety Issues balance decreased during turns;sequencing ability decreased;step length decreased;weight-shifting ability decreased  - step length decreased;weight-shifting ability decreased;balance decreased during turns  -     Gait, Impairments strength decreased;impaired balance;pain  - strength decreased;impaired balance  -CD     Gait, Comment Pt unable to tolerated weightbearing this session. Steps to chair only.  - CUES TO WIDEN HILDA AND TO INCREASE STEP LENGTH. PT ADVANCING R LE INDEPENDENTLY.   -CD     Recorded by [SH] Elza Palomo, PT [CD] Floresita Griffin, PT     Motor Skills/Interventions    Additional Documentation  Balance Skills Training (Group)  -CD     Recorded by  [CD] Floresita Griffin, PT     Balance Skills Training    Sitting-Level  of Assistance  Contact guard   MORE IMPULSIVE THIS AFTERNOON.   -CD     Sitting-Balance Support  Right upper extremity supported;Left upper extremity supported;Feet supported  -CD     Standing-Level of Assistance  Minimum assistance  -CD     Static Standing Balance Support  assistive device  -CD     Standing-Balance Activities  Weight Shift R-L  -CD     Standing Balance # of Minutes  2  -CD     Recorded by  [CD] Floresita Griffin, PT     Therapy Exercises    Bilateral Lower Extremities AAROM:;10 reps;sitting;ankle pumps/circles;hip abduction/adduction;hip flexion;LAQ  -      Bilateral Upper Extremity AROM:;10 reps;sitting;elbow flexion/extension;shoulder extension/flexion  -      Recorded by [SH] Elza Palomo, PT      Positioning and Restraints    Pre-Treatment Position in bed  -SH in bed  -CD     Post Treatment Position chair  - chair  -CD     In Chair notified nsg;reclined;call light within reach;encouraged to call for assist;exit alarm on;on mechanical lift sling;R waffle boot  - reclined;call light within reach;encouraged to call for assist;legs elevated;on mechanical lift sling;waffle cushion   NSG IN TO SCAN BLADDER AND AGREES TO SET EXIT ALARM.   -CD     Recorded by [SH] Elza Palomo, PT [CD] Floresita Griffin, PT       User Key  (r) = Recorded By, (t) = Taken By, (c) = Cosigned By    Initials Name Effective Dates    CD Floresita Griffin, PT 06/19/15 -     SH Elza Palomo, PT 06/19/15 -                 IP PT Goals       02/09/18 1413 02/09/18 1150       Bed Mobility PT LTG    Bed Mobility PT LTG, Time to Achieve  2 wks  -CD     Bed Mobility PT LTG, Activity Type  all bed mobility  -CD     Bed Mobility PT LTG, [place] Level  minimum assist (75% patient effort)  -CD     Bed Mobility PT Goal  LTG, Assist Device  bed rails  -CD     Bed Mobility PT LTG, Outcome goal ongoing  -CD      Transfer Training PT LTG    Transfer Training PT LTG, Time to Achieve  2 wks  -CD     Transfer Training PT LTG,  Activity Type  all transfers  -CD     Transfer Training PT LTG, [place] Level  contact guard assist  -CD     Transfer Training PT LTG, Assist Device  walker, rolling  -CD     Transfer Training PT LTG, Outcome goal ongoing  -CD      Gait Training PT LTG    Gait Training Goal PT LTG, Time to Achieve  2 wks  -CD     Gait Training Goal PT LTG, [place] Level  minimum assist (75% patient effort)  -CD     Gait Training Goal PT LTG, Assist Device  walker, rolling  -CD     Gait Training Goal PT LTG, Distance to Achieve  250  -CD     Gait Training Goal PT LTG, Outcome goal ongoing  -CD        User Key  (r) = Recorded By, (t) = Taken By, (c) = Cosigned By    Initials Name Provider Type    JANA Griffin, ANISH Physical Therapist          Physical Therapy Education     Title: PT OT SLP Therapies (Active)     Topic: Physical Therapy (Active)     Point: Mobility training (Active)    Learning Progress Summary    Learner Readiness Method Response Comment Documented by Status   Patient Acceptance E,D NR Hand placement  02/11/18 1334 Active    Acceptance E VU   02/11/18 1035 Done    Acceptance E NR   02/10/18 1558 Active    Acceptance E,D NR Gait training.  02/10/18 1141 Active    Acceptance E NR,NL SAFETY WITH MOBILITY, PRECAUTIONS, GAIT TRAINING AND TF TRAINING WITH R WALKER.  02/09/18 1412 Active    Acceptance E NR,NL PT HAS DEMENTIA. FAMILY NOT PRESENT. REVIEW POSTERIOR HIP PRECAUTIONS, TRANSFER/GAIT TRAINING WITH R WALKER, THER EX.  02/09/18 1149 Active    Acceptance TB DU  TP 02/08/18 1755 Done               Point: Home exercise program (Active)    Learning Progress Summary    Learner Readiness Method Response Comment Documented by Status   Patient Acceptance E,D NR Hand placement  02/11/18 1334 Active    Acceptance E VU   02/11/18 1035 Done    Acceptance E NR   02/10/18 1558 Active    Acceptance E,D NR Gait training.  02/10/18 1141 Active    Acceptance E NR,NL SAFETY WITH MOBILITY, PRECAUTIONS,  GAIT TRAINING AND TF TRAINING WITH R WALKER.  02/09/18 1412 Active    Acceptance E NR,NL PT HAS DEMENTIA. FAMILY NOT PRESENT. REVIEW POSTERIOR HIP PRECAUTIONS, TRANSFER/GAIT TRAINING WITH R WALKER, THER EX.  02/09/18 1149 Active    Acceptance TB DU   02/08/18 1755 Done               Point: Body mechanics (Active)    Learning Progress Summary    Learner Readiness Method Response Comment Documented by Status   Patient Acceptance E,D NR Hand placement  02/11/18 1334 Active    Acceptance E VU   02/11/18 1035 Done    Acceptance E NR   02/10/18 1558 Active    Acceptance E,D NR Gait training.  02/10/18 1141 Active    Acceptance E NR,NL SAFETY WITH MOBILITY, PRECAUTIONS, GAIT TRAINING AND TF TRAINING WITH R WALKER.  02/09/18 1412 Active    Acceptance E NR,NL PT HAS DEMENTIA. FAMILY NOT PRESENT. REVIEW POSTERIOR HIP PRECAUTIONS, TRANSFER/GAIT TRAINING WITH R WALKER, THER EX.  02/09/18 1149 Active    Acceptance TB DU   02/08/18 1755 Done               Point: Precautions (Active)    Learning Progress Summary    Learner Readiness Method Response Comment Documented by Status   Patient Acceptance E,D NR Hand placement  02/11/18 1334 Active    Acceptance E VU   02/11/18 1035 Done    Acceptance E NR   02/10/18 1558 Active    Acceptance E,D NR Gait training.  02/10/18 1141 Active    Acceptance E NR,NL SAFETY WITH MOBILITY, PRECAUTIONS, GAIT TRAINING AND TF TRAINING WITH R WALKER.  02/09/18 1412 Active    Acceptance E NR,NL PT HAS DEMENTIA. FAMILY NOT PRESENT. REVIEW POSTERIOR HIP PRECAUTIONS, TRANSFER/GAIT TRAINING WITH R WALKER, THER EX.  02/09/18 1149 Active    Acceptance TB DU   02/08/18 1755 Done                      User Key     Initials Effective Dates Name Provider Type Discipline     06/19/15 -  Floresita Griffin, PT Physical Therapist PT     06/19/15 -  Elza Palomo, PT Physical Therapist PT     07/10/17 -  Milena Torres, RN Registered Nurse Nurse                    PT Recommendation  and Plan  Anticipated Discharge Disposition: skilled nursing facility  Planned Therapy Interventions: balance training, bed mobility training, gait training, home exercise program, strengthening, transfer training, patient/family education  PT Frequency: 2 times/day  Plan of Care Review  Plan Of Care Reviewed With: patient  Progress: improving  Outcome Summary/Follow up Plan: Pt ambulated 8' with Rw with min A x 2.           Outcome Measures       02/11/18 1306 02/11/18 1006 02/10/18 1500    How much help from another person do you currently need...    Turning from your back to your side while in flat bed without using bedrails? 2  -SH 2  -SH 2  -SH    Moving from lying on back to sitting on the side of a flat bed without bedrails? 2  -SH 2  -SH 2  -SH    Moving to and from a bed to a chair (including a wheelchair)? 2  -SH 2  -SH 2  -SH    Standing up from a chair using your arms (e.g., wheelchair, bedside chair)? 2  -SH 2  -SH 2  -SH    Climbing 3-5 steps with a railing? 1  -SH 1  -SH 1  -SH    To walk in hospital room? 2  -SH 2  -SH 2  -SH    AM-PAC 6 Clicks Score 11  -SH 11  -SH 11  -SH    Functional Assessment    Outcome Measure Options AM-PAC 6 Clicks Basic Mobility (PT)  -SH AM-PAC 6 Clicks Basic Mobility (PT)  - AM-PAC 6 Clicks Basic Mobility (PT)  -      02/10/18 1003 02/09/18 1335 02/09/18 0955    How much help from another person do you currently need...    Turning from your back to your side while in flat bed without using bedrails? 2  -SH 2  -CD 2  -CD    Moving from lying on back to sitting on the side of a flat bed without bedrails? 2  -SH 2  -CD 2  -CD    Moving to and from a bed to a chair (including a wheelchair)? 2  -SH 2  -CD 2  -CD    Standing up from a chair using your arms (e.g., wheelchair, bedside chair)? 2  -SH 2  -CD 2  -CD    Climbing 3-5 steps with a railing? 1  -SH 1  -CD 1  -CD    To walk in hospital room? 2  -SH 2  -CD 2  -CD    AM-PAC 6 Clicks Score 11  -SH 11  -CD 11  -CD     Functional Assessment    Outcome Measure Options AM-PAC 6 Clicks Basic Mobility (PT)  - AM-PAC 6 Clicks Basic Mobility (PT)  -CD AM-PAC 6 Clicks Basic Mobility (PT)  -CD      02/09/18 0818          How much help from another is currently needed...    Putting on and taking off regular lower body clothing? 1  -HK      Bathing (including washing, rinsing, and drying) 2  -HK      Toileting (which includes using toilet bed pan or urinal) 3  -HK      Putting on and taking off regular upper body clothing 3  -HK      Taking care of personal grooming (such as brushing teeth) 3  -HK      Eating meals 3  -HK      Score 15  -HK      Functional Assessment    Outcome Measure Options AM-PAC 6 Clicks Daily Activity (OT)  -HK        User Key  (r) = Recorded By, (t) = Taken By, (c) = Cosigned By    Initials Name Provider Type     Floresita Griffin, PT Physical Therapist     Elza Palomo, PT Physical Therapist     Maricel Sandoval, OT Occupational Therapist           Time Calculation:         PT Charges       02/11/18 1335 02/11/18 1037       Time Calculation    Start Time 1306  - 1006  -SH     PT Received On 02/11/18  - 02/11/18  -     PT Goal Re-Cert Due Date 02/19/18  - 02/19/18  -     Time Calculation- PT    Total Timed Code Minutes- PT 17 minute(s)  -SH 23 minute(s)  -       User Key  (r) = Recorded By, (t) = Taken By, (c) = Cosigned By    Initials Name Provider Type     Elza Palomo, PT Physical Therapist          Therapy Charges for Today     Code Description Service Date Service Provider Modifiers Qty    51284112218 HC PT THER PROC EA 15 MIN 2/10/2018 Elza Palomo, PT GP 1    83293135781 HC PT THER SUPP EA 15 MIN 2/10/2018 Elza Palomo, PT GP 1    12376836388 HC GAIT TRAINING EA 15 MIN 2/10/2018 Elza Palomo, PT GP 1    40620970599 HC PT THER PROC EA 15 MIN 2/10/2018 Elza Palomo, PT GP 1    86062335979 HC PT THER SUPP EA 15 MIN 2/10/2018 Elza Palomo, PT GP 1    43864704484 HC GAIT  TRAINING EA 15 MIN 2/11/2018 Elza Palomo, PT GP 1    42370143045 HC PT THER PROC EA 15 MIN 2/11/2018 Elza Palomo, PT GP 1    84084574311 HC PT THER SUPP EA 15 MIN 2/11/2018 Elza Palomo, PT GP 2    07678655803 HC PT THER PROC EA 15 MIN 2/11/2018 Elza Palomo, PT GP 1    51294916699 HC PT THER SUPP EA 15 MIN 2/11/2018 Elza Palomo, PT GP 1          PT G-Codes  Outcome Measure Options: AM-PAC 6 Clicks Basic Mobility (PT)    Elza Palomo, PT  2/11/2018

## 2018-02-12 VITALS
WEIGHT: 108.6 LBS | RESPIRATION RATE: 17 BRPM | SYSTOLIC BLOOD PRESSURE: 138 MMHG | BODY MASS INDEX: 21.32 KG/M2 | TEMPERATURE: 98.2 F | OXYGEN SATURATION: 95 % | DIASTOLIC BLOOD PRESSURE: 75 MMHG | HEIGHT: 60 IN | HEART RATE: 91 BPM

## 2018-02-12 LAB
HCT VFR BLD AUTO: 33.2 % (ref 34.5–44)
HGB BLD-MCNC: 10.7 G/DL (ref 11.5–15.5)
MAGNESIUM SERPL-MCNC: 2 MG/DL (ref 1.3–2.7)
POTASSIUM BLD-SCNC: 4.4 MMOL/L (ref 3.5–5.5)

## 2018-02-12 PROCEDURE — 83735 ASSAY OF MAGNESIUM: CPT | Performed by: NURSE PRACTITIONER

## 2018-02-12 PROCEDURE — 97116 GAIT TRAINING THERAPY: CPT

## 2018-02-12 PROCEDURE — 85018 HEMOGLOBIN: CPT | Performed by: NURSE PRACTITIONER

## 2018-02-12 PROCEDURE — 85014 HEMATOCRIT: CPT | Performed by: NURSE PRACTITIONER

## 2018-02-12 PROCEDURE — 99239 HOSP IP/OBS DSCHRG MGMT >30: CPT | Performed by: NURSE PRACTITIONER

## 2018-02-12 PROCEDURE — 84132 ASSAY OF SERUM POTASSIUM: CPT | Performed by: NURSE PRACTITIONER

## 2018-02-12 PROCEDURE — 25010000002 ENOXAPARIN PER 10 MG: Performed by: ORTHOPAEDIC SURGERY

## 2018-02-12 PROCEDURE — 99024 POSTOP FOLLOW-UP VISIT: CPT | Performed by: ORTHOPAEDIC SURGERY

## 2018-02-12 PROCEDURE — 97110 THERAPEUTIC EXERCISES: CPT

## 2018-02-12 PROCEDURE — 97530 THERAPEUTIC ACTIVITIES: CPT | Performed by: OCCUPATIONAL THERAPIST

## 2018-02-12 RX ORDER — CEFUROXIME AXETIL 500 MG/1
500 TABLET ORAL 2 TIMES DAILY
Qty: 6 TABLET | Refills: 0 | Status: SHIPPED | OUTPATIENT
Start: 2018-02-12 | End: 2018-02-15

## 2018-02-12 RX ADMIN — ACETAMINOPHEN 650 MG: 325 TABLET, FILM COATED ORAL at 06:03

## 2018-02-12 RX ADMIN — MEMANTINE 10 MG: 10 TABLET ORAL at 08:43

## 2018-02-12 RX ADMIN — ENOXAPARIN SODIUM 40 MG: 40 INJECTION SUBCUTANEOUS at 08:44

## 2018-02-12 RX ADMIN — FAMOTIDINE 20 MG: 20 TABLET, FILM COATED ORAL at 08:43

## 2018-02-12 RX ADMIN — ACETAMINOPHEN 650 MG: 325 TABLET, FILM COATED ORAL at 10:30

## 2018-02-12 NOTE — DISCHARGE SUMMARY
[facility] Medicine Services  DISCHARGE SUMMARY    Patient Name: Zainab Worthy  : 2/3/1929  MRN: [id number]    Date of Admission: 2018  Date of Discharge:  18  Primary Care Physician: Carolyn Giraldo DO (Inactive)    Consults     Date and Time Order Name Status Description    2018 0131 Inpatient Consult to Orthopedic Surgery Completed         Hospital Course     Presenting Problem:   Closed fracture of neck of right femur, initial encounter [S72.001A]    Active Hospital Problems (** Indicates Principal Problem)    Diagnosis Date Noted   • **Fracture of femoral neck, right [S72.001A] 2018   • Closed fracture of neck of right femur [S72.001A] 2018   • Dementia [F03.90] 2018   • Metastatic colon cancer in female [C78.5] 2018   • Recurrent falls [R29.6] 2018   • UTI (urinary tract infection) [N39.0] 2017   • Hypertension [I10] 10/14/2016      Resolved Hospital Problems    Diagnosis Date Noted Date Resolved   No resolved problems to display.          Hospital Course:  Zainab Worthy is a 89 y.o. female with PMH of hypertension and dementia presented after being found on the floor at the [facility] complaining of right hip pain. Found to have a right femoral neck fracture. Admitted hospital medicine.  Patient had right hip hemiarthroplasty on 18 by Dr. Dominguez.  No immediate postop complications.  Patient found to have acute cystitis urine culture showing Escherichia coli.  Plan total 7 day course of antibiotics that will continue after discharge.  Post op dressing care per Dr. Dominguez found lower in summary. Protected weight bearing as tolerated right lower extremity, posterior hip precautions ×6 weeks, will need to follow-up with Dr. Dominguez 3 weeks.     Procedure(s):  HIP HEMIARTHROPLASTY       Day of Discharge     HPI:   Sitting up in chair.  Just finished with PT walking in room.  NAD.  Pain is controlled with pain medication.  Eager to go  to the [facility] today, with no new concerns overnight. Denies f/c, n/v/d, soa or cp     Review of Systems  Otherwise ROS is negative except as mentioned in the HPI.    Vital Signs:   Temp:  [97.8 °F (36.6 °C)-98.2 °F (36.8 °C)] 98.2 °F (36.8 °C)  Heart Rate:  [] 91  Resp:  [16-17] 17  BP: (136-156)/(67-91) 138/75     Physical Exam:  Constitutional: No acute distress, awake, alert  Eyes: PERRLA, sclerae anicteric, no conjunctival injection  HENT: NCAT, mucous membranes moist  Neck: Supple, no thyromegaly, no lymphadenopathy, trachea midline  Respiratory: Clear to auscultation bilaterally, nonlabored respirations   Cardiovascular: RRR, no murmurs, rubs, or gallops, palpable pedal pulses bilaterally  Gastrointestinal: Positive bowel sounds, soft, nontender, nondistended  Musculoskeletal: No bilateral ankle edema, no clubbing or cyanosis to extremities  Psychiatric: Appropriate affect, cooperative  Neurologic: Oriented to person, confused to details, strength symmetric in all extremities, Cranial Nerves grossly intact to confrontation, speech clear  Skin: No rashes, right lateral hip with CDI dressing in place, old bruising       Pertinent  and/or Most Recent Results       Results from last 7 days  Lab Units 02/12/18  0544 02/11/18  0553 02/10/18  0503 02/09/18  0556 02/08/18  0520 02/07/18  2308   WBC 10*3/mm3  --   --  10.92* 11.71* 12.70* 14.47*   HEMOGLOBIN g/dL 10.7* 9.0* 9.9* 11.2* 10.9* 11.8   HEMATOCRIT % 33.2* 27.2* 30.3* 34.2* 33.5* 34.9   PLATELETS 10*3/mm3  --   --  252 222 238 236   SODIUM mmol/L  --  136 136 133 128* 128*  127*   POTASSIUM mmol/L 4.4 3.0* 3.1* 4.0 3.7 3.7  3.6   CHLORIDE mmol/L  --  103 103 101 95* 95*  96*   CO2 mmol/L  --  28.0 25.0 21.0 24.0 22.0  24.0   BUN mg/dL  --  9 12 15 17 17  18   CREATININE mg/dL  --  0.30* 0.40* 0.50* 0.70 0.50*  0.50*   GLUCOSE mg/dL  --  94 95 181* 105* 124*  125*   CALCIUM mg/dL  --  7.7* 7.9* 8.4* 8.3* 7.9*  8.5*       Results from last 7  days  Lab Units 02/07/18  2308   BILIRUBIN mg/dL 0.7  0.7   ALK PHOS U/L 161*  151*   ALT (SGPT) U/L 37  39   AST (SGOT) U/L 100*  103*   PROTIME Seconds 11.3   INR  1.04   APTT seconds 29.8           Invalid input(s): TG, LDLREALC      Brief Urine Lab Results  (Last result in the past 365 days)      Color   Clarity   Blood   Leuk Est   Nitrite   Protein   CREAT   Urine HCG        02/07/18 2315 Yellow Cloudy(A) Moderate (2+)(A) Large (3+)(A) Positive(A) 30 mg/dL (1+)(A)               Microbiology Results Abnormal     Procedure Component Value - Date/Time    Blood Culture - Blood, [[id number]]  (Normal) Collected:  02/08/18 0030    Lab Status:  Preliminary result Specimen:  Blood from Arm, Right Updated:  02/12/18 0116     Blood Culture No growth at 4 days    Blood Culture - Blood, [[id number]]  (Normal) Collected:  02/08/18 0102    Lab Status:  Preliminary result Specimen:  Blood from Arm, Right Updated:  02/12/18 0116     Blood Culture No growth at 4 days    Urine Culture - Urine, Urine, Clean Catch [[id number]]  (Abnormal)  (Susceptibility) Collected:  02/07/18 2315    Lab Status:  Final result Specimen:  Urine from Urine, Catheter Updated:  02/10/18 1156     Urine Culture --      >100,000 CFU/mL Escherichia coli (A)    Susceptibility      Escherichia coli     KANIKA     Ampicillin >16 ug/ml Resistant     Ampicillin + Sulbactam 16/8 ug/ml Intermediate     Aztreonam <=8 ug/ml Susceptible     Cefepime <=8 ug/ml Susceptible     Cefotaxime <=2 ug/ml Susceptible     Ceftriaxone <=8 ug/ml Susceptible     Cefuroxime sodium <=4 ug/ml Susceptible     Cephalothin >16 ug/ml Resistant     Ertapenem <=1 ug/ml Susceptible     Gentamicin <=4 ug/ml Susceptible     Levofloxacin <=2 ug/ml Susceptible     Meropenem <=1 ug/ml Susceptible     Nitrofurantoin <=32 ug/ml Susceptible     Piperacillin + Tazobactam <=16 ug/ml Susceptible     Tetracycline >8 ug/ml Resistant     Tobramycin <=4 ug/ml Susceptible     Trimethoprim +  Sulfamethoxazole <=2/38 ug/ml Susceptible                          Imaging Results (all)     Procedure Component Value Units Date/Time    XR Femur 2 View Right [[id number]] Collected:  02/07/18 2237     Updated:  02/07/18 2320    Narrative:       EXAM:    XR Right Femur, 2 Views    CLINICAL HISTORY:    89 years old, female; Injury or trauma; Fall; Initial encounter; Fracture,   traumatic; Closed fracture; Hip; Right    TECHNIQUE:    Frontal and lateral views of the right femur.    COMPARISON:    No relevant prior studies available.    FINDINGS:    Bones/joints:  Transcervical fracture of the right femoral neck.  No other   fracture or dislocation.    Soft tissues:  Unremarkable.      Impression:         Right femoral neck fracture.    THIS DOCUMENT HAS BEEN ELECTRONICALLY SIGNED BY AGATA MARTINEZ MD    XR Hip With or Without Pelvis 2 - 3 View Right [[id number]] Collected:  02/07/18 2237     Updated:  02/07/18 2321    Narrative:       EXAM:    XR Right Hip With Pelvis When Performed, 2 or 3 Views    CLINICAL HISTORY:    89 years old, female; Injury or trauma; Fall; Initial encounter; Fracture of   pelvis & hip; Right; Traumatic fracture; Neck of femur, midcervical; Closed   fracture    TECHNIQUE:    Two or three views of the right hip, with pelvis when performed.    COMPARISON:    CT PELVIS WO CONTRAST 2017-11-24 21:17    FINDINGS:    Bones/joints:  Bilateral inferior and right superior pubic ramus fractures   with callus formation.  Transcervical fracture of the right femoral neck.  No   dislocation.    Soft tissues:  Unremarkable.      Impression:         Right femoral neck fracture.    THIS DOCUMENT HAS BEEN ELECTRONICALLY SIGNED BY AGATA MARTINEZ MD    XR Pelvis 1 or 2 View [[id number]] Collected:  02/09/18 0904     Updated:  02/09/18 1411    Narrative:       EXAMINATION: XR PELVIS 1 OR 2 VW-02/08/2018:      INDICATION: Postop Hip Arthroplasty; S72.001A-Fracture of unspecified  part of neck of right femur, initial  encounter for closed fracture;  N39.0-Urinary tract infection, site not specified; F03.90-Unspecified  dementia without behavioral disturbance; W19.XXXA-Unspecified fall,  initial encounter.      COMPARISON: NONE.     FINDINGS: AP view of the pelvis demonstrates a right hip arthroplasty.  There is no evidence of acute fracture or dislocation. There is no  loosening of the prosthesis. There are old bilateral superior and  inferior pubic rami fractures.           Impression:       Expected postoperative findings. Bilateral old pubic rami  fractures are noted.     D:  02/09/2018  E:  02/09/2018     This report was finalized on 2/9/2018 2:09 PM by Dr. Aguilar Ayala MD.                             Order Current Status    Blood Culture - Blood, Preliminary result    Blood Culture - Blood, Preliminary result        Discharge Details      Hilda Worthyminda SALMERON   Home Medication Instructions AN:[id number]    Printed on:02/12/18 1448   Medication Information                      acetaminophen (TYLENOL) 325 MG tablet  Take 2 tablets by mouth Every 6 (Six) Hours As Needed for Mild Pain .             aspirin 325 MG EC tablet  Take 1 tablet by mouth Daily for 30 days.             bisacodyl (DULCOLAX) 10 MG suppository  Insert 1 suppository into the rectum Daily As Needed for Constipation.             cefuroxime (CEFTIN) 500 MG tablet  Take 1 tablet by mouth 2 (Two) Times a Day for 3 days.             diclofenac (VOLTAREN) 1 % gel gel               docusate sodium 100 MG capsule  Take 100 mg by mouth 2 (Two) Times a Day.             donepezil (ARICEPT) 10 MG tablet  Take 10 mg by mouth Every Night.             enoxaparin (LOVENOX) 40 MG/0.4ML solution syringe  Inject 0.4 mL under the skin Daily for 14 days.             famotidine (PEPCID) 20 MG tablet  Take 1 tablet by mouth 2 (Two) Times a Day.             HYDROcodone-acetaminophen (NORCO) 5-325 MG per tablet  Take 1 tablet by mouth Every 6 (Six) Hours As Needed for Moderate Pain .              lisinopril (PRINIVIL,ZESTRIL) 2.5 MG tablet  Take 1 tablet by mouth Daily.             LORazepam (ATIVAN) 0.5 MG tablet  Take 0.5 mg by mouth Every 8 (Eight) Hours As Needed for Anxiety.             memantine (NAMENDA) 10 MG tablet  Take 10 mg by mouth 2 (Two) Times a Day.             polyethylene glycol (MIRALAX) pack packet  Take 17 g by mouth Daily.             raNITIdine (ZANTAC) 150 MG tablet  Take 150 mg by mouth 2 (Two) Times a Day.                   Discharge Disposition:  SNF     Discharge Diet:  Diet Instructions     Diet: Soft Texture; Thin Liquids, No Restrictions; Chopped       Discharge Diet:  Soft Texture   Fluid Consistency:  Thin Liquids, No Restrictions   Soft Options:  Chopped                 Discharge Activity:   Activity Instructions     Other Instructions (Specify)       Protected weight bearing as tolerated right lower extremity, posterior hip precautions ×6 weeks           Additional Activity Instructions:      Activity per Dr Dominguez and Physical Therapy's instructions.                  Future Appointments  Date Time Provider Department Center   3/1/2018 3:10 PM Fernando Dmoinguez MD [facility] None       Additional Instructions for the Follow-ups that You Need to Schedule     Discharge Follow-up with PCP    As directed    Follow Up Details:  1 week           Discharge Follow-up with Specialty: [facility]; 3 Weeks    As directed    Specialty:  [facility]    Follow Up:  3 Weeks           Discharge Follow-up with Specified Provider: Dr Dominguez; 3 Weeks    As directed    To:  Dr Dominguez    Follow Up:  3 Weeks                     Time Spent on Discharge:  60 minutes    KIMBERLEY Saenz  02/12/18  2:48 PM

## 2018-02-12 NOTE — PROGRESS NOTES
"  SUBJECTIVE  Patient resting comfortably.  Remains confused this morning.    OBJECTIVE  Temp (24hrs), Av.1 °F (36.7 °C), Min:97.8 °F (36.6 °C), Max:98.2 °F (36.8 °C)    Blood pressure 136/69, pulse 80, temperature 98.2 °F (36.8 °C), temperature source Temporal Artery , resp. rate 16, height 152.4 cm (60\"), weight 49.3 kg (108 lb 9.6 oz), SpO2 98 %, not currently breastfeeding.    Lab Results (last 24 hours)     Procedure Component Value Units Date/Time    Magnesium [[id number]]  (Normal) Collected:  18    Specimen:  Blood Updated:  18     Magnesium 1.7 mg/dL     Blood Culture - Blood, [[id number]]  (Normal) Collected:  18 0030    Specimen:  Blood from Arm, Right Updated:  18 011     Blood Culture No growth at 4 days    Blood Culture - Blood, [[id number]]  (Normal) Collected:  18 010    Specimen:  Blood from Arm, Right Updated:  18 0116     Blood Culture No growth at 4 days    Magnesium [[id number]] Collected:  18    Specimen:  Blood Updated:  18    Potassium [[id number]] Collected:  18    Specimen:  Blood Updated:  18    Hemoglobin & Hematocrit, Blood [[id number]]  (Abnormal) Collected:  18    Specimen:  Blood Updated:  18     Hemoglobin 10.7 (L) g/dL      Hematocrit 33.2 (L) %             PHYSICAL EXAM  Right lower extremity:Dressing clean, dry and intact.  Mild pain with logroll of hip.  Intact EHL, FHL, tibialis anterior, and gastrocsoleus. Sensation intact to light touch to deep peroneal, superficial peroneal, sural, saphenous, tibial nerves. 2+ palpable DP and PT pulses.       Principal Problem:    Fracture of femoral neck, right  Active Problems:    Hypertension    UTI (urinary tract infection)    Dementia    Metastatic colon cancer in female    Recurrent falls    Closed fracture of neck of right femur      PLAN / DISPOSITION:  4 Day Post-Op right hip hemiarthroplasty    Protected weight bearing as " tolerated right lower extremity, posterior hip precautions ×6 weeks   Pain control  PT/OT for post op mobilization and medical equipment needs   TEDs and SCD's bilateral lower extremities   Lovenox for DVT prophylaxis   Social work for discharge planning.   Dressing to remain in place for 7 days. May remove on POD#7. If no drainage, may shower on POD#10. No submerging wound in water. If drainage is noted, sterile dressing should be placed and wound checked daily. No showering until wound has remained dry for 72 consecutive hours.   Follow up in 3 weeks for re-assessment.      Fernando Dominguez MD  02/12/18  6:53 AM

## 2018-02-12 NOTE — PLAN OF CARE
Problem: Patient Care Overview (Adult)  Goal: Plan of Care Review  Outcome: Ongoing (interventions implemented as appropriate)   02/12/18 0420   Coping/Psychosocial Response Interventions   Plan Of Care Reviewed With patient   Patient Care Overview   Progress improving   Outcome Evaluation   Outcome Summary/Follow up Plan Patient rested well during the night no complaints of pain. Incontinent during the night. VS stable.        Problem: Fall Risk (Adult)  Goal: Identify Related Risk Factors and Signs and Symptoms  Outcome: Ongoing (interventions implemented as appropriate)   02/12/18 0420   Fall Risk   Fall Risk: Related Risk Factors confusion/agitation;fatigue/slow reaction;gait/mobility problems;environment unfamiliar;bladder function altered   Fall Risk: Signs and Symptoms presence of risk factors     Goal: Absence of Falls  Outcome: Ongoing (interventions implemented as appropriate)      Problem: Pressure Ulcer Risk (Chapin Scale) (Adult,Obstetrics,Pediatric)  Goal: Identify Related Risk Factors and Signs and Symptoms  Outcome: Ongoing (interventions implemented as appropriate)   02/12/18 0420   Pressure Ulcer Risk (Chapin Scale)   Related Risk Factors (Pressure Ulcer Risk (Chapin Scale)) age extremes;cognitive impairment;excretions/secretions;mobility impaired     Goal: Skin Integrity  Outcome: Ongoing (interventions implemented as appropriate)

## 2018-02-12 NOTE — PROGRESS NOTES
Continued Stay Note  [facility]     Patient Name: Zainab Worthy  MRN: [id number]  Today's Date: 2/12/2018    Admit Date: 2/7/2018          Discharge Plan     Consent obtained for the participation in the [facility] Transitions Program. Leslie Juarez RN                Discharge Codes     None        Expected Discharge Date and Time     Expected Discharge Date Expected Discharge Time    Feb 12, 2018             Leslie Juarez RN

## 2018-02-12 NOTE — PLAN OF CARE
Problem: Patient Care Overview (Adult)  Goal: Plan of Care Review  Outcome: Ongoing (interventions implemented as appropriate)   02/12/18 1453   Coping/Psychosocial Response Interventions   Plan Of Care Reviewed With patient   Outcome Evaluation   Outcome Summary/Follow up Plan Pt with mild confusion throughout session and requiring constant cuing for completion of UE exercises. Declined any OOC tasks.        Problem: Inpatient Occupational Therapy  Goal: Bed Mobility Goal LTG- OT  Outcome: Ongoing (interventions implemented as appropriate)   02/09/18 0926 02/12/18 1453   Bed Mobility OT LTG   Bed Mobility OT LTG, Date Established 02/09/18 --    Bed Mobility OT LTG, Time to Achieve 5 days --    Bed Mobility OT LTG, Activity Type supine to sit/sit to supine;scoot/bridge --    Bed Mobility OT LTG, [place] Level minimum assist (75% patient effort);verbal cues required --    Bed Mobility OT LTG, Outcome --  goal ongoing     Goal: Transfer Training Goal 1 LTG- OT  Outcome: Ongoing (interventions implemented as appropriate)   02/09/18 0926 02/12/18 1453   Transfer Training OT LTG   Transfer Training OT LTG, Date Established 02/09/18 --    Transfer Training OT LTG, Time to Achieve 5 days --    Transfer Training OT LTG, Activity Type sit to stand/stand to sit;toilet --    Transfer Training OT LTG, [place] Level minimum assist (75% patient effort);verbal cues required --    Transfer Training OT LTG, Assist Device walker, rolling --    Transfer Training OT LTG, Outcome --  goal ongoing     Goal: Static Standing Balance Goal LTG- OT  Outcome: Ongoing (interventions implemented as appropriate)   02/09/18 0926 02/12/18 1453   Static Standing Balance OT LTG   Static Standing Balance OT LTG, Date Established 02/09/18 --    Static Standing Balance OT LTG, Time to Achieve 5 days --    Static Standing Balance OT LTG, [place] Level minimum assist (75% patient effort);verbal cues required --    Static Standing Balance OT  LTG, Outcome --  goal ongoing     Goal: Toileting Goal LTG- OT  Outcome: Ongoing (interventions implemented as appropriate)   02/09/18 0926 02/12/18 1453   Toileting OT LTG   Toileting Goal OT LTG, Date Established 02/09/18 --    Toileting Goal OT LTG, Time to Achieve 5 days --    Toileting Goal OT LTG, Activity Type Pt will complete post toilet hygiene and clothing management  --    Toileting Goal OT LTG, [place] Level verbal cues required;contact guard assist --    Toileting Goal OT LTG, Outcome --  goal ongoing

## 2018-02-12 NOTE — PROGRESS NOTES
Adult Nutrition  Assessment/PES    Patient Name:  Zainab Worthy  YOB: 1929  MRN: [id number]  Admit Date:  2/7/2018    Assessment Date:  2/12/2018    Comments:            Reason for Assessment       02/12/18 1542    Reason for Assessment    Reason For Assessment/Visit follow up protocol    Time Spent (min) --   25 min    Diagnosis --   per notes this adm              Nutrition/Diet History       02/12/18 1542    Nutrition/Diet History    Reported/Observed By Patient   states she does not eat well when stressed, and states she is stressed recently/during hospitalization.                    Nutrition Prescription Ordered       02/12/18 1543    Nutrition Prescription PO    Supplement Boost Plus    Supplement Frequency 3 times a day   pt drinking some Boost while RD in room and states she likes supplement. RD encouraged pt to consume to supplement po intake at meals.      02/12/18 1542    Nutrition Prescription PO    Current PO Diet Soft Texture    Texture Chopped    Fluid Consistency Thin            Evaluation of Received Nutrient/Fluid Intake       02/12/18 1544    PO Evaluation    Number of Meals 3    % PO Intake 33            Problem/Interventions:        Problem 1       02/12/18 1546    Nutrition Diagnoses Problem 1    Problem 1 Inadequate Nutrient Intake    Etiology (related to) MNT for Treatment/Condition    Signs/Symptoms (evidenced by) PO Intake    Percent (%) intake recorded 33 %    Over number of meals 3                    Intervention Goal       02/12/18 1546    Intervention Goal    PO Increase intake            Nutrition Intervention       02/12/18 1546    Nutrition Intervention    RD/Tech Action Encourage intake;Follow Tx progress;Supplement provided;Interview for preference              Education/Evaluation       02/12/18 1547    Monitor/Evaluation    Monitor Per protocol        Electronically signed by:  Eliane Barajas MS,RD,LD  02/12/18 3:47 PM

## 2018-02-12 NOTE — NURSING NOTE
Report called to Hayley at the [facility] at [facility] 1512. Patient transported by medical transport. Packet given to  along with prescriptions for Norco and Ativan included inside sealed packet.

## 2018-02-12 NOTE — THERAPY DISCHARGE NOTE
Acute Care - Physical Therapy Discharge Summary  [facility]       Patient Name: Zainab Worthy  : 2/3/1929  MRN: [id number]    Today's Date: 2018  Onset of Illness/Injury or Date of Surgery Date: 18    Date of Referral to PT: 18  Referring Physician: TINA      Admit Date: 2018      PT Recommendation and Plan    Visit Dx:    ICD-10-CM ICD-9-CM   1. Closed fracture of neck of right femur, initial encounter S72.001A 820.8   2. Acute UTI N39.0 599.0   3. Dementia without behavioral disturbance, unspecified dementia type F03.90 294.20   4. Fall, initial encounter W19.XXXA E888.9   5. Impaired mobility and ADLs Z74.09 799.89   6. Impaired functional mobility, balance, gait, and endurance Z74.09 V49.89             Outcome Measures       18 1127 18 0936 18 1306    How much help from another person do you currently need...    Turning from your back to your side while in flat bed without using bedrails?  3  -LR 2  -SH    Moving from lying on back to sitting on the side of a flat bed without bedrails?  2  -LR 2  -SH    Moving to and from a bed to a chair (including a wheelchair)?  2  -LR 2  -SH    Standing up from a chair using your arms (e.g., wheelchair, bedside chair)?  2  -LR 2  -SH    Climbing 3-5 steps with a railing?  1  -LR 1  -SH    To walk in hospital room?  2  -LR 2  -SH    AM-PAC 6 Clicks Score  12  -LR 11  -SH    How much help from another is currently needed...    Putting on and taking off regular lower body clothing? 1  -ST      Bathing (including washing, rinsing, and drying) 1  -ST      Toileting (which includes using toilet bed pan or urinal) 1  -ST      Putting on and taking off regular upper body clothing 3  -ST      Taking care of personal grooming (such as brushing teeth) 3  -ST      Eating meals 3  -ST      Score 12  -ST      Functional Assessment    Outcome Measure Options  AM-PAC 6 Clicks Basic Mobility (PT)  -LR AM-PAC 6 Clicks Basic Mobility (PT)  -SH       02/11/18 1006 02/10/18 1500 02/10/18 1003    How much help from another person do you currently need...    Turning from your back to your side while in flat bed without using bedrails? 2  -SH 2  -SH 2  -SH    Moving from lying on back to sitting on the side of a flat bed without bedrails? 2  -SH 2  -SH 2  -SH    Moving to and from a bed to a chair (including a wheelchair)? 2  -SH 2  -SH 2  -SH    Standing up from a chair using your arms (e.g., wheelchair, bedside chair)? 2  -SH 2  -SH 2  -SH    Climbing 3-5 steps with a railing? 1  -SH 1  -SH 1  -SH    To walk in hospital room? 2  -SH 2  -SH 2  -SH    AM-PAC 6 Clicks Score 11  -SH 11  -SH 11  -SH    Functional Assessment    Outcome Measure Options AM-PAC 6 Clicks Basic Mobility (PT)  -SH AM-PAC 6 Clicks Basic Mobility (PT)  -SH AM-PAC 6 Clicks Basic Mobility (PT)  -SH      User Key  (r) = Recorded By, (t) = Taken By, (c) = Cosigned By    Initials Name Provider Type     Elza Palomo, PT Physical Therapist    ST Latanya Pretty, OTR Occupational Therapist    LR Nhi Hurt, PT Physical Therapist                PT Charges       02/12/18 1011          Time Calculation    Start Time 0936  -LR      PT Received On 02/12/18  -LR      PT Goal Re-Cert Due Date 02/19/18  -LR      Time Calculation- PT    Total Timed Code Minutes- PT 23 minute(s)  -LR        User Key  (r) = Recorded By, (t) = Taken By, (c) = Cosigned By    Initials Name Provider Type    LR Nhi Hurt, PT Physical Therapist                  IP PT Goals       02/12/18 1611 02/12/18 0936 02/09/18 1413    Bed Mobility PT LTG    Bed Mobility PT LTG, Date Established  02/09/18  -LR     Bed Mobility PT LTG, Time to Achieve  2 wks  -LR     Bed Mobility PT LTG, Activity Type  all bed mobility  -LR     Bed Mobility PT LTG, [place] Level  minimum assist (75% patient effort)  -LR     Bed Mobility PT Goal  LTG, Assist Device  bed rails  -LR     Bed Mobility PT LTG, Date Goal Reviewed   02/12/18  -LR     Bed Mobility PT LTG, Outcome goal not met  -LR goal ongoing  -LR goal ongoing  -CD    Bed Mobility PT LTG, Reason Goal Not Met discharged from facility  -LR      Transfer Training PT LTG    Transfer Training PT LTG, Date Established  02/10/18  -LR     Transfer Training PT LTG, Time to Achieve  2 wks  -LR     Transfer Training PT LTG, Activity Type  all transfers  -LR     Transfer Training PT LTG, [place] Level  contact guard assist  -LR     Transfer Training PT LTG, Assist Device  walker, rolling  -LR     Transfer Training PT  LTG, Date Goal Reviewed  02/12/18  -LR     Transfer Training PT LTG, Outcome goal not met  -LR goal ongoing  -LR goal ongoing  -CD    Transfer Training PT LTG, Reason Goal Not Met discharged from facility  -LR      Gait Training PT LTG    Gait Training Goal PT LTG, Date Established  02/09/18  -LR     Gait Training Goal PT LTG, Time to Achieve  2 wks  -LR     Gait Training Goal PT LTG, [place] Level  minimum assist (75% patient effort)  -LR     Gait Training Goal PT LTG, Assist Device  walker, rolling  -LR     Gait Training Goal PT LTG, Distance to Achieve  250 feet  -LR     Gait Training Goal PT LTG, Date Goal Reviewed  02/12/18  -LR     Gait Training Goal PT LTG, Outcome goal not met  -LR goal ongoing  -LR goal ongoing  -CD    Gait Training Goal PT LTG, Reason Goal Not Met discharged from facility  -LR        02/09/18 1150          Bed Mobility PT LTG    Bed Mobility PT LTG, Time to Achieve 2 wks  -CD      Bed Mobility PT LTG, Activity Type all bed mobility  -CD      Bed Mobility PT LTG, [place] Level minimum assist (75% patient effort)  -CD      Bed Mobility PT Goal  LTG, Assist Device bed rails  -CD      Transfer Training PT LTG    Transfer Training PT LTG, Time to Achieve 2 wks  -CD      Transfer Training PT LTG, Activity Type all transfers  -CD      Transfer Training PT LTG, [place] Level contact guard assist  -CD      Transfer Training PT LTG, Assist  Device walker, rolling  -CD      Gait Training PT LTG    Gait Training Goal PT LTG, Time to Achieve 2 wks  -CD      Gait Training Goal PT LTG, [place] Level minimum assist (75% patient effort)  -CD      Gait Training Goal PT LTG, Assist Device walker, rolling  -CD      Gait Training Goal PT LTG, Distance to Achieve 250  -CD        User Key  (r) = Recorded By, (t) = Taken By, (c) = Cosigned By    Initials Name Provider Type    CD Floresita Griffin, PT Physical Therapist    LR Nhi Hurt, PT Physical Therapist          Therapy Charges for Today     Code Description Service Date Service Provider Modifiers Qty    57021146455 HC GAIT TRAINING EA 15 MIN 2/12/2018 Nhi Hutr, PT GP 1    11450882617 HC PT THER PROC EA 15 MIN 2/12/2018 Nhi Hurt, PT GP 1          PT Discharge Summary  Anticipated Discharge Disposition: skilled nursing facility  Reason for Discharge: Discharge from facility  Outcomes Achieved: Refer to plan of care for updates on goals achieved  Discharge Destination: SNF      Nhi Hurt, PT   2/12/2018

## 2018-02-12 NOTE — PLAN OF CARE
Problem: Inpatient Physical Therapy  Goal: Bed Mobility Goal LTG- PT  Outcome: Unable to achieve outcome(s) by discharge Date Met: 02/12/18 02/12/18 0936 02/12/18 1611   Bed Mobility PT LTG   Bed Mobility PT LTG, Date Established 02/09/18 --    Bed Mobility PT LTG, Time to Achieve 2 wks --    Bed Mobility PT LTG, Activity Type all bed mobility --    Bed Mobility PT LTG, [place] Level minimum assist (75% patient effort) --    Bed Mobility PT Goal LTG, Assist Device bed rails --    Bed Mobility PT LTG, Date Goal Reviewed 02/12/18 --    Bed Mobility PT LTG, Outcome --  goal not met   Bed Mobility PT LTG, Reason Goal Not Met --  discharged from facility     Goal: Transfer Training Goal 1 LTG- PT  Outcome: Unable to achieve outcome(s) by discharge Date Met: 02/12/18 02/12/18 0936 02/12/18 1611   Transfer Training PT LTG   Transfer Training PT LTG, Date Established 02/10/18 --    Transfer Training PT LTG, Time to Achieve 2 wks --    Transfer Training PT LTG, Activity Type all transfers --    Transfer Training PT LTG, [place] Level contact guard assist --    Transfer Training PT LTG, Assist Device walker, rolling --    Transfer Training PT LTG, Date Goal Reviewed 02/12/18 --    Transfer Training PT LTG, Outcome --  goal not met   Transfer Training PT LTG, Reason Goal Not Met --  discharged from facility     Goal: Gait Training Goal LTG- PT  Outcome: Unable to achieve outcome(s) by discharge Date Met: 02/12/18 02/12/18 0936 02/12/18 1611   Gait Training PT LTG   Gait Training Goal PT LTG, Date Established 02/09/18 --    Gait Training Goal PT LTG, Time to Achieve 2 wks --    Gait Training Goal PT LTG, [place] Level minimum assist (75% patient effort) --    Gait Training Goal PT LTG, Assist Device walker, rolling --    Gait Training Goal PT LTG, Distance to Achieve 250 feet --    Gait Training Goal PT LTG, Date Goal Reviewed 02/12/18 --    Gait Training Goal PT LTG, Outcome --  goal not met   Gait  Training Goal PT LTG, Reason Goal Not Met --  discharged from facility

## 2018-02-12 NOTE — PROGRESS NOTES
Continued Stay Note  [facility]     Patient Name: Zainab Worthy  MRN: [id number]  Today's Date: 2/12/2018    Admit Date: 2/7/2018          Discharge Plan       02/12/18 1300    Case Management/Social Work Plan    Plan [facility]    Patient/Family In Agreement With Plan yes    Additional Comments Ms. Worthy has a skilled bed at [facility] today, if medically ready.  Notified [facility]. Notified patient at the bedside.  Left messages for her son and daughter by voicemail.    [facility] has been scheduled for today between 3-3:30pm.   will voucher medical van as the patient does not have any her purse here in the hospital.    Please call report to [facility] at ph [phone].  [facility] has [facility] access for transfer summary.  Please have a copy of the transfer summary and any scripts in the discharge packet.  Thank you.              Discharge Codes     None        Expected Discharge Date and Time     Expected Discharge Date Expected Discharge Time    Feb 12, 2018             Albina Napier

## 2018-02-12 NOTE — PLAN OF CARE
Problem: Patient Care Overview (Adult)  Goal: Plan of Care Review  Outcome: Ongoing (interventions implemented as appropriate)   02/12/18 0936   Coping/Psychosocial Response Interventions   Plan Of Care Reviewed With patient   Patient Care Overview   Progress progress toward functional goals as expected   Outcome Evaluation   Outcome Summary/Follow up Plan Patient increased gait distance to 10 feet with RW and less assist required, limited by pain with weight bearing. Possible d/c to rehab today. Will continue to progress mobility and strength as able.        Problem: Inpatient Physical Therapy  Goal: Bed Mobility Goal LTG- PT  Outcome: Ongoing (interventions implemented as appropriate)   02/12/18 0936   Bed Mobility PT LTG   Bed Mobility PT LTG, Date Established 02/09/18   Bed Mobility PT LTG, Time to Achieve 2 wks   Bed Mobility PT LTG, Activity Type all bed mobility   Bed Mobility PT LTG, [place] Level minimum assist (75% patient effort)   Bed Mobility PT Goal LTG, Assist Device bed rails   Bed Mobility PT LTG, Date Goal Reviewed 02/12/18   Bed Mobility PT LTG, Outcome goal ongoing     Goal: Transfer Training Goal 1 LTG- PT  Outcome: Ongoing (interventions implemented as appropriate)   02/12/18 0936   Transfer Training PT LTG   Transfer Training PT LTG, Date Established 02/10/18   Transfer Training PT LTG, Time to Achieve 2 wks   Transfer Training PT LTG, Activity Type all transfers   Transfer Training PT LTG, [place] Level contact guard assist   Transfer Training PT LTG, Assist Device walker, rolling   Transfer Training PT LTG, Date Goal Reviewed 02/12/18   Transfer Training PT LTG, Outcome goal ongoing     Goal: Gait Training Goal LTG- PT  Outcome: Ongoing (interventions implemented as appropriate)   02/12/18 0936   Gait Training PT LTG   Gait Training Goal PT LTG, Date Established 02/09/18   Gait Training Goal PT LTG, Time to Achieve 2 wks   Gait Training Goal PT LTG, [place] Level minimum  assist (75% patient effort)   Gait Training Goal PT LTG, Assist Device walker, rolling   Gait Training Goal PT LTG, Distance to Achieve 250 feet   Gait Training Goal PT LTG, Date Goal Reviewed 02/12/18   Gait Training Goal PT LTG, Outcome goal ongoing

## 2018-02-12 NOTE — THERAPY TREATMENT NOTE
Acute Care - Physical Therapy Treatment Note  [facility]     Patient Name: Zainab Worthy  : 2/3/1929  MRN: [id number]  Today's Date: 2018  Onset of Illness/Injury or Date of Surgery Date: 18  Date of Referral to PT: 18  Referring Physician: TINA    Admit Date: 2018    Visit Dx:    ICD-10-CM ICD-9-CM   1. Closed fracture of neck of right femur, initial encounter S72.001A 820.8   2. Acute UTI N39.0 599.0   3. Dementia without behavioral disturbance, unspecified dementia type F03.90 294.20   4. Fall, initial encounter W19.XXXA E888.9   5. Impaired mobility and ADLs Z74.09 799.89   6. Impaired functional mobility, balance, gait, and endurance Z74.09 V49.89     Patient Active Problem List   Diagnosis   • Gonalgia   • Hypertrophic polyarthritis   • Allergic rhinitis, seasonal   • Skin growth   • Endometrial cancer   • Post-operative state   • Rectal cancer   • Hydronephrosis   • Hypertension   • Anorexia   • Onychomycosis   • IUD complication   • Memory loss   • Urinary frequency   • Fall at home   • Closed fracture of pelvis   • UTI (urinary tract infection)   • Metabolic encephalopathy   • Fracture of femoral neck, right   • Dementia   • Metastatic colon cancer in female   • Recurrent falls   • Closed fracture of neck of right femur               Adult Rehabilitation Note       18 0936 18 1306 18 1006    Rehab Assessment/Intervention    Discipline physical therapist  -LR physical therapist  -SH physical therapist  -SH    Document Type therapy note (daily note)  -LR therapy note (daily note)  -SH therapy note (daily note)  -SH    Subjective Information agree to therapy;no complaints  -LR agree to therapy  -SH agree to therapy  -SH    Patient Effort, Rehab Treatment good  -LR good  -SH good  -SH    Symptoms Noted During/After Treatment fatigue;increased pain  -LR none  -SH     Symptoms Noted Comment Notified RN.   -LR [place] pleasantly confused  -SH [place], pleasantly confused.  -SH     Precautions/Limitations fall precautions;hip precautions- right;other (see comments)   [place], dementia  -LR fall precautions;hip precautions- right  -SH fall precautions;hip precautions- right  -SH    Specific Treatment Considerations Incontinence.   -LR      Recorded by [LR] Nhi Hurt, PT [SH] Elza Palomo, PT [SH] Elza Palomo, PT    Pain Assessment    Pain Assessment Villasenor-Garcia FACES  -LR Villasenor-Baker FACES  -SH Villasenor-Baker FACES  -SH    Villasenor-Garcia FACES Pain Rating 4  -LR 0  -SH 0  -SH    Pain Score  0  -SH 2  -SH    Post Pain Score  0  -SH 2  -SH    Pain Type Acute pain  -LR  Acute pain  -SH    Pain Location Hip  -LR  Hip  -SH    Pain Orientation Right  -LR  Right  -SH    Pain Intervention(s) Repositioned;Ambulation/increased activity  -LR  Repositioned;Ambulation/increased activity  -SH    Response to Interventions   tolerated  -SH    Recorded by [LR] Nhi Hurt, PT [SH] Elza Palomo, PT [SH] Elza Palomo, PT    Cognitive Assessment/Intervention    Current Cognitive/Communication Assessment impaired  -LR impaired  -SH impaired  -SH    Orientation Status oriented to;person;disoriented to;time;place;required verbal cueing (specifiy in comments)  -LR oriented to;person  -SH oriented to;person  -SH    Follows Commands/Answers Questions 75% of the time;able to follow single-step instructions;needs cueing;needs repetition  -LR able to follow single-step instructions;75% of the time;needs increased time;needs cueing;needs repetition  -SH able to follow single-step instructions;75% of the time;needs cueing;needs increased time;needs repetition  -SH    Personal Safety mild impairment;decreased insight to deficits  -LR moderate impairment;decreased awareness, need for assist;decreased awareness, need for safety;decreased insight to deficits  -SH moderate impairment;decreased awareness, need for safety;decreased awareness, need for assist;decreased insight to deficits  -SH    Personal  Safety Interventions  fall prevention program maintained;gait belt;nonskid shoes/slippers when out of bed  - fall prevention program maintained;gait belt;nonskid shoes/slippers when out of bed  -    Recorded by [LR] Nhi Hurt, PT [SH] Elza Paloom, PT [SH] Elza Palomo, PT    Mobility Assessment/Training    Extremity Weight-Bearing Status right lower extremity  -LR      Right Lower Extremity Weight-Bearing weight-bearing as tolerated  -LR      Recorded by [LR] Nhi Hurt, PT      Bed Mobility, Assessment/Treatment    Bed Mobility, Assistive Device head of bed elevated;bed rails;draw sheet  -LR  bed rails;head of bed elevated;draw sheet  -    Bed Mobility, Scoot/Bridge, [place]   minimum assist (75% patient effort);verbal cues required;nonverbal cues required (demo/gesture)  -    Bed Mob, Supine to Sit, [place] verbal cues required;moderate assist (50% patient effort)  -LR  moderate assist (50% patient effort);2 person assist required;verbal cues required  -    Bed Mob, Sit to Supine, [place] not tested   UIC at end of treatment.   -LR maximum assist (25% patient effort);2 person assist required;verbal cues required  -     Bed Mobility, Safety Issues decreased use of legs for bridging/pushing;decreased use of arms for pushing/pulling  -LR cognitive deficits limit understanding;decreased use of arms for pushing/pulling;decreased use of legs for bridging/pushing  - decreased use of arms for pushing/pulling;cognitive deficits limit understanding;decreased use of legs for bridging/pushing  -    Bed Mobility, Impairments ROM decreased;strength decreased;pain;impaired balance  -LR strength decreased  - strength decreased;impaired balance;pain  -    Bed Mobility, Comment Verbal cues to move LEs towards EOB and to push up from bed from behind to raise trunk into sitting and to scoot hips out to get feet on floor. Min assist with LEs and trunk and then mod  assist via draw sheet to scoot hips out.   -LR Pt edu in correct sequence.  -SH VC for sequencing and hand placement. Increased alertness and active participation this session.  -SH    Recorded by [LR] Nhi Hurt, PT [SH] Elza Palomo, PT [SH] Elza Palomo, PT    Transfer Assessment/Treatment    Transfers, Sit-Stand [place] verbal cues required;minimum assist (75% patient effort);2 person assist required  -LR verbal cues required;moderate assist (50% patient effort);2 person assist required  -SH moderate assist (50% patient effort);2 person assist required;nonverbal cues required (demo/gesture);verbal cues required  -SH    Transfers, Stand-Sit [place] verbal cues required;minimum assist (75% patient effort);1 person + 1 person to manage equipment  -LR moderate assist (50% patient effort);2 person assist required;verbal cues required  -SH verbal cues required;nonverbal cues required (demo/gesture);moderate assist (50% patient effort);2 person assist required  -SH    Transfers, Sit-Stand-Sit, Assist Device rolling walker  -LR rolling walker  -SH rolling walker  -SH    Toilet Transfer, [place] verbal cues required;moderate assist (50% patient effort);2 person assist required  -LR      Toilet Transfer, Assistive Device rolling walker;bedside commode without drop arms  -LR      Transfer, Safety Issues sequencing ability decreased;step length decreased;weight-shifting ability decreased  -LR      Transfer, Impairments ROM decreased;strength decreased;impaired balance;pain  -LR strength decreased  - strength decreased;pain  -SH    Transfer, Comment Verbal cues to push up from bed to stand instead of pulling up on RW. Verbal cues to reach back for chair to lower into sitting. Assisted pt to and from BSC. Required repeated cues to reach back for BSC to sit and to push up from BSC to stand instead of pulling up on RW. Mod assist x2 to stand up from BSC. Patient with posterior lean upon  standing and forward flexed. Cues for correction. Dependent with toileting hygiene.   -LR Pt performed sit to stand from chair with edu for safe hand placement, wants to pull up on RW.  - Pt performed sit to stand from EOB, cues to push up from bed vs pulling up on RW.  -SH    Recorded by [LR] Nhi Hurt, PT [SH] Elza Palomo, PT [SH] Elza Palomo, PT    Gait Assessment/Treatment    Gait, [place] Level verbal cues required;minimum assist (75% patient effort);2 person assist required  -LR minimum assist (75% patient effort);2 person assist required;verbal cues required;nonverbal cues required (demo/gesture)  - minimum assist (75% patient effort);verbal cues required  -    Gait, Assistive Device rolling walker  -LR rolling walker  -SH rolling walker  -    Gait, Distance (Feet) 10  -LR 8  -SH 8  -SH    Gait, Gait Pattern Analysis swing-to gait  -LR swing-to gait  -SH swing-to gait  -    Gait, Gait Deviations right:;antalgic;decreased heel strike;forward flexed posture;step length decreased;toe-to-floor clearance decreased;weight-shifting ability decreased  -LR antalgic;right:;gabe decreased;decreased heel strike;forward flexed posture;step length decreased  - gabe decreased;decreased heel strike;step length decreased  -    Gait, Safety Issues sequencing ability decreased;step length decreased;weight-shifting ability decreased  -LR balance decreased during turns;sequencing ability decreased;step length decreased;weight-shifting ability decreased  - step length decreased;weight-shifting ability decreased  -    Gait, Impairments ROM decreased;strength decreased;pain  -LR strength decreased  - strength decreased;pain  -    Gait, Comment Patient ambulated with step to gait pattern at slow pace. Cues for sequencing of steps and upright posture. Patient remained forward flexed with posterior lean throughout gait training. Gait limited by pain. Chair brought up behind patient  to sit when she could not proceed d/t pain.   -LR Pt took steps from chair to EOB, side stepped to head of bed with RW.  -SH Pt demonstrated significant improvement this session.  -SH    Recorded by [LR] Nhi Hurt, PT [SH] Elza Palomo, PT [SH] Elza Palomo, PT    Balance Skills Training    Sitting-Level of Assistance Close supervision  -LR      Sitting-Balance Support Feet unsupported;Right upper extremity supported;Left upper extremity supported  -LR      Sitting-Balance Activities Right UE Weight Bearing;Left UE Weight Bearing;Trunk control activities  -LR      Sitting # of Minutes 5  -LR      Standing-Level of Assistance   Minimum assistance  -    Static Standing Balance Support   assistive device  -    Standing-Balance Activities   --   static stand to address BM  -    Standing Balance # of Minutes   3  -SH    Recorded by [LR] Nhi Hurt, PT  [SH] Elza Palomo, PT    Therapy Exercises    Bilateral Lower Extremities  AAROM:;10 reps;supine;heel slides;hip abduction/adduction;ankle pumps/circles  -     Exercise Protocols total hip   hemiarthroplasty  -LR      Total Hip Exercises right:;15 reps;completed protocol;with assist;ankle pumps/circles;quad set;glut set;heel slides;hip abduction;SAQ;SLR;LAQ   cues for technique;min assist SLR,heel slides,SLR,hip abd  -LR      Recorded by [LR] Nhi Hurt, PT [SH] Elza Palomo, PT     Positioning and Restraints    Pre-Treatment Position in bed  -LR sitting in chair/recliner  - in bed  -    Post Treatment Position chair  -LR bed  -SH chair  -SH    In Bed  supine;call light within reach;encouraged to call for assist;exit alarm on;SCD pump applied;waffle boots/both  -SH     In Chair notified nsg;reclined;sitting;call light within reach;encouraged to call for assist;exit alarm on;on mechanical lift sling;waffle cushion;legs elevated  -LR  reclined;call light within reach;encouraged to call for assist;exit alarm  on;waffle boot/both  -SH    Recorded by [LR] Nhi Hurt, PT [SH] Elza Palomo, PT [SH] Elza Palomo, PT      02/10/18 1500 02/10/18 1003 02/09/18 1335    Rehab Assessment/Intervention    Discipline physical therapist  -SH physical therapist  -SH physical therapist  -CD    Document Type therapy note (daily note)  - therapy note (daily note)  - therapy note (daily note)  -CD    Subjective Information agree to therapy  - agree to therapy   RN consents to PT.  -SH agree to therapy   WANTS TO USE THE BATHROOM.   -CD    Patient Effort, Rehab Treatment adequate  -SH adequate  -SH good  -CD    Symptoms Noted During/After Treatment  other (see comments)   Confusion  -SH other (see comments)   PT UNABLE TO URINATE- NSG IN TO SCAN BLADDER AFTER P.T.   -CD    Symptoms Noted Comment   [place]-  PLEASANTLY CONFUSED.   -CD    Precautions/Limitations fall precautions;hip precautions- right  -SH fall precautions;hip precautions- right  -SH fall precautions;hip precautions- right   EXIT ALARM, CONFUSION.   -CD    Recorded by [SH] Elza Palomo, PT [SH] Elza Palomo, PT [CD] Floresita Griffin, PT    Vital Signs    Pre Systolic BP Rehab   --   VSS  -CD    Recorded by   [CD] Floresita Griffin, PT    Pain Assessment    Pain Assessment Villasenor-Garcia FACES  -SH Villasenor-Baker FACES  -SH Villasenor-Baker FACES  -CD    Villasenor-Garcia FACES Pain Rating 0  -SH 0  -SH 0  -CD    Pain Score 2  -SH      Post Pain Score 2  -SH 4  -SH     Pain Type Acute pain  -SH Acute pain  -SH --   PT DENIES PAIN.   -CD    Pain Location Hip  -SH Hip  -SH     Pain Orientation Right  -SH Right  -SH     Pain Intervention(s) Repositioned;Ambulation/increased activity  -SH Repositioned;Ambulation/increased activity  -SH     Recorded by [SH] Elza Palomo, PT [SH] Elza Palomo, PT [CD] Floresita Griffin, PT    Cognitive Assessment/Intervention    Current Cognitive/Communication Assessment impaired  -SH impaired  -SH     Orientation Status oriented  to;person;disoriented to;place;time;situation  - oriented to;person;disoriented to;place;time;situation   Upon entry pt naked, reported going to grocery.  -     Follows Commands/Answers Questions able to follow single-step instructions;75% of the time;needs cueing;needs increased time;needs repetition  - able to follow single-step instructions;75% of the time;needs cueing;needs increased time;needs repetition  -     Personal Safety moderate impairment;decreased awareness, need for assist;decreased awareness, need for safety;decreased insight to deficits  - moderate impairment;decreased awareness, need for assist;decreased insight to deficits;decreased awareness, need for safety  -     Personal Safety Interventions fall prevention program maintained;gait belt;nonskid shoes/slippers when out of bed  - gait belt;fall prevention program maintained;nonskid shoes/slippers when out of bed  -     Recorded by [SH] Elza Palomo, PT [] Elza Palomo, PT     Bed Mobility, Assessment/Treatment    Bed Mobility, Assistive Device bed rails;draw sheet  - draw sheet;head of bed elevated;bed rails  - draw sheet;head of bed elevated  -CD    Bed Mob, Supine to Sit, [place] verbal cues required;nonverbal cues required (demo/gesture);maximum assist (25% patient effort);2 person assist required  - maximum assist (25% patient effort);2 person assist required;verbal cues required;nonverbal cues required (demo/gesture)  - maximum assist (25% patient effort);2 person assist required;verbal cues required  -    Bed Mob, Sit to Supine, [place] maximum assist (25% patient effort);1 person + 1 person to manage equipment;verbal cues required;nonverbal cues required (demo/gesture)  -      Bed Mobility, Safety Issues cognitive deficits limit understanding;decreased use of arms for pushing/pulling;decreased use of legs for bridging/pushing  - cognitive deficits limit understanding;decreased use of arms  for pushing/pulling;decreased use of legs for bridging/pushing  -SH     Bed Mobility, Impairments strength decreased;impaired balance;pain  -SH strength decreased;impaired balance;pain  -SH     Bed Mobility, Comment VC for sequencing  -SH VC to move B LE to edge of bed, pt reported inability to move R LE. Pt required vc for sequencing, technique, and hand placement.  -SH     Recorded by [SH] Elza Palomo, PT [SH] Elza Palomo, PT [CD] Floresita Griffin PT    Transfer Assessment/Treatment    Transfers, Bed-Chair-Bed, Assist Device  rolling walker  -SH     Transfers, Sit-Stand [place] moderate assist (50% patient effort);2 person assist required;nonverbal cues required (demo/gesture);verbal cues required  - moderate assist (50% patient effort);2 person assist required;verbal cues required;nonverbal cues required (demo/gesture)  - minimum assist (75% patient effort);2 person assist required;verbal cues required  -CD    Transfers, Stand-Sit [place] verbal cues required;nonverbal cues required (demo/gesture);moderate assist (50% patient effort);2 person assist required  - nonverbal cues required (demo/gesture);verbal cues required;moderate assist (50% patient effort);2 person assist required  - moderate assist (50% patient effort);2 person assist required;verbal cues required  -CD    Transfers, Sit-Stand-Sit, Assist Device rolling walker  -SH rolling walker  -SH rolling walker  -CD    Toilet Transfer, [place]   minimum assist (75% patient effort);2 person assist required;verbal cues required  -CD    Toilet Transfer, Assistive Device   rolling walker;bedside commode without drop arms  -CD    Transfer, Safety Issues  sequencing ability decreased;step length decreased;weight-shifting ability decreased;knees buckling  - sequencing ability decreased  -CD    Transfer, Impairments strength decreased;pain  -SH pain;impaired balance;strength decreased  -SH impaired balance;strength decreased  -CD     Transfer, Comment Sit to stand performed at EOB.  - Pt performed sit to stand from EOB, R knee buckling limiting standing tolerance. Pt edu in hand placement for sit/stand.  - MAX CUES FOR HAND PLACEMENT.   -CD    Recorded by [] Elza Palomo, PT [SH] Elza Palomo, PT [CD] Floresita Griffin, PT    Gait Assessment/Treatment    Gait, [place] Level  moderate assist (50% patient effort);2 person assist required;verbal cues required;nonverbal cues required (demo/gesture)  - moderate assist (50% patient effort);2 person assist required;verbal cues required  -    Gait, Assistive Device  rolling walker  - rolling walker  -CD    Gait, Distance (Feet)  4  - 100   CHAIR IN TOW.   -    Gait, Gait Pattern Analysis  swing-to gait  -     Gait, Gait Deviations  right:;antalgic;gabe decreased;decreased heel strike;forward flexed posture;knee buckling;step length decreased  - gabe decreased;forward flexed posture;step length decreased;weight-shifting ability decreased;right:;antalgic  -    Gait, Safety Issues  balance decreased during turns;sequencing ability decreased;step length decreased;weight-shifting ability decreased  - step length decreased;weight-shifting ability decreased;balance decreased during turns  -    Gait, Impairments  strength decreased;impaired balance;pain  - strength decreased;impaired balance  -    Gait, Comment Pt unable to take steps away from EOB.  - Pt unable to tolerated weightbearing this session. Steps to chair only.  - CUES TO WIDEN HILDA AND TO INCREASE STEP LENGTH. PT ADVANCING R LE INDEPENDENTLY.   -CD    Recorded by [] Elza Palomo, PT [] Elza Palomo, PT [CD] Floresita Griffin, PT    Motor Skills/Interventions    Additional Documentation   Balance Skills Training (Group)  -CD    Recorded by   [CD] Floresita Griffin, PT    Balance Skills Training    Sitting-Level of Assistance   Contact guard   MORE IMPULSIVE THIS AFTERNOON.   -CD    Sitting-Balance  Support   Right upper extremity supported;Left upper extremity supported;Feet supported  -    Standing-Level of Assistance Moderate assistance;x2  -SH  Minimum assistance  -CD    Static Standing Balance Support assistive device  -  assistive device  -CD    Standing-Balance Activities Weight Shift A-P;Weight Shift R-L   R LE buckling  -  Weight Shift R-L  -CD    Standing Balance # of Minutes 2  -SH  2  -CD    Recorded by [SH] Elza Palomo, PT  [CD] Floresita Griffin PT    Therapy Exercises    Bilateral Lower Extremities AAROM:;10 reps;supine;ankle pumps/circles;hip abduction/adduction;heel slides  - AAROM:;10 reps;sitting;ankle pumps/circles;hip abduction/adduction;hip flexion;LAQ  -     Bilateral Upper Extremity  AROM:;10 reps;sitting;elbow flexion/extension;shoulder extension/flexion  -     Recorded by [SH] Elza Palomo, PT [SH] Elza Palomo, PT     Positioning and Restraints    Pre-Treatment Position in bed  - in bed  - in bed  -CD    Post Treatment Position bed  - chair  - chair  -CD    In Bed supine;call light within reach;encouraged to call for assist;exit alarm on;with family/caregiver;waffle boots/both  -      In Chair  notified nsg;reclined;call light within reach;encouraged to call for assist;exit alarm on;on mechanical lift sling;R waffle boot  - reclined;call light within reach;encouraged to call for assist;legs elevated;on mechanical lift sling;waffle cushion   NSG IN TO SCAN BLADDER AND AGREES TO SET EXIT ALARM.   -CD    Recorded by [SH] Elza Palomo, PT [SH] Elza Palomo, PT [CD] Floresita Griffin, PT      User Key  (r) = Recorded By, (t) = Taken By, (c) = Cosigned By    Initials Name Effective Dates    CD Floresita Griffin, PT 06/19/15 -     SH Elza Palomo, PT 06/19/15 -     LR Nhi Hurt, PT 06/19/15 -                 IP PT Goals       02/12/18 0936 02/09/18 1413 02/09/18 1150    Bed Mobility PT LTG    Bed Mobility PT LTG, Date Established 02/09/18   -LR      Bed Mobility PT LTG, Time to Achieve 2 wks  -LR  2 wks  -CD    Bed Mobility PT LTG, Activity Type all bed mobility  -LR  all bed mobility  -CD    Bed Mobility PT LTG, [place] Level minimum assist (75% patient effort)  -LR  minimum assist (75% patient effort)  -CD    Bed Mobility PT Goal  LTG, Assist Device bed rails  -LR  bed rails  -CD    Bed Mobility PT LTG, Date Goal Reviewed 02/12/18  -LR      Bed Mobility PT LTG, Outcome goal ongoing  -LR goal ongoing  -CD     Transfer Training PT LTG    Transfer Training PT LTG, Date Established 02/10/18  -LR      Transfer Training PT LTG, Time to Achieve 2 wks  -LR  2 wks  -CD    Transfer Training PT LTG, Activity Type all transfers  -LR  all transfers  -CD    Transfer Training PT LTG, [place] Level contact guard assist  -LR  contact guard assist  -CD    Transfer Training PT LTG, Assist Device walker, rolling  -LR  walker, rolling  -CD    Transfer Training PT  LTG, Date Goal Reviewed 02/12/18  -LR      Transfer Training PT LTG, Outcome goal ongoing  -LR goal ongoing  -CD     Gait Training PT LTG    Gait Training Goal PT LTG, Date Established 02/09/18  -LR      Gait Training Goal PT LTG, Time to Achieve 2 wks  -LR  2 wks  -CD    Gait Training Goal PT LTG, [place] Level minimum assist (75% patient effort)  -LR  minimum assist (75% patient effort)  -CD    Gait Training Goal PT LTG, Assist Device walker, rolling  -LR  walker, rolling  -CD    Gait Training Goal PT LTG, Distance to Achieve 250 feet  -LR  250  -CD    Gait Training Goal PT LTG, Date Goal Reviewed 02/12/18  -LR      Gait Training Goal PT LTG, Outcome goal ongoing  -LR goal ongoing  -CD       User Key  (r) = Recorded By, (t) = Taken By, (c) = Cosigned By    Initials Name Provider Type    CD Floresita Griffin, PT Physical Therapist    LR Nhi Hurt, PT Physical Therapist          Physical Therapy Education     Title: PT OT SLP Therapies (Done)     Topic: Physical Therapy (Done)     Point:  Mobility training (Done)    Learning Progress Summary    Learner Readiness Method Response Comment Documented by Status   Patient Acceptance E,D VU,NR Educated on hip precautions, HEP, correct gait mechanics, and benefits of mobility/weight bearing.  02/12/18 1010 Done    Acceptance TB DU  TP 02/11/18 1624 Done    Acceptance E,D NR Hand placement  02/11/18 1334 Active    Acceptance E VU   02/11/18 1035 Done    Acceptance E NR   02/10/18 1558 Active    Acceptance E,D NR Gait training.  02/10/18 1141 Active    Acceptance E NR,NL SAFETY WITH MOBILITY, PRECAUTIONS, GAIT TRAINING AND TF TRAINING WITH R WALKER.  02/09/18 1412 Active    Acceptance E NR,NL PT HAS DEMENTIA. FAMILY NOT PRESENT. REVIEW POSTERIOR HIP PRECAUTIONS, TRANSFER/GAIT TRAINING WITH R WALKER, THER EX.  02/09/18 1149 Active    Acceptance TB DU  TP 02/08/18 1755 Done               Point: Home exercise program (Done)    Learning Progress Summary    Learner Readiness Method Response Comment Documented by Status   Patient Acceptance E,D VU,NR Educated on hip precautions, HEP, correct gait mechanics, and benefits of mobility/weight bearing.  02/12/18 1010 Done    Acceptance TB DU  TP 02/11/18 1624 Done    Acceptance E,D NR Hand placement  02/11/18 1334 Active    Acceptance E VU   02/11/18 1035 Done    Acceptance E NR   02/10/18 1558 Active    Acceptance E,D NR Gait training.  02/10/18 1141 Active    Acceptance E NR,NL SAFETY WITH MOBILITY, PRECAUTIONS, GAIT TRAINING AND TF TRAINING WITH R WALKER.  02/09/18 1412 Active    Acceptance E NR,NL PT HAS DEMENTIA. FAMILY NOT PRESENT. REVIEW POSTERIOR HIP PRECAUTIONS, TRANSFER/GAIT TRAINING WITH R WALKER, THER EX.  02/09/18 1149 Active    Acceptance TB DU  TP 02/08/18 1755 Done               Point: Body mechanics (Done)    Learning Progress Summary    Learner Readiness Method Response Comment Documented by Status   Patient Acceptance E,D VU,NR Educated on hip precautions, HEP, correct gait  mechanics, and benefits of mobility/weight bearing.  02/12/18 1010 Done    Acceptance TB DU  TP 02/11/18 1624 Done    Acceptance E,D NR Hand placement  02/11/18 1334 Active    Acceptance E VU   02/11/18 1035 Done    Acceptance E NR   02/10/18 1558 Active    Acceptance E,D NR Gait training.  02/10/18 1141 Active    Acceptance E NR,NL SAFETY WITH MOBILITY, PRECAUTIONS, GAIT TRAINING AND TF TRAINING WITH R WALKER.  02/09/18 1412 Active    Acceptance E NR,NL PT HAS DEMENTIA. FAMILY NOT PRESENT. REVIEW POSTERIOR HIP PRECAUTIONS, TRANSFER/GAIT TRAINING WITH R WALKER, THER EX.  02/09/18 1149 Active    Acceptance TB DU  TP 02/08/18 1755 Done               Point: Precautions (Done)    Learning Progress Summary    Learner Readiness Method Response Comment Documented by Status   Patient Acceptance E,D VU,NR Educated on hip precautions, HEP, correct gait mechanics, and benefits of mobility/weight bearing.  02/12/18 1010 Done    Acceptance TB DU  TP 02/11/18 1624 Done    Acceptance E,D NR Hand placement  02/11/18 1334 Active    Acceptance E VU   02/11/18 1035 Done    Acceptance E NR   02/10/18 1558 Active    Acceptance E,D NR Gait training.  02/10/18 1141 Active    Acceptance E NR,NL SAFETY WITH MOBILITY, PRECAUTIONS, GAIT TRAINING AND TF TRAINING WITH R WALKER.  02/09/18 1412 Active    Acceptance E NR,NL PT HAS DEMENTIA. FAMILY NOT PRESENT. REVIEW POSTERIOR HIP PRECAUTIONS, TRANSFER/GAIT TRAINING WITH R WALKER, THER EX.  02/09/18 1149 Active    Acceptance TB DU  TP 02/08/18 1755 Done                      User Key     Initials Effective Dates Name Provider Type Discipline     06/19/15 -  Floresita Griffin, PT Physical Therapist PT     06/19/15 -  Elza Palomo, PT Physical Therapist PT     06/19/15 -  Nhi Hurt, PT Physical Therapist PT     07/10/17 -  Milena Torres, RN Registered Nurse Nurse                    PT Recommendation and Plan  Anticipated Discharge Disposition: skilled  nursing facility  Planned Therapy Interventions: balance training, bed mobility training, gait training, home exercise program, strengthening, transfer training, patient/family education  PT Frequency: 2 times/day  Plan of Care Review  Plan Of Care Reviewed With: patient  Progress: progress toward functional goals as expected  Outcome Summary/Follow up Plan: Patient increased gait distance to 10 feet with RW and less assist required, limited by pain with weight bearing. Possible d/c to rehab today. Will continue to progress mobility and strength as able.           Outcome Measures       02/12/18 0936 02/11/18 1306 02/11/18 1006    How much help from another person do you currently need...    Turning from your back to your side while in flat bed without using bedrails? 3  -LR 2  -SH 2  -SH    Moving from lying on back to sitting on the side of a flat bed without bedrails? 2  -LR 2  -SH 2  -SH    Moving to and from a bed to a chair (including a wheelchair)? 2  -LR 2  -SH 2  -SH    Standing up from a chair using your arms (e.g., wheelchair, bedside chair)? 2  -LR 2  -SH 2  -SH    Climbing 3-5 steps with a railing? 1  -LR 1  -SH 1  -SH    To walk in hospital room? 2  -LR 2  -SH 2  -SH    AM-PAC 6 Clicks Score 12  -LR 11  -SH 11  -SH    Functional Assessment    Outcome Measure Options AM-PAC 6 Clicks Basic Mobility (PT)  -LR AM-PAC 6 Clicks Basic Mobility (PT)  -SH AM-PAC 6 Clicks Basic Mobility (PT)  -SH      02/10/18 1500 02/10/18 1003 02/09/18 1335    How much help from another person do you currently need...    Turning from your back to your side while in flat bed without using bedrails? 2  -SH 2  -SH 2  -CD    Moving from lying on back to sitting on the side of a flat bed without bedrails? 2  -SH 2  -SH 2  -CD    Moving to and from a bed to a chair (including a wheelchair)? 2  -SH 2  -SH 2  -CD    Standing up from a chair using your arms (e.g., wheelchair, bedside chair)? 2  -SH 2  -SH 2  -CD    Climbing 3-5 steps  with a railing? 1  - 1  - 1  -CD    To walk in hospital room? 2  - 2  -SH 2  -CD    AM-PAC 6 Clicks Score 11  -SH 11  -SH 11  -CD    Functional Assessment    Outcome Measure Options AM-PAC 6 Clicks Basic Mobility (PT)  -SH AM-PAC 6 Clicks Basic Mobility (PT)  -SH AM-PAC 6 Clicks Basic Mobility (PT)  -CD      User Key  (r) = Recorded By, (t) = Taken By, (c) = Cosigned By    Initials Name Provider Type    CD Floresita Griffin, PT Physical Therapist     Elza Palomo, PT Physical Therapist    LR Nhi Hurt, PT Physical Therapist           Time Calculation:         PT Charges       02/12/18 1011          Time Calculation    Start Time 0936  -LR      PT Received On 02/12/18  -LR      PT Goal Re-Cert Due Date 02/19/18  -LR      Time Calculation- PT    Total Timed Code Minutes- PT 23 minute(s)  -LR        User Key  (r) = Recorded By, (t) = Taken By, (c) = Cosigned By    Initials Name Provider Type    LR Nhi Hurt, PT Physical Therapist          Therapy Charges for Today     Code Description Service Date Service Provider Modifiers Qty    10786028104 HC GAIT TRAINING EA 15 MIN 2/12/2018 Nhi Hurt, PT GP 1    64220854980 HC PT THER PROC EA 15 MIN 2/12/2018 Nhi Hurt, PT GP 1          PT G-Codes  Outcome Measure Options: AM-PAC 6 Clicks Basic Mobility (PT)    Nhi Hurt, PT  2/12/2018

## 2018-02-12 NOTE — THERAPY TREATMENT NOTE
Acute Care - Occupational Therapy Treatment Note  [facility]     Patient Name: Zainab Worthy  : 2/3/1929  MRN: [id number]  Today's Date: 2018  Onset of Illness/Injury or Date of Surgery Date: 18  Date of Referral to OT: 18  Referring Physician: TINA      Admit Date: 2018    Visit Dx:     ICD-10-CM ICD-9-CM   1. Closed fracture of neck of right femur, initial encounter S72.001A 820.8   2. Acute UTI N39.0 599.0   3. Dementia without behavioral disturbance, unspecified dementia type F03.90 294.20   4. Fall, initial encounter W19.XXXA E888.9   5. Impaired mobility and ADLs Z74.09 799.89   6. Impaired functional mobility, balance, gait, and endurance Z74.09 V49.89     Patient Active Problem List   Diagnosis   • Gonalgia   • Hypertrophic polyarthritis   • Allergic rhinitis, seasonal   • Skin growth   • Endometrial cancer   • Post-operative state   • Rectal cancer   • Hydronephrosis   • Hypertension   • Anorexia   • Onychomycosis   • IUD complication   • Memory loss   • Urinary frequency   • Fall at home   • Closed fracture of pelvis   • UTI (urinary tract infection)   • Metabolic encephalopathy   • Fracture of femoral neck, right   • Dementia   • Metastatic colon cancer in female   • Recurrent falls   • Closed fracture of neck of right femur             Adult Rehabilitation Note       18 1127 18 0936 18 1306    Rehab Assessment/Intervention    Discipline occupational therapist  -ST physical therapist  -LR physical therapist  -SH    Document Type therapy note (daily note)  -ST therapy note (daily note)  -LR therapy note (daily note)  -SH    Subjective Information agree to therapy;complains of;weakness;fatigue;pain  -ST agree to therapy;no complaints  -LR agree to therapy  -SH    Patient Effort, Rehab Treatment adequate  -ST good  -LR good  -SH    Symptoms Noted During/After Treatment fatigue;increased pain  -ST fatigue;increased pain  -LR none  -SH    Symptoms Noted Comment   Notified RN.   -LR [place] pleasantly confused  -SH    Precautions/Limitations fall precautions;hip precautions- right;other (see comments)   dementia and [place]  -ST fall precautions;hip precautions- right;other (see comments)   [place], dementia  -LR fall precautions;hip precautions- right  -SH    Specific Treatment Considerations Incontinence.   -ST Incontinence.   -LR     Recorded by [ST] Latanya Pretty, OTR [LR] Nhi uHrt, PT [SH] Elza Palomo, PT    Pain Assessment    Pain Assessment Villasenor-Garcia FACES   pt unable to give numeric value   -ST Villasenor-Baker FACES  -LR Villasenor-Baker FACES  -SH    Villasenor-Garcia FACES Pain Rating 4  -ST 4  -LR 0  -SH    Pain Score   0  -SH    Post Pain Score   0  -SH    Pain Type Acute pain  -ST Acute pain  -LR     Pain Location Hip  -ST Hip  -LR     Pain Orientation Right  -ST Right  -LR     Pain Intervention(s) --   pre-medicated   -ST Repositioned;Ambulation/increased activity  -LR     Recorded by [ST] Latanya Pretty, OTR [LR] Nhi Hurt, PT [SH] Elza Palomo, PT    Cognitive Assessment/Intervention    Current Cognitive/Communication Assessment impaired  -ST impaired  -LR impaired  -SH    Orientation Status oriented to;person;situation  -ST oriented to;person;disoriented to;time;place;required verbal cueing (specifiy in comments)  -LR oriented to;person  -SH    Follows Commands/Answers Questions 75% of the time;needs cueing;needs increased time  -ST 75% of the time;able to follow single-step instructions;needs cueing;needs repetition  -LR able to follow single-step instructions;75% of the time;needs increased time;needs cueing;needs repetition  -SH    Personal Safety mild impairment  -ST mild impairment;decreased insight to deficits  -LR moderate impairment;decreased awareness, need for assist;decreased awareness, need for safety;decreased insight to deficits  -SH    Personal Safety Interventions fall prevention program maintained  -ST  fall prevention program  maintained;gait belt;nonskid shoes/slippers when out of bed  -SH    Recorded by [ST] Latanya Pretty, OTR [LR] Nhi Hurt, PT [SH] Elza Palomo, PT    Mobility Assessment/Training    Extremity Weight-Bearing Status right lower extremity  -ST right lower extremity  -LR     Right Lower Extremity Weight-Bearing weight-bearing as tolerated  -ST weight-bearing as tolerated  -LR     Recorded by [ST] Latanya Pretty, OTR [LR] Nhi Hurt, PT     Bed Mobility, Assessment/Treatment    Bed Mobility, Assistive Device  head of bed elevated;bed rails;draw sheet  -LR     Bed Mob, Supine to Sit, [employer]  verbal cues required;moderate assist (50% patient effort)  -LR     Bed Mob, Sit to Supine, [employer]  not tested   [facility] at end of treatment.   -LR maximum assist (25% patient effort);2 person assist required;verbal cues required  -    Bed Mobility, Safety Issues  decreased use of legs for bridging/pushing;decreased use of arms for pushing/pulling  -LR cognitive deficits limit understanding;decreased use of arms for pushing/pulling;decreased use of legs for bridging/pushing  -    Bed Mobility, Impairments  ROM decreased;strength decreased;pain;impaired balance  -LR strength decreased  -    Bed Mobility, Comment [facility] upon arrival   -ST Verbal cues to move LEs towards EOB and to push up from bed from behind to raise trunk into sitting and to scoot hips out to get feet on floor. Min assist with LEs and trunk and then mod assist via draw sheet to scoot hips out.   -LR Pt edu in correct sequence.  -    Recorded by [ST] Latanya Pretty, OTR [LR] Nhi Hurt, PT [SH] Elza Palomo, PT    Transfer Assessment/Treatment    Transfers, Sit-Stand [employer]  verbal cues required;minimum assist (75% patient effort);2 person assist required  -LR verbal cues required;moderate assist (50% patient effort);2 person assist required  -    Transfers, Stand-Sit [employer]  verbal cues  "required;minimum assist (75% patient effort);1 person + 1 person to manage equipment  -LR moderate assist (50% patient effort);2 person assist required;verbal cues required  -    Transfers, Sit-Stand-Sit, Assist Device  rolling walker  -LR rolling walker  -SH    Toilet Transfer, [place]  verbal cues required;moderate assist (50% patient effort);2 person assist required  -LR     Toilet Transfer, Assistive Device  rolling walker;bedside commode without drop arms  -LR     Transfer, Safety Issues  sequencing ability decreased;step length decreased;weight-shifting ability decreased  -LR     Transfer, Impairments  ROM decreased;strength decreased;impaired balance;pain  -LR strength decreased  -    Transfer, Comment pt refused, stating, \"I was just up\"  -ST Verbal cues to push up from bed to stand instead of pulling up on RW. Verbal cues to reach back for chair to lower into sitting. Assisted pt to and from BSC. Required repeated cues to reach back for BSC to sit and to push up from BSC to stand instead of pulling up on RW. Mod assist x2 to stand up from BSC. Patient with posterior lean upon standing and forward flexed. Cues for correction. Dependent with toileting hygiene.   -LR Pt performed sit to stand from chair with edu for safe hand placement, wants to pull up on RW.  -SH    Recorded by [ST] Latanya Pretty OTR [LR] Nhi Hurt, PT [SH] Elza Palomo, PT    Gait Assessment/Treatment    Gait, [place] Level  verbal cues required;minimum assist (75% patient effort);2 person assist required  -LR minimum assist (75% patient effort);2 person assist required;verbal cues required;nonverbal cues required (demo/gesture)  -SH    Gait, Assistive Device  rolling walker  -LR rolling walker  -SH    Gait, Distance (Feet)  10  -LR 8  -SH    Gait, Gait Pattern Analysis  swing-to gait  -LR swing-to gait  -SH    Gait, Gait Deviations  right:;antalgic;decreased heel strike;forward flexed posture;step length " decreased;toe-to-floor clearance decreased;weight-shifting ability decreased  -LR antalgic;right:;gabe decreased;decreased heel strike;forward flexed posture;step length decreased  -SH    Gait, Safety Issues  sequencing ability decreased;step length decreased;weight-shifting ability decreased  -LR balance decreased during turns;sequencing ability decreased;step length decreased;weight-shifting ability decreased  -    Gait, Impairments  ROM decreased;strength decreased;pain  -LR strength decreased  -    Gait, Comment  Patient ambulated with step to gait pattern at slow pace. Cues for sequencing of steps and upright posture. Patient remained forward flexed with posterior lean throughout gait training. Gait limited by pain. Chair brought up behind patient to sit when she could not proceed d/t pain.   -LR Pt took steps from chair to EOB, side stepped to head of bed with RW.  -SH    Recorded by  [LR] Nhi Hurt, PT [SH] Elza Palomo, PT    Self-Feeding Assessment/Training    Self-Feeding Assess/Train, Comment OT completed set-up of tray, pt able to load utensils and complete self-feeding appropriately   -ST      Recorded by [ST] Latanya Pretty OTR      Balance Skills Training    Sitting-Level of Assistance  Close supervision  -LR     Sitting-Balance Support  Feet unsupported;Right upper extremity supported;Left upper extremity supported  -LR     Sitting-Balance Activities  Right UE Weight Bearing;Left UE Weight Bearing;Trunk control activities  -LR     Sitting # of Minutes  5  -LR     Recorded by  [LR] Nhi Hurt, PT     Therapy Exercises    Bilateral Lower Extremities   AAROM:;10 reps;supine;heel slides;hip abduction/adduction;ankle pumps/circles  -    Bilateral Upper Extremity AROM:;10 reps;sitting;elbow flexion/extension;pronation/supination;shoulder abduction/adduction;shoulder extension/flexion   mod vc'ing and demo to complete initiation/completion  -ST      Exercise Protocols   total hip   hemiarthroplasty  -LR     Total Hip Exercises  right:;15 reps;completed protocol;with assist;ankle pumps/circles;quad set;glut set;heel slides;hip abduction;SAQ;SLR;LAQ   cues for technique;min assist SLR,heel slides,SLR,hip abd  -LR     Recorded by [ST] Latanya Pretty, OTR [LR] Nhi Hurt, PT [SH] Elza Palomo, PT    Positioning and Restraints    Pre-Treatment Position sitting in chair/recliner  -ST in bed  -LR sitting in chair/recliner  -SH    Post Treatment Position chair  -ST chair  -LR bed  -SH    In Bed   supine;call light within reach;encouraged to call for assist;exit alarm on;SCD pump applied;waffle boots/both  -SH    In Chair notified nsg;reclined;sitting;call light within reach;encouraged to call for assist;exit alarm on;on mechanical lift sling;waffle cushion;legs elevated   pt eating lunch  -ST notified nsg;reclined;sitting;call light within reach;encouraged to call for assist;exit alarm on;on mechanical lift sling;waffle cushion;legs elevated  -LR     Recorded by [ST] Latanya Pretty, OTR [LR] Nhi Hurt, PT [SH] Elza Palomo, PT      02/11/18 1006 02/10/18 1500 02/10/18 1003    Rehab Assessment/Intervention    Discipline physical therapist  -SH physical therapist  -SH physical therapist  -    Document Type therapy note (daily note)  - therapy note (daily note)  - therapy note (daily note)  -    Subjective Information agree to therapy  -SH agree to therapy  -SH agree to therapy   RN consents to PT.  -SH    Patient Effort, Rehab Treatment good  -SH adequate  -SH adequate  -SH    Symptoms Noted During/After Treatment   other (see comments)   Confusion  -SH    Symptoms Noted Comment [place], pleasantly confused.  -SH      Precautions/Limitations fall precautions;hip precautions- right  -SH fall precautions;hip precautions- right  -SH fall precautions;hip precautions- right  -SH    Recorded by [SH] Elza Palomo, PT [SH] Elza Palomo, PT [SH]  Elza Palomo, PT    Pain Assessment    Pain Assessment Villasenor-Garcia FACES  -SH Villasenor-Baker FACES  -SH Villasenor-Baker FACES  -SH    Villasenor-Garcia FACES Pain Rating 0  -SH 0  -SH 0  -SH    Pain Score 2  -SH 2  -SH     Post Pain Score 2  -SH 2  -SH 4  -SH    Pain Type Acute pain  -SH Acute pain  -SH Acute pain  -SH    Pain Location Hip  -SH Hip  -SH Hip  -SH    Pain Orientation Right  -SH Right  -SH Right  -SH    Pain Intervention(s) Repositioned;Ambulation/increased activity  -SH Repositioned;Ambulation/increased activity  -SH Repositioned;Ambulation/increased activity  -SH    Response to Interventions tolerated  -SH      Recorded by [SH] Elza Palomo, PT [SH] Elza Palomo, PT [SH] Elza Palomo, PT    Cognitive Assessment/Intervention    Current Cognitive/Communication Assessment impaired  -SH impaired  -SH impaired  -SH    Orientation Status oriented to;person  -SH oriented to;person;disoriented to;place;time;situation  -SH oriented to;person;disoriented to;place;time;situation   Upon entry pt naked, reported going to grocery.  -SH    Follows Commands/Answers Questions able to follow single-step instructions;75% of the time;needs cueing;needs increased time;needs repetition  -SH able to follow single-step instructions;75% of the time;needs cueing;needs increased time;needs repetition  -SH able to follow single-step instructions;75% of the time;needs cueing;needs increased time;needs repetition  -SH    Personal Safety moderate impairment;decreased awareness, need for safety;decreased awareness, need for assist;decreased insight to deficits  -SH moderate impairment;decreased awareness, need for assist;decreased awareness, need for safety;decreased insight to deficits  -SH moderate impairment;decreased awareness, need for assist;decreased insight to deficits;decreased awareness, need for safety  -    Personal Safety Interventions fall prevention program maintained;gait belt;nonskid shoes/slippers when out of bed   - fall prevention program maintained;gait belt;nonskid shoes/slippers when out of bed  - gait belt;fall prevention program maintained;nonskid shoes/slippers when out of bed  -    Recorded by [] Elza Palomo, PT [SH] Elza Palomo, PT [] Elza Palomo, PT    Bed Mobility, Assessment/Treatment    Bed Mobility, Assistive Device bed rails;head of bed elevated;draw sheet  - bed rails;draw sheet  - draw sheet;head of bed elevated;bed rails  -    Bed Mobility, Scoot/Bridge, [place] minimum assist (75% patient effort);verbal cues required;nonverbal cues required (demo/gesture)  -      Bed Mob, Supine to Sit, [place] moderate assist (50% patient effort);2 person assist required;verbal cues required  - verbal cues required;nonverbal cues required (demo/gesture);maximum assist (25% patient effort);2 person assist required  - maximum assist (25% patient effort);2 person assist required;verbal cues required;nonverbal cues required (demo/gesture)  -    Bed Mob, Sit to Supine, [place]  maximum assist (25% patient effort);1 person + 1 person to manage equipment;verbal cues required;nonverbal cues required (demo/gesture)  -     Bed Mobility, Safety Issues decreased use of arms for pushing/pulling;cognitive deficits limit understanding;decreased use of legs for bridging/pushing  - cognitive deficits limit understanding;decreased use of arms for pushing/pulling;decreased use of legs for bridging/pushing  - cognitive deficits limit understanding;decreased use of arms for pushing/pulling;decreased use of legs for bridging/pushing  -    Bed Mobility, Impairments strength decreased;impaired balance;pain  -SH strength decreased;impaired balance;pain  -SH strength decreased;impaired balance;pain  -SH    Bed Mobility, Comment VC for sequencing and hand placement. Increased alertness and active participation this session.  - VC for sequencing  - VC to move B LE to edge of bed, pt  reported inability to move R LE. Pt required vc for sequencing, technique, and hand placement.  -SH    Recorded by [SH] Elza Palomo, PT [SH] Elza Palomo, PT [SH] Elza Palomo, PT    Transfer Assessment/Treatment    Transfers, Bed-Chair-Bed, Assist Device   rolling walker  -SH    Transfers, Sit-Stand [place] moderate assist (50% patient effort);2 person assist required;nonverbal cues required (demo/gesture);verbal cues required  - moderate assist (50% patient effort);2 person assist required;nonverbal cues required (demo/gesture);verbal cues required  - moderate assist (50% patient effort);2 person assist required;verbal cues required;nonverbal cues required (demo/gesture)  -    Transfers, Stand-Sit [place] verbal cues required;nonverbal cues required (demo/gesture);moderate assist (50% patient effort);2 person assist required  - verbal cues required;nonverbal cues required (demo/gesture);moderate assist (50% patient effort);2 person assist required  - nonverbal cues required (demo/gesture);verbal cues required;moderate assist (50% patient effort);2 person assist required  -    Transfers, Sit-Stand-Sit, Assist Device rolling walker  -SH rolling walker  -SH rolling walker  -SH    Transfer, Safety Issues   sequencing ability decreased;step length decreased;weight-shifting ability decreased;knees buckling  -    Transfer, Impairments strength decreased;pain  - strength decreased;pain  -SH pain;impaired balance;strength decreased  -    Transfer, Comment Pt performed sit to stand from EOB, cues to push up from bed vs pulling up on RW.  -SH Sit to stand performed at EOB.  - Pt performed sit to stand from EOB, R knee buckling limiting standing tolerance. Pt edu in hand placement for sit/stand.  -SH    Recorded by [SH] Elza Palomo, PT [SH] Elza Palomo, PT [SH] Elza Palomo, PT    Gait Assessment/Treatment    Gait, [place] Level minimum assist (75% patient  effort);verbal cues required  -  moderate assist (50% patient effort);2 person assist required;verbal cues required;nonverbal cues required (demo/gesture)  -    Gait, Assistive Device rolling walker  -  rolling walker  -    Gait, Distance (Feet) 8  -SH  4  -SH    Gait, Gait Pattern Analysis swing-to gait  -  swing-to gait  -    Gait, Gait Deviations gabe decreased;decreased heel strike;step length decreased  -  right:;antalgic;gabe decreased;decreased heel strike;forward flexed posture;knee buckling;step length decreased  -    Gait, Safety Issues step length decreased;weight-shifting ability decreased  -  balance decreased during turns;sequencing ability decreased;step length decreased;weight-shifting ability decreased  -    Gait, Impairments strength decreased;pain  -  strength decreased;impaired balance;pain  -    Gait, Comment Pt demonstrated significant improvement this session.  - Pt unable to take steps away from EOB.  - Pt unable to tolerated weightbearing this session. Steps to chair only.  -    Recorded by [] Elza Palomo, PT [] Elza Palomo, PT [] Elza Palomo PT    Balance Skills Training    Standing-Level of Assistance Minimum assistance  - Moderate assistance;x2  -     Static Standing Balance Support assistive device  - assistive device  -     Standing-Balance Activities --   static stand to address BM  - Weight Shift A-P;Weight Shift R-L   R LE buckling  -     Standing Balance # of Minutes 3  -SH 2  -SH     Recorded by [] Elza Palomo, PT [] Elza Palomo PT     Therapy Exercises    Bilateral Lower Extremities  AAROM:;10 reps;supine;ankle pumps/circles;hip abduction/adduction;heel slides  - AAROM:;10 reps;sitting;ankle pumps/circles;hip abduction/adduction;hip flexion;LAQ  -    Bilateral Upper Extremity   AROM:;10 reps;sitting;elbow flexion/extension;shoulder extension/flexion  -    Recorded by  [] Elza Palomo  PT [SH] Elza Palomo, PT    Positioning and Restraints    Pre-Treatment Position in bed  -SH in bed  -SH in bed  -SH    Post Treatment Position chair  -SH bed  -SH chair  -SH    In Bed  supine;call light within reach;encouraged to call for assist;exit alarm on;with family/caregiver;waffle boots/both  -SH     In Chair reclined;call light within reach;encouraged to call for assist;exit alarm on;waffle boot/both  -SH  notified nsg;reclined;call light within reach;encouraged to call for assist;exit alarm on;on mechanical lift sling;R waffle boot  -SH    Recorded by [SH] Elza Palomo, PT [SH] Elza Palomo, PT [SH] Elza Palomo, PT      User Key  (r) = Recorded By, (t) = Taken By, (c) = Cosigned By    Initials Name Effective Dates     Elza Palomo, PT 06/19/15 -     ST Latanya Pretty, OTR 02/20/17 -     LR Nhi Hurt, PT 06/19/15 -                 OT Goals       02/12/18 1453 02/09/18 0926       Bed Mobility OT LTG    Bed Mobility OT LTG, Date Established  02/09/18  -HK     Bed Mobility OT LTG, Time to Achieve  5 days  -HK     Bed Mobility OT LTG, Activity Type  supine to sit/sit to supine;scoot/bridge  -HK     Bed Mobility OT LTG, [place] Level  minimum assist (75% patient effort);verbal cues required  -HK     Bed Mobility OT LTG, Outcome goal ongoing  -ST goal ongoing  -HK     Transfer Training OT LTG    Transfer Training OT LTG, Date Established  02/09/18  -HK     Transfer Training OT LTG, Time to Achieve  5 days  -HK     Transfer Training OT LTG, Activity Type  sit to stand/stand to sit;toilet  -HK     Transfer Training OT LTG, [place] Level  minimum assist (75% patient effort);verbal cues required  -HK     Transfer Training OT LTG, Assist Device  walker, rolling  -HK     Transfer Training OT LTG, Outcome goal ongoing  -ST goal ongoing  -HK     Static Standing Balance OT LTG    Static Standing Balance OT LTG, Date Established  02/09/18  -HK     Static Standing Balance OT  LTG, Time to Achieve  5 days  -HK     Static Standing Balance OT LTG, [place] Level  minimum assist (75% patient effort);verbal cues required  -HK     Static Standing Balance OT LTG, Outcome goal ongoing  -ST goal ongoing  -HK     Toileting OT LTG    Toileting Goal OT LTG, Date Established  02/09/18  -HK     Toileting Goal OT LTG, Time to Achieve  5 days  -HK     Toileting Goal OT LTG, Activity Type  Pt will complete post toilet hygiene and clothing management   -HK     Toileting Goal OT LTG, [place] Level  verbal cues required;contact guard assist  -HK     Toileting Goal OT LTG, Outcome goal ongoing  -ST goal ongoing  -HK       User Key  (r) = Recorded By, (t) = Taken By, (c) = Cosigned By    Initials Name Provider Type    ST Latanya Pretty OTR Occupational Therapist     Maricel Sandoval, OT Occupational Therapist          Occupational Therapy Education     Title: PT OT SLP Therapies (Active)     Topic: Occupational Therapy (Active)     Point: ADL training (Active)    Description: Instruct learner(s) on proper safety adaptation and remediation techniques during self care or transfers.   Instruct in proper use of assistive devices.    Learning Progress Summary    Learner Readiness Method Response Comment Documented by Status   Patient Acceptance E,TB,D NR HEP, self-feeding, THP (unable to recall), benefits of activity  02/12/18 1453 Active    Acceptance TB DU  TP 02/11/18 1624 Done    Acceptance E NR Pt educated on ADL retraining, safety precautions, and appropriate body mechanics.  02/09/18 0923 Active    Acceptance TB DU  TP 02/08/18 1755 Done               Point: Precautions (Active)    Description: Instruct learner(s) on prescribed precautions during self-care and functional transfers.    Learning Progress Summary    Learner Readiness Method Response Comment Documented by Status   Patient Acceptance E,TB,D NR HEP, self-feeding, THP (unable to recall), benefits of activity  02/12/18 1453 Active     Acceptance TB DU  TP 02/11/18 1624 Done    Acceptance E NR Pt educated on ADL retraining, safety precautions, and appropriate body mechanics.  02/09/18 0923 Active    Acceptance TB DU  TP 02/08/18 1755 Done               Point: Body mechanics (Done)    Description: Instruct learner(s) on proper positioning and spine alignment during self-care, functional mobility activities and/or exercises.    Learning Progress Summary    Learner Readiness Method Response Comment Documented by Status   Patient Acceptance TB DU  TP 02/11/18 1624 Done    Acceptance E NR Pt educated on ADL retraining, safety precautions, and appropriate body mechanics.  02/09/18 0923 Active    Acceptance TB DU  TP 02/08/18 1755 Done                      User Key     Initials Effective Dates Name Provider Type Discipline     02/20/17 -  Latanya Pretty OTR Occupational Therapist OT     07/10/17 -  Milena Torres, RN Registered Nurse Nurse     09/28/17 -  Maricel Sandoval, OT Occupational Therapist OT                  OT Recommendation and Plan  Anticipated Equipment Needs At Discharge: other (see comments) (TBD)  Anticipated Discharge Disposition: skilled nursing facility (Pt will require skilled rehab)  Therapy Frequency: daily (per priority policy)  Plan of Care Review  Plan Of Care Reviewed With: patient  Outcome Summary/Follow up Plan: Pt with mild confusion throughout session and requiring constant cuing for completion of UE exercises. Declined any OOC tasks.         Outcome Measures       02/12/18 1127 02/12/18 0936 02/11/18 1306    How much help from another person do you currently need...    Turning from your back to your side while in flat bed without using bedrails?  3  -LR 2  -SH    Moving from lying on back to sitting on the side of a flat bed without bedrails?  2  -LR 2  -SH    Moving to and from a bed to a chair (including a wheelchair)?  2  -LR 2  -SH    Standing up from a chair using your arms (e.g., wheelchair, bedside  chair)?  2  -LR 2  -SH    Climbing 3-5 steps with a railing?  1  -LR 1  -SH    To walk in hospital room?  2  -LR 2  -SH    AM-PAC 6 Clicks Score  12  -LR 11  -SH    How much help from another is currently needed...    Putting on and taking off regular lower body clothing? 1  -ST      Bathing (including washing, rinsing, and drying) 1  -ST      Toileting (which includes using toilet bed pan or urinal) 1  -ST      Putting on and taking off regular upper body clothing 3  -ST      Taking care of personal grooming (such as brushing teeth) 3  -ST      Eating meals 3  -ST      Score 12  -ST      Functional Assessment    Outcome Measure Options  AM-PAC 6 Clicks Basic Mobility (PT)  -LR AM-PAC 6 Clicks Basic Mobility (PT)  -SH      02/11/18 1006 02/10/18 1500 02/10/18 1003    How much help from another person do you currently need...    Turning from your back to your side while in flat bed without using bedrails? 2  -SH 2  -SH 2  -SH    Moving from lying on back to sitting on the side of a flat bed without bedrails? 2  -SH 2  -SH 2  -SH    Moving to and from a bed to a chair (including a wheelchair)? 2  -SH 2  -SH 2  -SH    Standing up from a chair using your arms (e.g., wheelchair, bedside chair)? 2  -SH 2  -SH 2  -SH    Climbing 3-5 steps with a railing? 1  -SH 1  -SH 1  -SH    To walk in hospital room? 2  -SH 2  -SH 2  -SH    AM-PAC 6 Clicks Score 11  -SH 11  -SH 11  -SH    Functional Assessment    Outcome Measure Options AM-PAC 6 Clicks Basic Mobility (PT)  -SH AM-PAC 6 Clicks Basic Mobility (PT)  -SH AM-PAC 6 Clicks Basic Mobility (PT)  -SH      User Key  (r) = Recorded By, (t) = Taken By, (c) = Cosigned By    Initials Name Provider Type    VENUS Palomo, PT Physical Therapist    ST Latanya Pretty, OTR Occupational Therapist    LR Nhi Hurt, PT Physical Therapist           Time Calculation:         Time Calculation- OT       02/12/18 8194          Time Calculation- OT    OT Start Time 1127  -ST       Total Timed Code Minutes- OT 12 minute(s)  -ST      OT Received On 02/12/18  -ST      OT Goal Re-Cert Due Date 02/19/18  -ST        User Key  (r) = Recorded By, (t) = Taken By, (c) = Cosigned By    Initials Name Provider Type     FELECIA Lopez Occupational Therapist           Therapy Charges for Today     Code Description Service Date Service Provider Modifiers Qty    [id number]  OT THERAPEUTIC ACT EA 15 MIN 2/12/2018 FELECIA Lopez GO 1               FELECIA Roegl  2/12/2018

## 2018-02-13 LAB
BACTERIA SPEC AEROBE CULT: NORMAL
BACTERIA SPEC AEROBE CULT: NORMAL

## 2018-03-01 ENCOUNTER — OFFICE VISIT (OUTPATIENT)
Dept: ORTHOPEDIC SURGERY | Facility: CLINIC | Age: 83
End: 2018-03-01

## 2018-03-01 DIAGNOSIS — Z96.649 S/P HIP HEMIARTHROPLASTY: Primary | ICD-10-CM

## 2018-03-01 PROCEDURE — 99024 POSTOP FOLLOW-UP VISIT: CPT | Performed by: ORTHOPAEDIC SURGERY

## 2018-03-01 RX ORDER — MIRTAZAPINE 15 MG/1
15 TABLET, FILM COATED ORAL NIGHTLY
COMMUNITY
End: 2019-09-09

## 2018-03-01 NOTE — PROGRESS NOTES
Orthopaedic Clinic Note:  Hip Post Op    Chief Complaint   Patient presents with   • Post-op     3 weeks - Right hip hemiarthroplasty 2/8/17        HPI    Ms. Worthy is 3  week(s) s/p Right hip hemiarthroplasty. Rates pain 0/10. She is ambulating with a walker and is taking nothing for pain control. She denies fevers, chills, or constitutional symptoms. She is continuing therapy at her assisted living facility.  Her primary means of mobilization as a wheelchair, but she is using a walker and ambulating with the assistance of therapy.  She is improving overall.  She denies fevers, chills, constitutional symptoms.    Past Medical History:   Diagnosis Date   • Anorexia    • Arthritis    • Asthma    • Cataract    • Colon cancer    • Colon cancer metastasized to liver    • DDD (degenerative disc disease), lumbar    • Hearing loss    • History of transfusion    • Hypertension    • Liver cancer    • Macular degeneration    • Onychomycosis    • Urine frequency       Past Surgical History:   Procedure Laterality Date   • CATARACT EXTRACTION Bilateral    • COLON RESECTION  06/16/2009    Low anterior resection   • GALLBLADDER SURGERY      Resection of gallbladder   • HIP HEMIARTHROPLASTY Right 2/8/2018    Procedure: HIP HEMIARTHROPLASTY;  Surgeon: Fernando Dominguez MD;  Location: [facility];  Service:    • LIVER SURGERY  12/09/2014    Liver tumor, 1/2 was removed.   • PARTIAL HYSTERECTOMY        Family History   Problem Relation Age of Onset   • Arthritis Other    • Cervical cancer Other    • Uterine cancer Other    • Colon cancer Other      Social History     Social History   • Marital status:      Spouse name: N/A   • Number of children: 3   • Years of education: N/A     Occupational History   • homemaker       Social History Main Topics   • Smoking status: Never Smoker   • Smokeless tobacco: Never Used   • Alcohol use No   • Drug use: No   • Sexual activity: No     Other Topics Concern   • Not on file     Social  History Narrative      Current Outpatient Prescriptions on File Prior to Visit   Medication Sig Dispense Refill   • acetaminophen (TYLENOL) 325 MG tablet Take 2 tablets by mouth Every 6 (Six) Hours As Needed for Mild Pain .     • aspirin 325 MG EC tablet Take 1 tablet by mouth Daily for 30 days. 30 tablet 0   • bisacodyl (DULCOLAX) 10 MG suppository Insert 1 suppository into the rectum Daily As Needed for Constipation.     • diclofenac (VOLTAREN) 1 % gel gel   0   • docusate sodium 100 MG capsule Take 100 mg by mouth 2 (Two) Times a Day.     • donepezil (ARICEPT) 10 MG tablet Take 10 mg by mouth Every Night.     • famotidine (PEPCID) 20 MG tablet Take 1 tablet by mouth 2 (Two) Times a Day.     • HYDROcodone-acetaminophen (NORCO) 5-325 MG per tablet Take 1 tablet by mouth Every 6 (Six) Hours As Needed for Moderate Pain .     • lisinopril (PRINIVIL,ZESTRIL) 2.5 MG tablet Take 1 tablet by mouth Daily.     • LORazepam (ATIVAN) 0.5 MG tablet Take 0.5 mg by mouth Every 8 (Eight) Hours As Needed for Anxiety.     • memantine (NAMENDA) 10 MG tablet Take 10 mg by mouth 2 (Two) Times a Day.     • polyethylene glycol (MIRALAX) pack packet Take 17 g by mouth Daily.     • raNITIdine (ZANTAC) 150 MG tablet Take 150 mg by mouth 2 (Two) Times a Day.       No current facility-administered medications on file prior to visit.       No Known Allergies     Review of Systems   Constitutional: Negative.    HENT: Negative.    Eyes: Negative.    Respiratory: Negative.    Cardiovascular: Negative.    Gastrointestinal: Negative.    Endocrine: Negative.    Genitourinary: Negative.    Musculoskeletal: Positive for arthralgias.   Skin: Negative.    Allergic/Immunologic: Negative.    Neurological: Negative.    Hematological: Negative.    Psychiatric/Behavioral: Negative.         Physical Exam  not currently breastfeeding.    There is no height or weight on file to calculate BMI.    GENERAL APPEARANCE: awake, alert, oriented, in no acute  distress  LUNGS:  breathing nonlabored  EXTREMITIES: no clubbing, cyanosis  PERIPHERAL PULSES: palpable dorsalis pedis and posterior tibial pulses bilaterally.    GAIT:  Not assessed             Hip Exam:  Right    RANGE OF MOTION:  EXTENSION/FLEXION:  normal (0-110 degrees)  IR:  20  ER:  40  PAIN WITH HIP MOTION:  no  PAIN WITH LOGROLL:  no     STRENGTH:  ABDUCTOR:  4/5  ADDUCTOR:  4/5  HIP FLEXION:  4/5    GREATER TROCHANTER BURSAL PAIN:  no    SENSATION TO LIGHT TOUCH:  DEEP PERONEAL/SUPERFICIAL PERONEAL/SURAL/SAPHENOUS/TIBIAL:   intact    EDEMA:  no  ERYTHEMA:  no  WOUNDS/INCISIONS:  yes, posterior lateral incision is well-healed with no erythema or drainage  _______________________________________________________________  _______________________________________________________________    RADIOGRAPHIC FINDINGS:   Indication: Status post right hip hemiarthroplasty     Comparison: Todays xrays were compared to previous xrays from 2/8/18     AP pelvis: Right: Demonstrate a well positioned hip hemiarthroplasty without evidence of wear, loosening, fracture or osteolysis, femoral head is concentrically reduced within the acetabulum and No significant changes compared to prior radiographs.;Left: moderate joint space narrowing,  minimal osteophyte formation        Assessment/Plan:   Diagnosis Plan   1. S/P hip hemiarthroplasty  XR Hip With or Without Pelvis 1 View Right     Patient is doing well 3 weeks status post right hip hemiarthroplasty.  She is to continue her hip precautions for an additional 3 weeks.  She is continue working with therapy on gait training and strengthening.  I'll see her back in 3 weeks with repeat x-ray AP pelvis upon return.    ANA Chavarria(R)  03/01/18  2:59 PM

## 2018-03-27 ENCOUNTER — OFFICE VISIT (OUTPATIENT)
Dept: ORTHOPEDIC SURGERY | Facility: CLINIC | Age: 83
End: 2018-03-27

## 2018-03-27 DIAGNOSIS — Z96.649 S/P HIP HEMIARTHROPLASTY: Primary | ICD-10-CM

## 2018-03-27 PROCEDURE — 99024 POSTOP FOLLOW-UP VISIT: CPT | Performed by: ORTHOPAEDIC SURGERY

## 2018-03-27 NOTE — PROGRESS NOTES
Orthopaedic Clinic Note:  Hip Post Op    Chief Complaint   Patient presents with   • Right Hip - Follow-up     3 week f/u  6 week post HIP HEMIARTHROPLASTY - Right 2/8/18        HPI    Ms. Worthy is 6  week(s) s/p Right hip hemiarthroplasty.  She is ambulating with the assistance of a walker.  She rates her pain a 0/10 on the pain scale currently but has increased pain localized to the abductor muscles with ambulation.  She denies any fevers, chills, constitutional symptoms.  She is taking Tylenol for pain control.  She is continuing to do therapy at her assisted living facility and is overall making progress per the daughter.  They deny any complications.  She is in better overall.    Past Medical History:   Diagnosis Date   • Anorexia    • Arthritis    • Asthma    • Cataract    • Colon cancer    • Colon cancer metastasized to liver    • DDD (degenerative disc disease), lumbar    • Hearing loss    • History of transfusion    • Hypertension    • Liver cancer    • Macular degeneration    • Onychomycosis    • Urine frequency       Past Surgical History:   Procedure Laterality Date   • CATARACT EXTRACTION Bilateral    • COLON RESECTION  06/16/2009    Low anterior resection   • GALLBLADDER SURGERY      Resection of gallbladder   • HIP HEMIARTHROPLASTY Right 2/8/2018    Procedure: HIP HEMIARTHROPLASTY;  Surgeon: Fernando Dominguez MD;  Location: [facility];  Service:    • LIVER SURGERY  12/09/2014    Liver tumor, 1/2 was removed.   • PARTIAL HYSTERECTOMY        Family History   Problem Relation Age of Onset   • Arthritis Other    • Cervical cancer Other    • Uterine cancer Other    • Colon cancer Other      Social History     Social History   • Marital status:      Spouse name: N/A   • Number of children: 3   • Years of education: N/A     Occupational History   • homemaker       Social History Main Topics   • Smoking status: Never Smoker   • Smokeless tobacco: Never Used   • Alcohol use No   • Drug use: No   •  Sexual activity: No     Other Topics Concern   • Not on file     Social History Narrative   • No narrative on file      Current Outpatient Prescriptions on File Prior to Visit   Medication Sig Dispense Refill   • acetaminophen (TYLENOL) 325 MG tablet Take 2 tablets by mouth Every 6 (Six) Hours As Needed for Mild Pain .     • bisacodyl (DULCOLAX) 10 MG suppository Insert 1 suppository into the rectum Daily As Needed for Constipation.     • diclofenac (VOLTAREN) 1 % gel gel   0   • docusate sodium 100 MG capsule Take 100 mg by mouth 2 (Two) Times a Day.     • donepezil (ARICEPT) 10 MG tablet Take 10 mg by mouth Every Night.     • famotidine (PEPCID) 20 MG tablet Take 1 tablet by mouth 2 (Two) Times a Day.     • HYDROcodone-acetaminophen (NORCO) 5-325 MG per tablet Take 1 tablet by mouth Every 6 (Six) Hours As Needed for Moderate Pain .     • lisinopril (PRINIVIL,ZESTRIL) 2.5 MG tablet Take 1 tablet by mouth Daily.     • LORazepam (ATIVAN) 0.5 MG tablet Take 0.5 mg by mouth Every 8 (Eight) Hours As Needed for Anxiety.     • memantine (NAMENDA) 10 MG tablet Take 10 mg by mouth 2 (Two) Times a Day.     • mirtazapine (REMERON) 15 MG tablet Take 15 mg by mouth Every Night.     • polyethylene glycol (MIRALAX) pack packet Take 17 g by mouth Daily.     • raNITIdine (ZANTAC) 150 MG tablet Take 150 mg by mouth 2 (Two) Times a Day.       No current facility-administered medications on file prior to visit.       No Known Allergies     Review of Systems     Physical Exam  not currently breastfeeding.    There is no height or weight on file to calculate BMI.    GENERAL APPEARANCE: awake, alert, oriented, in no acute distress  LUNGS:  breathing nonlabored  EXTREMITIES: no clubbing, cyanosis  PERIPHERAL PULSES: palpable dorsalis pedis and posterior tibial pulses bilaterally.    GAIT:  Trendelenberg            Hip Exam:  Right    RANGE OF MOTION:  EXTENSION/FLEXION:  normal (0-110 degrees)  IR:  15  ER:  35  PAIN WITH HIP MOTION:   no  PAIN WITH LOGROLL:  no     STRENGTH:  ABDUCTOR:  4/5  ADDUCTOR:  4/5  HIP FLEXION:  4/5    GREATER TROCHANTER BURSAL PAIN:  yes    SENSATION TO LIGHT TOUCH:  DEEP PERONEAL/SUPERFICIAL PERONEAL/SURAL/SAPHENOUS/TIBIAL:   intact    EDEMA:  no  ERYTHEMA:  no  WOUNDS/INCISIONS:  yes, well-healed posterior lateral hip incision  _______________________________________________________________  _______________________________________________________________    RADIOGRAPHIC FINDINGS:   Indication: Status post right hip hemiarthroplasty     Comparison: Todays xrays were compared to previous xrays from 3/1/18     AP pelvis: Right: Demonstrate a well positioned hip hemiarthroplasty without evidence of wear, loosening, fracture or osteolysis, femoral head is concentrically reduced within the acetabulum and No significant changes compared to prior radiographs.      Assessment/Plan:   Diagnosis Plan   1. S/P hip hemiarthroplasty       Patient is doing well 6 weeks status post right hip hemiarthroplasty.  Her pain with weightbearing is related to muscle soreness secondary to deconditioning.  She clinically has excellent hip range of motion which is asymptomatic.  Furthermore her incision looks excellent.  I instructed her to continue working on gait training and strengthening.  I'll see her back in 6 weeks with repeat x-ray AP pelvis    Fernando Dominguez MD  03/27/18  1:16 PM

## 2018-05-17 ENCOUNTER — OFFICE VISIT (OUTPATIENT)
Dept: ORTHOPEDIC SURGERY | Facility: CLINIC | Age: 83
End: 2018-05-17

## 2018-05-17 VITALS
WEIGHT: 113 LBS | DIASTOLIC BLOOD PRESSURE: 64 MMHG | SYSTOLIC BLOOD PRESSURE: 140 MMHG | HEIGHT: 60 IN | BODY MASS INDEX: 22.19 KG/M2 | HEART RATE: 91 BPM

## 2018-05-17 DIAGNOSIS — Z96.641 STATUS POST RIGHT HIP REPLACEMENT: ICD-10-CM

## 2018-05-17 DIAGNOSIS — M17.0 PRIMARY OSTEOARTHRITIS OF BOTH KNEES: ICD-10-CM

## 2018-05-17 DIAGNOSIS — Z96.649 S/P HIP HEMIARTHROPLASTY: Primary | ICD-10-CM

## 2018-05-17 PROCEDURE — 99213 OFFICE O/P EST LOW 20 MIN: CPT | Performed by: ORTHOPAEDIC SURGERY

## 2018-05-17 PROCEDURE — 20610 DRAIN/INJ JOINT/BURSA W/O US: CPT | Performed by: ORTHOPAEDIC SURGERY

## 2018-05-17 RX ORDER — BUPIVACAINE HYDROCHLORIDE 2.5 MG/ML
3 INJECTION, SOLUTION INFILTRATION; PERINEURAL
Status: COMPLETED | OUTPATIENT
Start: 2018-05-17 | End: 2018-05-17

## 2018-05-17 RX ORDER — CEFUROXIME AXETIL 250 MG/1
250 TABLET ORAL 2 TIMES DAILY
COMMUNITY
End: 2018-09-10 | Stop reason: HOSPADM

## 2018-05-17 RX ORDER — TRIAMCINOLONE ACETONIDE 40 MG/ML
80 INJECTION, SUSPENSION INTRA-ARTICULAR; INTRAMUSCULAR
Status: COMPLETED | OUTPATIENT
Start: 2018-05-17 | End: 2018-05-17

## 2018-05-17 RX ORDER — LIDOCAINE HYDROCHLORIDE 10 MG/ML
3 INJECTION, SOLUTION INFILTRATION; PERINEURAL
Status: COMPLETED | OUTPATIENT
Start: 2018-05-17 | End: 2018-05-17

## 2018-05-17 RX ADMIN — BUPIVACAINE HYDROCHLORIDE 3 ML: 2.5 INJECTION, SOLUTION INFILTRATION; PERINEURAL at 14:04

## 2018-05-17 RX ADMIN — TRIAMCINOLONE ACETONIDE 80 MG: 40 INJECTION, SUSPENSION INTRA-ARTICULAR; INTRAMUSCULAR at 14:04

## 2018-05-17 RX ADMIN — LIDOCAINE HYDROCHLORIDE 3 ML: 10 INJECTION, SOLUTION INFILTRATION; PERINEURAL at 14:04

## 2018-05-17 NOTE — PROGRESS NOTES
Procedure   Large Joint Arthrocentesis  Date/Time: 5/17/2018 2:04 PM  Consent given by: patient  Site marked: site marked  Timeout: Immediately prior to procedure a time out was called to verify the correct patient, procedure, equipment, support staff and site/side marked as required   Supporting Documentation  Indications: pain   Procedure Details  Location: knee - R knee  Needle size: 22 G  Approach: anterolateral  Medications administered: 3 mL bupivacaine 0.25 %; 3 mL lidocaine 1 %; 80 mg triamcinolone acetonide 40 MG/ML  Patient tolerance: patient tolerated the procedure well with no immediate complications    Large Joint Arthrocentesis  Date/Time: 5/17/2018 2:04 PM  Consent given by: patient  Site marked: site marked  Timeout: Immediately prior to procedure a time out was called to verify the correct patient, procedure, equipment, support staff and site/side marked as required   Supporting Documentation  Indications: pain   Procedure Details  Location: knee - L knee  Needle size: 22 G  Approach: anterolateral  Medications administered: 3 mL bupivacaine 0.25 %; 3 mL lidocaine 1 %; 80 mg triamcinolone acetonide 40 MG/ML  Patient tolerance: patient tolerated the procedure well with no immediate complications

## 2018-05-17 NOTE — PROGRESS NOTES
Orthopaedic Clinic Note: Hip Established Patient    Chief Complaint   Patient presents with   • Right Hip - Follow-up     2 month follow up - status post 3 months HIP HEMIARTHROPLASTY - Right 2/8/18        HPI    It has been 2  month(s) since Ms. Worthy's last visit. She returns to clinic today for Follow-up of right hip hemiarthroplasty as well as evaluation of bilateral knee pain.  Patient denies any problems with her hip rates her knee pain a 6/10 on the pain scale.  Weightbearing increases both knees significantly.  Walking and standing with rehabbing decrease her symptoms.  Sitting and resting improve her symptoms.  She is here today to discuss treatment options for her ongoing knee pain as well as follow-up evaluation of her right hip.    Past Medical History:   Diagnosis Date   • Anorexia    • Arthritis    • Asthma    • Cataract    • Colon cancer    • Colon cancer metastasized to liver    • DDD (degenerative disc disease), lumbar    • Hearing loss    • History of transfusion    • Hypertension    • Liver cancer    • Macular degeneration    • Onychomycosis    • Urine frequency       Past Surgical History:   Procedure Laterality Date   • CATARACT EXTRACTION Bilateral    • COLON RESECTION  06/16/2009    Low anterior resection   • GALLBLADDER SURGERY      Resection of gallbladder   • HIP HEMIARTHROPLASTY Right 2/8/2018    Procedure: HIP HEMIARTHROPLASTY;  Surgeon: Fernando Dominguez MD;  [facility];  Service:    • LIVER SURGERY  12/09/2014    Liver tumor, 1/2 was removed.   • PARTIAL HYSTERECTOMY        Family History   Problem Relation Age of Onset   • Arthritis Other    • Cervical cancer Other    • Uterine cancer Other    • Colon cancer Other      Social History     Social History   • Marital status:      Spouse name: N/A   • Number of children: 3   • Years of education: N/A     Occupational History   • homemaker       Social History Main Topics   • Smoking status: Never Smoker   • Smokeless  tobacco: Never Used   • Alcohol use No   • Drug use: No   • Sexual activity: No     Other Topics Concern   • Not on file     Social History Narrative   • No narrative on file      Current Outpatient Prescriptions on File Prior to Visit   Medication Sig Dispense Refill   • acetaminophen (TYLENOL) 325 MG tablet Take 2 tablets by mouth Every 6 (Six) Hours As Needed for Mild Pain .     • bisacodyl (DULCOLAX) 10 MG suppository Insert 1 suppository into the rectum Daily As Needed for Constipation.     • diclofenac (VOLTAREN) 1 % gel gel   0   • docusate sodium 100 MG capsule Take 100 mg by mouth 2 (Two) Times a Day.     • donepezil (ARICEPT) 10 MG tablet Take 10 mg by mouth Every Night.     • famotidine (PEPCID) 20 MG tablet Take 1 tablet by mouth 2 (Two) Times a Day.     • HYDROcodone-acetaminophen (NORCO) 5-325 MG per tablet Take 1 tablet by mouth Every 6 (Six) Hours As Needed for Moderate Pain .     • lisinopril (PRINIVIL,ZESTRIL) 2.5 MG tablet Take 1 tablet by mouth Daily.     • LORazepam (ATIVAN) 0.5 MG tablet Take 0.5 mg by mouth Every 8 (Eight) Hours As Needed for Anxiety.     • memantine (NAMENDA) 10 MG tablet Take 10 mg by mouth 2 (Two) Times a Day.     • mirtazapine (REMERON) 15 MG tablet Take 15 mg by mouth Every Night.     • polyethylene glycol (MIRALAX) pack packet Take 17 g by mouth Daily.     • raNITIdine (ZANTAC) 150 MG tablet Take 150 mg by mouth 2 (Two) Times a Day.       No current facility-administered medications on file prior to visit.       No Known Allergies     Review of Systems   Constitutional: Negative.    HENT: Negative.    Eyes: Negative.    Respiratory: Negative.    Cardiovascular: Negative.    Gastrointestinal: Negative.    Endocrine: Negative.    Genitourinary: Negative.    Musculoskeletal: Positive for arthralgias.   Skin: Negative.    Allergic/Immunologic: Negative.    Neurological: Negative.    Hematological: Negative.    Psychiatric/Behavioral: Negative.         Physical Exam  Blood  "pressure 140/64, pulse 91, height 152.4 cm (60\"), weight 51.3 kg (113 lb)    Body mass index is 22.07 kg/m².    GENERAL APPEARANCE: awake, alert, oriented, in no acute distress  LUNGS:  breathing nonlabored  EXTREMITIES: no clubbing, cyanosis  PERIPHERAL PULSES: palpable dorsalis pedis and posterior tibial pulses bilaterally.    GAIT:  Not assessed             Hip Exam:  Right    RANGE OF MOTION:  EXTENSION/FLEXION:  normal (0-110 degrees)  IR (at 90 degrees of flexion):  15  ER (at 90 degrees of flexion):  40  PAIN WITH HIP MOTION:  no  PAIN WITH LOGROLL:  no     STINCHFIELD TEST: negative    STRENGTH:  ABDUCTOR:  4/5  ADDUCTOR:  5/5  HIP FLEXION:  4/5    GREATER TROCHANTER BURSAL PAIN:  no    SENSATION TO LIGHT TOUCH:  DEEP PERONEAL/SUPERFICIAL PERONEAL/SURAL/SAPHENOUS/TIBIAL:   intact    EDEMA:  no  ERYTHEMA:  no  WOUNDS/INCISIONS:  yes, Well-healed posterior lateral hip incision   ----------------------------------------------------  Bilateral knee exam:  ----------  ALIGNMENT: Moderate varus, correctable to neutral     RANGE OF MOTION:  Decreased (5 - 120 degrees)   LIGAMENTOUS STABILITY:   stable to varus and valgus stress at terminal extension and 30 degrees; slight retensioning of the MCL is appreciated with valgus stress at 30 degrees consistent with medial compartment degeneration     STRENGTH:  4/5 knee flexion, extension. 5/5 ankle dorsiflexion and plantarflexion.     PAIN WITH PALPATION: global  KNEE EFFUSION: yes, mild effusion  PAIN WITH KNEE ROM: yes, global   PATELLAR CREPITUS: yes, painful and symptomatic  SPECIAL EXAM FINDINGS:  none    REFLEXES:  PATELLAR 2+/4  ACHILLES 2+/4    CLONUS: no  STRAIGHT LEG TEST:   negative    SENSATION TO LIGHT TOUCH:  DEEP PERONEAL/SUPERFICIAL PERONEAL/SURAL/SAPHENOUS/TIBIAL:   intact    EDEMA:  no  ERYTHEMA:  no  WOUNDS/INCISIONS: " no      _________________________________________________________________  _________________________________________________________________    RADIOGRAPHIC FINDINGS:   Indication: Status post right hip hemiarthroplasty, bilateral knee pain    Comparison: Todays xrays were compared to previous xrays from 3/27/18 of the pelvis     AP pelvis: Right: Demonstrate a well positioned hip hemiarthroplasty without evidence of wear, loosening, fracture or osteolysis, femoral head is concentrically reduced within the acetabulum and No significant changes compared to prior radiographs.    Bilateral knees 4 views:  moderate to severe tricompartmental arthritis, periarticular osteophytes visualized in all compartments      Assessment/Plan:   Diagnosis Plan   1. S/P hip hemiarthroplasty     2. Status post right hip replacement  XR Hip With or Without Pelvis 1 View Right   3. Primary osteoarthritis of both knees  XR Knee 4+ View Bilateral     Patient is doing well 3 months status post right hip hemiarthroplasty.  She can continue activity as tolerated with regards the right hip.  In regards to her bilateral knees, she has end-stage osteoarthritis.  She is not a good candidate for total joint arthroplasty for the knees.  I recommended proceeding with cortisone injections.  She is agreeable to this plan.  She'll follow up in 3 months for repeat assessment.    Procedure Note:  I discussed with the patient the potential benefits of performing a therapeutic injections of the bilateral knees as well as potential risks including but not limited to infection, swelling, pain, bleeding, bruising, nerve/vessel damage, skin color changes, transient elevation in blood glucose levels, and fat atrophy. After informed consent and after the areas were prepped with alcohol, ethyl chloride was used to numb the skin. Via the superolateral approach, 3cc of 1% lidocaine, 3cc of 0.25% marcaine and 2 cc of 40mg/ml of Kenalog were each injected into the  bilateral knees. The patient tolerated the procedures well. There were no complications. A sterile dressing was placed over each injection site.    Fernando Dominguez MD  05/17/18  2:24 PM

## 2018-05-23 ENCOUNTER — OFFICE VISIT (OUTPATIENT)
Dept: NEUROLOGY | Facility: CLINIC | Age: 83
End: 2018-05-23

## 2018-05-23 ENCOUNTER — APPOINTMENT (OUTPATIENT)
Dept: LAB | Facility: HOSPITAL | Age: 83
End: 2018-05-23

## 2018-05-23 VITALS
SYSTOLIC BLOOD PRESSURE: 138 MMHG | WEIGHT: 113 LBS | DIASTOLIC BLOOD PRESSURE: 70 MMHG | HEIGHT: 60 IN | BODY MASS INDEX: 22.19 KG/M2

## 2018-05-23 DIAGNOSIS — F02.80 LATE ONSET ALZHEIMER'S DISEASE WITHOUT BEHAVIORAL DISTURBANCE (HCC): Primary | ICD-10-CM

## 2018-05-23 DIAGNOSIS — G30.1 LATE ONSET ALZHEIMER'S DISEASE WITHOUT BEHAVIORAL DISTURBANCE (HCC): Primary | ICD-10-CM

## 2018-05-23 LAB
FOLATE SERPL-MCNC: >24 NG/ML (ref 3.2–20)
TSH SERPL DL<=0.05 MIU/L-ACNC: 2.03 MIU/ML (ref 0.35–5.35)
VIT B12 BLD-MCNC: 360 PG/ML (ref 211–911)

## 2018-05-23 PROCEDURE — 82746 ASSAY OF FOLIC ACID SERUM: CPT | Performed by: PSYCHIATRY & NEUROLOGY

## 2018-05-23 PROCEDURE — 82607 VITAMIN B-12: CPT | Performed by: PSYCHIATRY & NEUROLOGY

## 2018-05-23 PROCEDURE — 84443 ASSAY THYROID STIM HORMONE: CPT | Performed by: PSYCHIATRY & NEUROLOGY

## 2018-05-23 PROCEDURE — 36415 COLL VENOUS BLD VENIPUNCTURE: CPT | Performed by: PSYCHIATRY & NEUROLOGY

## 2018-05-23 PROCEDURE — 99205 OFFICE O/P NEW HI 60 MIN: CPT | Performed by: PSYCHIATRY & NEUROLOGY

## 2018-05-23 NOTE — PROGRESS NOTES
"Subjective     CC: Memory Loss (NP)    History of Present Illness   Zainab Worthy is a 89 y.o. female who comes to clinic today for evaluation of memory impairment. Her family  has noted symptoms since approximately 2015 marked initially by forgetfulness. This has gradually worsened  over time. Additional associated symptoms have included impairments in essentially all spheres of cognition. She has had no definite symptoms of BPSD though there is a suspicion of delusions. There are not clear modifying factors. She is currently residing at [facility].     Prior evaluation has included CBC/CMP which were unremarkable . She is currently taking donepezil and memantine.    I have reviewed and confirmed the past family, social and medical history as accurate on 5/23/18.     Review of Systems   Unable to perform ROS: Dementia       Objective   General appearance today is normal.   Peripheral pulses were present and symmetric.   The ophthalmoscopic exam today is unremarkable. The discs and posterior elements are unremarkable.    /70   Ht 152.4 cm (60\")   Wt 51.3 kg (113 lb)   BMI 22.07 kg/m²     Physical Exam   Neurological: She has normal strength. She has a normal Finger-Nose-Finger Test.   Reflex Scores:       Tricep reflexes are 2+ on the right side and 2+ on the left side.       Bicep reflexes are 2+ on the right side and 2+ on the left side.       Brachioradialis reflexes are 2+ on the right side and 2+ on the left side.       Patellar reflexes are 2+ on the right side and 2+ on the left side.       Achilles reflexes are 2+ on the right side and 2+ on the left side.  Psychiatric: Her speech is normal.        Neurologic Exam     Mental Status   Oriented to person.   Disoriented to place.   Disoriented to time.   Registration: recalls 3 of 3 objects. Recall at 5 minutes: recalls 1 of 3 objects. Follows 3 step commands.   Attention: normal.   Speech: speech is normal   Level of consciousness: alert  Knowledge: " poor.   Able to name object. Able to read. Able to repeat. Unable to write. Normal comprehension.     Cranial Nerves   Cranial nerves II through XII intact.     Motor Exam   Muscle bulk: normal  Overall muscle tone: normal    Strength   Strength 5/5 throughout.     Sensory Exam   Light touch normal.     Gait, Coordination, and Reflexes     Gait  Gait: (largely wheelchair bound)    Coordination   Finger to nose coordination: normal    Reflexes   Right brachioradialis: 2+  Left brachioradialis: 2+  Right biceps: 2+  Left biceps: 2+  Right triceps: 2+  Left triceps: 2+  Right patellar: 2+  Left patellar: 2+  Right achilles: 2+  Left achilles: 2+      MMSE=15      Assessment/Plan   Zainab was seen today for memory loss.    Diagnoses and all orders for this visit:    Late onset Alzheimer's disease without behavioral disturbance  -     Folate  -     TSH  -     Vitamin B12  -     CT Head Without Contrast          DISCUSSION/SUMMARY    Zainab Worthy comes to clinic today for evaluation of Dementia . Her history and examination, including bedside cognitive testing are most consistent with Alzheimer's Disease , which was discussed. For now it was elected to obtain screening blood work  and a head CT . After discussing potential treatment options, it was elected to continue on  donepezil and memantine. She will then follow up in 6 months , or sooner if needed.     As part of this visit I reviewed prior lab results and obtained additional history from the family which is incorporated in the HPI. Please see above for additional details.

## 2018-05-30 ENCOUNTER — HOSPITAL ENCOUNTER (OUTPATIENT)
Dept: CT IMAGING | Facility: HOSPITAL | Age: 83
Discharge: HOME | End: 2018-05-30
Attending: PSYCHIATRY & NEUROLOGY | Admitting: PSYCHIATRY & NEUROLOGY

## 2018-05-30 PROCEDURE — 70450 CT HEAD/BRAIN W/O DYE: CPT

## 2018-06-11 ENCOUNTER — TELEPHONE (OUTPATIENT)
Dept: ONCOLOGY | Facility: CLINIC | Age: 83
End: 2018-06-11

## 2018-06-11 NOTE — TELEPHONE ENCOUNTER
TAMMY ALSTON , DAUGHTER CALLED AND SAID THE [facility] AT [facility] CHECKED HER BLOOD WORK AND HER CEA (5.1) IS ELEVATED.     I called back and spoke with Tammy.  I discussed with her that I would do nothing different as long as her mother is comfortable and in her usual state of health.  She is now living at [facility].  She does have dementia.  She has frequent urinary tract infections.  I discussed with Tammy that the CEA alone does not mean that her cancer was back and that I would not put her mother through scanning or other procedures and would just treat her symptomatically.  I also pointed out that it had been slightly elevated in the past.  At her age and with her current health status she could not tolerate further chemotherapy, the family understands this.  I discussed that we will be glad to see her at any time if they felt that would be helpful.  Tammy states understanding and agreement.  She will let us know if she has any further concerns.

## 2018-08-16 ENCOUNTER — OFFICE VISIT (OUTPATIENT)
Dept: ORTHOPEDIC SURGERY | Facility: CLINIC | Age: 83
End: 2018-08-16

## 2018-08-16 VITALS — WEIGHT: 115 LBS | HEART RATE: 99 BPM | HEIGHT: 60 IN | OXYGEN SATURATION: 99 % | BODY MASS INDEX: 22.58 KG/M2

## 2018-08-16 DIAGNOSIS — Z96.641 STATUS POST RIGHT HIP REPLACEMENT: Primary | ICD-10-CM

## 2018-08-16 DIAGNOSIS — M17.0 PRIMARY OSTEOARTHRITIS OF BOTH KNEES: ICD-10-CM

## 2018-08-16 PROCEDURE — 99214 OFFICE O/P EST MOD 30 MIN: CPT | Performed by: ORTHOPAEDIC SURGERY

## 2018-08-16 PROCEDURE — 20610 DRAIN/INJ JOINT/BURSA W/O US: CPT | Performed by: ORTHOPAEDIC SURGERY

## 2018-08-16 RX ORDER — BUPIVACAINE HYDROCHLORIDE 2.5 MG/ML
3 INJECTION, SOLUTION INFILTRATION; PERINEURAL
Status: COMPLETED | OUTPATIENT
Start: 2018-08-16 | End: 2018-08-16

## 2018-08-16 RX ORDER — LIDOCAINE HYDROCHLORIDE 10 MG/ML
3 INJECTION, SOLUTION INFILTRATION; PERINEURAL
Status: COMPLETED | OUTPATIENT
Start: 2018-08-16 | End: 2018-08-16

## 2018-08-16 RX ORDER — TRIAMCINOLONE ACETONIDE 40 MG/ML
80 INJECTION, SUSPENSION INTRA-ARTICULAR; INTRAMUSCULAR
Status: COMPLETED | OUTPATIENT
Start: 2018-08-16 | End: 2018-08-16

## 2018-08-16 RX ORDER — LOPERAMIDE HYDROCHLORIDE 2 MG/1
2 CAPSULE ORAL 4 TIMES DAILY PRN
COMMUNITY
End: 2018-09-10 | Stop reason: HOSPADM

## 2018-08-16 RX ORDER — OXYBUTYNIN CHLORIDE 5 MG/1
5 TABLET ORAL 3 TIMES DAILY
COMMUNITY

## 2018-08-16 RX ORDER — SOLIFENACIN SUCCINATE 5 MG/1
TABLET, FILM COATED ORAL DAILY
Status: ON HOLD | COMMUNITY
End: 2018-09-10

## 2018-08-16 RX ADMIN — LIDOCAINE HYDROCHLORIDE 3 ML: 10 INJECTION, SOLUTION INFILTRATION; PERINEURAL at 15:01

## 2018-08-16 RX ADMIN — TRIAMCINOLONE ACETONIDE 80 MG: 40 INJECTION, SUSPENSION INTRA-ARTICULAR; INTRAMUSCULAR at 14:59

## 2018-08-16 RX ADMIN — BUPIVACAINE HYDROCHLORIDE 3 ML: 2.5 INJECTION, SOLUTION INFILTRATION; PERINEURAL at 14:59

## 2018-08-16 RX ADMIN — TRIAMCINOLONE ACETONIDE 80 MG: 40 INJECTION, SUSPENSION INTRA-ARTICULAR; INTRAMUSCULAR at 15:01

## 2018-08-16 RX ADMIN — LIDOCAINE HYDROCHLORIDE 3 ML: 10 INJECTION, SOLUTION INFILTRATION; PERINEURAL at 14:59

## 2018-08-16 RX ADMIN — BUPIVACAINE HYDROCHLORIDE 3 ML: 2.5 INJECTION, SOLUTION INFILTRATION; PERINEURAL at 15:01

## 2018-08-16 NOTE — PROGRESS NOTES
Orthopaedic Clinic Note: Hip Established Patient    Chief Complaint   Patient presents with   • Follow-up     3 month f/u Primary osteoarthritis of both knees    • Post-op Follow-up     3 month f/u 6 months s/p RIGHT HIP HEMIARTHROPLASTY - 02/08/18         HPI    It has been 3  month(s) since Ms. Worthy's last visit. She returns to clinic today for bilateral knee pain and status post right hip hemiarthroplasty 6 months ago.  She rates her pain 5/10 on the pain scale for the bilateral knees.  She denies any pain in the hip.  She is ambulating primarily with a walker but her primary means of transportation and mobilization has been with a wheelchair lately.  She continues with therapy 2-3 times weekly.  Overall she is doing better in regards to the hip, worse in regards to the knees.    Past Medical History:   Diagnosis Date   • Anorexia    • Arthritis    • Asthma    • Cataract    • Colon cancer (CMS/HCC)    • Colon cancer metastasized to liver (CMS/HCC)    • DDD (degenerative disc disease), lumbar    • Hearing loss    • History of transfusion    • Hypertension    • Liver cancer (CMS/HCC)    • Macular degeneration    • Onychomycosis    • Urine frequency       Past Surgical History:   Procedure Laterality Date   • CATARACT EXTRACTION Bilateral    • COLON RESECTION  06/16/2009    Low anterior resection   • GALLBLADDER SURGERY      Resection of gallbladder   • HIP HEMIARTHROPLASTY Right 2/8/2018    Procedure: HIP HEMIARTHROPLASTY;  Surgeon: Fernando Dominguez MD;  Location: [facility];  Service:    • LIVER SURGERY  12/09/2014    Liver tumor, 1/2 was removed.   • PARTIAL HYSTERECTOMY        Family History   Problem Relation Age of Onset   • Arthritis Other    • Cervical cancer Other    • Uterine cancer Other    • Colon cancer Other      Social History     Social History   • Marital status:      Spouse name: N/A   • Number of children: 3   • Years of education: N/A     Occupational History   • homemaker       Social  History Main Topics   • Smoking status: Never Smoker   • Smokeless tobacco: Never Used   • Alcohol use No   • Drug use: No   • Sexual activity: No     Other Topics Concern   • Not on file     Social History Narrative   • No narrative on file      Current Outpatient Prescriptions on File Prior to Visit   Medication Sig Dispense Refill   • acetaminophen (TYLENOL) 325 MG tablet Take 2 tablets by mouth Every 6 (Six) Hours As Needed for Mild Pain .     • b complex-C-folic acid 1 MG capsule Take 1 capsule by mouth Daily.     • bisacodyl (DULCOLAX) 10 MG suppository Insert 1 suppository into the rectum Daily As Needed for Constipation.     • cefuroxime (CEFTIN) 250 MG tablet Take 250 mg by mouth 2 (Two) Times a Day.     • diclofenac (VOLTAREN) 1 % gel gel   0   • docusate sodium 100 MG capsule Take 100 mg by mouth 2 (Two) Times a Day.     • donepezil (ARICEPT) 10 MG tablet Take 10 mg by mouth Every Night.     • famotidine (PEPCID) 20 MG tablet Take 1 tablet by mouth 2 (Two) Times a Day.     • HYDROcodone-acetaminophen (NORCO) 5-325 MG per tablet Take 1 tablet by mouth Every 6 (Six) Hours As Needed for Moderate Pain .     • lisinopril (PRINIVIL,ZESTRIL) 2.5 MG tablet Take 1 tablet by mouth Daily.     • LORazepam (ATIVAN) 0.5 MG tablet Take 0.5 mg by mouth Every 8 (Eight) Hours As Needed for Anxiety.     • memantine (NAMENDA) 10 MG tablet Take 10 mg by mouth 2 (Two) Times a Day.     • mirtazapine (REMERON) 15 MG tablet Take 15 mg by mouth Every Night.     • polyethylene glycol (MIRALAX) pack packet Take 17 g by mouth Daily.     • raNITIdine (ZANTAC) 150 MG tablet Take 150 mg by mouth 2 (Two) Times a Day.       No current facility-administered medications on file prior to visit.       No Known Allergies     Review of Systems   Constitutional: Negative.    HENT: Negative.    Eyes: Negative.    Respiratory: Negative.    Cardiovascular: Negative.    Gastrointestinal: Negative.    Endocrine: Negative.    Genitourinary: Negative.   "  Musculoskeletal: Positive for arthralgias (Right hip and Bilateral knees).   Skin: Negative.    Allergic/Immunologic: Negative.    Neurological: Negative.    Hematological: Negative.    Psychiatric/Behavioral: Negative.         Physical Exam  Pulse 99, height 152.4 cm (60\"), weight 52.2 kg (115 lb), SpO2 99 %, not currently breastfeeding.    Body mass index is 22.46 kg/m².    GENERAL APPEARANCE: awake, alert, oriented, in no acute distress  LUNGS:  breathing nonlabored  EXTREMITIES: no clubbing, cyanosis  PERIPHERAL PULSES: palpable dorsalis pedis and posterior tibial pulses bilaterally.    GAIT:  Not assessed             Hip Exam:  Right     RANGE OF MOTION:  EXTENSION/FLEXION:  normal (0-110 degrees)  IR (at 90 degrees of flexion):  15  ER (at 90 degrees of flexion):  40  PAIN WITH HIP MOTION:  no  PAIN WITH LOGROLL:  no      STINCHFIELD TEST: negative     STRENGTH:  ABDUCTOR:    4/5  ADDUCTOR:  5/5  HIP FLEXION:  4/5     GREATER TROCHANTER BURSAL PAIN:  no    SENSATION TO LIGHT TOUCH:  DEEP PERONEAL/SUPERFICIAL PERONEAL/SURAL/SAPHENOUS/TIBIAL:   intact    EDEMA:  no  ERYTHEMA:  no  WOUNDS/INCISIONS:  yes, Well-healed posterior lateral hip incision   ----------------------------------------------------  Bilateral knee exam:  ----------  ALIGNMENT: Moderate varus, correctable to neutral     RANGE OF MOTION:  Decreased (5 - 120 degrees)   LIGAMENTOUS STABILITY:   stable to varus and valgus stress at terminal extension and 30 degrees; slight retensioning of the MCL is appreciated with valgus stress at 30 degrees consistent with medial compartment degeneration     STRENGTH:  4/5 knee flexion, extension. 5/5 ankle dorsiflexion and plantarflexion.     PAIN WITH PALPATION: global  KNEE EFFUSION: yes, trace effusion  PAIN WITH KNEE ROM: yes, global   PATELLAR CREPITUS: yes, painful and symptomatic  SPECIAL EXAM FINDINGS:  none    REFLEXES:  PATELLAR 2+/4  ACHILLES 2+/4    CLONUS: no  STRAIGHT LEG TEST:   " negative     SENSATION TO LIGHT TOUCH:  DEEP PERONEAL/SUPERFICIAL PERONEAL/SURAL/SAPHENOUS/TIBIAL:   intact     EDEMA:  no  ERYTHEMA:  no  WOUNDS/INCISIONS: no  _________________________________________________________________  _________________________________________________________________    RADIOGRAPHIC FINDINGS:   Indication: status post right hip hemiarthroplasty    Comparison: Todays xrays were compared to previous xrays from 5/17/18     AP pelvis: Right: Demonstrate a well positioned right hip hemiarthroplasty without evidence of wear, loosening, fracture or osteolysis, femoral head is concentrically reduced within the acetabulum and No significant changes compared to prior radiographs.      Assessment/Plan:   Diagnosis Plan   1. Status post right hip replacement  XR Pelvis 1 or 2 View   2. Primary osteoarthritis of both knees  Large Joint Arthrocentesis    Large Joint Arthrocentesis     Patient is doing well 6 months status post right hip hemiarthroplasty. I recommended continued protected weightbearing as tolerated.  In regards to her bilateral knees, she received good relief with the prior cortisone injections. I discussed alternative conservative treatment measures for her knees today.  She declines.  We will proceed with bilateral knee cortisone injections today.  Follow-up in 3 months with repeat x-rays 4 views bilateral knees.    Procedure Note:  I discussed with the patient the potential benefits of performing a therapeutic injections of the bilateral knees as well as potential risks including but not limited to infection, swelling, pain, bleeding, bruising, nerve/vessel damage, skin color changes, transient elevation in blood glucose levels, and fat atrophy. After informed consent and after the areas were prepped with alcohol, ethyl chloride was used to numb the skin. Via the superolateral approach, 3cc of 1% lidocaine, 3cc of 0.25% marcaine and 2 cc of 40mg/ml of Kenalog were each injected into  the bilateral knees. The patient tolerated the procedures well. There were no complications. A sterile dressing was placed over each injection site.    Fernando Dominguez MD  08/16/18  3:04 PM

## 2018-08-16 NOTE — PROGRESS NOTES
Procedure   Large Joint Arthrocentesis  Date/Time: 8/16/2018 2:59 PM  Consent given by: patient  Site marked: site marked  Timeout: Immediately prior to procedure a time out was called to verify the correct patient, procedure, equipment, support staff and site/side marked as required   Supporting Documentation  Indications: pain   Procedure Details  Location: knee - R knee  Preparation: Patient was prepped and draped in the usual sterile fashion  Needle size: 22 G  Approach: anterolateral  Medications administered: 3 mL bupivacaine 0.25 %; 3 mL lidocaine 1 %; 80 mg triamcinolone acetonide 40 MG/ML  Patient tolerance: patient tolerated the procedure well with no immediate complications    Large Joint Arthrocentesis  Date/Time: 8/16/2018 3:01 PM  Consent given by: patient  Site marked: site marked  Timeout: Immediately prior to procedure a time out was called to verify the correct patient, procedure, equipment, support staff and site/side marked as required   Supporting Documentation  Indications: pain   Procedure Details  Location: knee - L knee  Preparation: Patient was prepped and draped in the usual sterile fashion  Needle size: 22 G  Approach: anterolateral  Medications administered: 3 mL bupivacaine 0.25 %; 3 mL lidocaine 1 %; 80 mg triamcinolone acetonide 40 MG/ML  Patient tolerance: patient tolerated the procedure well with no immediate complications

## 2018-08-25 ENCOUNTER — HOSPITAL ENCOUNTER (INPATIENT)
Facility: HOSPITAL | Age: 83
LOS: 16 days | Discharge: NURSING, REHAB OR LONG-TERM CARE | End: 2018-09-10
Attending: EMERGENCY MEDICINE | Admitting: INTERNAL MEDICINE

## 2018-08-25 ENCOUNTER — APPOINTMENT (OUTPATIENT)
Dept: GENERAL RADIOLOGY | Facility: HOSPITAL | Age: 83
End: 2018-08-25

## 2018-08-25 DIAGNOSIS — R13.13 PHARYNGEAL DYSPHAGIA: ICD-10-CM

## 2018-08-25 DIAGNOSIS — R09.02 HYPOXIA: ICD-10-CM

## 2018-08-25 DIAGNOSIS — J69.0 ASPIRATION PNEUMONIA OF BOTH LOWER LOBES DUE TO GASTRIC SECRETIONS (HCC): ICD-10-CM

## 2018-08-25 DIAGNOSIS — Z74.09 IMPAIRED FUNCTIONAL MOBILITY, BALANCE, GAIT, AND ENDURANCE: ICD-10-CM

## 2018-08-25 DIAGNOSIS — N39.0 ACUTE URINARY TRACT INFECTION: ICD-10-CM

## 2018-08-25 DIAGNOSIS — A41.9 SEPSIS, DUE TO UNSPECIFIED ORGANISM: Primary | ICD-10-CM

## 2018-08-25 PROBLEM — D64.9 NORMOCYTIC ANEMIA: Status: ACTIVE | Noted: 2018-08-25

## 2018-08-25 PROBLEM — D69.6 THROMBOCYTOPENIA (HCC): Status: ACTIVE | Noted: 2018-08-25

## 2018-08-25 PROBLEM — D72.829 LEUKOCYTOSIS: Status: ACTIVE | Noted: 2018-08-25

## 2018-08-25 PROBLEM — I95.9 SEPSIS ASSOCIATED HYPOTENSION (HCC): Status: ACTIVE | Noted: 2018-08-25

## 2018-08-25 PROBLEM — E87.20 LACTIC ACIDOSIS: Status: ACTIVE | Noted: 2018-08-25

## 2018-08-25 LAB
ALBUMIN SERPL-MCNC: 3.12 G/DL (ref 3.2–4.8)
ALBUMIN/GLOB SERPL: 1.4 G/DL (ref 1.5–2.5)
ALP SERPL-CCNC: 159 U/L (ref 25–100)
ALT SERPL W P-5'-P-CCNC: 38 U/L (ref 7–40)
ANION GAP SERPL CALCULATED.3IONS-SCNC: 9 MMOL/L (ref 3–11)
ARTERIAL PATENCY WRIST A: ABNORMAL
AST SERPL-CCNC: 33 U/L (ref 0–33)
ATMOSPHERIC PRESS: ABNORMAL MMHG
BACTERIA BLD CULT: ABNORMAL
BACTERIA UR QL AUTO: ABNORMAL /HPF
BASE EXCESS BLDA CALC-SCNC: 1 MMOL/L (ref 0–2)
BASOPHILS # BLD AUTO: 0.01 10*3/MM3 (ref 0–0.2)
BASOPHILS NFR BLD AUTO: 0.1 % (ref 0–1)
BDY SITE: ABNORMAL
BILIRUB SERPL-MCNC: 0.6 MG/DL (ref 0.3–1.2)
BILIRUB UR QL STRIP: NEGATIVE
BUN BLD-MCNC: 28 MG/DL (ref 9–23)
BUN/CREAT SERPL: 40 (ref 7–25)
CALCIUM SPEC-SCNC: 8 MG/DL (ref 8.7–10.4)
CHLORIDE SERPL-SCNC: 100 MMOL/L (ref 99–109)
CLARITY UR: ABNORMAL
CO2 BLDA-SCNC: 25.9 MMOL/L (ref 22–33)
CO2 SERPL-SCNC: 24 MMOL/L (ref 20–31)
COHGB MFR BLD: 0.9 % (ref 0–2)
COLOR UR: YELLOW
CREAT BLD-MCNC: 0.7 MG/DL (ref 0.6–1.3)
CRP SERPL-MCNC: 14.96 MG/DL (ref 0–1)
D-LACTATE SERPL-SCNC: 1.8 MMOL/L (ref 0.5–2)
D-LACTATE SERPL-SCNC: 2.2 MMOL/L (ref 0.5–2)
D-LACTATE SERPL-SCNC: 2.4 MMOL/L (ref 0.5–2)
DEPRECATED RDW RBC AUTO: 43.8 FL (ref 37–54)
EOSINOPHIL # BLD AUTO: 0 10*3/MM3 (ref 0–0.3)
EOSINOPHIL NFR BLD AUTO: 0 % (ref 0–3)
ERYTHROCYTE [DISTWIDTH] IN BLOOD BY AUTOMATED COUNT: 14.5 % (ref 11.3–14.5)
GFR SERPL CREATININE-BSD FRML MDRD: 79 ML/MIN/1.73
GLOBULIN UR ELPH-MCNC: 2.2 GM/DL
GLUCOSE BLD-MCNC: 180 MG/DL (ref 70–100)
GLUCOSE UR STRIP-MCNC: NEGATIVE MG/DL
HCO3 BLDA-SCNC: 24.8 MMOL/L (ref 20–26)
HCT VFR BLD AUTO: 34.1 % (ref 34.5–44)
HCT VFR BLD CALC: 33 %
HGB BLD-MCNC: 11.2 G/DL (ref 11.5–15.5)
HGB BLDA-MCNC: 10.8 G/DL (ref 14–18)
HGB UR QL STRIP.AUTO: ABNORMAL
HOLD SPECIMEN: NORMAL
HOLD SPECIMEN: NORMAL
HOROWITZ INDEX BLD+IHG-RTO: 36 %
HYALINE CASTS UR QL AUTO: ABNORMAL /LPF
IMM GRANULOCYTES # BLD: 0.08 10*3/MM3 (ref 0–0.03)
IMM GRANULOCYTES NFR BLD: 0.5 % (ref 0–0.6)
KETONES UR QL STRIP: NEGATIVE
LEUKOCYTE ESTERASE UR QL STRIP.AUTO: ABNORMAL
LYMPHOCYTES # BLD AUTO: 0.38 10*3/MM3 (ref 0.6–4.8)
LYMPHOCYTES NFR BLD AUTO: 2.5 % (ref 24–44)
MAGNESIUM SERPL-MCNC: 1.8 MG/DL (ref 1.3–2.7)
MCH RBC QN AUTO: 27.2 PG (ref 27–31)
MCHC RBC AUTO-ENTMCNC: 32.8 G/DL (ref 32–36)
MCV RBC AUTO: 82.8 FL (ref 80–99)
METHGB BLD QL: 1.2 % (ref 0–1.5)
MODALITY: ABNORMAL
MONOCYTES # BLD AUTO: 0.67 10*3/MM3 (ref 0–1)
MONOCYTES NFR BLD AUTO: 4.5 % (ref 0–12)
MRSA DNA SPEC QL NAA+PROBE: NEGATIVE
NEUTROPHILS # BLD AUTO: 13.98 10*3/MM3 (ref 1.5–8.3)
NEUTROPHILS NFR BLD AUTO: 92.9 % (ref 41–71)
NITRITE UR QL STRIP: NEGATIVE
NRBC BLD MANUAL-RTO: 0 /100 WBC (ref 0–0)
OXYHGB MFR BLDV: 91.3 % (ref 94–99)
PCO2 BLDA: 35.7 MM HG (ref 35–45)
PH BLDA: 7.45 PH UNITS (ref 7.35–7.45)
PH UR STRIP.AUTO: <=5 [PH] (ref 5–8)
PLATELET # BLD AUTO: 126 10*3/MM3 (ref 150–450)
PMV BLD AUTO: 10.4 FL (ref 6–12)
PO2 BLDA: 62.2 MM HG (ref 83–108)
POTASSIUM BLD-SCNC: 3.6 MMOL/L (ref 3.5–5.5)
PROCALCITONIN SERPL-MCNC: 48.81 NG/ML
PROT SERPL-MCNC: 5.3 G/DL (ref 5.7–8.2)
PROT UR QL STRIP: ABNORMAL
RBC # BLD AUTO: 4.12 10*6/MM3 (ref 3.89–5.14)
RBC # UR: ABNORMAL /HPF
REF LAB TEST METHOD: ABNORMAL
SODIUM BLD-SCNC: 133 MMOL/L (ref 132–146)
SP GR UR STRIP: 1.02 (ref 1–1.03)
SQUAMOUS #/AREA URNS HPF: ABNORMAL /HPF
TROPONIN I SERPL-MCNC: 0.02 NG/ML (ref 0–0.07)
UROBILINOGEN UR QL STRIP: ABNORMAL
WBC NRBC COR # BLD: 15.04 10*3/MM3 (ref 3.5–10.8)
WBC UR QL AUTO: ABNORMAL /HPF
WHOLE BLOOD HOLD SPECIMEN: NORMAL
WHOLE BLOOD HOLD SPECIMEN: NORMAL

## 2018-08-25 PROCEDURE — 85025 COMPLETE CBC W/AUTO DIFF WBC: CPT | Performed by: EMERGENCY MEDICINE

## 2018-08-25 PROCEDURE — 83605 ASSAY OF LACTIC ACID: CPT | Performed by: EMERGENCY MEDICINE

## 2018-08-25 PROCEDURE — 82805 BLOOD GASES W/O2 SATURATION: CPT | Performed by: EMERGENCY MEDICINE

## 2018-08-25 PROCEDURE — 87150 DNA/RNA AMPLIFIED PROBE: CPT | Performed by: EMERGENCY MEDICINE

## 2018-08-25 PROCEDURE — 83735 ASSAY OF MAGNESIUM: CPT | Performed by: EMERGENCY MEDICINE

## 2018-08-25 PROCEDURE — 71045 X-RAY EXAM CHEST 1 VIEW: CPT

## 2018-08-25 PROCEDURE — 87040 BLOOD CULTURE FOR BACTERIA: CPT | Performed by: EMERGENCY MEDICINE

## 2018-08-25 PROCEDURE — 87449 NOS EACH ORGANISM AG IA: CPT | Performed by: INTERNAL MEDICINE

## 2018-08-25 PROCEDURE — 87077 CULTURE AEROBIC IDENTIFY: CPT | Performed by: EMERGENCY MEDICINE

## 2018-08-25 PROCEDURE — 93005 ELECTROCARDIOGRAM TRACING: CPT | Performed by: EMERGENCY MEDICINE

## 2018-08-25 PROCEDURE — 80053 COMPREHEN METABOLIC PANEL: CPT | Performed by: EMERGENCY MEDICINE

## 2018-08-25 PROCEDURE — 87899 AGENT NOS ASSAY W/OPTIC: CPT | Performed by: INTERNAL MEDICINE

## 2018-08-25 PROCEDURE — 25010000002 HEPARIN (PORCINE) PER 1000 UNITS

## 2018-08-25 PROCEDURE — 25010000002 PIPERACILLIN SOD-TAZOBACTAM PER 1 G: Performed by: EMERGENCY MEDICINE

## 2018-08-25 PROCEDURE — 25010000002 PIPERACILLIN SOD-TAZOBACTAM PER 1 G: Performed by: INTERNAL MEDICINE

## 2018-08-25 PROCEDURE — 36600 WITHDRAWAL OF ARTERIAL BLOOD: CPT | Performed by: EMERGENCY MEDICINE

## 2018-08-25 PROCEDURE — 87086 URINE CULTURE/COLONY COUNT: CPT | Performed by: EMERGENCY MEDICINE

## 2018-08-25 PROCEDURE — 87186 SC STD MICRODIL/AGAR DIL: CPT | Performed by: EMERGENCY MEDICINE

## 2018-08-25 PROCEDURE — 25010000002 VANCOMYCIN PER 500 MG: Performed by: EMERGENCY MEDICINE

## 2018-08-25 PROCEDURE — 86140 C-REACTIVE PROTEIN: CPT | Performed by: EMERGENCY MEDICINE

## 2018-08-25 PROCEDURE — 87641 MR-STAPH DNA AMP PROBE: CPT

## 2018-08-25 PROCEDURE — 99223 1ST HOSP IP/OBS HIGH 75: CPT | Performed by: INTERNAL MEDICINE

## 2018-08-25 PROCEDURE — 84145 PROCALCITONIN (PCT): CPT | Performed by: EMERGENCY MEDICINE

## 2018-08-25 PROCEDURE — P9612 CATHETERIZE FOR URINE SPEC: HCPCS

## 2018-08-25 PROCEDURE — 83605 ASSAY OF LACTIC ACID: CPT | Performed by: INTERNAL MEDICINE

## 2018-08-25 PROCEDURE — 99285 EMERGENCY DEPT VISIT HI MDM: CPT

## 2018-08-25 PROCEDURE — 84484 ASSAY OF TROPONIN QUANT: CPT

## 2018-08-25 PROCEDURE — 81001 URINALYSIS AUTO W/SCOPE: CPT | Performed by: EMERGENCY MEDICINE

## 2018-08-25 RX ORDER — SODIUM CHLORIDE 9 MG/ML
100 INJECTION, SOLUTION INTRAVENOUS CONTINUOUS
Status: ACTIVE | OUTPATIENT
Start: 2018-08-25 | End: 2018-08-26

## 2018-08-25 RX ORDER — ACETAMINOPHEN 325 MG/1
650 TABLET ORAL EVERY 4 HOURS PRN
Status: DISCONTINUED | OUTPATIENT
Start: 2018-08-25 | End: 2018-09-10 | Stop reason: HOSPADM

## 2018-08-25 RX ORDER — POTASSIUM CHLORIDE 1.5 G/1.77G
40 POWDER, FOR SOLUTION ORAL AS NEEDED
Status: DISCONTINUED | OUTPATIENT
Start: 2018-08-25 | End: 2018-09-10 | Stop reason: HOSPADM

## 2018-08-25 RX ORDER — HEPARIN SODIUM 5000 [USP'U]/ML
5000 INJECTION, SOLUTION INTRAVENOUS; SUBCUTANEOUS EVERY 8 HOURS SCHEDULED
Status: DISCONTINUED | OUTPATIENT
Start: 2018-08-25 | End: 2018-08-25

## 2018-08-25 RX ORDER — OXYBUTYNIN CHLORIDE 5 MG/1
5 TABLET ORAL 3 TIMES DAILY
Status: DISCONTINUED | OUTPATIENT
Start: 2018-08-25 | End: 2018-09-10 | Stop reason: HOSPADM

## 2018-08-25 RX ORDER — ONDANSETRON 2 MG/ML
4 INJECTION INTRAMUSCULAR; INTRAVENOUS EVERY 6 HOURS PRN
Status: DISCONTINUED | OUTPATIENT
Start: 2018-08-25 | End: 2018-09-10 | Stop reason: HOSPADM

## 2018-08-25 RX ORDER — FAMOTIDINE 20 MG/1
20 TABLET, FILM COATED ORAL 2 TIMES DAILY
Status: DISCONTINUED | OUTPATIENT
Start: 2018-08-25 | End: 2018-08-25 | Stop reason: SDUPTHER

## 2018-08-25 RX ORDER — HEPARIN SODIUM 5000 [USP'U]/ML
5000 INJECTION, SOLUTION INTRAVENOUS; SUBCUTANEOUS EVERY 12 HOURS SCHEDULED
Status: DISCONTINUED | OUTPATIENT
Start: 2018-08-25 | End: 2018-09-10 | Stop reason: HOSPADM

## 2018-08-25 RX ORDER — POTASSIUM CHLORIDE 750 MG/1
40 CAPSULE, EXTENDED RELEASE ORAL AS NEEDED
Status: DISCONTINUED | OUTPATIENT
Start: 2018-08-25 | End: 2018-09-10 | Stop reason: HOSPADM

## 2018-08-25 RX ORDER — POTASSIUM CHLORIDE 7.45 MG/ML
10 INJECTION INTRAVENOUS
Status: DISCONTINUED | OUTPATIENT
Start: 2018-08-25 | End: 2018-09-10 | Stop reason: HOSPADM

## 2018-08-25 RX ORDER — DONEPEZIL HYDROCHLORIDE 10 MG/1
10 TABLET, FILM COATED ORAL NIGHTLY
Status: DISCONTINUED | OUTPATIENT
Start: 2018-08-25 | End: 2018-09-10 | Stop reason: HOSPADM

## 2018-08-25 RX ORDER — MIRTAZAPINE 15 MG/1
15 TABLET, FILM COATED ORAL NIGHTLY
Status: DISCONTINUED | OUTPATIENT
Start: 2018-08-25 | End: 2018-09-10 | Stop reason: HOSPADM

## 2018-08-25 RX ORDER — SODIUM CHLORIDE 0.9 % (FLUSH) 0.9 %
10 SYRINGE (ML) INJECTION AS NEEDED
Status: DISCONTINUED | OUTPATIENT
Start: 2018-08-25 | End: 2018-09-10 | Stop reason: HOSPADM

## 2018-08-25 RX ORDER — ONDANSETRON 4 MG/1
4 TABLET, FILM COATED ORAL EVERY 6 HOURS PRN
Status: DISCONTINUED | OUTPATIENT
Start: 2018-08-25 | End: 2018-09-10 | Stop reason: HOSPADM

## 2018-08-25 RX ORDER — BISACODYL 10 MG
10 SUPPOSITORY, RECTAL RECTAL DAILY PRN
Status: DISCONTINUED | OUTPATIENT
Start: 2018-08-25 | End: 2018-09-10 | Stop reason: HOSPADM

## 2018-08-25 RX ORDER — DOCUSATE SODIUM 100 MG/1
100 CAPSULE, LIQUID FILLED ORAL 2 TIMES DAILY PRN
Status: DISCONTINUED | OUTPATIENT
Start: 2018-08-25 | End: 2018-09-10 | Stop reason: HOSPADM

## 2018-08-25 RX ORDER — ACETAMINOPHEN 500 MG
1000 TABLET ORAL ONCE
Status: DISCONTINUED | OUTPATIENT
Start: 2018-08-25 | End: 2018-08-25

## 2018-08-25 RX ORDER — HYDROCODONE BITARTRATE AND ACETAMINOPHEN 5; 325 MG/1; MG/1
1 TABLET ORAL EVERY 4 HOURS PRN
Status: DISPENSED | OUTPATIENT
Start: 2018-08-25 | End: 2018-09-04

## 2018-08-25 RX ORDER — MEMANTINE HYDROCHLORIDE 10 MG/1
10 TABLET ORAL EVERY 12 HOURS SCHEDULED
Status: DISCONTINUED | OUTPATIENT
Start: 2018-08-25 | End: 2018-09-10 | Stop reason: HOSPADM

## 2018-08-25 RX ORDER — FAMOTIDINE 20 MG/1
20 TABLET, FILM COATED ORAL 2 TIMES DAILY
Status: DISCONTINUED | OUTPATIENT
Start: 2018-08-25 | End: 2018-09-06

## 2018-08-25 RX ORDER — LORAZEPAM 0.5 MG/1
0.5 TABLET ORAL EVERY 8 HOURS PRN
Status: DISCONTINUED | OUTPATIENT
Start: 2018-08-25 | End: 2018-09-10 | Stop reason: HOSPADM

## 2018-08-25 RX ORDER — SODIUM CHLORIDE 0.9 % (FLUSH) 0.9 %
1-10 SYRINGE (ML) INJECTION AS NEEDED
Status: DISCONTINUED | OUTPATIENT
Start: 2018-08-25 | End: 2018-09-10 | Stop reason: HOSPADM

## 2018-08-25 RX ORDER — VANCOMYCIN HYDROCHLORIDE 1 G/200ML
20 INJECTION, SOLUTION INTRAVENOUS ONCE
Status: COMPLETED | OUTPATIENT
Start: 2018-08-25 | End: 2018-08-25

## 2018-08-25 RX ADMIN — HEPARIN SODIUM 5000 UNITS: 5000 INJECTION, SOLUTION INTRAVENOUS; SUBCUTANEOUS at 14:30

## 2018-08-25 RX ADMIN — FAMOTIDINE 20 MG: 20 TABLET ORAL at 12:15

## 2018-08-25 RX ADMIN — DONEPEZIL HYDROCHLORIDE 10 MG: 10 TABLET, FILM COATED ORAL at 20:09

## 2018-08-25 RX ADMIN — SODIUM CHLORIDE 587 ML: 9 INJECTION, SOLUTION INTRAVENOUS at 07:05

## 2018-08-25 RX ADMIN — FAMOTIDINE 20 MG: 20 TABLET ORAL at 20:09

## 2018-08-25 RX ADMIN — VANCOMYCIN HYDROCHLORIDE 1000 MG: 1 INJECTION, SOLUTION INTRAVENOUS at 06:13

## 2018-08-25 RX ADMIN — SODIUM CHLORIDE 1000 ML: 9 INJECTION, SOLUTION INTRAVENOUS at 05:46

## 2018-08-25 RX ADMIN — MIRTAZAPINE 15 MG: 15 TABLET, FILM COATED ORAL at 20:09

## 2018-08-25 RX ADMIN — SODIUM CHLORIDE 100 ML/HR: 9 INJECTION, SOLUTION INTRAVENOUS at 17:12

## 2018-08-25 RX ADMIN — TAZOBACTAM SODIUM AND PIPERACILLIN SODIUM 3.38 G: 375; 3 INJECTION, SOLUTION INTRAVENOUS at 05:41

## 2018-08-25 RX ADMIN — MEMANTINE 10 MG: 10 TABLET ORAL at 12:15

## 2018-08-25 RX ADMIN — MEMANTINE 10 MG: 10 TABLET ORAL at 20:09

## 2018-08-25 RX ADMIN — HEPARIN SODIUM 5000 UNITS: 5000 INJECTION, SOLUTION INTRAVENOUS; SUBCUTANEOUS at 20:09

## 2018-08-25 RX ADMIN — SODIUM CHLORIDE 100 ML/HR: 9 INJECTION, SOLUTION INTRAVENOUS at 12:03

## 2018-08-25 RX ADMIN — OXYBUTYNIN CHLORIDE 5 MG: 5 TABLET ORAL at 20:09

## 2018-08-25 RX ADMIN — HYDROCODONE BITARTRATE AND ACETAMINOPHEN 1 TABLET: 5; 325 TABLET ORAL at 20:09

## 2018-08-25 RX ADMIN — TAZOBACTAM SODIUM AND PIPERACILLIN SODIUM 3.38 G: 375; 3 INJECTION, SOLUTION INTRAVENOUS at 20:09

## 2018-08-25 RX ADMIN — TAZOBACTAM SODIUM AND PIPERACILLIN SODIUM 3.38 G: 375; 3 INJECTION, SOLUTION INTRAVENOUS at 12:11

## 2018-08-25 RX ADMIN — OXYBUTYNIN CHLORIDE 5 MG: 5 TABLET ORAL at 17:11

## 2018-08-26 ENCOUNTER — APPOINTMENT (OUTPATIENT)
Dept: ULTRASOUND IMAGING | Facility: HOSPITAL | Age: 83
End: 2018-08-26

## 2018-08-26 PROBLEM — B96.20 E COLI BACTEREMIA: Status: ACTIVE | Noted: 2018-08-26

## 2018-08-26 PROBLEM — R78.81 E COLI BACTEREMIA: Status: ACTIVE | Noted: 2018-08-26

## 2018-08-26 LAB
ANION GAP SERPL CALCULATED.3IONS-SCNC: 5 MMOL/L (ref 3–11)
BUN BLD-MCNC: 16 MG/DL (ref 9–23)
BUN/CREAT SERPL: 32.7 (ref 7–25)
CALCIUM SPEC-SCNC: 7.5 MG/DL (ref 8.7–10.4)
CHLORIDE SERPL-SCNC: 106 MMOL/L (ref 99–109)
CO2 SERPL-SCNC: 25 MMOL/L (ref 20–31)
CREAT BLD-MCNC: 0.49 MG/DL (ref 0.6–1.3)
DEPRECATED RDW RBC AUTO: 45.9 FL (ref 37–54)
ERYTHROCYTE [DISTWIDTH] IN BLOOD BY AUTOMATED COUNT: 14.6 % (ref 11.3–14.5)
GFR SERPL CREATININE-BSD FRML MDRD: 119 ML/MIN/1.73
GLUCOSE BLD-MCNC: 97 MG/DL (ref 70–100)
HCT VFR BLD AUTO: 30.5 % (ref 34.5–44)
HGB BLD-MCNC: 9.5 G/DL (ref 11.5–15.5)
MCH RBC QN AUTO: 26.6 PG (ref 27–31)
MCHC RBC AUTO-ENTMCNC: 31.1 G/DL (ref 32–36)
MCV RBC AUTO: 85.4 FL (ref 80–99)
PLATELET # BLD AUTO: 105 10*3/MM3 (ref 150–450)
PMV BLD AUTO: 10.2 FL (ref 6–12)
POTASSIUM BLD-SCNC: 3.6 MMOL/L (ref 3.5–5.5)
RBC # BLD AUTO: 3.57 10*6/MM3 (ref 3.89–5.14)
SODIUM BLD-SCNC: 136 MMOL/L (ref 132–146)
WBC NRBC COR # BLD: 10.01 10*3/MM3 (ref 3.5–10.8)

## 2018-08-26 PROCEDURE — 85027 COMPLETE CBC AUTOMATED: CPT | Performed by: INTERNAL MEDICINE

## 2018-08-26 PROCEDURE — 25010000002 MEROPENEM: Performed by: INTERNAL MEDICINE

## 2018-08-26 PROCEDURE — 25010000002 VANCOMYCIN PER 500 MG

## 2018-08-26 PROCEDURE — 80048 BASIC METABOLIC PNL TOTAL CA: CPT | Performed by: INTERNAL MEDICINE

## 2018-08-26 PROCEDURE — 25010000002 PIPERACILLIN SOD-TAZOBACTAM PER 1 G: Performed by: INTERNAL MEDICINE

## 2018-08-26 PROCEDURE — 76775 US EXAM ABDO BACK WALL LIM: CPT

## 2018-08-26 PROCEDURE — 99233 SBSQ HOSP IP/OBS HIGH 50: CPT | Performed by: INTERNAL MEDICINE

## 2018-08-26 PROCEDURE — 25010000002 HEPARIN (PORCINE) PER 1000 UNITS

## 2018-08-26 RX ORDER — SODIUM CHLORIDE 9 MG/ML
100 INJECTION, SOLUTION INTRAVENOUS CONTINUOUS
Status: ACTIVE | OUTPATIENT
Start: 2018-08-26 | End: 2018-08-27

## 2018-08-26 RX ADMIN — DONEPEZIL HYDROCHLORIDE 10 MG: 10 TABLET, FILM COATED ORAL at 20:23

## 2018-08-26 RX ADMIN — OXYBUTYNIN CHLORIDE 5 MG: 5 TABLET ORAL at 09:33

## 2018-08-26 RX ADMIN — MEMANTINE 10 MG: 10 TABLET ORAL at 20:24

## 2018-08-26 RX ADMIN — MIRTAZAPINE 15 MG: 15 TABLET, FILM COATED ORAL at 20:24

## 2018-08-26 RX ADMIN — MEROPENEM 1 G: 1 INJECTION, POWDER, FOR SOLUTION INTRAVENOUS at 11:14

## 2018-08-26 RX ADMIN — SODIUM CHLORIDE 100 ML/HR: 9 INJECTION, SOLUTION INTRAVENOUS at 16:13

## 2018-08-26 RX ADMIN — TAZOBACTAM SODIUM AND PIPERACILLIN SODIUM 3.38 G: 375; 3 INJECTION, SOLUTION INTRAVENOUS at 04:05

## 2018-08-26 RX ADMIN — MEROPENEM 1 G: 1 INJECTION, POWDER, FOR SOLUTION INTRAVENOUS at 20:24

## 2018-08-26 RX ADMIN — HEPARIN SODIUM 5000 UNITS: 5000 INJECTION, SOLUTION INTRAVENOUS; SUBCUTANEOUS at 09:33

## 2018-08-26 RX ADMIN — OXYBUTYNIN CHLORIDE 5 MG: 5 TABLET ORAL at 20:23

## 2018-08-26 RX ADMIN — VANCOMYCIN HYDROCHLORIDE 750 MG: 750 INJECTION, SOLUTION INTRAVENOUS at 05:56

## 2018-08-26 RX ADMIN — FAMOTIDINE 20 MG: 20 TABLET ORAL at 09:33

## 2018-08-26 RX ADMIN — OXYBUTYNIN CHLORIDE 5 MG: 5 TABLET ORAL at 16:13

## 2018-08-26 RX ADMIN — FAMOTIDINE 20 MG: 20 TABLET ORAL at 20:23

## 2018-08-26 RX ADMIN — HEPARIN SODIUM 5000 UNITS: 5000 INJECTION, SOLUTION INTRAVENOUS; SUBCUTANEOUS at 20:24

## 2018-08-26 RX ADMIN — MEMANTINE 10 MG: 10 TABLET ORAL at 09:33

## 2018-08-27 LAB
ANION GAP SERPL CALCULATED.3IONS-SCNC: 6 MMOL/L (ref 3–11)
BACTERIA SPEC AEROBE CULT: ABNORMAL
BACTERIA UR QL AUTO: ABNORMAL /HPF
BILIRUB UR QL STRIP: NEGATIVE
BUN BLD-MCNC: 10 MG/DL (ref 9–23)
BUN/CREAT SERPL: 23.8 (ref 7–25)
CALCIUM SPEC-SCNC: 8.2 MG/DL (ref 8.7–10.4)
CHLORIDE SERPL-SCNC: 110 MMOL/L (ref 99–109)
CLARITY UR: CLEAR
CO2 SERPL-SCNC: 25 MMOL/L (ref 20–31)
COLOR UR: YELLOW
CREAT BLD-MCNC: 0.42 MG/DL (ref 0.6–1.3)
DEPRECATED RDW RBC AUTO: 43.9 FL (ref 37–54)
ERYTHROCYTE [DISTWIDTH] IN BLOOD BY AUTOMATED COUNT: 14.3 % (ref 11.3–14.5)
GFR SERPL CREATININE-BSD FRML MDRD: 142 ML/MIN/1.73
GLUCOSE BLD-MCNC: 109 MG/DL (ref 70–100)
GLUCOSE UR STRIP-MCNC: NEGATIVE MG/DL
GRAM STN SPEC: ABNORMAL
HCT VFR BLD AUTO: 30.9 % (ref 34.5–44)
HGB BLD-MCNC: 10.1 G/DL (ref 11.5–15.5)
HGB UR QL STRIP.AUTO: NEGATIVE
HYALINE CASTS UR QL AUTO: ABNORMAL /LPF
ISOLATED FROM: ABNORMAL
ISOLATED FROM: ABNORMAL
KETONES UR QL STRIP: NEGATIVE
LEUKOCYTE ESTERASE UR QL STRIP.AUTO: ABNORMAL
MCH RBC QN AUTO: 27.4 PG (ref 27–31)
MCHC RBC AUTO-ENTMCNC: 32.7 G/DL (ref 32–36)
MCV RBC AUTO: 84 FL (ref 80–99)
NITRITE UR QL STRIP: NEGATIVE
PH UR STRIP.AUTO: 6.5 [PH] (ref 5–8)
PLATELET # BLD AUTO: 136 10*3/MM3 (ref 150–450)
PMV BLD AUTO: 10.6 FL (ref 6–12)
POTASSIUM BLD-SCNC: 3.5 MMOL/L (ref 3.5–5.5)
PROT UR QL STRIP: NEGATIVE
RBC # BLD AUTO: 3.68 10*6/MM3 (ref 3.89–5.14)
RBC # UR: ABNORMAL /HPF
REF LAB TEST METHOD: ABNORMAL
SODIUM BLD-SCNC: 141 MMOL/L (ref 132–146)
SP GR UR STRIP: 1.01 (ref 1–1.03)
SQUAMOUS #/AREA URNS HPF: ABNORMAL /HPF
UROBILINOGEN UR QL STRIP: ABNORMAL
VANCOMYCIN TROUGH SERPL-MCNC: 6.6 MCG/ML (ref 10–20)
WBC NRBC COR # BLD: 9.47 10*3/MM3 (ref 3.5–10.8)
WBC UR QL AUTO: ABNORMAL /HPF

## 2018-08-27 PROCEDURE — 25010000002 MEROPENEM: Performed by: INTERNAL MEDICINE

## 2018-08-27 PROCEDURE — 85027 COMPLETE CBC AUTOMATED: CPT | Performed by: INTERNAL MEDICINE

## 2018-08-27 PROCEDURE — 80202 ASSAY OF VANCOMYCIN: CPT

## 2018-08-27 PROCEDURE — 25010000002 VANCOMYCIN PER 500 MG

## 2018-08-27 PROCEDURE — 99233 SBSQ HOSP IP/OBS HIGH 50: CPT | Performed by: INTERNAL MEDICINE

## 2018-08-27 PROCEDURE — 80048 BASIC METABOLIC PNL TOTAL CA: CPT | Performed by: INTERNAL MEDICINE

## 2018-08-27 PROCEDURE — 25010000002 HEPARIN (PORCINE) PER 1000 UNITS

## 2018-08-27 PROCEDURE — 81001 URINALYSIS AUTO W/SCOPE: CPT | Performed by: UROLOGY

## 2018-08-27 RX ORDER — ACETAMINOPHEN 650 MG/1
650 SUPPOSITORY RECTAL EVERY 4 HOURS PRN
Status: DISCONTINUED | OUTPATIENT
Start: 2018-08-27 | End: 2018-09-10 | Stop reason: HOSPADM

## 2018-08-27 RX ORDER — VANCOMYCIN HYDROCHLORIDE 1 G/200ML
1000 INJECTION, SOLUTION INTRAVENOUS EVERY 24 HOURS
Status: DISCONTINUED | OUTPATIENT
Start: 2018-08-28 | End: 2018-08-28

## 2018-08-27 RX ADMIN — MEROPENEM 1 G: 1 INJECTION, POWDER, FOR SOLUTION INTRAVENOUS at 08:09

## 2018-08-27 RX ADMIN — DONEPEZIL HYDROCHLORIDE 10 MG: 10 TABLET, FILM COATED ORAL at 20:52

## 2018-08-27 RX ADMIN — MEMANTINE 10 MG: 10 TABLET ORAL at 20:52

## 2018-08-27 RX ADMIN — SODIUM CHLORIDE 100 ML/HR: 9 INJECTION, SOLUTION INTRAVENOUS at 02:25

## 2018-08-27 RX ADMIN — ACETAMINOPHEN 650 MG: 325 TABLET ORAL at 23:37

## 2018-08-27 RX ADMIN — OXYBUTYNIN CHLORIDE 5 MG: 5 TABLET ORAL at 20:52

## 2018-08-27 RX ADMIN — HEPARIN SODIUM 5000 UNITS: 5000 INJECTION, SOLUTION INTRAVENOUS; SUBCUTANEOUS at 20:52

## 2018-08-27 RX ADMIN — OXYBUTYNIN CHLORIDE 5 MG: 5 TABLET ORAL at 16:11

## 2018-08-27 RX ADMIN — MEROPENEM 1 G: 1 INJECTION, POWDER, FOR SOLUTION INTRAVENOUS at 20:53

## 2018-08-27 RX ADMIN — FAMOTIDINE 20 MG: 20 TABLET ORAL at 20:52

## 2018-08-27 RX ADMIN — MIRTAZAPINE 15 MG: 15 TABLET, FILM COATED ORAL at 20:52

## 2018-08-27 RX ADMIN — FAMOTIDINE 20 MG: 20 TABLET ORAL at 08:09

## 2018-08-27 RX ADMIN — VANCOMYCIN HYDROCHLORIDE 750 MG: 750 INJECTION, SOLUTION INTRAVENOUS at 06:51

## 2018-08-27 RX ADMIN — ACETAMINOPHEN 650 MG: 650 SUPPOSITORY RECTAL at 14:53

## 2018-08-27 RX ADMIN — OXYBUTYNIN CHLORIDE 5 MG: 5 TABLET ORAL at 08:09

## 2018-08-27 RX ADMIN — MEMANTINE 10 MG: 10 TABLET ORAL at 08:09

## 2018-08-27 RX ADMIN — HEPARIN SODIUM 5000 UNITS: 5000 INJECTION, SOLUTION INTRAVENOUS; SUBCUTANEOUS at 08:09

## 2018-08-28 LAB
ANION GAP SERPL CALCULATED.3IONS-SCNC: 5 MMOL/L (ref 3–11)
BUN BLD-MCNC: 14 MG/DL (ref 9–23)
BUN/CREAT SERPL: 34.1 (ref 7–25)
CALCIUM SPEC-SCNC: 7.7 MG/DL (ref 8.7–10.4)
CHLORIDE SERPL-SCNC: 107 MMOL/L (ref 99–109)
CO2 SERPL-SCNC: 27 MMOL/L (ref 20–31)
CREAT BLD-MCNC: 0.41 MG/DL (ref 0.6–1.3)
DEPRECATED RDW RBC AUTO: 43.8 FL (ref 37–54)
ERYTHROCYTE [DISTWIDTH] IN BLOOD BY AUTOMATED COUNT: 14.1 % (ref 11.3–14.5)
GFR SERPL CREATININE-BSD FRML MDRD: 146 ML/MIN/1.73
GLUCOSE BLD-MCNC: 145 MG/DL (ref 70–100)
HCT VFR BLD AUTO: 30.7 % (ref 34.5–44)
HGB BLD-MCNC: 10 G/DL (ref 11.5–15.5)
MCH RBC QN AUTO: 27.5 PG (ref 27–31)
MCHC RBC AUTO-ENTMCNC: 32.6 G/DL (ref 32–36)
MCV RBC AUTO: 84.6 FL (ref 80–99)
PLATELET # BLD AUTO: 102 10*3/MM3 (ref 150–450)
PMV BLD AUTO: 10.2 FL (ref 6–12)
POTASSIUM BLD-SCNC: 3 MMOL/L (ref 3.5–5.5)
POTASSIUM BLD-SCNC: 4.3 MMOL/L (ref 3.5–5.5)
RBC # BLD AUTO: 3.63 10*6/MM3 (ref 3.89–5.14)
SODIUM BLD-SCNC: 139 MMOL/L (ref 132–146)
WBC NRBC COR # BLD: 15.63 10*3/MM3 (ref 3.5–10.8)

## 2018-08-28 PROCEDURE — 25010000002 HEPARIN (PORCINE) PER 1000 UNITS

## 2018-08-28 PROCEDURE — 85027 COMPLETE CBC AUTOMATED: CPT | Performed by: INTERNAL MEDICINE

## 2018-08-28 PROCEDURE — 99233 SBSQ HOSP IP/OBS HIGH 50: CPT | Performed by: INTERNAL MEDICINE

## 2018-08-28 PROCEDURE — 25010000002 PIPERACILLIN SOD-TAZOBACTAM PER 1 G: Performed by: INTERNAL MEDICINE

## 2018-08-28 PROCEDURE — 80048 BASIC METABOLIC PNL TOTAL CA: CPT | Performed by: INTERNAL MEDICINE

## 2018-08-28 PROCEDURE — 25010000002 VANCOMYCIN PER 500 MG

## 2018-08-28 PROCEDURE — 84132 ASSAY OF SERUM POTASSIUM: CPT | Performed by: INTERNAL MEDICINE

## 2018-08-28 RX ORDER — DOXYCYCLINE 100 MG/1
100 CAPSULE ORAL EVERY 12 HOURS SCHEDULED
Status: COMPLETED | OUTPATIENT
Start: 2018-08-28 | End: 2018-09-09

## 2018-08-28 RX ADMIN — OXYBUTYNIN CHLORIDE 5 MG: 5 TABLET ORAL at 20:04

## 2018-08-28 RX ADMIN — MEMANTINE 10 MG: 10 TABLET ORAL at 08:20

## 2018-08-28 RX ADMIN — POTASSIUM CHLORIDE 40 MEQ: 750 CAPSULE, EXTENDED RELEASE ORAL at 08:20

## 2018-08-28 RX ADMIN — FAMOTIDINE 20 MG: 20 TABLET ORAL at 20:04

## 2018-08-28 RX ADMIN — HEPARIN SODIUM 5000 UNITS: 5000 INJECTION, SOLUTION INTRAVENOUS; SUBCUTANEOUS at 20:04

## 2018-08-28 RX ADMIN — DONEPEZIL HYDROCHLORIDE 10 MG: 10 TABLET, FILM COATED ORAL at 20:04

## 2018-08-28 RX ADMIN — POTASSIUM CHLORIDE 40 MEQ: 750 CAPSULE, EXTENDED RELEASE ORAL at 12:21

## 2018-08-28 RX ADMIN — DOXYCYCLINE 100 MG: 100 CAPSULE ORAL at 08:20

## 2018-08-28 RX ADMIN — MEMANTINE 10 MG: 10 TABLET ORAL at 20:04

## 2018-08-28 RX ADMIN — ACETAMINOPHEN 650 MG: 650 SUPPOSITORY RECTAL at 05:07

## 2018-08-28 RX ADMIN — DOXYCYCLINE 100 MG: 100 CAPSULE ORAL at 20:04

## 2018-08-28 RX ADMIN — HEPARIN SODIUM 5000 UNITS: 5000 INJECTION, SOLUTION INTRAVENOUS; SUBCUTANEOUS at 08:20

## 2018-08-28 RX ADMIN — POTASSIUM CHLORIDE 40 MEQ: 750 CAPSULE, EXTENDED RELEASE ORAL at 16:28

## 2018-08-28 RX ADMIN — OXYBUTYNIN CHLORIDE 5 MG: 5 TABLET ORAL at 16:28

## 2018-08-28 RX ADMIN — TAZOBACTAM SODIUM AND PIPERACILLIN SODIUM 3.38 G: 375; 3 INJECTION, SOLUTION INTRAVENOUS at 08:19

## 2018-08-28 RX ADMIN — TAZOBACTAM SODIUM AND PIPERACILLIN SODIUM 3.38 G: 375; 3 INJECTION, SOLUTION INTRAVENOUS at 17:45

## 2018-08-28 RX ADMIN — MIRTAZAPINE 15 MG: 15 TABLET, FILM COATED ORAL at 20:04

## 2018-08-28 RX ADMIN — VANCOMYCIN HYDROCHLORIDE 1000 MG: 1 INJECTION, SOLUTION INTRAVENOUS at 05:06

## 2018-08-28 RX ADMIN — FAMOTIDINE 20 MG: 20 TABLET ORAL at 08:19

## 2018-08-28 RX ADMIN — OXYBUTYNIN CHLORIDE 5 MG: 5 TABLET ORAL at 08:24

## 2018-08-29 LAB
ALBUMIN SERPL-MCNC: 2.97 G/DL (ref 3.2–4.8)
ALBUMIN/GLOB SERPL: 1.2 G/DL (ref 1.5–2.5)
ALP SERPL-CCNC: 217 U/L (ref 25–100)
ALT SERPL W P-5'-P-CCNC: 19 U/L (ref 7–40)
ANION GAP SERPL CALCULATED.3IONS-SCNC: 6 MMOL/L (ref 3–11)
AST SERPL-CCNC: 16 U/L (ref 0–33)
BACTERIA FLD CULT: NORMAL
BILIRUB SERPL-MCNC: 0.7 MG/DL (ref 0.3–1.2)
BUN BLD-MCNC: 13 MG/DL (ref 9–23)
BUN/CREAT SERPL: 27.7 (ref 7–25)
CALCIUM SPEC-SCNC: 8.4 MG/DL (ref 8.7–10.4)
CHLORIDE SERPL-SCNC: 105 MMOL/L (ref 99–109)
CO2 SERPL-SCNC: 24 MMOL/L (ref 20–31)
CREAT BLD-MCNC: 0.47 MG/DL (ref 0.6–1.3)
DEPRECATED RDW RBC AUTO: 43.8 FL (ref 37–54)
ERYTHROCYTE [DISTWIDTH] IN BLOOD BY AUTOMATED COUNT: 14.2 % (ref 11.3–14.5)
GFR SERPL CREATININE-BSD FRML MDRD: 125 ML/MIN/1.73
GLOBULIN UR ELPH-MCNC: 2.5 GM/DL
GLUCOSE BLD-MCNC: 106 MG/DL (ref 70–100)
HCT VFR BLD AUTO: 33.7 % (ref 34.5–44)
HGB BLD-MCNC: 10.8 G/DL (ref 11.5–15.5)
L PNEUMO1 AG UR QL IA: NEGATIVE
Lab: NORMAL
MCH RBC QN AUTO: 27.1 PG (ref 27–31)
MCHC RBC AUTO-ENTMCNC: 32 G/DL (ref 32–36)
MCV RBC AUTO: 84.5 FL (ref 80–99)
ORGANISM ID: NORMAL
PLATELET # BLD AUTO: 117 10*3/MM3 (ref 150–450)
PMV BLD AUTO: 10.3 FL (ref 6–12)
POTASSIUM BLD-SCNC: 4.2 MMOL/L (ref 3.5–5.5)
PROT SERPL-MCNC: 5.5 G/DL (ref 5.7–8.2)
RBC # BLD AUTO: 3.99 10*6/MM3 (ref 3.89–5.14)
S PNEUM AG SPEC QL LA: NEGATIVE
SODIUM BLD-SCNC: 135 MMOL/L (ref 132–146)
SPECIMEN SOURCE: NORMAL
WBC NRBC COR # BLD: 14.29 10*3/MM3 (ref 3.5–10.8)

## 2018-08-29 PROCEDURE — 25010000002 HEPARIN (PORCINE) PER 1000 UNITS

## 2018-08-29 PROCEDURE — 25010000002 PIPERACILLIN SOD-TAZOBACTAM PER 1 G: Performed by: INTERNAL MEDICINE

## 2018-08-29 PROCEDURE — 80053 COMPREHEN METABOLIC PANEL: CPT | Performed by: INTERNAL MEDICINE

## 2018-08-29 PROCEDURE — 99232 SBSQ HOSP IP/OBS MODERATE 35: CPT | Performed by: PHYSICIAN ASSISTANT

## 2018-08-29 PROCEDURE — 85027 COMPLETE CBC AUTOMATED: CPT | Performed by: INTERNAL MEDICINE

## 2018-08-29 RX ADMIN — FAMOTIDINE 20 MG: 20 TABLET ORAL at 20:16

## 2018-08-29 RX ADMIN — DOXYCYCLINE 100 MG: 100 CAPSULE ORAL at 20:15

## 2018-08-29 RX ADMIN — LORAZEPAM 0.5 MG: 0.5 TABLET ORAL at 20:15

## 2018-08-29 RX ADMIN — ACETAMINOPHEN 650 MG: 650 SUPPOSITORY RECTAL at 00:21

## 2018-08-29 RX ADMIN — DONEPEZIL HYDROCHLORIDE 10 MG: 10 TABLET, FILM COATED ORAL at 20:16

## 2018-08-29 RX ADMIN — FAMOTIDINE 20 MG: 20 TABLET ORAL at 09:07

## 2018-08-29 RX ADMIN — OXYBUTYNIN CHLORIDE 5 MG: 5 TABLET ORAL at 09:09

## 2018-08-29 RX ADMIN — HEPARIN SODIUM 5000 UNITS: 5000 INJECTION, SOLUTION INTRAVENOUS; SUBCUTANEOUS at 09:07

## 2018-08-29 RX ADMIN — HEPARIN SODIUM 5000 UNITS: 5000 INJECTION, SOLUTION INTRAVENOUS; SUBCUTANEOUS at 20:16

## 2018-08-29 RX ADMIN — MEMANTINE 10 MG: 10 TABLET ORAL at 20:16

## 2018-08-29 RX ADMIN — OXYBUTYNIN CHLORIDE 5 MG: 5 TABLET ORAL at 20:19

## 2018-08-29 RX ADMIN — MEMANTINE 10 MG: 10 TABLET ORAL at 09:07

## 2018-08-29 RX ADMIN — TAZOBACTAM SODIUM AND PIPERACILLIN SODIUM 3.38 G: 375; 3 INJECTION, SOLUTION INTRAVENOUS at 00:11

## 2018-08-29 RX ADMIN — MIRTAZAPINE 15 MG: 15 TABLET, FILM COATED ORAL at 20:15

## 2018-08-29 RX ADMIN — TAZOBACTAM SODIUM AND PIPERACILLIN SODIUM 3.38 G: 375; 3 INJECTION, SOLUTION INTRAVENOUS at 09:09

## 2018-08-29 RX ADMIN — OXYBUTYNIN CHLORIDE 5 MG: 5 TABLET ORAL at 16:21

## 2018-08-29 RX ADMIN — DOXYCYCLINE 100 MG: 100 CAPSULE ORAL at 09:07

## 2018-08-29 RX ADMIN — TAZOBACTAM SODIUM AND PIPERACILLIN SODIUM 3.38 G: 375; 3 INJECTION, SOLUTION INTRAVENOUS at 17:59

## 2018-08-30 ENCOUNTER — APPOINTMENT (OUTPATIENT)
Dept: GENERAL RADIOLOGY | Facility: HOSPITAL | Age: 83
End: 2018-08-30

## 2018-08-30 LAB
ALBUMIN SERPL-MCNC: 2.95 G/DL (ref 3.2–4.8)
ALBUMIN/GLOB SERPL: 1.2 G/DL (ref 1.5–2.5)
ALP SERPL-CCNC: 213 U/L (ref 25–100)
ALT SERPL W P-5'-P-CCNC: 18 U/L (ref 7–40)
ANION GAP SERPL CALCULATED.3IONS-SCNC: 9 MMOL/L (ref 3–11)
AST SERPL-CCNC: 13 U/L (ref 0–33)
BACTERIA UR QL AUTO: ABNORMAL /HPF
BILIRUB SERPL-MCNC: 0.7 MG/DL (ref 0.3–1.2)
BILIRUB UR QL STRIP: NEGATIVE
BUN BLD-MCNC: 14 MG/DL (ref 9–23)
BUN/CREAT SERPL: 28 (ref 7–25)
CALCIUM SPEC-SCNC: 8.2 MG/DL (ref 8.7–10.4)
CHLORIDE SERPL-SCNC: 101 MMOL/L (ref 99–109)
CLARITY UR: ABNORMAL
CO2 SERPL-SCNC: 23 MMOL/L (ref 20–31)
COD CRY URNS QL: ABNORMAL /HPF
COLOR UR: YELLOW
CREAT BLD-MCNC: 0.5 MG/DL (ref 0.6–1.3)
DEPRECATED RDW RBC AUTO: 42.9 FL (ref 37–54)
ERYTHROCYTE [DISTWIDTH] IN BLOOD BY AUTOMATED COUNT: 14.2 % (ref 11.3–14.5)
GFR SERPL CREATININE-BSD FRML MDRD: 116 ML/MIN/1.73
GLOBULIN UR ELPH-MCNC: 2.5 GM/DL
GLUCOSE BLD-MCNC: 106 MG/DL (ref 70–100)
GLUCOSE UR STRIP-MCNC: NEGATIVE MG/DL
HCT VFR BLD AUTO: 31.3 % (ref 34.5–44)
HGB BLD-MCNC: 10.2 G/DL (ref 11.5–15.5)
HGB UR QL STRIP.AUTO: NEGATIVE
HYALINE CASTS UR QL AUTO: ABNORMAL /LPF
KETONES UR QL STRIP: NEGATIVE
LEUKOCYTE ESTERASE UR QL STRIP.AUTO: ABNORMAL
MCH RBC QN AUTO: 26.8 PG (ref 27–31)
MCHC RBC AUTO-ENTMCNC: 32.6 G/DL (ref 32–36)
MCV RBC AUTO: 82.2 FL (ref 80–99)
NITRITE UR QL STRIP: NEGATIVE
PH UR STRIP.AUTO: 6 [PH] (ref 5–8)
PLATELET # BLD AUTO: 148 10*3/MM3 (ref 150–450)
PMV BLD AUTO: 10.9 FL (ref 6–12)
POTASSIUM BLD-SCNC: 3.6 MMOL/L (ref 3.5–5.5)
PROT SERPL-MCNC: 5.4 G/DL (ref 5.7–8.2)
PROT UR QL STRIP: NEGATIVE
RBC # BLD AUTO: 3.81 10*6/MM3 (ref 3.89–5.14)
RBC # UR: ABNORMAL /HPF
REF LAB TEST METHOD: ABNORMAL
SODIUM BLD-SCNC: 133 MMOL/L (ref 132–146)
SP GR UR STRIP: 1.01 (ref 1–1.03)
SQUAMOUS #/AREA URNS HPF: ABNORMAL /HPF
UROBILINOGEN UR QL STRIP: ABNORMAL
WBC NRBC COR # BLD: 14.66 10*3/MM3 (ref 3.5–10.8)
WBC UR QL AUTO: ABNORMAL /HPF

## 2018-08-30 PROCEDURE — 87040 BLOOD CULTURE FOR BACTERIA: CPT | Performed by: INTERNAL MEDICINE

## 2018-08-30 PROCEDURE — 25010000002 ONDANSETRON PER 1 MG: Performed by: INTERNAL MEDICINE

## 2018-08-30 PROCEDURE — 25010000002 PIPERACILLIN SOD-TAZOBACTAM PER 1 G: Performed by: INTERNAL MEDICINE

## 2018-08-30 PROCEDURE — 85027 COMPLETE CBC AUTOMATED: CPT | Performed by: INTERNAL MEDICINE

## 2018-08-30 PROCEDURE — 71045 X-RAY EXAM CHEST 1 VIEW: CPT

## 2018-08-30 PROCEDURE — 99233 SBSQ HOSP IP/OBS HIGH 50: CPT | Performed by: INTERNAL MEDICINE

## 2018-08-30 PROCEDURE — 80053 COMPREHEN METABOLIC PANEL: CPT | Performed by: INTERNAL MEDICINE

## 2018-08-30 PROCEDURE — 25010000002 VANCOMYCIN PER 500 MG: Performed by: INTERNAL MEDICINE

## 2018-08-30 PROCEDURE — 87086 URINE CULTURE/COLONY COUNT: CPT | Performed by: INTERNAL MEDICINE

## 2018-08-30 PROCEDURE — 25010000002 HEPARIN (PORCINE) PER 1000 UNITS

## 2018-08-30 PROCEDURE — 81001 URINALYSIS AUTO W/SCOPE: CPT | Performed by: INTERNAL MEDICINE

## 2018-08-30 RX ORDER — VANCOMYCIN HYDROCHLORIDE 1 G/200ML
1 INJECTION, SOLUTION INTRAVENOUS ONCE
Status: COMPLETED | OUTPATIENT
Start: 2018-08-30 | End: 2018-08-30

## 2018-08-30 RX ORDER — VANCOMYCIN HYDROCHLORIDE 1 G/200ML
1000 INJECTION, SOLUTION INTRAVENOUS DAILY
Status: COMPLETED | OUTPATIENT
Start: 2018-08-31 | End: 2018-09-02

## 2018-08-30 RX ADMIN — DOXYCYCLINE 100 MG: 100 CAPSULE ORAL at 20:38

## 2018-08-30 RX ADMIN — ACETAMINOPHEN 650 MG: 650 SUPPOSITORY RECTAL at 06:37

## 2018-08-30 RX ADMIN — ONDANSETRON 4 MG: 2 INJECTION INTRAMUSCULAR; INTRAVENOUS at 16:49

## 2018-08-30 RX ADMIN — VANCOMYCIN HYDROCHLORIDE 1 G: 1 INJECTION, SOLUTION INTRAVENOUS at 10:24

## 2018-08-30 RX ADMIN — HEPARIN SODIUM 5000 UNITS: 5000 INJECTION, SOLUTION INTRAVENOUS; SUBCUTANEOUS at 20:39

## 2018-08-30 RX ADMIN — OXYBUTYNIN CHLORIDE 5 MG: 5 TABLET ORAL at 09:50

## 2018-08-30 RX ADMIN — DOXYCYCLINE 100 MG: 100 CAPSULE ORAL at 09:50

## 2018-08-30 RX ADMIN — ACETAMINOPHEN 650 MG: 650 SUPPOSITORY RECTAL at 00:07

## 2018-08-30 RX ADMIN — FAMOTIDINE 20 MG: 20 TABLET ORAL at 20:38

## 2018-08-30 RX ADMIN — MEMANTINE 10 MG: 10 TABLET ORAL at 09:50

## 2018-08-30 RX ADMIN — MEMANTINE 10 MG: 10 TABLET ORAL at 20:38

## 2018-08-30 RX ADMIN — DONEPEZIL HYDROCHLORIDE 10 MG: 10 TABLET, FILM COATED ORAL at 20:38

## 2018-08-30 RX ADMIN — TAZOBACTAM SODIUM AND PIPERACILLIN SODIUM 3.38 G: 375; 3 INJECTION, SOLUTION INTRAVENOUS at 10:23

## 2018-08-30 RX ADMIN — HEPARIN SODIUM 5000 UNITS: 5000 INJECTION, SOLUTION INTRAVENOUS; SUBCUTANEOUS at 09:50

## 2018-08-30 RX ADMIN — OXYBUTYNIN CHLORIDE 5 MG: 5 TABLET ORAL at 20:38

## 2018-08-30 RX ADMIN — MIRTAZAPINE 15 MG: 15 TABLET, FILM COATED ORAL at 20:39

## 2018-08-30 RX ADMIN — MICAFUNGIN SODIUM 100 MG: 20 INJECTION, POWDER, LYOPHILIZED, FOR SOLUTION INTRAVENOUS at 09:05

## 2018-08-30 RX ADMIN — OXYBUTYNIN CHLORIDE 5 MG: 5 TABLET ORAL at 16:48

## 2018-08-30 RX ADMIN — TAZOBACTAM SODIUM AND PIPERACILLIN SODIUM 3.38 G: 375; 3 INJECTION, SOLUTION INTRAVENOUS at 16:48

## 2018-08-30 RX ADMIN — TAZOBACTAM SODIUM AND PIPERACILLIN SODIUM 3.38 G: 375; 3 INJECTION, SOLUTION INTRAVENOUS at 00:05

## 2018-08-30 RX ADMIN — FAMOTIDINE 20 MG: 20 TABLET ORAL at 09:50

## 2018-08-30 RX ADMIN — ACETAMINOPHEN 650 MG: 325 TABLET ORAL at 20:39

## 2018-08-30 RX ADMIN — POTASSIUM CHLORIDE 40 MEQ: 750 CAPSULE, EXTENDED RELEASE ORAL at 09:50

## 2018-08-30 RX ADMIN — HYDROCODONE BITARTRATE AND ACETAMINOPHEN 1 TABLET: 5; 325 TABLET ORAL at 03:16

## 2018-08-31 LAB
ALBUMIN SERPL-MCNC: 2.69 G/DL (ref 3.2–4.8)
ALBUMIN/GLOB SERPL: 1.5 G/DL (ref 1.5–2.5)
ALP SERPL-CCNC: 218 U/L (ref 25–100)
ALT SERPL W P-5'-P-CCNC: 16 U/L (ref 7–40)
ANION GAP SERPL CALCULATED.3IONS-SCNC: 8 MMOL/L (ref 3–11)
AST SERPL-CCNC: 15 U/L (ref 0–33)
BASOPHILS # BLD AUTO: 0.03 10*3/MM3 (ref 0–0.2)
BASOPHILS NFR BLD AUTO: 0.3 % (ref 0–1)
BILIRUB SERPL-MCNC: 0.4 MG/DL (ref 0.3–1.2)
BUN BLD-MCNC: 23 MG/DL (ref 9–23)
BUN/CREAT SERPL: 35.4 (ref 7–25)
CALCIUM SPEC-SCNC: 8.1 MG/DL (ref 8.7–10.4)
CHLORIDE SERPL-SCNC: 103 MMOL/L (ref 99–109)
CO2 SERPL-SCNC: 26 MMOL/L (ref 20–31)
CREAT BLD-MCNC: 0.65 MG/DL (ref 0.6–1.3)
DEPRECATED RDW RBC AUTO: 45.1 FL (ref 37–54)
EOSINOPHIL # BLD AUTO: 0.42 10*3/MM3 (ref 0–0.3)
EOSINOPHIL NFR BLD AUTO: 3.6 % (ref 0–3)
ERYTHROCYTE [DISTWIDTH] IN BLOOD BY AUTOMATED COUNT: 14.6 % (ref 11.3–14.5)
GFR SERPL CREATININE-BSD FRML MDRD: 86 ML/MIN/1.73
GLOBULIN UR ELPH-MCNC: 1.8 GM/DL
GLUCOSE BLD-MCNC: 115 MG/DL (ref 70–100)
HCT VFR BLD AUTO: 30.5 % (ref 34.5–44)
HGB BLD-MCNC: 9.7 G/DL (ref 11.5–15.5)
IMM GRANULOCYTES # BLD: 0.15 10*3/MM3 (ref 0–0.03)
IMM GRANULOCYTES NFR BLD: 1.3 % (ref 0–0.6)
LYMPHOCYTES # BLD AUTO: 1.14 10*3/MM3 (ref 0.6–4.8)
LYMPHOCYTES NFR BLD AUTO: 9.8 % (ref 24–44)
MCH RBC QN AUTO: 26.9 PG (ref 27–31)
MCHC RBC AUTO-ENTMCNC: 31.8 G/DL (ref 32–36)
MCV RBC AUTO: 84.5 FL (ref 80–99)
MONOCYTES # BLD AUTO: 1.17 10*3/MM3 (ref 0–1)
MONOCYTES NFR BLD AUTO: 10.1 % (ref 0–12)
NEUTROPHILS # BLD AUTO: 8.88 10*3/MM3 (ref 1.5–8.3)
NEUTROPHILS NFR BLD AUTO: 76.2 % (ref 41–71)
PLATELET # BLD AUTO: 188 10*3/MM3 (ref 150–450)
PMV BLD AUTO: 10.4 FL (ref 6–12)
POTASSIUM BLD-SCNC: 4.2 MMOL/L (ref 3.5–5.5)
PROT SERPL-MCNC: 4.5 G/DL (ref 5.7–8.2)
RBC # BLD AUTO: 3.61 10*6/MM3 (ref 3.89–5.14)
SODIUM BLD-SCNC: 137 MMOL/L (ref 132–146)
WBC NRBC COR # BLD: 11.64 10*3/MM3 (ref 3.5–10.8)

## 2018-08-31 PROCEDURE — 85025 COMPLETE CBC W/AUTO DIFF WBC: CPT | Performed by: INTERNAL MEDICINE

## 2018-08-31 PROCEDURE — 99232 SBSQ HOSP IP/OBS MODERATE 35: CPT | Performed by: NURSE PRACTITIONER

## 2018-08-31 PROCEDURE — 25010000002 PIPERACILLIN SOD-TAZOBACTAM PER 1 G: Performed by: INTERNAL MEDICINE

## 2018-08-31 PROCEDURE — 25010000002 HEPARIN (PORCINE) PER 1000 UNITS

## 2018-08-31 PROCEDURE — 25010000002 VANCOMYCIN PER 500 MG

## 2018-08-31 PROCEDURE — 80053 COMPREHEN METABOLIC PANEL: CPT | Performed by: INTERNAL MEDICINE

## 2018-08-31 RX ORDER — NYSTATIN 100000 [USP'U]/G
POWDER TOPICAL EVERY 12 HOURS SCHEDULED
Status: DISCONTINUED | OUTPATIENT
Start: 2018-08-31 | End: 2018-09-10 | Stop reason: HOSPADM

## 2018-08-31 RX ADMIN — VANCOMYCIN HYDROCHLORIDE 1000 MG: 1 INJECTION, SOLUTION INTRAVENOUS at 09:19

## 2018-08-31 RX ADMIN — NYSTATIN: 100000 POWDER TOPICAL at 20:12

## 2018-08-31 RX ADMIN — OXYBUTYNIN CHLORIDE 5 MG: 5 TABLET ORAL at 20:11

## 2018-08-31 RX ADMIN — HEPARIN SODIUM 5000 UNITS: 5000 INJECTION, SOLUTION INTRAVENOUS; SUBCUTANEOUS at 08:19

## 2018-08-31 RX ADMIN — FAMOTIDINE 20 MG: 20 TABLET ORAL at 08:19

## 2018-08-31 RX ADMIN — TAZOBACTAM SODIUM AND PIPERACILLIN SODIUM 3.38 G: 375; 3 INJECTION, SOLUTION INTRAVENOUS at 16:40

## 2018-08-31 RX ADMIN — MEMANTINE 10 MG: 10 TABLET ORAL at 08:19

## 2018-08-31 RX ADMIN — MIRTAZAPINE 15 MG: 15 TABLET, FILM COATED ORAL at 20:11

## 2018-08-31 RX ADMIN — MEMANTINE 10 MG: 10 TABLET ORAL at 20:11

## 2018-08-31 RX ADMIN — TAZOBACTAM SODIUM AND PIPERACILLIN SODIUM 3.38 G: 375; 3 INJECTION, SOLUTION INTRAVENOUS at 00:00

## 2018-08-31 RX ADMIN — FAMOTIDINE 20 MG: 20 TABLET ORAL at 20:11

## 2018-08-31 RX ADMIN — DOXYCYCLINE 100 MG: 100 CAPSULE ORAL at 08:19

## 2018-08-31 RX ADMIN — OXYBUTYNIN CHLORIDE 5 MG: 5 TABLET ORAL at 08:19

## 2018-08-31 RX ADMIN — DONEPEZIL HYDROCHLORIDE 10 MG: 10 TABLET, FILM COATED ORAL at 20:11

## 2018-08-31 RX ADMIN — NYSTATIN: 100000 POWDER TOPICAL at 14:07

## 2018-08-31 RX ADMIN — DOXYCYCLINE 100 MG: 100 CAPSULE ORAL at 20:11

## 2018-08-31 RX ADMIN — OXYBUTYNIN CHLORIDE 5 MG: 5 TABLET ORAL at 16:40

## 2018-08-31 RX ADMIN — HEPARIN SODIUM 5000 UNITS: 5000 INJECTION, SOLUTION INTRAVENOUS; SUBCUTANEOUS at 20:11

## 2018-08-31 RX ADMIN — ACETAMINOPHEN 650 MG: 650 SUPPOSITORY RECTAL at 00:25

## 2018-08-31 RX ADMIN — TAZOBACTAM SODIUM AND PIPERACILLIN SODIUM 3.38 G: 375; 3 INJECTION, SOLUTION INTRAVENOUS at 08:19

## 2018-09-01 LAB — BACTERIA SPEC AEROBE CULT: NORMAL

## 2018-09-01 PROCEDURE — 25010000002 HEPARIN (PORCINE) PER 1000 UNITS

## 2018-09-01 PROCEDURE — 25010000002 PIPERACILLIN SOD-TAZOBACTAM PER 1 G: Performed by: INTERNAL MEDICINE

## 2018-09-01 PROCEDURE — 99233 SBSQ HOSP IP/OBS HIGH 50: CPT | Performed by: INTERNAL MEDICINE

## 2018-09-01 PROCEDURE — 25010000002 VANCOMYCIN PER 500 MG

## 2018-09-01 RX ADMIN — HEPARIN SODIUM 5000 UNITS: 5000 INJECTION, SOLUTION INTRAVENOUS; SUBCUTANEOUS at 21:08

## 2018-09-01 RX ADMIN — MIRTAZAPINE 15 MG: 15 TABLET, FILM COATED ORAL at 21:08

## 2018-09-01 RX ADMIN — DOCUSATE SODIUM 100 MG: 100 CAPSULE, LIQUID FILLED ORAL at 18:37

## 2018-09-01 RX ADMIN — MEMANTINE 10 MG: 10 TABLET ORAL at 07:32

## 2018-09-01 RX ADMIN — OXYBUTYNIN CHLORIDE 5 MG: 5 TABLET ORAL at 17:25

## 2018-09-01 RX ADMIN — OXYBUTYNIN CHLORIDE 5 MG: 5 TABLET ORAL at 07:32

## 2018-09-01 RX ADMIN — NYSTATIN: 100000 POWDER TOPICAL at 07:33

## 2018-09-01 RX ADMIN — NYSTATIN: 100000 POWDER TOPICAL at 18:38

## 2018-09-01 RX ADMIN — FAMOTIDINE 20 MG: 20 TABLET ORAL at 21:08

## 2018-09-01 RX ADMIN — TAZOBACTAM SODIUM AND PIPERACILLIN SODIUM 3.38 G: 375; 3 INJECTION, SOLUTION INTRAVENOUS at 00:54

## 2018-09-01 RX ADMIN — DONEPEZIL HYDROCHLORIDE 10 MG: 10 TABLET, FILM COATED ORAL at 21:08

## 2018-09-01 RX ADMIN — FAMOTIDINE 20 MG: 20 TABLET ORAL at 07:32

## 2018-09-01 RX ADMIN — TAZOBACTAM SODIUM AND PIPERACILLIN SODIUM 3.38 G: 375; 3 INJECTION, SOLUTION INTRAVENOUS at 08:19

## 2018-09-01 RX ADMIN — DOXYCYCLINE 100 MG: 100 CAPSULE ORAL at 21:08

## 2018-09-01 RX ADMIN — DOXYCYCLINE 100 MG: 100 CAPSULE ORAL at 07:32

## 2018-09-01 RX ADMIN — TAZOBACTAM SODIUM AND PIPERACILLIN SODIUM 3.38 G: 375; 3 INJECTION, SOLUTION INTRAVENOUS at 17:27

## 2018-09-01 RX ADMIN — MEMANTINE 10 MG: 10 TABLET ORAL at 21:08

## 2018-09-01 RX ADMIN — VANCOMYCIN HYDROCHLORIDE 1000 MG: 1 INJECTION, SOLUTION INTRAVENOUS at 08:19

## 2018-09-01 RX ADMIN — HEPARIN SODIUM 5000 UNITS: 5000 INJECTION, SOLUTION INTRAVENOUS; SUBCUTANEOUS at 07:33

## 2018-09-01 RX ADMIN — OXYBUTYNIN CHLORIDE 5 MG: 5 TABLET ORAL at 21:08

## 2018-09-01 NOTE — PLAN OF CARE
Problem: Patient Care Overview  Goal: Plan of Care Review  Outcome: Ongoing (interventions implemented as appropriate)   08/31/18 2000 09/01/18 0413   Coping/Psychosocial   Plan of Care Reviewed With patient;son --    Plan of Care Review   Progress --  no change     Goal: Discharge Needs Assessment  Outcome: Ongoing (interventions implemented as appropriate)   08/27/18 1447 08/28/18 1416   Discharge Needs Assessment   Readmission Within the Last 30 Days --  current reason for admission unrelated to previous admission   Concerns to be Addressed --  no discharge needs identified   Patient/Family Anticipates Transition to --  home   Patient/Family Anticipated Services at Transition  --    Transportation Concerns other (see comments)  (Pt's son denies transportation concerns) --    Transportation Anticipated family or friend will provide --    Anticipated Changes Related to Illness inability to care for self --    Equipment Needed After Discharge wheelchair, manual --    Discharge Facility/Level of Care Needs nursing facility, intermediate --    Disability   Equipment Currently Used at Home wheelchair --      Goal: Interprofessional Rounds/Family Conf  Outcome: Ongoing (interventions implemented as appropriate)   08/27/18 0425   Interdisciplinary Rounds/Family Conf   Participants ;family;nursing;patient;physician       Problem: Fall Risk (Adult)  Goal: Identify Related Risk Factors and Signs and Symptoms  Outcome: Ongoing (interventions implemented as appropriate)   08/29/18 1624   Fall Risk (Adult)   Related Risk Factors (Fall Risk) age-related changes;confusion/agitation;gait/mobility problems;neuro disease/injury   Signs and Symptoms (Fall Risk) presence of risk factors     Goal: Absence of Fall  Outcome: Ongoing (interventions implemented as appropriate)   09/01/18 0413   Fall Risk (Adult)   Absence of Fall making progress toward outcome       Problem: Skin Injury Risk (Adult)  Goal: Identify  Related Risk Factors and Signs and Symptoms  Outcome: Ongoing (interventions implemented as appropriate)   08/28/18 1416   Skin Injury Risk (Adult)   Related Risk Factors (Skin Injury Risk) advanced age     Goal: Skin Health and Integrity  Outcome: Ongoing (interventions implemented as appropriate)   09/01/18 0413   Skin Injury Risk (Adult)   Skin Health and Integrity making progress toward outcome

## 2018-09-01 NOTE — PLAN OF CARE
Problem: Skin Injury Risk (Adult)  Intervention: Promote/Optimize Nutrition   09/01/18 1059   Nutrition Interventions   Oral Nutrition Promotion rest periods promoted     Intervention: Prevent/Manage Excess Moisture   09/01/18 1059   Hygiene Care   Perineal Care absorbent pad changed   Bathing/Skin Care incontinence care     Intervention: Maintain Head of Bed Elevation Less Than 30 Degrees as Tolerated   09/01/18 1059   Positioning   Head of Bed (HOB) HOB at 30-45 degrees     Intervention: Prevent/Minimize Shear/Friction Injuries   09/01/18 1059   Positioning   Positioning/Transfer Devices pillows;in use     Intervention: Prevent or Minimize Pressure   09/01/18 1059   Skin Interventions   Pressure Reduction Techniques frequent weight shift encouraged;sit time limited to 2 hours;weight shift assistance provided   Positioning   Body Position foot of bed elevated       Goal: Identify Related Risk Factors and Signs and Symptoms  Outcome: Ongoing (interventions implemented as appropriate)   09/01/18 1059   Skin Injury Risk (Adult)   Related Risk Factors (Skin Injury Risk) advanced age;cognitive impairment;fluid intake inadequate;hospitalization prolonged;infection;hypothermia/hyperthermia;mobility impaired;nutritional deficiencies;tissue perfusion altered     Goal: Skin Health and Integrity  Outcome: Ongoing (interventions implemented as appropriate)   09/01/18 1059   Skin Injury Risk (Adult)   Skin Health and Integrity making progress toward outcome

## 2018-09-01 NOTE — PROGRESS NOTES
[facility] Progress Note        Antibiotics:  Anti-Infectives     Ordered     Dose/Rate Route Frequency Start Stop    08/30/18 1411  vancomycin (VANCOCIN) in iso-osmotic dextrose IVPB 1 g (premix) 200 mL     Ordering Provider:  Carol Sanchez RPH    1,000 mg  over 60 Minutes Intravenous Daily 08/31/18 0900 09/07/18 0859    08/30/18 0749  micafungin 100 mg/100 mL 0.9% NS IVPB (mbp)     Ordering Provider:  Deepak Ellsworth MD    100 mg  over 60 Minutes Intravenous Once 08/30/18 0900 08/30/18 1005    08/30/18 0749  vancomycin (VANCOCIN) in iso-osmotic dextrose IVPB 1 g (premix) 200 mL     Ordering Provider:  Deepak Ellsworth MD    1 g Intravenous Once 08/30/18 0900 08/30/18 1024    08/30/18 0749  Pharmacy to dose vancomycin     Ordering Provider:  Deepak Ellsworth MD     Does not apply Continuous PRN 08/30/18 0748 09/06/18 0747    08/28/18 0714  piperacillin-tazobactam (ZOSYN) 3.375 g in iso-osmotic dextrose 50 ml (premix)     Ordering Provider:  Deepak Ellsworth MD    3.375 g  over 4 Hours Intravenous Every 8 Hours 08/28/18 1700 09/11/18 1659    08/28/18 0714  piperacillin-tazobactam (ZOSYN) 3.375 g in iso-osmotic dextrose 50 ml (premix)     Ordering Provider:  Deepak Ellsworth MD    3.375 g  over 30 Minutes Intravenous Once 08/28/18 0900 08/28/18 0849    08/28/18 0715  doxycycline (MONODOX) capsule 100 mg     Ordering Provider:  Deepak Ellsworth MD    100 mg Oral Every 12 Hours Scheduled 08/28/18 0900 09/04/18 0859    08/26/18 0934  meropenem (MERREM) 1 g/100 mL 0.9% NS VTB (mbp)     Ordering Provider:  Deepak Ellsworth MD    1 g  over 30 Minutes Intravenous Once 08/26/18 1030 08/26/18 1144    08/25/18 0532  piperacillin-tazobactam (ZOSYN) 3.375 g in iso-osmotic dextrose 50 ml (premix)     Ordering Provider:  Latoya Petersen MD    3.375 g  100 mL/hr over 30 Minutes Intravenous Once 08/25/18 0534 08/25/18 0613    08/25/18 0532  vancomycin (VANCOCIN) in iso-osmotic dextrose IVPB 1 g (premix)  "200 mL     Ordering Provider:  Latoya Petersen MD    20 mg/kg × 52.9 kg  200 mL/hr over 60 Minutes Intravenous Once 08/25/18 0534 08/25/18 0717          CC: fever    HPI:  Patient is a 89 y.o.  Yr old female with history of dementia, chronically wheelchair bound after hip fracture February 2018 and resides at the [facility].  Chronic urinary incontinence per son and sent to the emergency room August 25 with mental status worse from baseline with encephalopathy, nausea/vomiting and fever to 103 with concern for UTI and aspiration.  She was initially hypotensive per records, with tachycardia and leukocytosis including high pro-calcitonin/lactate consistent with acute severe sepsis and subsequently found to have gram-negative ynes bacteremia and abnormal urinalysis.  Chest x-ray with bibasilar consolidation concerning for aspiration.     Patient with severe dementia and encephalopathy and not cooperative with details of history or review of systems.  Son reports no preceding symptoms of headache/photophobia or neck stiffness, no hemoptysis or other bleeding and no diarrhea/abdominal pain.     Son reports that she has not seen urology that he knows of.  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     Otherwise complete review of systems unable to obtain.    ROS:      9/1/18 per nursing, No f/c/s. No n/v/d. No rash. No new ADR to Abx.     Otherwise unable to obtain as above    PE: nursing/chaperone present  /63   Pulse 82   Temp 99.1 °F (37.3 °C) (Axillary)   Resp 14   Ht 152.4 cm (60\")   Wt 45.6 kg (100 lb 9.6 oz)   SpO2 100%  "  BMI 19.65 kg/m²     GENERAL: Awake but not oriented, in no acute distress.   HEENT: Normocephalic, atraumatic.   No conjunctival injection. No icterus. Oropharynx clear without evidence of thrush or exudate. No evidence of peridontal disease.    NECK: Supple without nuchal rigidity. No mass.  LYMPH: No cervical, axillary or inguinal lymphadenopathy.  HEART: RRR; No murmur, rubs, gallops.   LUNGS: Diminished at bases with scattered rhonchi/crackles. Normal respiratory effort. Nonlabored. No dullness.  ABDOMEN: Soft, slight suprapubic tenderness but no CVA tenderness, nondistended. Positive bowel sounds. No rebound or guarding. NO mass or HSM.  EXT:  No cyanosis, clubbing or edema. No cord.  : Genitalia generally unremarkable.    MSK: FROM without joint effusions noted arms/legs.    SKIN: Warm and dry without cutaneous eruptions on Inspection/palpation.    NEURO: She is not cooperative with a motor or sensory exam     No peripheral stigmata/phenomena of endocarditis     IV no redenss or purulence    Laboratory Data      Results from last 7 days  Lab Units 08/31/18  0433 08/30/18  0452 08/29/18  0512   WBC 10*3/mm3 11.64* 14.66* 14.29*   HEMOGLOBIN g/dL 9.7* 10.2* 10.8*   HEMATOCRIT % 30.5* 31.3* 33.7*   PLATELETS 10*3/mm3 188 148* 117*       Results from last 7 days  Lab Units 08/31/18  0433   SODIUM mmol/L 137   POTASSIUM mmol/L 4.2   CHLORIDE mmol/L 103   CO2 mmol/L 26.0   BUN mg/dL 23   CREATININE mg/dL 0.65   GLUCOSE mg/dL 115*   CALCIUM mg/dL 8.1*       Results from last 7 days  Lab Units 08/31/18  0433   ALK PHOS U/L 218*   BILIRUBIN mg/dL 0.4   ALT (SGPT) U/L 16   AST (SGOT) U/L 15               Estimated Creatinine Clearance: 34.3 mL/min (by C-G formula based on SCr of 0.65 mg/dL).      Microbiology:      Radiology:  Imaging Results (last 72 hours)     Procedure Component Value Units Date/Time    US Renal Bilateral [[id number]] Collected:  08/26/18 1356     Updated:  08/26/18 1413    Narrative:        EXAMINATION: US RENAL BILATERAL - 8/26/2018     INDICATION:  A41.9-Sepsis, unspecified organism; J69.0-Pneumonitis due  to inhalation of food and vomit; N39.0-Urinary tract infection, site not  specified; R09.02-Hypoxemia.      TECHNIQUE: Ultrasound kidneys and urinary bladder.     COMPARISON: None.     FINDINGS: Right kidney measures 12 cm in length with severe right  hydronephrosis and cortical thinning present, however, no calculi or  soft tissue contour-deforming mass.     Left kidney measures 11.1 cm in length without evidence for  hydronephrosis, contour-deforming mass, or obvious calculi.     Urinary bladder is moderately distended and grossly unremarkable.       Impression:       Severe right hydronephrosis with cortical thinning, however,  no obstructive uropathy is clearly identified.      DICTATED:   8/26/2018  EDITED/ls :   8/26/2018        XR Chest 1 View [[id number]] Collected:  08/25/18 0504     Updated:  08/25/18 0650    Addenda:        IMPRESSION:       Bibasal atelectasis and consolidation suspicious for ASPIRATION   PNEUMONIA.   Recommend followup to complete resolution.      THIS DOCUMENT HAS BEEN ELECTRONICALLY SIGNED BY YOBANI LOERA MD  Signed:  08/25/18 0650 by Yobani Loera MD    Impression:       :      THIS DOCUMENT HAS BEEN ELECTRONICALLY SIGNED BY YOBANI LOERA MD            Impression:   --Acute severe sepsis with fever/tachycardia/leukocytosis, hypotension, elevated lactate/pro-calcitonin and metabolic encephalopathy out of proportion to her dementia.  Urinary source and respiratory infection are primary concerns.    Resuscitative efforts per internal medicine;  Fever back up some 8/29-8/30 and recheck urine/blood cultures, not producing sputum,CXR noted and added broader GPC coverage with vancomycin while cultures pending;  mycamine x 1 8/30;  abd benign at present, and no diarrhea;  IV site appears ok      --Acute E Coli septicemia.  Concern for urinary source.  Otherwise as  below     --Acute pyelonephritis.  Hydronephrosis and renal following.  ?contributing to persistent fever;  Timing/option/threshold for intervention per urology     --Chronic urinary incontinence.  As above.     --Acute nausea/vomiting.  In setting of pyelonephritis and aspiration risk.     --Acute bilateral pneumonia.  Concern for aspiration as above, also with ongoing treatment for other HCAP organisms etc.   If significant pleural effusions evolve, you could give consideration to diagnostic/therapeutic thoracentesis etc. Repeat CXR 8/30     --Chronic dementia with acute metabolic encephalopathy     --Acute lactic acidosis     --Thrombocytopenia.  Monitor       PLAN:    --IV vancomycin/zosyn, doxycycline;  mycamine x 1 8/30     --Check/review labs cultures and scans     --partial history per nursing     --Discussed with microbiology     --Highly complex set of issues with high risk for further serious morbidity and other serious sequela/mortality    --urology following      Deepak Ellsworth MD  9/1/2018

## 2018-09-01 NOTE — PROGRESS NOTES
[facility] Medicine Services  PROGRESS NOTE    Patient Name: Zainab Worthy  : 2/3/1929  MRN: [id number]    Date of Admission: 2018  Length of Stay: 7  Primary Care Physician: Aguilar Kelly MD    Subjective     CC: sepsis    HPI:  Sleeping comfortably in bed , wakes and has no acute complaints. No overnight events reported.   Review of Systems  Gen- - fevers, - chills  CV- No chest pain, palpitations  Resp- No cough, dyspnea  GI- No N/V/D, abd pain    Otherwise ROS is negative except as mentioned in the HPI.    Objective     Vital Signs:   Temp:  [98 °F (36.7 °C)-99.1 °F (37.3 °C)] 99.1 °F (37.3 °C)  Heart Rate:  [] 82  Resp:  [14-18] 14  BP: (108-134)/(60-66) 134/63        Physical Exam:  Constitutional: No acute distress, sleeping but arouses easily and converses, daughter at bedside  HENT: NCAT, mucous membranes moist  Respiratory: Clear to auscultation bilaterally, respiratory effort normal on room air  Cardiovascular: rrr, no murmurs, on tele  Gastrointestinal: Positive bowel sounds, soft, nontender, nondistended  Musculoskeletal: No bilateral ankle edema  Psychiatric: sleeping but appropriate when wakes  Neurologic: Oriented to self. No focal deficits.  Skin: No rashes    Results Reviewed:  I have personally reviewed current lab, radiology, and data and agree.      Results from last 7 days  Lab Units 18  0512   WBC 10*3/mm3 11.64* 14.66* 14.29*   HEMOGLOBIN g/dL 9.7* 10.2* 10.8*   HEMATOCRIT % 30.5* 31.3* 33.7*   PLATELETS 10*3/mm3 188 148* 117*       Results from last 7 days  Lab Units 18  0512   SODIUM mmol/L 137 133 135   POTASSIUM mmol/L 4.2 3.6 4.2   CHLORIDE mmol/L 103 101 105   CO2 mmol/L 26.0 23.0 24.0   BUN mg/dL 23 14 13   CREATININE mg/dL 0.65 0.50* 0.47*   GLUCOSE mg/dL 115* 106* 106*   CALCIUM mg/dL 8.1* 8.2* 8.4*   ALT (SGPT) U/L 16 18 19   AST (SGOT) U/L 15 13 16        Estimated  Creatinine Clearance: 34.3 mL/min (by C-G formula based on SCr of 0.65 mg/dL).  No results found for: BNP    Microbiology Results Abnormal     Procedure Component Value - Date/Time    Blood Culture - Blood, [[id number]]  (Normal) Collected:  08/30/18 0849    Lab Status:  Preliminary result Specimen:  Blood from Arm, Right Updated:  09/01/18 0945     Blood Culture No growth at 2 days    Blood Culture - Blood, [[id number]]  (Normal) Collected:  08/30/18 0859    Lab Status:  Preliminary result Specimen:  Blood from Hand, Right Updated:  09/01/18 0945     Blood Culture No growth at 2 days    Urine Culture - Urine, [[id number]]  (Normal) Collected:  08/30/18 1627    Lab Status:  Final result Specimen:  Urine from Urine, Clean Catch Updated:  09/01/18 0740     Urine Culture No growth at 2 days    Legionella Antigen, Urine - Urine, Urine, Clean Catch [[id number]] Collected:  08/25/18 0526    Lab Status:  Final result Specimen:  Urine from Urine, Clean Catch Updated:  08/29/18 1518     L. pneumophila Serogp 1 Ur Ag Negative     Comment: Presumptive negative for L. pneumophila serogroup 1 antigen in urine,  suggesting no recent or current infection. Legionnaires' disease  cannot be ruled out since other serogroups and species may also cause  disease.       Narrative:       Performed at:  [address] [place] [address]  [id number]  : Rik Leos MD, Phone:  [phone]    S. Pneumo Ag Urine or CSF - Urine, Urine, Clean Catch [[id number]] Collected:  08/25/18 0526    Lab Status:  Final result Specimen:  Urine from Urine, Clean Catch Updated:  08/29/18 1518     Specimen Source Urine     STREP PNEUMONIAE ANTIGEN Negative     Body Fluid Culture, Sterile Not Indicated     Organism ID Not indicated.     Please note Comment     Comment: College [employer] standards require a culture to be  performed on CSF specimens submitted for bacterial antigen testing.  (CAP [id number]) Urine  specimens will not be cultured.       Narrative:       Performed at:  [address] [place] [address]  [id number]  : Rik Leos MD, Phone:  [phone]    Urine Culture - Urine, [[id number]]  (Abnormal)  (Susceptibility) Collected:  08/25/18 0526    Lab Status:  Final result Specimen:  Urine from Urine, Catheter Updated:  08/27/18 1413     Urine Culture >100,000 CFU/mL Escherichia coli (A)    Susceptibility      Escherichia coli     KANIKA     Ampicillin >16 ug/ml Resistant     Ampicillin + Sulbactam >16/8 ug/ml Resistant     Aztreonam <=8 ug/ml Susceptible     Cefepime <=8 ug/ml Susceptible     Cefotaxime <=2 ug/ml Susceptible     Ceftriaxone <=8 ug/ml Susceptible     Cefuroxime sodium 8 ug/ml Susceptible     Cephalothin >16 ug/ml Resistant     Ertapenem <=1 ug/ml Susceptible     Gentamicin <=4 ug/ml Susceptible     Levofloxacin <=2 ug/ml Susceptible     Meropenem <=1 ug/ml Susceptible     Nitrofurantoin <=32 ug/ml Susceptible     Piperacillin + Tazobactam <=16 ug/ml Susceptible     Tetracycline >8 ug/ml Resistant     Tobramycin <=4 ug/ml Susceptible     Trimethoprim + Sulfamethoxazole <=2/38 ug/ml Susceptible                    Blood Culture - Blood, [[id number]]  (Abnormal) Collected:  08/25/18 0520    Lab Status:  Final result Specimen:  Blood from Arm, Right Updated:  08/27/18 1157     Blood Culture Gram Negative Bacilli (A)     Isolated from Aerobic and Anaerobic Bottles     Gram Stain Result Anaerobic Bottle Gram negative bacilli      Aerobic Bottle Gram negative bacilli    Narrative:       SEE CULTURE [id number] FOR ID AND SUSCEPTIBILITIES    Blood Culture - Blood, [[id number]]  (Abnormal)  (Susceptibility) Collected:  08/25/18 0520    Lab Status:  Final result Specimen:  Blood from Arm, Left Updated:  08/27/18 1156     Blood Culture Escherichia coli (A)     Isolated from Aerobic and Anaerobic Bottles     Gram Stain Result Anaerobic Bottle Gram negative bacilli      Aerobic  Bottle Gram negative bacilli    Susceptibility      Escherichia coli     KANIKA     Ampicillin >16 ug/ml Resistant     Ampicillin + Sulbactam 16/8 ug/ml Intermediate     Aztreonam <=8 ug/ml Susceptible     Cefepime <=8 ug/ml Susceptible     Cefotaxime <=2 ug/ml Susceptible     Ceftriaxone <=8 ug/ml Susceptible     Cefuroxime sodium 16 ug/ml Intermediate     Ertapenem <=1 ug/ml Susceptible     Gentamicin <=4 ug/ml Susceptible     Levofloxacin <=2 ug/ml Susceptible     Meropenem <=1 ug/ml Susceptible     Piperacillin + Tazobactam <=16 ug/ml Susceptible     Tetracycline >8 ug/ml Resistant     Tobramycin <=4 ug/ml Susceptible     Trimethoprim + Sulfamethoxazole <=2/38 ug/ml Susceptible                    Blood Culture ID, PCR - Blood, [[id number]]  (Abnormal) Collected:  08/25/18 0520    Lab Status:  Final result Specimen:  Blood from Arm, Left Updated:  08/25/18 2045     BCID, PCR Escherichia coli. Identification by BCID PCR. (C)    MRSA Screen, PCR - Swab, Nares [[id number]]  (Normal) Collected:  08/25/18 1714    Lab Status:  Final result Specimen:  Swab from Nares Updated:  08/25/18 1859     MRSA, PCR Negative    Narrative:         MRSA Negative        Imaging Results (last 24 hours)     ** No results found for the last 24 hours. **        I have reviewed the medications.      [START ON 9/2/2018] Pharmacy Consult  Does not apply Once   donepezil 10 mg Oral Nightly   doxycycline 100 mg Oral Q12H   famotidine 20 mg Oral BID   heparin (porcine) 5,000 Units Subcutaneous Q12H   memantine 10 mg Oral Q12H   mirtazapine 15 mg Oral Nightly   nystatin  Topical Q12H   oxybutynin 5 mg Oral TID   piperacillin-tazobactam 3.375 g Intravenous Q8H   vancomycin 1,000 mg Intravenous Daily     Assessment / Plan     Hospital Problem List     * (Principal)Sepsis (CMS/HCC)    Endometrial cancer (CMS/HCC)    UTI (urinary tract infection)    Late onset Alzheimer's disease without behavioral disturbance    Sepsis associated hypotension (CMS/HCC)     Leukocytosis    Normocytic anemia    Thrombocytopenia (CMS/HCC)    Lactic acidosis    E coli bacteremia        Brief Hospital Course to date:  Zainab Worthy is a 89 y.o. female w/ PMH of normocytic anemia, dementia, HTN, and remote h/o endometrial cancer who resides at the [facility] and presented with sepsis (fever, leukocytosis) due to aspiration PNA and UTI. Patient found to have E coli bacteremia as well, suspect this is from urinary source.      Plan:  - Appreciate ID evaluation. Doxy/vanc for now with 1 time dose of micafungin given 8/30. Final abx duration to be decided by ID.   -- no fevers in last 24h- have sent recultures of blood/urine NGTD. CXR with concern for possible aspiration, continue with ongoing CAP coverage.  Monitor cultures  - UCx  >100K Ecoli.  Blood Cx with Ecoli. ABX per ID. Urology consulted given hydronephrosis - per Dr. Rankin, patient has a known right UPJ obstruction, likely congenital   - Continue to hold antihypertensives, she is normotensive at this time  - Thrombocytopenia, likely related to sepsis. Resolved.   - Continue home meds for dementia.   - Suspect anemia is secondary to ACOD, based on last admissions anemia work up. Monitor and transfuse PRN for Hgb < 7.   - Replete K+ PRN.     DVT prophylaxis: [facility]  CODE STATUS:   Code Status and Medical Interventions:   Ordered at: 08/25/18 1141     Level Of Support Discussed With:    Next of Kin (If No Surrogate)     Code Status:    CPR     Medical Interventions (Level of Support Prior to Arrest):    Full     Disposition: I expect the patient to be discharged back to the [facility] once duration of ABX has been determined and patient has improved from a sepsis standpoint.  Family is paying for a bed hold, so she can go whenever.     Electronically signed by Dasha Lacy MD, 09/01/18, 2:13 PM.

## 2018-09-02 ENCOUNTER — ANCILLARY PROCEDURE (OUTPATIENT)
Dept: SPEECH THERAPY | Facility: HOSPITAL | Age: 83
End: 2018-09-02
Attending: INTERNAL MEDICINE

## 2018-09-02 LAB
ANION GAP SERPL CALCULATED.3IONS-SCNC: 6 MMOL/L (ref 3–11)
BUN BLD-MCNC: 15 MG/DL (ref 9–23)
BUN/CREAT SERPL: 31.9 (ref 7–25)
CALCIUM SPEC-SCNC: 8.2 MG/DL (ref 8.7–10.4)
CHLORIDE SERPL-SCNC: 104 MMOL/L (ref 99–109)
CO2 SERPL-SCNC: 27 MMOL/L (ref 20–31)
CREAT BLD-MCNC: 0.47 MG/DL (ref 0.6–1.3)
GFR SERPL CREATININE-BSD FRML MDRD: 125 ML/MIN/1.73
GLUCOSE BLD-MCNC: 116 MG/DL (ref 70–100)
POTASSIUM BLD-SCNC: 4.3 MMOL/L (ref 3.5–5.5)
SODIUM BLD-SCNC: 137 MMOL/L (ref 132–146)
VANCOMYCIN TROUGH SERPL-MCNC: 8 MCG/ML (ref 10–20)

## 2018-09-02 PROCEDURE — 92610 EVALUATE SWALLOWING FUNCTION: CPT

## 2018-09-02 PROCEDURE — 92612 ENDOSCOPY SWALLOW (FEES) VID: CPT

## 2018-09-02 PROCEDURE — 99232 SBSQ HOSP IP/OBS MODERATE 35: CPT | Performed by: INTERNAL MEDICINE

## 2018-09-02 PROCEDURE — 80048 BASIC METABOLIC PNL TOTAL CA: CPT

## 2018-09-02 PROCEDURE — 25010000002 VANCOMYCIN PER 500 MG

## 2018-09-02 PROCEDURE — 25010000002 PIPERACILLIN SOD-TAZOBACTAM PER 1 G: Performed by: INTERNAL MEDICINE

## 2018-09-02 PROCEDURE — 87899 AGENT NOS ASSAY W/OPTIC: CPT | Performed by: INTERNAL MEDICINE

## 2018-09-02 PROCEDURE — 25010000002 VANCOMYCIN

## 2018-09-02 PROCEDURE — 80202 ASSAY OF VANCOMYCIN: CPT

## 2018-09-02 PROCEDURE — 87449 NOS EACH ORGANISM AG IA: CPT | Performed by: INTERNAL MEDICINE

## 2018-09-02 PROCEDURE — 25010000002 HEPARIN (PORCINE) PER 1000 UNITS

## 2018-09-02 RX ADMIN — OXYBUTYNIN CHLORIDE 5 MG: 5 TABLET ORAL at 20:51

## 2018-09-02 RX ADMIN — FAMOTIDINE 20 MG: 20 TABLET ORAL at 20:51

## 2018-09-02 RX ADMIN — VANCOMYCIN HYDROCHLORIDE 500 MG: 500 INJECTION, POWDER, LYOPHILIZED, FOR SOLUTION INTRAVENOUS at 20:51

## 2018-09-02 RX ADMIN — MEMANTINE 10 MG: 10 TABLET ORAL at 20:51

## 2018-09-02 RX ADMIN — HEPARIN SODIUM 5000 UNITS: 5000 INJECTION, SOLUTION INTRAVENOUS; SUBCUTANEOUS at 20:51

## 2018-09-02 RX ADMIN — DOXYCYCLINE 100 MG: 100 CAPSULE ORAL at 20:51

## 2018-09-02 RX ADMIN — HEPARIN SODIUM 5000 UNITS: 5000 INJECTION, SOLUTION INTRAVENOUS; SUBCUTANEOUS at 10:11

## 2018-09-02 RX ADMIN — MIRTAZAPINE 15 MG: 15 TABLET, FILM COATED ORAL at 20:51

## 2018-09-02 RX ADMIN — NYSTATIN: 100000 POWDER TOPICAL at 10:49

## 2018-09-02 RX ADMIN — FAMOTIDINE 20 MG: 20 TABLET ORAL at 10:11

## 2018-09-02 RX ADMIN — TAZOBACTAM SODIUM AND PIPERACILLIN SODIUM 3.38 G: 375; 3 INJECTION, SOLUTION INTRAVENOUS at 15:43

## 2018-09-02 RX ADMIN — NYSTATIN: 100000 POWDER TOPICAL at 03:03

## 2018-09-02 RX ADMIN — TAZOBACTAM SODIUM AND PIPERACILLIN SODIUM 3.38 G: 375; 3 INJECTION, SOLUTION INTRAVENOUS at 10:08

## 2018-09-02 RX ADMIN — TAZOBACTAM SODIUM AND PIPERACILLIN SODIUM 3.38 G: 375; 3 INJECTION, SOLUTION INTRAVENOUS at 03:02

## 2018-09-02 RX ADMIN — NYSTATIN: 100000 POWDER TOPICAL at 20:51

## 2018-09-02 RX ADMIN — DONEPEZIL HYDROCHLORIDE 10 MG: 10 TABLET, FILM COATED ORAL at 20:51

## 2018-09-02 RX ADMIN — VANCOMYCIN HYDROCHLORIDE 1000 MG: 1 INJECTION, SOLUTION INTRAVENOUS at 11:46

## 2018-09-02 RX ADMIN — MEMANTINE 10 MG: 10 TABLET ORAL at 10:11

## 2018-09-02 RX ADMIN — OXYBUTYNIN CHLORIDE 5 MG: 5 TABLET ORAL at 15:43

## 2018-09-02 RX ADMIN — DOXYCYCLINE 100 MG: 100 CAPSULE ORAL at 10:11

## 2018-09-02 RX ADMIN — OXYBUTYNIN CHLORIDE 5 MG: 5 TABLET ORAL at 10:11

## 2018-09-02 NOTE — PLAN OF CARE
Problem: Patient Care Overview  Goal: Plan of Care Review  Outcome: Ongoing (interventions implemented as appropriate)   09/02/18 0000 09/02/18 0417   Coping/Psychosocial   Plan of Care Reviewed With patient --    Plan of Care Review   Progress --  no change   OTHER   Outcome Summary --  Pt rested well throughout shift. No c/o pain. VSS. Will continue to monitor.     Goal: Individualization and Mutuality  Outcome: Ongoing (interventions implemented as appropriate)    Goal: Discharge Needs Assessment  Outcome: Ongoing (interventions implemented as appropriate)   08/27/18 1447 08/28/18 1416   Discharge Needs Assessment   Readmission Within the Last 30 Days --  current reason for admission unrelated to previous admission   Concerns to be Addressed --  no discharge needs identified   Patient/Family Anticipates Transition to --  home   Patient/Family Anticipated Services at Transition  --    Transportation Concerns other (see comments)  (Pt's son denies transportation concerns) --    Transportation Anticipated family or friend will provide --    Anticipated Changes Related to Illness inability to care for self --    Equipment Needed After Discharge wheelchair, manual --    Discharge Facility/Level of Care Needs nursing facility, intermediate --    Discharge Coordination/Progress Pt has Medicare and  for life insurance. Pt's son denies recent changes in insurance. The plan is to return to [facility] when medically ready for discharge. Son states family can transport. Spoke with Dasha Thompson and bed is held by private pay. CM will con to follow., --    Disability   Equipment Currently Used at Home wheelchair --      Goal: Interprofessional Rounds/Family Conf  Outcome: Ongoing (interventions implemented as appropriate)   08/27/18 0425   Interdisciplinary Rounds/Family Conf   Participants ;family;nursing;patient;physician       Problem: Fall Risk (Adult)  Goal: Identify Related Risk  Factors and Signs and Symptoms  Outcome: Ongoing (interventions implemented as appropriate)   08/29/18 1624   Fall Risk (Adult)   Related Risk Factors (Fall Risk) age-related changes;confusion/agitation;gait/mobility problems;neuro disease/injury   Signs and Symptoms (Fall Risk) presence of risk factors     Goal: Absence of Fall  Outcome: Ongoing (interventions implemented as appropriate)   09/02/18 0417   Fall Risk (Adult)   Absence of Fall making progress toward outcome       Problem: Skin Injury Risk (Adult)  Goal: Identify Related Risk Factors and Signs and Symptoms  Outcome: Ongoing (interventions implemented as appropriate)   09/01/18 1059   Skin Injury Risk (Adult)   Related Risk Factors (Skin Injury Risk) advanced age;cognitive impairment;fluid intake inadequate;hospitalization prolonged;infection;hypothermia/hyperthermia;mobility impaired;nutritional deficiencies;tissue perfusion altered     Goal: Skin Health and Integrity  Outcome: Ongoing (interventions implemented as appropriate)   09/02/18 0417   Skin Injury Risk (Adult)   Skin Health and Integrity making progress toward outcome

## 2018-09-02 NOTE — MBS/VFSS/FEES
Acute Care - Speech Language Pathology   Swallow Re-Evaluation [facility]     Patient Name: Zainab Worthy  : 2/3/1929  MRN: [id number]  Today's Date: 2018               Admit Date: 2018    Visit Dx:     ICD-10-CM ICD-9-CM   1. Sepsis, due to unspecified organism (CMS/HCC) A41.9 038.9     995.91   2. Aspiration pneumonia of both lower lobes due to gastric secretions (CMS/HCC) J69.0 507.0   3. Acute urinary tract infection N39.0 599.0   4. Hypoxia R09.02 799.02     Patient Active Problem List   Diagnosis   • Gonalgia   • Hypertrophic polyarthritis   • Allergic rhinitis, seasonal   • Skin growth   • Endometrial cancer (CMS/HCC)   • Post-operative state   • Rectal cancer (CMS/HCC)   • Hydronephrosis   • Hypertension   • Anorexia   • Onychomycosis   • IUD complication (CMS/HCC)   • Memory loss   • Urinary frequency   • Fall at home   • Closed fracture of pelvis (CMS/HCC)   • UTI (urinary tract infection)   • Fracture of femoral neck, right (CMS/HCC)   • Late onset Alzheimer's disease without behavioral disturbance   • Metastatic colon cancer in female (CMS/HCC)   • Recurrent falls   • Closed fracture of neck of right femur (CMS/HCC)   • Sepsis (CMS/HCC)   • Sepsis associated hypotension (CMS/HCC)   • Leukocytosis   • Normocytic anemia   • Thrombocytopenia (CMS/HCC)   • Lactic acidosis   • E coli bacteremia     Past Medical History:   Diagnosis Date   • Anorexia    • Arthritis    • Asthma    • Cataract    • Colon cancer (CMS/HCC)    • Colon cancer metastasized to liver (CMS/HCC)    • DDD (degenerative disc disease), lumbar    • Hearing loss    • History of transfusion    • Hypertension    • Liver cancer (CMS/HCC)    • Macular degeneration    • Onychomycosis    • Urine frequency      Past Surgical History:   Procedure Laterality Date   • CATARACT EXTRACTION Bilateral    • COLON RESECTION  2009    Low anterior resection   • GALLBLADDER SURGERY      Resection of gallbladder   • HIP HEMIARTHROPLASTY Right  2/8/2018    Procedure: HIP HEMIARTHROPLASTY;  Surgeon: Fernando Dominguez MD;  Location: [facility] OR;  Service:    • LIVER SURGERY  12/09/2014    Liver tumor, 1/2 was removed.   • PARTIAL HYSTERECTOMY            SWALLOW EVALUATION (last 72 hours)      SLP Adult Swallow Evaluation     Row Name 09/02/18 1400 09/02/18 1101                Rehab Evaluation    Document Type evaluation  -JW evaluation  -JW       Subjective Information no complaints  -JW no complaints  -JW       Patient Observations alert;cooperative  -JW alert;cooperative  -JW       Patient/Family Observations  -- pt confused as to how long she will be here  -JW       Patient Effort good  -JW good  -JW       Symptoms Noted During/After Treatment none  -JW none  -JW          General Information    Patient Profile Reviewed yes  -JW yes  -JW       Pertinent History Of Current Problem  -- 89yoF adm with sepsis, UTI and aspiration pneumonia. PMH: Dementia. Pt is a LTC resident at the [facility].  -JW       Current Method of Nutrition  -- regular textures;thin liquids  -JW       Precautions/Limitations, Vision  -- WFL;for purposes of eval  -JW       Precautions/Limitations, Hearing  -- WFL;for purposes of eval  -JW       Prior Level of Function-Communication  -- cognitive-linguistic impairment  -JW       Prior Level of Function-Swallowing  -- no diet consistency restrictions  -JW       Plans/Goals Discussed with  -- patient  -JW       Barriers to Rehab  -- none identified  -JW          Oral Motor and Function    Dentition Assessment  -- lower dentures/partial in place  -JW       Secretion Management  -- WNL/WFL  -JW       Mucosal Quality  -- moist, healthy  -JW       Volitional Swallow  -- weak  -JW          Oral Musculature and Cranial Nerve Assessment    Oral Motor General Assessment  -- WFL  -JW          Clinical Swallow Eval    Oral Prep Phase  -- WFL  -JW       Oral Transit  -- WFL  -JW       Oral Residue  -- WFL  -JW       Pharyngeal Phase  --  suspected pharyngeal impairment  -JW       Esophageal Phase  -- unremarkable  -JW       Clinical Swallow Evaluation Summary  -- PO trials of thin via tsp/straw, puree, mixed soft solid and regular solid. Oral phase characterized by minimal lingual residue. Pharyngeal phase characterized only by suspected decreased/weak elevation per palpation, though no overt clinical s/sx suspected aspiration were noted. Given admiting diagnosis of aspiration pneumonia and familial concerns, as well as respiratory status, pt may be at risk of silent aspiration and SLP recommends FEES to r/o aspiration. Recommend FEES this afternoon.  -          Pharyngeal Phase Concerns    Pharyngeal Phase Concerns  -- other (see comments)   seemingly decreased elevation/excursion  -       Pharyngeal Phase Concerns, Comment  -- per palpation  -          FEES Interpretation    Oral Phase WFL  -JW  --          Initiation of Pharyngeal Swallow    Initiation of Pharyngeal Swallow other (see comments)   bolus at lateral channels with thin via tsp  -JW  --       Pharyngeal Phase impaired pharyngeal phase of swallowing  -JW  --       Penetration Before the Swallow thin liquids;secondary to delayed swallow initiation or mistiming  -JW  --       Penetration During the Swallow thin liquids;secondary to delayed swallow initiation or mistiming;secondary to reduced laryngeal elevation;secondary to reduced vestibular closure  -JW  --       Response to Aspiration other (see comments)   no response to penetration  -JW  --       Rosenbek's Scale thin:;5-->Level 5  -JW  --       Residue pudding/puree;regular Textures;base of tongue;valleculae;thin liquids;laryngeal vestibule;secondary to reduced base of tongue retraction;secondary to reduced hyolaryngeal excursion  -JW  --       Response to Residue cleared residue;with liquid wash  -JW  --       Attempted Compensatory Maneuvers bolus size;bolus presentation style;multiple swallows;chin tuck  -  --        Response to Attempted Compensatory Maneuvers did not reduce residue  -JW  --       Pharyngeal Phase, Comment epiglottis rests completely anteriorly and didn't invert resulting in moderate amounts of residue of solids/puree on top of epiglottis  -JW  --       FEES Summary FEES completed. Anatomically of note, pt presents with spherical, smooth mass above and to the left of the epiglottis; this did not affect swallow function. PO trials of thins, NTL, puree and solids. Delayed initiation of pharyngeal swallow with thin via tsp resulting in deep penetration without clearance.  decreased hyolaryngeal elevation resulting in absent epiglottic inversion, which ultimately resulted in moderate amounts of residue on top of epiglottis. Liquid washes mostly eliminated this residue. Decreased vestibular closure and mistiming resulted in continued penetration of thins via cup, not seen with NTL. No aspiration visualized, however pt was unable to clear penetrate, and was not sensate to it. Given amounts of residue and pt cognitive status preventing pt from recalling compensation to use liquid wash, recommend supervision with PO to provide cues for 1:1 alternation of liquids and solids.  Compensations of chin tuck, multiple swallows and hard cough with reswallow were all unsuccessful, as pt was unable to understand the commands to perform them. Recommend level 4 mechanical soft, nectar-thick liquids, meds in applesauce/pudding, oral care BID and PRN, dysphagia tx, assistance at meals for cueing liquid washes, no straw (not trialed).  -JW  --          Clinical Impression    SLP Swallowing Diagnosis mild-moderate;pharyngeal dysfunction  -JW functional oral phase;suspected pharyngeal dysfunction  -JW       Functional Impact risk of aspiration/pneumonia  -JW risk of aspiration/pneumonia  -JW       Rehab Potential/Prognosis, Swallowing good, to achieve stated therapy goals  -JW  --       Swallow Criteria for Skilled Therapeutic  Interventions Met demonstrates skilled criteria  - demonstrates skilled criteria  -          Recommendations    Therapy Frequency (Swallow) 5 days per week  -  --       Predicted Duration Therapy Intervention (Days) until discharge  -  --       SLP Diet Recommendation mechanical soft with no mixed consistencies;nectar thick liquids  -  --       Recommended Diagnostics  -- FEES  -       Recommended Precautions and Strategies upright posture during/after eating;small bites of food and sips of liquid;no straw;alternate between small bites of food and sips of liquid  - upright posture during/after eating  -       SLP Rec. for Method of Medication Administration with pudding or applesauce  -  --       Monitor for Signs of Aspiration yes;notify SLP if any concerns;elevated WBC count;pneumonia;right lower lobe infiltrates  - yes;notify SLP if any concerns  -       Anticipated Dischage Disposition skilled nursing facility  - skilled nursing facility  -         User Key  (r) = Recorded By, (t) = Taken By, (c) = Cosigned By    Initials Name Effective Dates    Yissel Munroe, MS CCC-SLP 06/08/18 -         EDUCATION  The patient has been educated in the following areas:   Dysphagia (Swallowing Impairment) Modified Diet Instruction.    SLP Recommendation and Plan  SLP Swallowing Diagnosis: mild-moderate, pharyngeal dysfunction  SLP Diet Recommendation: mechanical soft with no mixed consistencies, nectar thick liquids  Recommended Precautions and Strategies: upright posture during/after eating, small bites of food and sips of liquid, no straw, alternate between small bites of food and sips of liquid     Monitor for Signs of Aspiration: yes, notify SLP if any concerns, elevated WBC count, pneumonia, right lower lobe infiltrates  Recommended Diagnostics: FEES  Swallow Criteria for Skilled Therapeutic Interventions Met: demonstrates skilled criteria  Anticipated Dischage Disposition: skilled  nursing facility  Rehab Potential/Prognosis, Swallowing: good, to achieve stated therapy goals  Therapy Frequency (Swallow): 5 days per week  Predicted Duration Therapy Intervention (Days): until discharge       Plan of Care Reviewed With: patient  Plan of Care Review  Plan of Care Reviewed With: patient  Outcome Summary: FEES completed. Recommend level 4 mechanical soft, NTL. Recommend cueing for liquid wash after each bite. Please see note for further details. SLP will f/u for dysphagia tx.          SLP GOALS     Row Name 09/02/18 1400             Oral Nutrition/Hydration Goal 1 (SLP)    Oral Nutrition/Hydration Goal 1, SLP Pt will tolerate mechanical soft diet with NTL without aspiration in order to prevent pneumonia  -JW      Time Frame (Oral Nutrition/Hydration Goal 1, SLP) by discharge  -JW         Pharyngeal Strengthening Exercise Goal 1 (SLP)    Activity (Pharyngeal Strengthening Goal 1, SLP) increase superior movement of the hyolaryngeal complex;increase epiglottic inversion and retroflexion;increase closure at entrance to airway/closure of airway at glottis;increase tongue base retraction  -JW      Increase Superior Movement of the Hyolaryngeal Complex falsetto  -JW      Increase Epiglottic Inversion and Retroflexion falsetto;Mendelsohn  -JW      Increase Closure at Entrance to Airway/Closure of Airway at Glottis super-supraglottic swallow  -JW      Increase Tongue Base Retraction norah  -      [place]/Accuracy (Pharyngeal Strengthening Goal 1, SLP) with moderate cues (50-74% accuracy)  -JW      Time Frame (Pharyngeal Strengthening Goal 1, SLP) short term goal (STG);by discharge  -         Swallow Compensatory Strategies Goal 2 (SLP)    Activity (Swallow Compensatory Strategies/Techniques Goal 2, SLP) compensatory strategies;during p.o. trials;small bites;alternate food/liquid intake  -      [place]/Accuracy (Swallow Compensatory Strategies/Techniques Goal 1, SLP) with minimal cues (75-90%  accuracy)  -      Time Frame (Swallow Compensatory Strategies/Techniques Goal 2, SLP) short term goal (STG);by discharge  -        User Key  (r) = Recorded By, (t) = Taken By, (c) = Cosigned By    Initials Name Provider Type    Yissel Munroe MS CCC-SLP Speech and Language Pathologist               Time Calculation:         Time Calculation- SLP     Row Name 09/02/18 1430 09/02/18 1229          Time Calculation- SLP    SLP Start Time 1300  - 1101  -     SLP Received On 09/02/18  - 09/02/18  -       User Key  (r) = Recorded By, (t) = Taken By, (c) = Cosigned By    Initials Name Provider Type    Yissel Munroe MS CCC-SLP Speech and Language Pathologist          Therapy Charges for Today     Code Description Service Date Service Provider Modifiers Qty    42522682172 HC ST EVAL ORAL PHARYNG SWALLOW 2 9/2/2018 Yissel Barry MS CCC-SLP GN 1    93097838920 HC ST FIBEROPTIC ENDO EVAL SWALL 7 9/2/2018 Yissel Barry MS CCC-SLP GN 1               MS TIERA Salmon  9/2/2018

## 2018-09-02 NOTE — PROGRESS NOTES
[facility] Medicine Services  PROGRESS NOTE    Patient Name: Zainab Worthy  : 2/3/1929  MRN: [id number]    Date of Admission: 2018  Length of Stay: 8  Primary Care Physician: Aguilar Kelly MD    Subjective     CC: sepsis    HPI:  Sleeping comfortably in bed , wakes easily, no complaints. No overnight events reported.   Review of Systems  Gen- - fevers, - chills  CV- No chest pain, palpitations  Resp- No cough, dyspnea  GI- No N/V/D, abd pain    Otherwise ROS is negative except as mentioned in the HPI.    Objective     Vital Signs:   Temp:  [98.2 °F (36.8 °C)-99.2 °F (37.3 °C)] 99.2 °F (37.3 °C)  Heart Rate:  [93-95] 95  Resp:  [18-20] 18  BP: (118-138)/(52-57) 118/52        Physical Exam:  Constitutional: No acute distress, sleeping   HENT: NCAT, mucous membranes moist  Respiratory: Clear to auscultation bilaterally, respiratory effort normal on room air  Cardiovascular: rrr, no murmurs, on tele  Gastrointestinal: Positive bowel sounds, soft, nontender, nondistended  Musculoskeletal: No bilateral ankle edema  Psychiatric: sleeping but appropriate when wakes  Neurologic: Oriented to self. No focal deficits.  Skin: No rashes    Results Reviewed:  I have personally reviewed current lab, radiology, and data and agree.      Results from last 7 days  Lab Units 18  0512   WBC 10*3/mm3 11.64* 14.66* 14.29*   HEMOGLOBIN g/dL 9.7* 10.2* 10.8*   HEMATOCRIT % 30.5* 31.3* 33.7*   PLATELETS 10*3/mm3 188 148* 117*       Results from last 7 days  Lab Units 18  0918 18  0512   SODIUM mmol/L 137 137 133 135   POTASSIUM mmol/L 4.3 4.2 3.6 4.2   CHLORIDE mmol/L 104 103 101 105   CO2 mmol/L 27.0 26.0 23.0 24.0   BUN mg/dL 15 23 14 13   CREATININE mg/dL 0.47* 0.65 0.50* 0.47*   GLUCOSE mg/dL 116* 115* 106* 106*   CALCIUM mg/dL 8.2* 8.1* 8.2* 8.4*   ALT (SGPT) U/L  --  16 18 19   AST (SGOT) U/L  --  15 13 16         Estimated Creatinine Clearance: 34.3 mL/min (A) (by C-G formula based on SCr of 0.47 mg/dL (L)).  No results found for: BNP    Microbiology Results Abnormal     Procedure Component Value - Date/Time    Blood Culture - Blood, [[id number]]  (Normal) Collected:  08/30/18 0849    Lab Status:  Preliminary result Specimen:  Blood from Arm, Right Updated:  09/02/18 0945     Blood Culture No growth at 3 days    Blood Culture - Blood, [[id number]]  (Normal) Collected:  08/30/18 0859    Lab Status:  Preliminary result Specimen:  Blood from Hand, Right Updated:  09/02/18 0945     Blood Culture No growth at 3 days    Urine Culture - Urine, [[id number]]  (Normal) Collected:  08/30/18 1627    Lab Status:  Final result Specimen:  Urine from Urine, Clean Catch Updated:  09/01/18 0740     Urine Culture No growth at 2 days    Legionella Antigen, Urine - Urine, Urine, Clean Catch [[id number]] Collected:  08/25/18 0526    Lab Status:  Final result Specimen:  Urine from Urine, Clean Catch Updated:  08/29/18 1518     L. pneumophila Serogp 1 Ur Ag Negative     Comment: Presumptive negative for L. pneumophila serogroup 1 antigen in urine,  suggesting no recent or current infection. Legionnaires' disease  cannot be ruled out since other serogroups and species may also cause  disease.       Narrative:       Performed at:  [address] [place] [address]  [id number]  : Rik Leos MD, Phone:  [phone]    S. Pneumo Ag Urine or CSF - Urine, Urine, Clean Catch [[id number]] Collected:  08/25/18 0526    Lab Status:  Final result Specimen:  Urine from Urine, Clean Catch Updated:  08/29/18 1518     Specimen Source Urine     STREP PNEUMONIAE ANTIGEN Negative     Body Fluid Culture, Sterile Not Indicated     Organism ID Not indicated.     Please note Comment     Comment: College [employer]ologists standards require a culture to be  performed on CSF specimens submitted for bacterial antigen  testing.  (CAP [id number]) Urine specimens will not be cultured.       Narrative:       Performed at:   - [facility]  [id number]  : Rik Leos MD, Phone:  [phone]    Urine Culture - Urine, [[id number]]  (Abnormal)  (Susceptibility) Collected:  08/25/18 0526    Lab Status:  Final result Specimen:  Urine from Urine, Catheter Updated:  08/27/18 1413     Urine Culture >100,000 CFU/mL Escherichia coli (A)    Susceptibility      Escherichia coli     KANIKA     Ampicillin >16 ug/ml Resistant     Ampicillin + Sulbactam >16/8 ug/ml Resistant     Aztreonam <=8 ug/ml Susceptible     Cefepime <=8 ug/ml Susceptible     Cefotaxime <=2 ug/ml Susceptible     Ceftriaxone <=8 ug/ml Susceptible     Cefuroxime sodium 8 ug/ml Susceptible     Cephalothin >16 ug/ml Resistant     Ertapenem <=1 ug/ml Susceptible     Gentamicin <=4 ug/ml Susceptible     Levofloxacin <=2 ug/ml Susceptible     Meropenem <=1 ug/ml Susceptible     Nitrofurantoin <=32 ug/ml Susceptible     Piperacillin + Tazobactam <=16 ug/ml Susceptible     Tetracycline >8 ug/ml Resistant     Tobramycin <=4 ug/ml Susceptible     Trimethoprim + Sulfamethoxazole <=2/38 ug/ml Susceptible                    Blood Culture - Blood, [[id number]]  (Abnormal) Collected:  08/25/18 0520    Lab Status:  Final result Specimen:  Blood from Arm, Right Updated:  08/27/18 1157     Blood Culture Gram Negative Bacilli (A)     Isolated from Aerobic and Anaerobic Bottles     Gram Stain Result Anaerobic Bottle Gram negative bacilli      Aerobic Bottle Gram negative bacilli    Narrative:       SEE CULTURE [id number] FOR ID AND SUSCEPTIBILITIES    Blood Culture - Blood, [[id number]]  (Abnormal)  (Susceptibility) Collected:  08/25/18 0520    Lab Status:  Final result Specimen:  Blood from Arm, Left Updated:  08/27/18 1156     Blood Culture Escherichia coli (A)     Isolated from Aerobic and Anaerobic Bottles     Gram Stain Result Anaerobic Bottle  Gram negative bacilli      Aerobic Bottle Gram negative bacilli    Susceptibility      Escherichia coli     KANIKA     Ampicillin >16 ug/ml Resistant     Ampicillin + Sulbactam 16/8 ug/ml Intermediate     Aztreonam <=8 ug/ml Susceptible     Cefepime <=8 ug/ml Susceptible     Cefotaxime <=2 ug/ml Susceptible     Ceftriaxone <=8 ug/ml Susceptible     Cefuroxime sodium 16 ug/ml Intermediate     Ertapenem <=1 ug/ml Susceptible     Gentamicin <=4 ug/ml Susceptible     Levofloxacin <=2 ug/ml Susceptible     Meropenem <=1 ug/ml Susceptible     Piperacillin + Tazobactam <=16 ug/ml Susceptible     Tetracycline >8 ug/ml Resistant     Tobramycin <=4 ug/ml Susceptible     Trimethoprim + Sulfamethoxazole <=2/38 ug/ml Susceptible                    Blood Culture ID, PCR - Blood, [[id number]]  (Abnormal) Collected:  08/25/18 0520    Lab Status:  Final result Specimen:  Blood from Arm, Left Updated:  08/25/18 2045     BCID, PCR Escherichia coli. Identification by BCID PCR. (C)    MRSA Screen, PCR - Swab, Nares [[id number]]  (Normal) Collected:  08/25/18 1714    Lab Status:  Final result Specimen:  Swab from Nares Updated:  08/25/18 1859     MRSA, PCR Negative    Narrative:         MRSA Negative        Imaging Results (last 24 hours)     Procedure Component Value Units Date/Time    Fiberoptic Endo (fees) [[id number]] Resulted:  09/02/18 1231     Updated:  09/02/18 1231        I have reviewed the medications.      [START ON 9/4/2018] Pharmacy Consult  Does not apply Once   donepezil 10 mg Oral Nightly   doxycycline 100 mg Oral Q12H   famotidine 20 mg Oral BID   heparin (porcine) 5,000 Units Subcutaneous Q12H   memantine 10 mg Oral Q12H   mirtazapine 15 mg Oral Nightly   nystatin  Topical Q12H   oxybutynin 5 mg Oral TID   piperacillin-tazobactam 3.375 g Intravenous Q8H   vancomycin 500 mg Intravenous Q12H     Assessment / Plan     Hospital Problem List     * (Principal)Sepsis (CMS/HCC)    Endometrial cancer (CMS/HCC)    UTI (urinary tract  infection)    Late onset Alzheimer's disease without behavioral disturbance    Sepsis associated hypotension (CMS/HCC)    Leukocytosis    Normocytic anemia    Thrombocytopenia (CMS/HCC)    Lactic acidosis    E coli bacteremia        Brief Hospital Course to date:  Zainab Worthy is a 89 y.o. female w/ PMH of normocytic anemia, dementia, HTN, and remote h/o endometrial cancer who resides at the [facility] and presented with sepsis (fever, leukocytosis) due to aspiration PNA and UTI. Patient found to have E coli bacteremia as well, suspect this is from urinary source.      Plan:  - Appreciate ID evaluation. Doxy/vanc for now with 1 time dose of micafungin given 8/30. Final abx duration to be decided by ID.   -- no fevers in last 48h- have sent recultures of blood/urine NGTD. CXR with concern for possible aspiration, continue with ongoing CAP coverage.  Monitor cultures  - UCx  >100K Ecoli.  Blood Cx with Ecoli. ABX per ID. Urology consulted given hydronephrosis - per Dr. Rankin, patient has a known right UPJ obstruction, likely congenital   - Continue to hold antihypertensives, she is normotensive at this time  - Thrombocytopenia, likely related to sepsis. Resolved.   - Continue home meds for dementia.   - Suspect anemia is secondary to ACOD, based on last admissions anemia work up. Monitor and transfuse PRN for Hgb < 7.   - Replete K+ PRN.   - speech eval in progress    DVT prophylaxis: LATHA  CODE STATUS:   Code Status and Medical Interventions:   Ordered at: 08/25/18 1141     Level Of Support Discussed With:    Next of Kin (If No Surrogate)     Code Status:    CPR     Medical Interventions (Level of Support Prior to Arrest):    Full     Disposition: I expect the patient to be discharged back to the [facility] once duration of ABX has been determined and patient has improved from a sepsis standpoint.  Family is paying for a bed hold, so she can go whenever.     Electronically signed by Dasha Lacy MD, 09/02/18, 1:03 PM.

## 2018-09-02 NOTE — THERAPY EVALUATION
Acute Care - Speech Language Pathology   Swallow Initial Evaluation [facility]   Clinical Swallow Evaluation    Patient Name: Zainab Worthy  : 2/3/1929  MRN: [id number]  Today's Date: 2018               Admit Date: 2018    Visit Dx:     ICD-10-CM ICD-9-CM   1. Sepsis, due to unspecified organism (CMS/HCC) A41.9 038.9     995.91   2. Aspiration pneumonia of both lower lobes due to gastric secretions (CMS/HCC) J69.0 507.0   3. Acute urinary tract infection N39.0 599.0   4. Hypoxia R09.02 799.02     Patient Active Problem List   Diagnosis   • Gonalgia   • Hypertrophic polyarthritis   • Allergic rhinitis, seasonal   • Skin growth   • Endometrial cancer (CMS/HCC)   • Post-operative state   • Rectal cancer (CMS/HCC)   • Hydronephrosis   • Hypertension   • Anorexia   • Onychomycosis   • IUD complication (CMS/HCC)   • Memory loss   • Urinary frequency   • Fall at home   • Closed fracture of pelvis (CMS/HCC)   • UTI (urinary tract infection)   • Fracture of femoral neck, right (CMS/HCC)   • Late onset Alzheimer's disease without behavioral disturbance   • Metastatic colon cancer in female (CMS/HCC)   • Recurrent falls   • Closed fracture of neck of right femur (CMS/HCC)   • Sepsis (CMS/HCC)   • Sepsis associated hypotension (CMS/HCC)   • Leukocytosis   • Normocytic anemia   • Thrombocytopenia (CMS/HCC)   • Lactic acidosis   • E coli bacteremia     Past Medical History:   Diagnosis Date   • Anorexia    • Arthritis    • Asthma    • Cataract    • Colon cancer (CMS/HCC)    • Colon cancer metastasized to liver (CMS/HCC)    • DDD (degenerative disc disease), lumbar    • Hearing loss    • History of transfusion    • Hypertension    • Liver cancer (CMS/HCC)    • Macular degeneration    • Onychomycosis    • Urine frequency      Past Surgical History:   Procedure Laterality Date   • CATARACT EXTRACTION Bilateral    • COLON RESECTION  2009    Low anterior resection   • GALLBLADDER SURGERY      Resection of  gallbladder   • HIP HEMIARTHROPLASTY Right 2/8/2018    Procedure: HIP HEMIARTHROPLASTY;  Surgeon: Fernando Dominguez MD;  Location: [facility] OR;  Service:    • LIVER SURGERY  12/09/2014    Liver tumor, 1/2 was removed.   • PARTIAL HYSTERECTOMY            SWALLOW EVALUATION (last 72 hours)      SLP Adult Swallow Evaluation     Row Name 09/02/18 1101                   Rehab Evaluation    Document Type evaluation  -JW        Subjective Information no complaints  -JW        Patient Observations alert;cooperative  -JW        Patient/Family Observations pt confused as to how long she will be here  -JW        Patient Effort good  -JW        Symptoms Noted During/After Treatment none  -JW           General Information    Patient Profile Reviewed yes  -JW        Pertinent History Of Current Problem 89yoF adm with sepsis, UTI and aspiration pneumonia. PMH: Dementia. Pt is a LTC resident at the [facility].  -JW        Current Method of Nutrition regular textures;thin liquids  -JW        Precautions/Limitations, Vision WFL;for purposes of eval  -JW        Precautions/Limitations, Hearing WFL;for purposes of eval  -JW        Prior Level of Function-Communication cognitive-linguistic impairment  -JW        Prior Level of Function-Swallowing no diet consistency restrictions  -JW        Plans/Goals Discussed with patient  -JW        Barriers to Rehab none identified  -JW           Oral Motor and Function    Dentition Assessment lower dentures/partial in place  -JW        Secretion Management WNL/WFL  -JW        Mucosal Quality moist, healthy  -JW        Volitional Swallow weak  -JW           Oral Musculature and Cranial Nerve Assessment    Oral Motor General Assessment WFL  -JW           Clinical Swallow Eval    Oral Prep Phase WFL  -JW        Oral Transit WFL  -JW        Oral Residue WFL  -JW        Pharyngeal Phase suspected pharyngeal impairment  -JW        Esophageal Phase unremarkable  -JW        Clinical Swallow  Evaluation Summary PO trials of thin via tsp/straw, puree, mixed soft solid and regular solid. Oral phase characterized by minimal lingual residue. Pharyngeal phase characterized only by suspected decreased/weak elevation per palpation, though no overt clinical s/sx suspected aspiration were noted. Given admiting diagnosis of aspiration pneumonia and familial concerns, as well as respiratory status, pt may be at risk of silent aspiration and SLP recommends FEES to r/o aspiration. Recommend FEES this afternoon.  -           Pharyngeal Phase Concerns    Pharyngeal Phase Concerns other (see comments)   seemingly decreased elevation/excursion  -        Pharyngeal Phase Concerns, Comment per palpation  -           Clinical Impression    SLP Swallowing Diagnosis functional oral phase;suspected pharyngeal dysfunction  -        Functional Impact risk of aspiration/pneumonia  -        Swallow Criteria for Skilled Therapeutic Interventions Met demonstrates skilled criteria  -           Recommendations    Recommended Diagnostics FEES  -        Recommended Precautions and Strategies upright posture during/after eating  -        Monitor for Signs of Aspiration yes;notify SLP if any concerns  -        Anticipated Dischage Disposition skilled nursing facility  -          User Key  (r) = Recorded By, (t) = Taken By, (c) = Cosigned By    Initials Name Effective Dates    Yissel uMnroe, MS CCC-SLP 06/08/18 -         EDUCATION  The patient has been educated in the following areas:   Dysphagia (Swallowing Impairment).    SLP Recommendation and Plan  SLP Swallowing Diagnosis: functional oral phase, suspected pharyngeal dysfunction     Recommended Precautions and Strategies: upright posture during/after eating     Monitor for Signs of Aspiration: yes, notify SLP if any concerns  Recommended Diagnostics: FEES  Swallow Criteria for Skilled Therapeutic Interventions Met: demonstrates skilled  criteria  Anticipated Dischage Disposition: skilled nursing facility                Plan of Care Reviewed With: patient  Plan of Care Review  Plan of Care Reviewed With: patient  Outcome Summary: SLP performed clinical swallow evaluation. Recommend FEES. Please see note for full details             Time Calculation:         Time Calculation- SLP     Row Name 09/02/18 1229             Time Calculation- SLP    SLP Start Time 1101  -      SLP Received On 09/02/18  -ARA        User Key  (r) = Recorded By, (t) = Taken By, (c) = Cosigned By    Initials Name Provider Type    Yissel Munroe MS CCC-SLP Speech and Language Pathologist          Therapy Charges for Today     Code Description Service Date Service Provider Modifiers Qty    88752589944 HC ST EVAL ORAL PHARYNG SWALLOW 2 9/2/2018 Yissel Barry MS CCC-SLP GN 1               Yissel Barry MS CCC-JUDIT  9/2/2018

## 2018-09-02 NOTE — PLAN OF CARE
Problem: Patient Care Overview  Goal: Plan of Care Review  Outcome: Ongoing (interventions implemented as appropriate)   09/02/18 5307   Coping/Psychosocial   Plan of Care Reviewed With patient   OTHER   Outcome Summary FEES completed. Recommend level 4 mechanical soft, NTL. Recommend cueing for liquid wash after each bite. Please see note for further details. SLP will f/u for dysphagia tx.

## 2018-09-02 NOTE — PLAN OF CARE
Problem: Patient Care Overview  Goal: Plan of Care Review  Outcome: Ongoing (interventions implemented as appropriate)   09/02/18 1227   Coping/Psychosocial   Plan of Care Reviewed With patient   OTHER   Outcome Summary SLP performed clinical swallow evaluation. Recommend FEES. Please see note for full details

## 2018-09-02 NOTE — PROGRESS NOTES
Pharmacy Consult-Vancomycin Dosing    Zainab Worthy is a  89 y.o. female admitted 8/25/18 for sepsis and placed on broad spectrum antibiotics which were de-escalated after initial cultures were resulted.  ID notes that she had fever 8/29-8/30;   rechecking urine/blood cultures, CXR and added broader GPC coverage with vancomycin while cultures pending.  PMH includes dementia, chronically wheelchair bound after hip fracture February 2018, nursing home resident, chrnic urinary incontinence.    Indication: Fever of unknown origin  Consulting Provider: ID Service    Goal Trough: 15-20 mcg/ml    Current Antimicrobial Therapy  Anti-Infectives       Ordered     Dose/Rate Route Frequency Start Stop    08/30/18 1411  vancomycin (VANCOCIN) in iso-osmotic dextrose IVPB 1 g (premix) 200 mL     Ordering Provider:  Carol Sanchez RPH    1,000 mg  over 60 Minutes Intravenous Daily 08/31/18 0900 09/07/18 0859    08/30/18 0749  micafungin 100 mg/100 mL 0.9% NS IVPB (mbp)     Ordering Provider:  Deepak Ellsworth MD    100 mg  over 60 Minutes Intravenous Once 08/30/18 0900 08/30/18 1005    08/30/18 0749  vancomycin (VANCOCIN) in iso-osmotic dextrose IVPB 1 g (premix) 200 mL     Ordering Provider:  Deepak Ellsworth MD    1 g Intravenous Once 08/30/18 0900 08/30/18 1024    08/30/18 0749  Pharmacy to dose vancomycin     Ordering Provider:  Deepak Ellsworth MD     Does not apply Continuous PRN 08/30/18 0748 09/06/18 0747    08/28/18 0714  piperacillin-tazobactam (ZOSYN) 3.375 g in iso-osmotic dextrose 50 ml (premix)     Ordering Provider:  Deepak Ellsworth MD    3.375 g  over 4 Hours Intravenous Every 8 Hours 08/28/18 1700 09/11/18 1659    08/28/18 0714  piperacillin-tazobactam (ZOSYN) 3.375 g in iso-osmotic dextrose 50 ml (premix)     Ordering Provider:  Deepak Ellsworth MD    3.375 g  over 30 Minutes Intravenous Once 08/28/18 0900 08/28/18 0849    08/28/18 0715  doxycycline (MONODOX) capsule 100 mg     Ordering  "Provider:  Deepak Ellsworth MD    100 mg Oral Every 12 Hours Scheduled 08/28/18 0900 09/04/18 0859    08/26/18 0934  meropenem (MERREM) 1 g/100 mL 0.9% NS VTB (mbp)     Ordering Provider:  Deepak Ellsworth MD    1 g  over 30 Minutes Intravenous Once 08/26/18 1030 08/26/18 1144    08/25/18 0532  piperacillin-tazobactam (ZOSYN) 3.375 g in iso-osmotic dextrose 50 ml (premix)     Ordering Provider:  Latoya Petersen MD    3.375 g  100 mL/hr over 30 Minutes Intravenous Once 08/25/18 0534 08/25/18 0613    08/25/18 0532  vancomycin (VANCOCIN) in iso-osmotic dextrose IVPB 1 g (premix) 200 mL     Ordering Provider:  Latoya Petersen MD    20 mg/kg × 52.9 kg  200 mL/hr over 60 Minutes Intravenous Once 08/25/18 0534 08/25/18 0717          Results from last 7 days   Lab Units 09/02/18  0918 08/31/18  0433 08/30/18  0452   BUN mg/dL 15 23 14   CREATININE mg/dL 0.47* 0.65 0.50*   Results from last 7 days   Lab Units 08/31/18  0433 08/30/18  0452 08/29/18  0512   WBC 10*3/mm3 11.64* 14.66* 14.29*     Ht - 152.4 cm (60\")  Wt - 45.6 kg (100 lb 9.6 oz)    Estimated Creatinine Clearance: 34.3 mL/min (A) (by C-G formula based on SCr of 0.47 mg/dL (L)).    Microbiology and Radiology  Microbiology Results (last 10 days)       Procedure Component Value - Date/Time    Urine Culture - Urine, [[id number]]  (Normal) Collected:  08/30/18 1627    Lab Status:  Final result Specimen:  Urine from Urine, Clean Catch Updated:  09/01/18 0740     Urine Culture No growth at 2 days    Blood Culture - Blood, [[id number]]  (Normal) Collected:  08/30/18 0859    Lab Status:  Preliminary result Specimen:  Blood from Hand, Right Updated:  09/02/18 0945     Blood Culture No growth at 3 days    Blood Culture - Blood, [[id number]]  (Normal) Collected:  08/30/18 0849    Lab Status:  Preliminary result Specimen:  Blood from Arm, Right Updated:  09/02/18 0945     Blood Culture No growth at 3 days    MRSA Screen, PCR - Swab, Nares [[id number]]  (Normal) " Collected:  08/25/18 1714    Lab Status:  Final result Specimen:  Swab from Nares Updated:  08/25/18 1859     MRSA, PCR Negative    Narrative:         MRSA Negative    Urine Culture - Urine, [[id number]]  (Abnormal)  (Susceptibility) Collected:  08/25/18 0526    Lab Status:  Final result Specimen:  Urine from Urine, Catheter Updated:  08/27/18 1413     Urine Culture >100,000 CFU/mL Escherichia coli (A)    Susceptibility        Escherichia coli     KANIKA     Ampicillin >16 ug/ml Resistant     Ampicillin + Sulbactam >16/8 ug/ml Resistant     Aztreonam <=8 ug/ml Susceptible     Cefepime <=8 ug/ml Susceptible     Cefotaxime <=2 ug/ml Susceptible     Ceftriaxone <=8 ug/ml Susceptible     Cefuroxime sodium 8 ug/ml Susceptible     Cephalothin >16 ug/ml Resistant     Ertapenem <=1 ug/ml Susceptible     Gentamicin <=4 ug/ml Susceptible     Levofloxacin <=2 ug/ml Susceptible     Meropenem <=1 ug/ml Susceptible     Nitrofurantoin <=32 ug/ml Susceptible     Piperacillin + Tazobactam <=16 ug/ml Susceptible     Tetracycline >8 ug/ml Resistant     Tobramycin <=4 ug/ml Susceptible     Trimethoprim + Sulfamethoxazole <=2/38 ug/ml Susceptible                      Legionella Antigen, Urine - Urine, Urine, Clean Catch [[id number]] Collected:  08/25/18 0526    Lab Status:  Final result Specimen:  Urine from Urine, Clean Catch Updated:  08/29/18 1518     L. pneumophila Serogp 1 Ur Ag Negative     Comment: Presumptive negative for L. pneumophila serogroup 1 antigen in urine,  suggesting no recent or current infection. Legionnaires' disease  cannot be ruled out since other serogroups and species may also cause  disease.       Narrative:       Performed at:  [address]  [id number]  : Rik Leos MD, Phone:  [phone]    S. Pneumo Ag Urine or CSF - Urine, Urine, Clean Catch [[id number]] Collected:  08/25/18 0526    Lab Status:  Final result Specimen:  Urine from Urine, Clean Catch  Updated:  08/29/18 9594     Specimen Source Urine     STREP PNEUMONIAE ANTIGEN Negative     Body Fluid Culture, Sterile Not Indicated     Organism ID Not indicated.     Please note Comment     Comment: [employer] standards require a culture to be  performed on CSF specimens submitted for bacterial antigen testing.  (CAP [id number]) Urine specimens will not be cultured.       Narrative:       Performed at:  [address] [place] [address]  [id number]  : Rki Leos MD, Phone:  [phone]    Blood Culture - Blood, [[id number]]  (Abnormal)  (Susceptibility) Collected:  08/25/18 0520    Lab Status:  Final result Specimen:  Blood from Arm, Left Updated:  08/27/18 1156     Blood Culture Escherichia coli (A)     Isolated from Aerobic and Anaerobic Bottles     Gram Stain Result Anaerobic Bottle Gram negative bacilli      Aerobic Bottle Gram negative bacilli    Susceptibility        Escherichia coli     KANIKA     Ampicillin >16 ug/ml Resistant     Ampicillin + Sulbactam 16/8 ug/ml Intermediate     Aztreonam <=8 ug/ml Susceptible     Cefepime <=8 ug/ml Susceptible     Cefotaxime <=2 ug/ml Susceptible     Ceftriaxone <=8 ug/ml Susceptible     Cefuroxime sodium 16 ug/ml Intermediate     Ertapenem <=1 ug/ml Susceptible     Gentamicin <=4 ug/ml Susceptible     Levofloxacin <=2 ug/ml Susceptible     Meropenem <=1 ug/ml Susceptible     Piperacillin + Tazobactam <=16 ug/ml Susceptible     Tetracycline >8 ug/ml Resistant     Tobramycin <=4 ug/ml Susceptible     Trimethoprim + Sulfamethoxazole <=2/38 ug/ml Susceptible                      Blood Culture - Blood, [[id number]]  (Abnormal) Collected:  08/25/18 0520    Lab Status:  Final result Specimen:  Blood from Arm, Right Updated:  08/27/18 1157     Blood Culture Gram Negative Bacilli (A)     Isolated from Aerobic and Anaerobic Bottles     Gram Stain Result Anaerobic Bottle Gram negative bacilli      Aerobic Bottle Gram  negative bacilli    Narrative:       SEE CULTURE [id number] FOR ID AND SUSCEPTIBILITIES    Blood Culture ID, PCR - Blood, [[id number]]  (Abnormal) Collected:  08/25/18 0520    Lab Status:  Final result Specimen:  Blood from Arm, Left Updated:  08/25/18 2045     BCID, PCR Escherichia coli. Identification by BCID PCR. (C)            Evaluation of Level    Lab Results   Component Value Date    [facility] 8.00 (L) 09/02/2018   This is ~25 hr level, drawn 1.5 hours late    Assessment/Plan:     1. Patient received vancomycin 1 gram IV load on admission followed by a dose of 750 mg IV q24h.  Trough before the third dose was 6.6 mcg/ml and the dose was increased to 1 gm IV q24h. The drug was stopped and no additional troughs were obtained. Vancomycin restarted on 8/30 per ID.     2. Vancomycin was restarted on 8/30 at dose of 1000 mg IV q24h.  A trough was obtained today prior to 4th dose of new regimen and was SUBtherapeutic, although trough was drawn 1.5 hrs late.  Will give vancomycin 1000 mg x 1 more dose, then change to vancomycin 500 mg IV q12h this evening to try to optimize kinetics.  Repeat trough 9/4 @ 0830.    3. Pharmacy Service will continue to follow the patient's clinical progress and monitor for evidence of vancomycin-induced adverse effects.    Thank you for this consult,  Beata Watkins, PharmD  09/02/18  9:42 AM

## 2018-09-02 NOTE — PLAN OF CARE
Problem: Patient Care Overview  Goal: Plan of Care Review  Outcome: Ongoing (interventions implemented as appropriate)  Speech consulted and diet adjusted accordingly. Patient sat up in chair. Patient vitals remained stable. Encouraged PO intake and will continue to monitor.   Goal: Individualization and Mutuality  Outcome: Ongoing (interventions implemented as appropriate)    Goal: Discharge Needs Assessment  Outcome: Ongoing (interventions implemented as appropriate)      Problem: Fall Risk (Adult)  Goal: Identify Related Risk Factors and Signs and Symptoms  Outcome: Ongoing (interventions implemented as appropriate)    Goal: Absence of Fall  Outcome: Ongoing (interventions implemented as appropriate)      Problem: Skin Injury Risk (Adult)  Goal: Identify Related Risk Factors and Signs and Symptoms  Outcome: Ongoing (interventions implemented as appropriate)    Goal: Skin Health and Integrity  Outcome: Ongoing (interventions implemented as appropriate)

## 2018-09-03 PROCEDURE — 97162 PT EVAL MOD COMPLEX 30 MIN: CPT | Performed by: PHYSICAL THERAPIST

## 2018-09-03 PROCEDURE — 25010000002 PIPERACILLIN SOD-TAZOBACTAM PER 1 G: Performed by: INTERNAL MEDICINE

## 2018-09-03 PROCEDURE — 25010000002 HEPARIN (PORCINE) PER 1000 UNITS

## 2018-09-03 PROCEDURE — 25010000002 VANCOMYCIN

## 2018-09-03 PROCEDURE — 97530 THERAPEUTIC ACTIVITIES: CPT | Performed by: PHYSICAL THERAPIST

## 2018-09-03 PROCEDURE — 99232 SBSQ HOSP IP/OBS MODERATE 35: CPT | Performed by: INTERNAL MEDICINE

## 2018-09-03 RX ADMIN — FAMOTIDINE 20 MG: 20 TABLET ORAL at 09:03

## 2018-09-03 RX ADMIN — DONEPEZIL HYDROCHLORIDE 10 MG: 10 TABLET, FILM COATED ORAL at 21:04

## 2018-09-03 RX ADMIN — OXYBUTYNIN CHLORIDE 5 MG: 5 TABLET ORAL at 21:04

## 2018-09-03 RX ADMIN — MIRTAZAPINE 15 MG: 15 TABLET, FILM COATED ORAL at 21:04

## 2018-09-03 RX ADMIN — NYSTATIN: 100000 POWDER TOPICAL at 21:04

## 2018-09-03 RX ADMIN — TAZOBACTAM SODIUM AND PIPERACILLIN SODIUM 3.38 G: 375; 3 INJECTION, SOLUTION INTRAVENOUS at 16:20

## 2018-09-03 RX ADMIN — HEPARIN SODIUM 5000 UNITS: 5000 INJECTION, SOLUTION INTRAVENOUS; SUBCUTANEOUS at 21:04

## 2018-09-03 RX ADMIN — FAMOTIDINE 20 MG: 20 TABLET ORAL at 21:04

## 2018-09-03 RX ADMIN — MEMANTINE 10 MG: 10 TABLET ORAL at 21:04

## 2018-09-03 RX ADMIN — TAZOBACTAM SODIUM AND PIPERACILLIN SODIUM 3.38 G: 375; 3 INJECTION, SOLUTION INTRAVENOUS at 00:18

## 2018-09-03 RX ADMIN — NYSTATIN: 100000 POWDER TOPICAL at 09:03

## 2018-09-03 RX ADMIN — VANCOMYCIN HYDROCHLORIDE 500 MG: 500 INJECTION, POWDER, LYOPHILIZED, FOR SOLUTION INTRAVENOUS at 09:09

## 2018-09-03 RX ADMIN — MEMANTINE 10 MG: 10 TABLET ORAL at 09:03

## 2018-09-03 RX ADMIN — DOXYCYCLINE 100 MG: 100 CAPSULE ORAL at 09:03

## 2018-09-03 RX ADMIN — OXYBUTYNIN CHLORIDE 5 MG: 5 TABLET ORAL at 09:03

## 2018-09-03 RX ADMIN — OXYBUTYNIN CHLORIDE 5 MG: 5 TABLET ORAL at 16:20

## 2018-09-03 RX ADMIN — VANCOMYCIN HYDROCHLORIDE 500 MG: 500 INJECTION, POWDER, LYOPHILIZED, FOR SOLUTION INTRAVENOUS at 21:11

## 2018-09-03 RX ADMIN — HEPARIN SODIUM 5000 UNITS: 5000 INJECTION, SOLUTION INTRAVENOUS; SUBCUTANEOUS at 09:03

## 2018-09-03 RX ADMIN — TAZOBACTAM SODIUM AND PIPERACILLIN SODIUM 3.38 G: 375; 3 INJECTION, SOLUTION INTRAVENOUS at 10:03

## 2018-09-03 RX ADMIN — DOXYCYCLINE 100 MG: 100 CAPSULE ORAL at 21:04

## 2018-09-03 NOTE — THERAPY EVALUATION
Acute Care - Physical Therapy Initial Evaluation  [facility]     Patient Name: Zainab Worthy  : 2/3/1929  MRN: [id number]  Today's Date: 9/3/2018   Onset of Illness/Injury or Date of Surgery: 18  Date of Referral to PT: 18  Referring Physician: Dasha Lacy MD      Admit Date: 2018    Visit Dx:     ICD-10-CM ICD-9-CM   1. Sepsis, due to unspecified organism (CMS/HCC) A41.9 038.9     995.91   2. Aspiration pneumonia of both lower lobes due to gastric secretions (CMS/HCC) J69.0 507.0   3. Acute urinary tract infection N39.0 599.0   4. Hypoxia R09.02 799.02   5. Impaired functional mobility, balance, gait, and endurance Z74.09 V49.89     Patient Active Problem List   Diagnosis   • Gonalgia   • Hypertrophic polyarthritis   • Allergic rhinitis, seasonal   • Skin growth   • Endometrial cancer (CMS/HCC)   • Post-operative state   • Rectal cancer (CMS/HCC)   • Hydronephrosis   • Hypertension   • Anorexia   • Onychomycosis   • IUD complication (CMS/HCC)   • Memory loss   • Urinary frequency   • Fall at home   • Closed fracture of pelvis (CMS/HCC)   • UTI (urinary tract infection)   • Fracture of femoral neck, right (CMS/HCC)   • Late onset Alzheimer's disease without behavioral disturbance   • Metastatic colon cancer in female (CMS/HCC)   • Recurrent falls   • Closed fracture of neck of right femur (CMS/HCC)   • Sepsis (CMS/HCC)   • Sepsis associated hypotension (CMS/HCC)   • Leukocytosis   • Normocytic anemia   • Thrombocytopenia (CMS/HCC)   • Lactic acidosis   • E coli bacteremia     Past Medical History:   Diagnosis Date   • Anorexia    • Arthritis    • Asthma    • Cataract    • Colon cancer (CMS/HCC)    • Colon cancer metastasized to liver (CMS/HCC)    • DDD (degenerative disc disease), lumbar    • Hearing loss    • History of transfusion    • Hypertension    • Liver cancer (CMS/HCC)    • Macular degeneration    • Onychomycosis    • Urine frequency      Past Surgical History:   Procedure Laterality  Date   • CATARACT EXTRACTION Bilateral    • COLON RESECTION  06/16/2009    Low anterior resection   • GALLBLADDER SURGERY      Resection of gallbladder   • HIP HEMIARTHROPLASTY Right 2/8/2018    Procedure: HIP HEMIARTHROPLASTY;  Surgeon: Fernando Dominguez MD;  Location: [facility];  Service:    • LIVER SURGERY  12/09/2014    Liver tumor, 1/2 was removed.   • PARTIAL HYSTERECTOMY          PT ASSESSMENT (last 12 hours)      Physical Therapy Evaluation     Row Name 09/03/18 0900          PT Evaluation Time/Intention    Subjective Information complains of;weakness  -LM     Document Type evaluation  -LM     Mode of Treatment individual therapy;physical therapy  -LM     Patient Effort good  -LM     Symptoms Noted During/After Treatment fatigue  -LM     Row Name 09/03/18 0900          General Information    Patient Profile Reviewed? yes  -LM     Onset of Illness/Injury or Date of Surgery 08/25/18  -LM     Referring Physician Dasha Lacy MD  -     Patient Observations cooperative;agree to therapy  -LM     Patient/Family Observations No family present.  -LM     General Observations of Patient Pt lying in bed.  Noted to have IV.  Pleasantly confused, but agreeable to PT eval.  -LM     Prior Level of Function --   Unable to obtain PLOF as pt confused - no family present  -LM     Pertinent History of Current Functional Problem Admitted from the [facility] with nausea, vomiting, fever, and confusion.  Dx: Sepsis; UTI; Aspiration Pneumonia  -LM     Existing Precautions/Restrictions fall;other (see comments)   Dementia  -LM     Risks Reviewed patient:;LOB;increased discomfort  -LM     Benefits Reviewed patient:;improve function;increase independence;increase strength;increase balance  -LM     Barriers to Rehab previous functional deficit;cognitive status  -LM     Row Name 09/03/18 0900          Relationship/Environment    Lives With facility resident  -     Row Name 09/03/18 0900          Resource/Environmental Concerns     Current Living Arrangements extended care facility   [facility]  -LM     Row Name 09/03/18 0900          Cognitive Assessment/Interventions    Additional Documentation Cognitive Assessment/Intervention (Group)  -LM     Row Name 09/03/18 0900          Cognitive Assessment/Intervention- PT/OT    Affect/Mental Status (Cognitive) confused  -LM     Orientation Status (Cognition) oriented to;person;disoriented to;place;situation;time   Knew she was in the hospital but unsure which one  -LM     Follows Commands (Cognition) follows one step commands;75-90% accuracy;initiation impaired;verbal cues/prompting required;physical/tactile prompts required   [place]  -     Cognitive Function (Cognitive) safety deficit  -LM     Safety Deficit (Cognitive) moderate deficit;safety precautions awareness  -LM     Personal Safety Interventions fall prevention program maintained;gait belt;muscle strengthening facilitated;nonskid shoes/slippers when out of bed  -LM     Row Name 09/03/18 0900          Bed Mobility Assessment/Treatment    Bed Mobility Assessment/Treatment supine-sit  -LM     Sit-Supine [place] (Bed Mobility) moderate assist (50% patient effort);verbal cues  -     Assistive Device (Bed Mobility) bed rails;head of bed elevated  -     Row Name 09/03/18 0900          Transfer Assessment/Treatment    Transfer Assessment/Treatment sit-stand transfer;stand-sit transfer;bed-chair transfer;chair-bed transfer;toilet transfer  -     Comment (Transfers) Initially stood with rw and used to transfer to [facility].  Pt then stood from C using rw while PT provided dependent pericare - however, pt unable to step over to bed.  Therefore, PT completed MaxA pivot from BSC-->bed and then again from bed-->chair.  -LM     Bed-Chair [place] (Transfers) maximum assist (25% patient effort)   Stand Pivot  -LM     Assistive Device (Bed-Chair Transfers) other (see comments)   PT stood directly in front of patient  -LM     Sit-Stand  [place] (Transfers) moderate assist (50% patient effort)  -LM     Stand-Sit [place] (Transfers) moderate assist (50% patient effort)  -LM     [place] Level (Toilet Transfer) moderate assist (50% patient effort);maximum assist (25% patient effort)   ModA to stand w/ walker; MaxA for stand pivot from bed<->BSC  -LM     Assistive Device (Toilet Transfer) commode, bedside without drop arms;walker, front-wheeled  -LM     Row Name 09/03/18 0900          Sit-Stand Transfer    Assistive Device (Sit-Stand Transfers) walker, front-wheeled  -LM     Row Name 09/03/18 0900          Stand-Sit Transfer    Assistive Device (Stand-Sit Transfers) walker, front-wheeled  -LM     Row Name 09/03/18 0900          Toilet Transfer    Type (Toilet Transfer) stand pivot/stand step;sit-stand;stand-sit  -[facility] Name 09/03/18 0900          General ROM    GENERAL ROM COMMENTS BLE AROM WFL  -LM     Row Name 09/03/18 0900          MMT (Manual Muscle Testing)    General MMT Comments BLE's - Hip flex - 3+/5; Knee flex/ext - 4-/5; Ankle DF - 3+/5  -     Row Name 09/03/18 0900          Motor Assessment/Intervention    Additional Documentation Balance (Group)  -LM     Row Name 09/03/18 0900          Balance    Balance static sitting balance;static standing balance  -LM     Row Name 09/03/18 0900          Static Sitting Balance    Level of [place] (Unsupported Sitting, Static Balance) contact guard assist  -     Sitting Position (Unsupported Sitting, Static Balance) sitting on edge of bed;sitting in chair  -LM     Time Able to Maintain Position (Unsupported Sitting, Static Balance) more than 5 minutes  -     Row Name 09/03/18 0900          Static Standing Balance    Level of [place] (Supported Standing, Static Balance) moderate assist, 50 to 74% patient effort  -LM     Time Able to Maintain Position (Supported Standing, Static Balance) 15 to 30 seconds  -     Assistive Device Utilized (Supported Standing, Static  Balance) rolling walker  -LM     Row Name 09/03/18 0900          Pain Assessment    Additional Documentation Pain Scale: Numbers Pre/Post-Treatment (Group)  -LM     Row Name 09/03/18 0900          Pain Scale: Numbers Pre/Post-Treatment    Pain Scale: Numbers, Pretreatment 0/10 - no pain  -LM     Pain Scale: Numbers, Post-Treatment 0/10 - no pain  -LM     Row Name 09/03/18 0900          Plan of Care Review    Plan of Care Reviewed With patient  -LM     Row Name 09/03/18 0900          Physical Therapy Clinical Impression    Date of Referral to PT 09/02/18  -     PT Diagnosis (PT Clinical Impression) Impaired functional mobility, balance, gait, and endurance  -     Criteria for Skilled Interventions Met (PT Clinical Impression) yes;treatment indicated  -LM     Rehab Potential (PT Clinical Summary) fair, will monitor progress closely  -     Care Plan Review (PT) evaluation/treatment results reviewed;care plan/treatment goals reviewed;risks/benefits reviewed;current/potential barriers reviewed;patient/other agree to care plan  -     Row Name 09/03/18 0900          Vital Signs    Pre Systolic BP Rehab 121  -LM     Pre Treatment Diastolic BP 61  -LM     Pretreatment Heart Rate (beats/min) 91  -LM     Posttreatment Heart Rate (beats/min) 96  -LM     Pre SpO2 (%) 95  -LM     O2 Delivery Pre Treatment room air  -LM     Post SpO2 (%) 95  -LM     O2 Delivery Post Treatment room air  -LM     Pre Patient Position Supine  -LM     Intra Patient Position Standing  -LM     Post Patient Position Sitting  -LM     Row Name 09/03/18 0900          Physical Therapy Goals    Bed Mobility Goal Selection (PT) bed mobility, PT goal 1  -LM     Transfer Goal Selection (PT) transfer, PT goal 1;transfer, PT goal 2  -LM     Row Name 09/03/18 0900          Bed Mobility Goal 1 (PT)    Activity/Assistive Device (Bed Mobility Goal 1, PT) sit to supine/supine to sit  -LM     [place] Level/Cues Needed (Bed Mobility Goal 1, PT) supervision  required  -LM     Time Frame (Bed Mobility Goal 1, PT) long term goal (LTG);2 weeks  -LM     Row Name 09/03/18 0900          Transfer Goal 1 (PT)    Activity/Assistive Device (Transfer Goal 1, PT) sit-to-stand/stand-to-sit  -LM     [place] Level/Cues Needed (Transfer Goal 1, PT) minimum assist (75% or more patient effort)  -LM     Time Frame (Transfer Goal 1, PT) short term goal (STG);1 week  -LM     Row Name 09/03/18 0900          Transfer Goal 2 (PT)    Activity/Assistive Device (Transfer Goal 2, PT) bed-to-chair/chair-to-bed  -LM     [place] Level/Cues Needed (Transfer Goal 2, PT) minimum assist (75% or more patient effort)  -LM     Time Frame (Transfer Goal 2, PT) long term goal (LTG);2 weeks  -LM     Row Name 09/03/18 0900          Positioning and Restraints    Pre-Treatment Position in bed  -LM     Post Treatment Position chair  -LM     In Chair reclined;call light within reach;encouraged to call for assist;exit alarm on;waffle cushion;notified nsg  -LM     Row Name 09/03/18 0900          Living Environment    Home Accessibility --   No concerns  -LM       User Key  (r) = Recorded By, (t) = Taken By, (c) = Cosigned By    Initials Name Provider Type    Sugey Gama, PT Physical Therapist          Physical Therapy Education     Title: PT OT SLP Therapies (Active)     Topic: Physical Therapy (Active)     Point: Mobility training (Active)    Learning Progress Summary     Learner Status Readiness Method Response Comment Documented by    Patient Active Acceptance E,D ANUEL VARGAS 09/03/18 1033     Done Acceptance E ANUEL MATOS 08/28/18 1046          Point: Home exercise program (Done)    Learning Progress Summary     Learner Status Readiness Method Response Comment Documented by    Patient Done Acceptance E CARLO,NR  FELICITAS 08/28/18 1046          Point: Body mechanics (Done)    Learning Progress Summary     Learner Status Readiness Method Response Comment Documented by    Patient Done Acceptance E CARLO,NR  FELICITAS 08/28/18  1046          Point: Precautions (Active)    Learning Progress Summary     Learner Status Readiness Method Response Comment Documented by    Patient Active Acceptance E,JARON AGEE LM 09/03/18 1033     Done Acceptance E ANUEL MATOS 08/28/18 1046                      User Key     Initials Effective Dates Name Provider Type Discipline     06/15/16 -  Sugey Reed, PT Physical Therapist PT    BR 06/16/16 -  Deborah Thomas, RN Registered Nurse Nurse                PT Recommendation and Plan  Anticipated Discharge Disposition (PT): skilled nursing facility  Planned Therapy Interventions (PT Eval): balance training, bed mobility training, gait training, home exercise program, neuromuscular re-education, patient/family education, postural re-education, ROM (range of motion), strengthening, stretching, transfer training, wheelchair management/propulsion training  Therapy Frequency (PT Clinical Impression): daily  Outcome Summary/Treatment Plan (PT)  Anticipated Discharge Disposition (PT): skilled nursing facility  Plan of Care Reviewed With: patient  Outcome Summary: PT evaluation completed on this date.  Pt pleasantly confused throughout eval.  Pt transferred supine-->sit with ModA, stood x 2 using rw with ModA, completed bed-->BSC transfer using rw with ModA.  However, pt fatigued and was unable to complete transfer using rw back to bed.  Completed stand pivot t/f from BSC-->bed and then bed-->chair with MaxAx1.  Skilled PT services warranted to improve mobility and safety.  Recommend d/c back to SNF.          Outcome Measures     Row Name 09/03/18 0900             How much help from another person do you currently need...    Turning from your back to your side while in flat bed without using bedrails? 2  -LM      Moving from lying on back to sitting on the side of a flat bed without bedrails? 2  -LM      Moving to and from a bed to a chair (including a wheelchair)? 2  -LM      Standing up from a chair using your arms  (e.g., wheelchair, bedside chair)? 2  -LM      Climbing 3-5 steps with a railing? 1  -LM      To walk in hospital room? 1  -LM      AM-PAC 6 Clicks Score 10  -LM         Functional Assessment    Outcome Measure Options AM-PAC 6 Clicks Basic Mobility (PT)  -LM        User Key  (r) = Recorded By, (t) = Taken By, (c) = Cosigned By    Initials Name Provider Type    Sugey Gama, PT Physical Therapist           Time Calculation:         PT Charges     Row Name 09/03/18 0900             Time Calculation    Start Time 0900  -LM      PT Received On 09/03/18  -LM      PT Goal Re-Cert Due Date 09/13/18  -LM         Timed Charges    20421 - PT Therapeutic Activity Minutes 8  -LM        User Key  (r) = Recorded By, (t) = Taken By, (c) = Cosigned By    Initials Name Provider Type    Sugey Gama, PT Physical Therapist        Therapy Suggested Charges     Code   Minutes Charges    77717 (CPT®) Hc Pt Neuromusc Re Education Ea 15 Min      61561 (CPT®) Hc Pt Ther Proc Ea 15 Min      28572 (CPT®) Hc Gait Training Ea 15 Min      11800 (CPT®) Hc Pt Therapeutic Act Ea 15 Min 8 1    83083 (CPT®) Hc Pt Manual Therapy Ea 15 Min      35877 (CPT®) Hc Pt Iontophoresis Ea 15 Min      30779 (CPT®) Hc Pt Elec Stim Ea-Per 15 Min      88639 (CPT®) Hc Pt Ultrasound Ea 15 Min      43382 (CPT®) Hc Pt Self Care/Mgmt/Train Ea 15 Min      08992 (CPT®) Hc Pt Prosthetic (S) Train Initial Encounter, Each 15 Min      39835 (CPT®) Hc Pt Orthotic(S)/Prosthetic(S) Encounter, Each 15 Min      37870 (CPT®) Hc Orthotic(S) Mgmt/Train Initial Encounter, Each 15min      Total  8 1        Therapy Charges for Today     Code Description Service Date Service Provider Modifiers Qty    40235179804 HC PT THERAPEUTIC ACT EA 15 MIN 9/3/2018 Sugey Reed, PT GP 1    22888584898 HC PT EVAL MOD COMPLEXITY 4 9/3/2018 Sugey Reed, PT GP 1          PT G-Codes  Outcome Measure Options: AM-PAC 6 Clicks Basic Mobility (PT)      Sugey Reed PT  9/3/2018

## 2018-09-03 NOTE — PROGRESS NOTES
[facility] Medicine Services  PROGRESS NOTE    Patient Name: Zainab Worthy  : 2/3/1929  MRN: [id number]    Date of Admission: 2018  Length of Stay: 9  Primary Care Physician: Aguilar Kelly MD    Subjective     CC: sepsis    HPI:  Sitting up in bed eating breakfast. No complaints.     Review of Systems  Gen- - fevers, - chills  CV- No chest pain, palpitations  Resp- No cough, dyspnea  GI- No N/V/D, abd pain    Otherwise ROS is negative except as mentioned in the HPI.    Objective     Vital Signs:   Temp:  [99.1 °F (37.3 °C)] 99.1 °F (37.3 °C)  Heart Rate:  [] 98  Resp:  [20] 20  BP: (133)/(70) 133/70        Physical Exam:  Constitutional: No acute distress, awake, alert   HENT: NCAT, mucous membranes moist  Respiratory: Clear to auscultation bilaterally, respiratory effort normal on room air  Cardiovascular: rrr, no murmurs, on tele  Gastrointestinal: Positive bowel sounds, soft, nontender, nondistended  Musculoskeletal: No bilateral ankle edema  Psychiatric: sleeping but appropriate when wakes  Neurologic: Oriented to self. No focal deficits.  Skin: No rashes    Results Reviewed:  I have personally reviewed current lab, radiology, and data and agree.      Results from last 7 days  Lab Units 18  0512   WBC 10*3/mm3 11.64* 14.66* 14.29*   HEMOGLOBIN g/dL 9.7* 10.2* 10.8*   HEMATOCRIT % 30.5* 31.3* 33.7*   PLATELETS 10*3/mm3 188 148* 117*       Results from last 7 days  Lab Units 18  0918 18  0433 18  0452 18  0512   SODIUM mmol/L 137 137 133 135   POTASSIUM mmol/L 4.3 4.2 3.6 4.2   CHLORIDE mmol/L 104 103 101 105   CO2 mmol/L 27.0 26.0 23.0 24.0   BUN mg/dL 15 23 14 13   CREATININE mg/dL 0.47* 0.65 0.50* 0.47*   GLUCOSE mg/dL 116* 115* 106* 106*   CALCIUM mg/dL 8.2* 8.1* 8.2* 8.4*   ALT (SGPT) U/L  --  16 18 19   AST (SGOT) U/L  --  15 13 16        Estimated Creatinine Clearance: 34.3 mL/min (A) (by C-G formula  based on SCr of 0.47 mg/dL (L)).  No results found for: BNP    Microbiology Results Abnormal     Procedure Component Value - Date/Time    Blood Culture - Blood, [[id number]]  (Normal) Collected:  08/30/18 0849    Lab Status:  Preliminary result Specimen:  Blood from Arm, Right Updated:  09/02/18 0945     Blood Culture No growth at 3 days    Blood Culture - Blood, [[id number]]  (Normal) Collected:  08/30/18 0859    Lab Status:  Preliminary result Specimen:  Blood from Hand, Right Updated:  09/02/18 0945     Blood Culture No growth at 3 days    Urine Culture - Urine, [[id number]]  (Normal) Collected:  08/30/18 1627    Lab Status:  Final result Specimen:  Urine from Urine, Clean Catch Updated:  09/01/18 0740     Urine Culture No growth at 2 days    Legionella Antigen, Urine - Urine, Urine, Clean Catch [[id number]] Collected:  08/25/18 0526    Lab Status:  Final result Specimen:  Urine from Urine, Clean Catch Updated:  08/29/18 1518     L. pneumophila Serogp 1 Ur Ag Negative     Comment: Presumptive negative for L. pneumophila serogroup 1 antigen in urine,  suggesting no recent or current infection. Legionnaires' disease  cannot be ruled out since other serogroups and species may also cause  disease.       Narrative:       Performed at:  [address] [place] [address]  [id number]  : Rik Leos MD, Phone:  [phone]    S. Pneumo Ag Urine or CSF - Urine, Urine, Clean Catch [[id number]] Collected:  08/25/18 0526    Lab Status:  Final result Specimen:  Urine from Urine, Clean Catch Updated:  08/29/18 1518     Specimen Source Urine     STREP PNEUMONIAE ANTIGEN Negative     Body Fluid Culture, Sterile Not Indicated     Organism ID Not indicated.     Please note Comment     Comment: [employer] standards require a culture to be  performed on CSF specimens submitted for bacterial antigen testing.  (CAP [id number]) Urine specimens will not be cultured.       Narrative:        Performed at:  [address] [place] [address]  [id number]  : Rik Leos MD, Phone:  [phone]    Urine Culture - Urine, [[id number]]  (Abnormal)  (Susceptibility) Collected:  08/25/18 0526    Lab Status:  Final result Specimen:  Urine from Urine, Catheter Updated:  08/27/18 1413     Urine Culture >100,000 CFU/mL Escherichia coli (A)    Susceptibility      Escherichia coli     KANIKA     Ampicillin >16 ug/ml Resistant     Ampicillin + Sulbactam >16/8 ug/ml Resistant     Aztreonam <=8 ug/ml Susceptible     Cefepime <=8 ug/ml Susceptible     Cefotaxime <=2 ug/ml Susceptible     Ceftriaxone <=8 ug/ml Susceptible     Cefuroxime sodium 8 ug/ml Susceptible     Cephalothin >16 ug/ml Resistant     Ertapenem <=1 ug/ml Susceptible     Gentamicin <=4 ug/ml Susceptible     Levofloxacin <=2 ug/ml Susceptible     Meropenem <=1 ug/ml Susceptible     Nitrofurantoin <=32 ug/ml Susceptible     Piperacillin + Tazobactam <=16 ug/ml Susceptible     Tetracycline >8 ug/ml Resistant     Tobramycin <=4 ug/ml Susceptible     Trimethoprim + Sulfamethoxazole <=2/38 ug/ml Susceptible                    Blood Culture - Blood, [[id number]]  (Abnormal) Collected:  08/25/18 0520    Lab Status:  Final result Specimen:  Blood from Arm, Right Updated:  08/27/18 1157     Blood Culture Gram Negative Bacilli (A)     Isolated from Aerobic and Anaerobic Bottles     Gram Stain Result Anaerobic Bottle Gram negative bacilli      Aerobic Bottle Gram negative bacilli    Narrative:       SEE CULTURE [id number] FOR ID AND SUSCEPTIBILITIES    Blood Culture - Blood, [[id number]]  (Abnormal)  (Susceptibility) Collected:  08/25/18 0520    Lab Status:  Final result Specimen:  Blood from Arm, Left Updated:  08/27/18 1156     Blood Culture Escherichia coli (A)     Isolated from Aerobic and Anaerobic Bottles     Gram Stain Result Anaerobic Bottle Gram negative bacilli      Aerobic Bottle Gram negative bacilli    Susceptibility       Escherichia coli     KANIKA     Ampicillin >16 ug/ml Resistant     Ampicillin + Sulbactam 16/8 ug/ml Intermediate     Aztreonam <=8 ug/ml Susceptible     Cefepime <=8 ug/ml Susceptible     Cefotaxime <=2 ug/ml Susceptible     Ceftriaxone <=8 ug/ml Susceptible     Cefuroxime sodium 16 ug/ml Intermediate     Ertapenem <=1 ug/ml Susceptible     Gentamicin <=4 ug/ml Susceptible     Levofloxacin <=2 ug/ml Susceptible     Meropenem <=1 ug/ml Susceptible     Piperacillin + Tazobactam <=16 ug/ml Susceptible     Tetracycline >8 ug/ml Resistant     Tobramycin <=4 ug/ml Susceptible     Trimethoprim + Sulfamethoxazole <=2/38 ug/ml Susceptible                    Blood Culture ID, PCR - Blood, [[id number]]  (Abnormal) Collected:  08/25/18 0520    Lab Status:  Final result Specimen:  Blood from Arm, Left Updated:  08/25/18 2045     BCID, PCR Escherichia coli. Identification by BCID PCR. (C)    MRSA Screen, PCR - Swab, Nares [[id number]]  (Normal) Collected:  08/25/18 1714    Lab Status:  Final result Specimen:  Swab from Nares Updated:  08/25/18 1859     MRSA, PCR Negative    Narrative:         MRSA Negative        Imaging Results (last 24 hours)     Procedure Component Value Units Date/Time    Fiberoptic Endo (fees) [[id number]] Resulted:  09/02/18 1359     Updated:  09/02/18 1359    Narrative:       This procedure was auto-finalized with no dictation required.        I have reviewed the medications.      [START ON 9/4/2018] Pharmacy Consult  Does not apply Once   donepezil 10 mg Oral Nightly   doxycycline 100 mg Oral Q12H   famotidine 20 mg Oral BID   heparin (porcine) 5,000 Units Subcutaneous Q12H   memantine 10 mg Oral Q12H   mirtazapine 15 mg Oral Nightly   nystatin  Topical Q12H   oxybutynin 5 mg Oral TID   piperacillin-tazobactam 3.375 g Intravenous Q8H   vancomycin 500 mg Intravenous Q12H     Assessment / Plan     Hospital Problem List     * (Principal)Sepsis (CMS/HCC)    Endometrial cancer (CMS/HCC)    UTI (urinary tract  infection)    Late onset Alzheimer's disease without behavioral disturbance    Sepsis associated hypotension (CMS/HCC)    Leukocytosis    Normocytic anemia    Thrombocytopenia (CMS/HCC)    Lactic acidosis    E coli bacteremia        Brief Hospital Course to date:  Zainab Worthy is a 89 y.o. female w/ PMH of normocytic anemia, dementia, HTN, and remote h/o endometrial cancer who resides at the [facility] and presented with sepsis (fever, leukocytosis) due to aspiration PNA and UTI. Patient found to have E coli bacteremia as well, suspect this is from urinary source.      Plan:  - Appreciate ID evaluation. Doxy/vanc for now with 1 time dose of micafungin given 8/30. Final abx duration to be decided by ID.   -- no fevers in last 72h- have sent recultures of blood/urine NGTD. CXR with concern for possible aspiration, continue with ongoing CAP coverage.  Monitor cultures  - UCx  >100K Ecoli. Sensitivities reviewed  Blood Cx with Ecoli. ABX per ID. Urology consulted given hydronephrosis - per Dr. Rankin, patient has a known right UPJ obstruction, likely congenital   - Continue to hold antihypertensives, she is normotensive at this time  - Thrombocytopenia, likely related to sepsis. Resolved.   - Continue home meds for dementia.   - Suspect anemia is secondary to ACOD, based on last admissions anemia work up. Monitor and transfuse PRN for Hgb < 7.   - Replete K+ PRN.   - speech eval in progress    DVT prophylaxis: LATHA  CODE STATUS:   Code Status and Medical Interventions:   Ordered at: 08/25/18 1141     Level Of Support Discussed With:    Next of Kin (If No Surrogate)     Code Status:    CPR     Medical Interventions (Level of Support Prior to Arrest):    Full     Disposition: I expect the patient to be discharged back to the [facility] once duration of ABX has been determined and patient has improved from a sepsis standpoint.  Family is paying for a bed hold, so she can go whenever.     Electronically signed by Dasha Lacy  MD, 09/03/18, 8:01 AM.

## 2018-09-03 NOTE — PLAN OF CARE
Problem: Patient Care Overview  Goal: Plan of Care Review  Outcome: Ongoing (interventions implemented as appropriate)   09/03/18 0900   Coping/Psychosocial   Plan of Care Reviewed With patient   OTHER   Outcome Summary PT evaluation completed on this date. Pt pleasantly confused throughout eval. Pt transferred supine-->sit with ModA, stood x 2 using rw with ModA, completed bed-->BSC transfer using rw with ModA. However, pt fatigued and was unable to complete transfer using rw back to bed. Completed stand pivot t/f from BSC-->bed and then bed-->chair with MaxAx1. Skilled PT services warranted to improve mobility and safety. Recommend d/c back to SNF.

## 2018-09-03 NOTE — PROGRESS NOTES
"Clinical Nutrition   Reason For Visit: Follow-up protocol    Patient Name: Zainab Worthy  YOB: 1929  MRN: [id number]  Date of Encounter: 09/03/18 2:52 PM  Admission date: 8/25/2018      Nutrition Assessment     Hospital Problem List  Principal Problem:    Sepsis (CMS/HCC)  Active Problems:    Endometrial cancer (CMS/HCC)    UTI (urinary tract infection)    Late onset Alzheimer's disease without behavioral disturbance    Sepsis associated hypotension (CMS/HCC)    Leukocytosis    Normocytic anemia    Thrombocytopenia (CMS/HCC)    Lactic acidosis    E coli bacteremia      PMH: She  has a past medical history of Anorexia; Arthritis; Asthma; Cataract; Colon cancer (CMS/HCC); Colon cancer metastasized to liver (CMS/HCC); DDD (degenerative disc disease), lumbar; Hearing loss; History of transfusion; Hypertension; Liver cancer (CMS/HCC); Macular degeneration; Onychomycosis; and Urine frequency.   PSxH: She  has a past surgical history that includes Liver surgery (12/09/2014); Partial hysterectomy; Gallbladder surgery; Colectomy (06/16/2009); Cataract extraction (Bilateral); and Hip hemiArthroplasty (Right, 2/8/2018).       Other nutrition related factors:  Assisted living resident  W/C bound (since Feb 2018)      Applicable medical tests/procedures since admission:  SLP (9/2) - SLP performed clinical swallow evaluation. Recommend FEES.  SLP (9/2) - FEES completed. Recommend level 4 mechanical soft, NTL.       Reported/Observed/Food/Nutrition Related History     Patient pleasantly confused at visit. Falling back asleep. No family/friends in at visit. Per RN, patient eating OK. Has been eating about 25% of past trays. Still receiving Boost supplements and tolerating those OK as well.      Anthropometrics   Height: 152.4 cm (60\")  Weight: 45.6 kg (100 lb 9 .6 oz) - bed scale weight per charting (8/25)  BMI: 19.65 kg/m²  BMI classification: Normal: 18.5-24.9kg/m2     Per previous RD note: unintentional weight loss " of 15 lbs x 1.5 weeks based on current admission bed wt and previous MD office visit wt; note this seems unlikely given report of poor PO intake x past 2 days only. RN unable to obtain standing weight at this time d/t patient's limited physical ability. Thus, RD unable to verify weight change.    Labs reviewed   Labs reviewed: Yes    Medications reviewed   Medications reviewed: Yes    Current Nutrition Prescription   PO: Diet Dysphagia; IV - Mechanical Soft No Mixed Consistencies; Nectar / Syrup Thick; Cardiac, Consistent Carbohydrate  Oral Nutrition Supplement: Boost Glucose Control x2/d with ice cream    Evaluation of Received Nutrient/Fluid Intake:  4 Days: 36% of 7 meals from (8/30 - 9/2)       Nutrition Diagnosis     (8/26) updated (9/3)  Problem Inadequate oral intake   Etiology Poor appetite   Signs/Symptoms PO Intake (36% of 7 meals)   Status: ongoing    Intervention   Intervention: Follow treatment progress, Care plan reviewed      Goal:   General: Nutrition support treatment  PO: Increase intake    Monitoring/Evaluation:       Monitoring/Evaluation: Per protocol, PO intake, Supplement intake, Swallow function  Will Continue to follow per protocol  Nhi Mena  Time Spent: 20 minutes

## 2018-09-03 NOTE — PLAN OF CARE
Problem: Patient Care Overview  Goal: Plan of Care Review  Outcome: Ongoing (interventions implemented as appropriate)   09/03/18 0358   Coping/Psychosocial   Plan of Care Reviewed With patient   Plan of Care Review   Progress improving   OTHER   Outcome Summary pt rested well tonight, was up in chair some of the night, no s/sx of pain noted or voiced, tolerated meds whole in applesauce with NTL     Goal: Individualization and Mutuality  Outcome: Ongoing (interventions implemented as appropriate)   09/03/18 0358   Individualization   Patient Specific Preferences likes to sit up in the chair, like to use her personal blanket   Patient Specific Goals (Include Timeframe) get better and return to the [facility], finish abt therapy   Patient Specific Interventions IV abt, pt/ot, speech     Goal: Discharge Needs Assessment  Outcome: Ongoing (interventions implemented as appropriate)   08/27/18 1447 09/03/18 0358   Discharge Needs Assessment   Concerns to be Addressed --  no discharge needs identified   Patient/Family Anticipates Transition to --  long term care facility   Patient/Family Anticipated Services at Transition --     Transportation Concerns --  other (see comments)  (tbd)   Transportation Anticipated --  other (see comments)  (tbd)   Anticipated Changes Related to Illness --  none   Equipment Needed After Discharge --  oxygen;other (see comments)  (tbd)   Outpatient/Agency/Support Group Needs --  skilled nursing facility   Discharge Facility/Level of Care Needs --  nursing facility, skilled;nursing facility, intermediate   Current Discharge Risk --  cognitively impaired;dependent with mobility/activities of daily living   Disability   Equipment Currently Used at Home wheelchair --        Problem: Fall Risk (Adult)  Goal: Identify Related Risk Factors and Signs and Symptoms  Outcome: Outcome(s) achieved Date Met: 09/03/18 09/03/18 0358   Fall Risk (Adult)   Related Risk Factors (Fall Risk) age-related  changes;bladder function altered;confusion/agitation;fatigue/slow reaction;gait/mobility problems;neuro disease/injury;sensory deficits;environment unfamiliar   Signs and Symptoms (Fall Risk) presence of risk factors     Goal: Absence of Fall  Outcome: Ongoing (interventions implemented as appropriate)   09/03/18 0358   Fall Risk (Adult)   Absence of Fall making progress toward outcome       Problem: Skin Injury Risk (Adult)  Goal: Identify Related Risk Factors and Signs and Symptoms  Outcome: Outcome(s) achieved Date Met: 09/03/18 09/03/18 0358   Skin Injury Risk (Adult)   Related Risk Factors (Skin Injury Risk) advanced age;cognitive impairment;hospitalization prolonged;mobility impaired;moisture;medication;infection     Goal: Skin Health and Integrity  Outcome: Ongoing (interventions implemented as appropriate)   09/03/18 0358   Skin Injury Risk (Adult)   Skin Health and Integrity making progress toward outcome

## 2018-09-03 NOTE — PROGRESS NOTES
[facility] Progress Note        Antibiotics:  Anti-Infectives     Ordered     Dose/Rate Route Frequency Start Stop    09/02/18 1056  vancomycin 500 mg/100 mL 0.9% NS IVPB (mbp)     Ordering Provider:  Beata Watkins RPH    500 mg  over 60 Minutes Intravenous Every 12 Hours 09/02/18 2100 09/07/18 2059    08/30/18 1411  vancomycin (VANCOCIN) in iso-osmotic dextrose IVPB 1 g (premix) 200 mL     Beata Watkins RPH reviewed the order on 09/02/18 1056.   Ordering Provider:  Beata Watkins RPH    1,000 mg  over 60 Minutes Intravenous Daily 08/31/18 0900 09/02/18 1246    08/30/18 0749  micafungin 100 mg/100 mL 0.9% NS IVPB (mbp)     Ordering Provider:  Deepak Ellsworth MD    100 mg  over 60 Minutes Intravenous Once 08/30/18 0900 08/30/18 1005    08/30/18 0749  vancomycin (VANCOCIN) in iso-osmotic dextrose IVPB 1 g (premix) 200 mL     Ordering Provider:  Deepak Ellsworth MD    1 g Intravenous Once 08/30/18 0900 08/30/18 1024    08/30/18 0749  Pharmacy to dose vancomycin     Ordering Provider:  Deepak Ellsworth MD     Does not apply Continuous PRN 08/30/18 0748 09/06/18 0747    08/28/18 0714  piperacillin-tazobactam (ZOSYN) 3.375 g in iso-osmotic dextrose 50 ml (premix)     Ordering Provider:  Deepak Ellsworth MD    3.375 g  over 4 Hours Intravenous Every 8 Hours 08/28/18 1700 09/11/18 1659    08/28/18 0714  piperacillin-tazobactam (ZOSYN) 3.375 g in iso-osmotic dextrose 50 ml (premix)     Ordering Provider:  Deepak Ellsworth MD    3.375 g  over 30 Minutes Intravenous Once 08/28/18 0900 08/28/18 0849    08/28/18 0715  doxycycline (MONODOX) capsule 100 mg     Deepak Ellsworth MD reviewed the order on 09/03/18 0845.   Ordering Provider:  Deepak Ellsworth MD    100 mg Oral Every 12 Hours Scheduled 08/28/18 0900 09/10/18 0844    08/26/18 0934  meropenem (MERREM) 1 g/100 mL 0.9% NS VTB (mbp)     Ordering Provider:  Deepak Ellsworth MD    1 g  over 30 Minutes Intravenous Once 08/26/18 1030 08/26/18 1144     08/25/18 0532  piperacillin-tazobactam (ZOSYN) 3.375 g in iso-osmotic dextrose 50 ml (premix)     Ordering Provider:  Latoya Petersen MD    3.375 g  100 mL/hr over 30 Minutes Intravenous Once 08/25/18 0534 08/25/18 0613    08/25/18 0532  vancomycin (VANCOCIN) in iso-osmotic dextrose IVPB 1 g (premix) 200 mL     Ordering Provider:  Latoya Petersen MD    20 mg/kg × 52.9 kg  200 mL/hr over 60 Minutes Intravenous Once 08/25/18 0534 08/25/18 0717          CC: fever    HPI:  Patient is a 89 y.o.  Yr old female with history of dementia, chronically wheelchair bound after hip fracture February 2018 and resides at the [facility].  Chronic urinary incontinence per son and sent to the emergency room August 25 with mental status worse from baseline with encephalopathy, nausea/vomiting and fever to 103 with concern for UTI and aspiration.  She was initially hypotensive per records, with tachycardia and leukocytosis including high pro-calcitonin/lactate consistent with acute severe sepsis and subsequently found to have gram-negative ynes bacteremia and abnormal urinalysis.  Chest x-ray with bibasilar consolidation concerning for aspiration.     Patient with severe dementia and encephalopathy and not cooperative with details of history or review of systems.  Son reports no preceding symptoms of headache/photophobia or neck stiffness, no hemoptysis or other bleeding and no diarrhea/abdominal pain.     Son reports that she has not seen urology that he knows of.  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     Otherwise complete review of  "systems unable to obtain.    ROS:      9/3/18 per nursing, No f/c/s. No n/v/d. No rash. No new ADR to Abx.     Otherwise unable to obtain as above    PE: nursing/chaperone present  /61 (BP Location: Right arm, Patient Position: Lying)   Pulse 86   Temp 99.1 °F (37.3 °C) (Oral)   Resp 17   Ht 152.4 cm (60\")   Wt 45.6 kg (100 lb 9.6 oz)   SpO2 100%   BMI 19.65 kg/m²     GENERAL: Awake but not oriented, in no acute distress.   HEENT: Normocephalic, atraumatic.   No conjunctival injection. No icterus. Oropharynx clear without evidence of thrush or exudate. No evidence of peridontal disease.    NECK: Supple without nuchal rigidity. No mass.  LYMPH: No cervical, axillary or inguinal lymphadenopathy.  HEART: RRR; No murmur, rubs, gallops.   LUNGS: Diminished at bases with scattered rhonchi/crackles. Normal respiratory effort. Nonlabored. No dullness.  ABDOMEN: Soft, slight suprapubic tenderness but no CVA tenderness, nondistended. Positive bowel sounds. No rebound or guarding. NO mass or HSM.  EXT:  No cyanosis, clubbing or edema. No cord.  : Genitalia generally unremarkable.    MSK: FROM without joint effusions noted arms/legs.    SKIN: Warm and dry without cutaneous eruptions on Inspection/palpation.    NEURO: She is not cooperative with a motor or sensory exam     No peripheral stigmata/phenomena of endocarditis     IV no redenss or purulence    Laboratory Data      Results from last 7 days  Lab Units 08/31/18  0433 08/30/18  0452 08/29/18  0512   WBC 10*3/mm3 11.64* 14.66* 14.29*   HEMOGLOBIN g/dL 9.7* 10.2* 10.8*   HEMATOCRIT % 30.5* 31.3* 33.7*   PLATELETS 10*3/mm3 188 148* 117*       Results from last 7 days  Lab Units 09/02/18  0918   SODIUM mmol/L 137   POTASSIUM mmol/L 4.3   CHLORIDE mmol/L 104   CO2 mmol/L 27.0   BUN mg/dL 15   CREATININE mg/dL 0.47*   GLUCOSE mg/dL 116*   CALCIUM mg/dL 8.2*       Results from last 7 days  Lab Units 08/31/18  0433   ALK PHOS U/L 218*   BILIRUBIN mg/dL 0.4   ALT " (SGPT) U/L 16   AST (SGOT) U/L 15               Estimated Creatinine Clearance: 34.3 mL/min (A) (by C-G formula based on SCr of 0.47 mg/dL (L)).      Microbiology:      Radiology:  Imaging Results (last 72 hours)     Procedure Component Value Units Date/Time    US Renal Bilateral [[id number]] Collected:  08/26/18 1356     Updated:  08/26/18 1413    Narrative:       EXAMINATION: US RENAL BILATERAL - 8/26/2018     INDICATION:  A41.9-Sepsis, unspecified organism; J69.0-Pneumonitis due  to inhalation of food and vomit; N39.0-Urinary tract infection, site not  specified; R09.02-Hypoxemia.      TECHNIQUE: Ultrasound kidneys and urinary bladder.     COMPARISON: None.     FINDINGS: Right kidney measures 12 cm in length with severe right  hydronephrosis and cortical thinning present, however, no calculi or  soft tissue contour-deforming mass.     Left kidney measures 11.1 cm in length without evidence for  hydronephrosis, contour-deforming mass, or obvious calculi.     Urinary bladder is moderately distended and grossly unremarkable.       Impression:       Severe right hydronephrosis with cortical thinning, however,  no obstructive uropathy is clearly identified.      DICTATED:   8/26/2018  EDITED/ls :   8/26/2018        XR Chest 1 View [[id number]] Collected:  08/25/18 0504     Updated:  08/25/18 0650    Addenda:        IMPRESSION:       Bibasal atelectasis and consolidation suspicious for ASPIRATION   PNEUMONIA.   Recommend followup to complete resolution.      THIS DOCUMENT HAS BEEN ELECTRONICALLY SIGNED BY YOBANI LOERA MD  Signed:  08/25/18 7650 by Yobani Loera MD    Impression:       :      THIS DOCUMENT HAS BEEN ELECTRONICALLY SIGNED BY YOBANI LOERA MD            Impression:   --Acute severe sepsis with fever/tachycardia/leukocytosis, hypotension, elevated lactate/pro-calcitonin and metabolic encephalopathy out of proportion to her dementia.  Urinary source and respiratory infection are primary concerns.     Resuscitative efforts per internal medicine;  Fever back up some 8/29-8/30 and recheck urine/blood cultures, not producing sputum,CXR noted and added broader GPC coverage with vancomycin while cultures pending;  mycamine x 1 8/30;  abd benign at present, and no diarrhea;  IV site appears ok      --Acute E Coli septicemia.  Concern for urinary source.  Otherwise as below     --Acute pyelonephritis.  Hydronephrosis and renal following.  ?contributing to persistent fever;  Timing/option/threshold for intervention per urology     --Chronic urinary incontinence.  As above.     --Acute nausea/vomiting.  In setting of pyelonephritis and aspiration risk.     --Acute bilateral pneumonia.  Concern for aspiration as above, also with ongoing treatment for other HCAP organisms etc.   If significant pleural effusions evolve, you could give consideration to diagnostic/therapeutic thoracentesis etc. Repeat CXR 8/30     --Chronic dementia with acute metabolic encephalopathy     --Acute lactic acidosis     --Thrombocytopenia.  Monitor       PLAN:    --IV vancomycin/zosyn, doxycycline;  mycamine x 1 8/30     --Check/review labs cultures and scans     --partial history per nursing     --Discussed with microbiology     --Highly complex set of issues with high risk for further serious morbidity and other serious sequela/mortality    --urology following      Deepak Ellsworth MD  9/3/2018

## 2018-09-04 LAB
ALBUMIN SERPL-MCNC: 3.25 G/DL (ref 3.2–4.8)
ALBUMIN/GLOB SERPL: 1.3 G/DL (ref 1.5–2.5)
ALP SERPL-CCNC: 198 U/L (ref 25–100)
ALT SERPL W P-5'-P-CCNC: 18 U/L (ref 7–40)
ANION GAP SERPL CALCULATED.3IONS-SCNC: 7 MMOL/L (ref 3–11)
AST SERPL-CCNC: 16 U/L (ref 0–33)
BACTERIA SPEC AEROBE CULT: NORMAL
BACTERIA SPEC AEROBE CULT: NORMAL
BILIRUB SERPL-MCNC: 0.6 MG/DL (ref 0.3–1.2)
BUN BLD-MCNC: 13 MG/DL (ref 9–23)
BUN/CREAT SERPL: 26.5 (ref 7–25)
CALCIUM SPEC-SCNC: 9 MG/DL (ref 8.7–10.4)
CHLORIDE SERPL-SCNC: 105 MMOL/L (ref 99–109)
CO2 SERPL-SCNC: 27 MMOL/L (ref 20–31)
CREAT BLD-MCNC: 0.49 MG/DL (ref 0.6–1.3)
DEPRECATED RDW RBC AUTO: 44.5 FL (ref 37–54)
ERYTHROCYTE [DISTWIDTH] IN BLOOD BY AUTOMATED COUNT: 14.3 % (ref 11.3–14.5)
GFR SERPL CREATININE-BSD FRML MDRD: 119 ML/MIN/1.73
GLOBULIN UR ELPH-MCNC: 2.6 GM/DL
GLUCOSE BLD-MCNC: 119 MG/DL (ref 70–100)
HCT VFR BLD AUTO: 33.1 % (ref 34.5–44)
HGB BLD-MCNC: 10.6 G/DL (ref 11.5–15.5)
MCH RBC QN AUTO: 27.3 PG (ref 27–31)
MCHC RBC AUTO-ENTMCNC: 32 G/DL (ref 32–36)
MCV RBC AUTO: 85.3 FL (ref 80–99)
PLATELET # BLD AUTO: 349 10*3/MM3 (ref 150–450)
PMV BLD AUTO: 9.9 FL (ref 6–12)
POTASSIUM BLD-SCNC: 3.8 MMOL/L (ref 3.5–5.5)
PROT SERPL-MCNC: 5.8 G/DL (ref 5.7–8.2)
RBC # BLD AUTO: 3.88 10*6/MM3 (ref 3.89–5.14)
SODIUM BLD-SCNC: 139 MMOL/L (ref 132–146)
VANCOMYCIN TROUGH SERPL-MCNC: 12 MCG/ML (ref 10–20)
WBC NRBC COR # BLD: 9.94 10*3/MM3 (ref 3.5–10.8)

## 2018-09-04 PROCEDURE — 80053 COMPREHEN METABOLIC PANEL: CPT | Performed by: INTERNAL MEDICINE

## 2018-09-04 PROCEDURE — 80202 ASSAY OF VANCOMYCIN: CPT

## 2018-09-04 PROCEDURE — 99232 SBSQ HOSP IP/OBS MODERATE 35: CPT | Performed by: INTERNAL MEDICINE

## 2018-09-04 PROCEDURE — 25010000002 VANCOMYCIN

## 2018-09-04 PROCEDURE — 25010000002 HEPARIN (PORCINE) PER 1000 UNITS

## 2018-09-04 PROCEDURE — 25010000002 PIPERACILLIN SOD-TAZOBACTAM PER 1 G: Performed by: INTERNAL MEDICINE

## 2018-09-04 PROCEDURE — 85027 COMPLETE CBC AUTOMATED: CPT | Performed by: INTERNAL MEDICINE

## 2018-09-04 RX ADMIN — MIRTAZAPINE 15 MG: 15 TABLET, FILM COATED ORAL at 20:14

## 2018-09-04 RX ADMIN — OXYBUTYNIN CHLORIDE 5 MG: 5 TABLET ORAL at 20:14

## 2018-09-04 RX ADMIN — VANCOMYCIN HYDROCHLORIDE 500 MG: 500 INJECTION, POWDER, LYOPHILIZED, FOR SOLUTION INTRAVENOUS at 20:14

## 2018-09-04 RX ADMIN — TAZOBACTAM SODIUM AND PIPERACILLIN SODIUM 3.38 G: 375; 3 INJECTION, SOLUTION INTRAVENOUS at 08:50

## 2018-09-04 RX ADMIN — NYSTATIN: 100000 POWDER TOPICAL at 08:50

## 2018-09-04 RX ADMIN — FAMOTIDINE 20 MG: 20 TABLET ORAL at 20:14

## 2018-09-04 RX ADMIN — VANCOMYCIN HYDROCHLORIDE 500 MG: 500 INJECTION, POWDER, LYOPHILIZED, FOR SOLUTION INTRAVENOUS at 10:40

## 2018-09-04 RX ADMIN — MEMANTINE 10 MG: 10 TABLET ORAL at 08:50

## 2018-09-04 RX ADMIN — DOXYCYCLINE 100 MG: 100 CAPSULE ORAL at 08:50

## 2018-09-04 RX ADMIN — MEMANTINE 10 MG: 10 TABLET ORAL at 20:14

## 2018-09-04 RX ADMIN — HEPARIN SODIUM 5000 UNITS: 5000 INJECTION, SOLUTION INTRAVENOUS; SUBCUTANEOUS at 20:14

## 2018-09-04 RX ADMIN — Medication: at 09:20

## 2018-09-04 RX ADMIN — NYSTATIN: 100000 POWDER TOPICAL at 20:18

## 2018-09-04 RX ADMIN — HEPARIN SODIUM 5000 UNITS: 5000 INJECTION, SOLUTION INTRAVENOUS; SUBCUTANEOUS at 08:50

## 2018-09-04 RX ADMIN — OXYBUTYNIN CHLORIDE 5 MG: 5 TABLET ORAL at 08:50

## 2018-09-04 RX ADMIN — DONEPEZIL HYDROCHLORIDE 10 MG: 10 TABLET, FILM COATED ORAL at 20:14

## 2018-09-04 RX ADMIN — FAMOTIDINE 20 MG: 20 TABLET ORAL at 08:50

## 2018-09-04 RX ADMIN — TAZOBACTAM SODIUM AND PIPERACILLIN SODIUM 3.38 G: 375; 3 INJECTION, SOLUTION INTRAVENOUS at 16:56

## 2018-09-04 RX ADMIN — TAZOBACTAM SODIUM AND PIPERACILLIN SODIUM 3.38 G: 375; 3 INJECTION, SOLUTION INTRAVENOUS at 00:50

## 2018-09-04 RX ADMIN — DOXYCYCLINE 100 MG: 100 CAPSULE ORAL at 20:14

## 2018-09-04 RX ADMIN — OXYBUTYNIN CHLORIDE 5 MG: 5 TABLET ORAL at 15:06

## 2018-09-04 NOTE — PLAN OF CARE
Problem: Patient Care Overview  Goal: Plan of Care Review  Outcome: Ongoing (interventions implemented as appropriate)   09/03/18 0358 09/03/18 2000   Coping/Psychosocial   Plan of Care Reviewed With --  patient   Plan of Care Review   Progress improving --      Goal: Discharge Needs Assessment  Outcome: Ongoing (interventions implemented as appropriate)   08/27/18 1447 08/28/18 1416 09/03/18 0358   Discharge Needs Assessment   Readmission Within the Last 30 Days --  current reason for admission unrelated to previous admission --    Concerns to be Addressed --  --  no discharge needs identified   Patient/Family Anticipates Transition to --  --  long term care facility   Patient/Family Anticipated Services at Transition --  --     Transportation Concerns --  --  other (see comments)  (tbd)   Transportation Anticipated --  --  other (see comments)  (tbd)   Anticipated Changes Related to Illness --  --  none   Equipment Needed After Discharge --  --  oxygen;other (see comments)  (tbd)   Outpatient/Agency/Support Group Needs --  --  skilled nursing facility   Discharge Facility/Level of Care Needs --  --  nursing facility, skilled;nursing facility, intermediate   Current Discharge Risk --  --  cognitively impaired;dependent with mobility/activities of daily living   Discharge Coordination/Progress Pt has Medicare and  for life insurance. Pt's son denies recent changes in insurance. The plan is to return to [facility] when medically ready for discharge. Son states family can transport. Spoke with Dasha Thompson and bed is held by private pay. CM will con to follow., --  --    Disability   Equipment Currently Used at Home wheelchair --  --      Goal: Interprofessional Rounds/Family Conf  Outcome: Ongoing (interventions implemented as appropriate)   08/27/18 0425   Interdisciplinary Rounds/Family Conf   Participants ;family;nursing;patient;physician       Problem: Fall Risk  (Adult)  Goal: Absence of Fall  Outcome: Ongoing (interventions implemented as appropriate)   09/04/18 0313   Fall Risk (Adult)   Absence of Fall making progress toward outcome       Problem: Skin Injury Risk (Adult)  Goal: Skin Health and Integrity  Outcome: Ongoing (interventions implemented as appropriate)   09/04/18 0313   Skin Injury Risk (Adult)   Skin Health and Integrity making progress toward outcome

## 2018-09-04 NOTE — PROGRESS NOTES
Pharmacy Consult-Vancomycin Dosing    Zainab Worthy is a  89 y.o. female admitted 8/25/18 for sepsis and placed on broad spectrum antibiotics which were de-escalated after initial cultures were resulted.  ID notes that she had fever 8/29-8/30;   rechecking urine/blood cultures, CXR and added broader GPC coverage with vancomycin while cultures pending.  PMH includes dementia, chronically wheelchair bound after hip fracture February 2018, nursing home resident, chrnic urinary incontinence.    Indication: Fever of unknown origin  Consulting Provider: ID Service    Goal Trough: 15-20 mcg/ml    Current Antimicrobial Therapy  Anti-Infectives       Ordered     Dose/Rate Route Frequency Start Stop    09/02/18 1056  vancomycin 500 mg/100 mL 0.9% NS IVPB (mbp)     Ordering Provider:  Beata Watkins RPH    500 mg  over 60 Minutes Intravenous Every 12 Hours 09/02/18 2100 09/07/18 2059    08/30/18 1411  vancomycin (VANCOCIN) in iso-osmotic dextrose IVPB 1 g (premix) 200 mL     Beata Watkins RPH reviewed the order on 09/02/18 1056.   Ordering Provider:  Beata Watkins RPH    1,000 mg  over 60 Minutes Intravenous Daily 08/31/18 0900 09/02/18 1246    08/30/18 0749  micafungin 100 mg/100 mL 0.9% NS IVPB (mbp)     Ordering Provider:  Deepak Ellsworth MD    100 mg  over 60 Minutes Intravenous Once 08/30/18 0900 08/30/18 1005    08/30/18 0749  vancomycin (VANCOCIN) in iso-osmotic dextrose IVPB 1 g (premix) 200 mL     Ordering Provider:  Deepak Ellsworth MD    1 g Intravenous Once 08/30/18 0900 08/30/18 1024    08/30/18 0749  Pharmacy to dose vancomycin     Ordering Provider:  Deepak Ellsworth MD     Does not apply Continuous PRN 08/30/18 0748 09/06/18 0747    08/28/18 0714  piperacillin-tazobactam (ZOSYN) 3.375 g in iso-osmotic dextrose 50 ml (premix)     Ordering Provider:  Deepak Ellsworth MD    3.375 g  over 4 Hours Intravenous Every 8 Hours 08/28/18 1700 09/11/18 1659    08/28/18 0714  piperacillin-tazobactam  "(ZOSYN) 3.375 g in iso-osmotic dextrose 50 ml (premix)     Ordering Provider:  Deepak Ellsworth MD    3.375 g  over 30 Minutes Intravenous Once 08/28/18 0900 08/28/18 0849    08/28/18 0715  doxycycline (MONODOX) capsule 100 mg     Deepak Ellsworth MD reviewed the order on 09/03/18 0845.   Ordering Provider:  Deepak Ellsworth MD    100 mg Oral Every 12 Hours Scheduled 08/28/18 0900 09/10/18 0844    08/26/18 0934  meropenem (MERREM) 1 g/100 mL 0.9% NS VTB (mbp)     Ordering Provider:  Deepak Ellsworth MD    1 g  over 30 Minutes Intravenous Once 08/26/18 1030 08/26/18 1144    08/25/18 0532  piperacillin-tazobactam (ZOSYN) 3.375 g in iso-osmotic dextrose 50 ml (premix)     Ordering Provider:  Latoya Petersen MD    3.375 g  100 mL/hr over 30 Minutes Intravenous Once 08/25/18 0534 08/25/18 0613    08/25/18 0532  vancomycin (VANCOCIN) in iso-osmotic dextrose IVPB 1 g (premix) 200 mL     Ordering Provider:  Latoya Petersen MD    20 mg/kg × 52.9 kg  200 mL/hr over 60 Minutes Intravenous Once 08/25/18 0534 08/25/18 0717          Results from last 7 days   Lab Units 09/04/18  0833 09/02/18  0918 08/31/18  0433   BUN mg/dL 13 15 23   CREATININE mg/dL 0.49* 0.47* 0.65   Results from last 7 days   Lab Units 09/04/18  0900 08/31/18  0433 08/30/18  0452   WBC 10*3/mm3 9.94 11.64* 14.66*     Ht - 152.4 cm (60\")  Wt - 45.6 kg (100 lb 9.6 oz)    Estimated Creatinine Clearance: 34.3 mL/min (A) (by C-G formula based on SCr of 0.49 mg/dL (L)).    Microbiology and Radiology  Microbiology Results (last 10 days)       Procedure Component Value - Date/Time    Urine Culture - Urine, [[id number]]  (Normal) Collected:  08/30/18 1627    Lab Status:  Final result Specimen:  Urine from Urine, Clean Catch Updated:  09/01/18 0740     Urine Culture No growth at 2 days    Blood Culture - Blood, [[id number]]  (Normal) Collected:  08/30/18 0859    Lab Status:  Final result Specimen:  Blood from Hand, Right Updated:  09/04/18 0945 "     Blood Culture No growth at 5 days    Blood Culture - Blood, [[id number]]  (Normal) Collected:  08/30/18 0849    Lab Status:  Final result Specimen:  Blood from Arm, Right Updated:  09/04/18 0945     Blood Culture No growth at 5 days    MRSA Screen, PCR - Swab, Nares [[id number]]  (Normal) Collected:  08/25/18 1714    Lab Status:  Final result Specimen:  Swab from Nares Updated:  08/25/18 1859     MRSA, PCR Negative    Narrative:         MRSA Negative            Evaluation of Level    Lab Results   Component Value Date    [facility] 12.00 09/04/2018       Assessment/Plan:   1. Vancomycin trough today is slightly subtherapeutic; however, d/t age and renal function will continue with current dose of vancomycin 500mg IV q12h with expected accumulation.   2. Continue to monitor renal function, cultures and sensitivities and clinical status and adjust regimen as necessary.  WBC improved; patient afebrile; MRSA, screen negative; repeat cultures NGTD.  3. Pharmacy will continue to follow.      Lula Carlos, PharmD  9/4/2018  10:36 AM

## 2018-09-04 NOTE — PROGRESS NOTES
Continued Stay Note  [facility]     Patient Name: Zainab Worthy  MRN: [id number]  Today's Date: 9/4/2018    Admit Date: 8/25/2018          Discharge Plan     Row Name 09/04/18 1501       Plan    Plan discharge plan    Patient/Family in Agreement with Plan yes    Plan Comments Plan remains to return to [facility] when medically ready for discharge. Per Dasha at [facility], family is paying to hold bed and they can accept when medically ready for discharge. It patient needs IV antibiotics, may need PICC line. CM will cont to follow.              Discharge Codes    No documentation.       Expected Discharge Date and Time     Expected Discharge Date Expected Discharge Time    Sep 5, 2018             Rebecca Hines RN

## 2018-09-04 NOTE — PROGRESS NOTES
[facility] Progress Note        Antibiotics:  Anti-Infectives     Ordered     Dose/Rate Route Frequency Start Stop    09/02/18 1056  vancomycin 500 mg/100 mL 0.9% NS IVPB (mbp)     Ordering Provider:  Beata Watkins RPH    500 mg  over 60 Minutes Intravenous Every 12 Hours 09/02/18 2100 09/07/18 2059    08/30/18 1411  vancomycin (VANCOCIN) in iso-osmotic dextrose IVPB 1 g (premix) 200 mL     Beata Watkins RPH reviewed the order on 09/02/18 1056.   Ordering Provider:  Beata Watkins RPH    1,000 mg  over 60 Minutes Intravenous Daily 08/31/18 0900 09/02/18 1246    08/30/18 0749  micafungin 100 mg/100 mL 0.9% NS IVPB (mbp)     Ordering Provider:  Deepak Ellsworth MD    100 mg  over 60 Minutes Intravenous Once 08/30/18 0900 08/30/18 1005    08/30/18 0749  vancomycin (VANCOCIN) in iso-osmotic dextrose IVPB 1 g (premix) 200 mL     Ordering Provider:  Deepak Ellsworth MD    1 g Intravenous Once 08/30/18 0900 08/30/18 1024    08/30/18 0749  Pharmacy to dose vancomycin     Ordering Provider:  Deepak Ellsworth MD     Does not apply Continuous PRN 08/30/18 0748 09/06/18 0747    08/28/18 0714  piperacillin-tazobactam (ZOSYN) 3.375 g in iso-osmotic dextrose 50 ml (premix)     Ordering Provider:  Deepak Ellsworth MD    3.375 g  over 4 Hours Intravenous Every 8 Hours 08/28/18 1700 09/11/18 1659    08/28/18 0714  piperacillin-tazobactam (ZOSYN) 3.375 g in iso-osmotic dextrose 50 ml (premix)     Ordering Provider:  Deepak Ellsworth MD    3.375 g  over 30 Minutes Intravenous Once 08/28/18 0900 08/28/18 0849    08/28/18 0715  doxycycline (MONODOX) capsule 100 mg     Deepak Ellsworth MD reviewed the order on 09/03/18 0845.   Ordering Provider:  Deepak Ellsworth MD    100 mg Oral Every 12 Hours Scheduled 08/28/18 0900 09/10/18 0844    08/26/18 0934  meropenem (MERREM) 1 g/100 mL 0.9% NS VTB (mbp)     Ordering Provider:  Deepak Ellsworth MD    1 g  over 30 Minutes Intravenous Once 08/26/18 1030 08/26/18 1144     08/25/18 0532  piperacillin-tazobactam (ZOSYN) 3.375 g in iso-osmotic dextrose 50 ml (premix)     Ordering Provider:  Latoya Petersen MD    3.375 g  100 mL/hr over 30 Minutes Intravenous Once 08/25/18 0534 08/25/18 0613    08/25/18 0532  vancomycin (VANCOCIN) in iso-osmotic dextrose IVPB 1 g (premix) 200 mL     Ordering Provider:  Latoya Petersen MD    20 mg/kg × 52.9 kg  200 mL/hr over 60 Minutes Intravenous Once 08/25/18 0534 08/25/18 0717          CC: fever    HPI:  Patient is a 89 y.o.  Yr old female with history of dementia, chronically wheelchair bound after hip fracture February 2018 and resides at the [facility].  Chronic urinary incontinence per son and sent to the emergency room August 25 with mental status worse from baseline with encephalopathy, nausea/vomiting and fever to 103 with concern for UTI and aspiration.  She was initially hypotensive per records, with tachycardia and leukocytosis including high pro-calcitonin/lactate consistent with acute severe sepsis and subsequently found to have gram-negative ynes bacteremia and abnormal urinalysis.  Chest x-ray with bibasilar consolidation concerning for aspiration.     Patient with severe dementia and encephalopathy and not cooperative with details of history or review of systems.  Son reports no preceding symptoms of headache/photophobia or neck stiffness, no hemoptysis or other bleeding and no diarrhea/abdominal pain.     Son reports that she has not seen urology that he knows of.  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     Otherwise complete review of systems unable to obtain.    ROS:      9/4/18 per nursing, No f/c/s. No n/v/d.  "No rash. No new ADR to Abx.     Otherwise unable to obtain as above    PE: nursing/chaperone present  /71 (BP Location: Right arm, Patient Position: Lying)   Pulse 90   Temp 97.8 °F (36.6 °C) (Oral)   Resp 18   Ht 152.4 cm (60\")   Wt 45.6 kg (100 lb 9.6 oz)   SpO2 100%   BMI 19.65 kg/m²     GENERAL: Awake but not oriented, in no acute distress.   HEENT: Normocephalic, atraumatic.   No conjunctival injection. No icterus. Oropharynx clear without evidence of thrush or exudate. No evidence of peridontal disease.    NECK: Supple without nuchal rigidity. No mass.  LYMPH: No cervical, axillary or inguinal lymphadenopathy.  HEART: RRR; No murmur, rubs, gallops.   LUNGS: Diminished at bases with scattered rhonchi/crackles. Normal respiratory effort. Nonlabored. No dullness.  ABDOMEN: Soft, slight suprapubic tenderness but no CVA tenderness, nondistended. Positive bowel sounds. No rebound or guarding. NO mass or HSM.  EXT:  No cyanosis, clubbing or edema. No cord.  : Genitalia generally unremarkable.    MSK: FROM without joint effusions noted arms/legs.    SKIN: Warm and dry without cutaneous eruptions on Inspection/palpation.    NEURO: She is not cooperative with a motor or sensory exam     No peripheral stigmata/phenomena of endocarditis     IV no redenss or purulence    Laboratory Data      Results from last 7 days  Lab Units 09/04/18  0900 08/31/18  0433 08/30/18  0452   WBC 10*3/mm3 9.94 11.64* 14.66*   HEMOGLOBIN g/dL 10.6* 9.7* 10.2*   HEMATOCRIT % 33.1* 30.5* 31.3*   PLATELETS 10*3/mm3 349 188 148*       Results from last 7 days  Lab Units 09/04/18  0833   SODIUM mmol/L 139   POTASSIUM mmol/L 3.8   CHLORIDE mmol/L 105   CO2 mmol/L 27.0   BUN mg/dL 13   CREATININE mg/dL 0.49*   GLUCOSE mg/dL 119*   CALCIUM mg/dL 9.0       Results from last 7 days  Lab Units 09/04/18  0833   ALK PHOS U/L 198*   BILIRUBIN mg/dL 0.6   ALT (SGPT) U/L 18   AST (SGOT) U/L 16               Estimated Creatinine Clearance: 34.3 " mL/min (A) (by C-G formula based on SCr of 0.49 mg/dL (L)).      Microbiology:      Radiology:  Imaging Results (last 72 hours)     Procedure Component Value Units Date/Time    US Renal Bilateral [[id number]] Collected:  08/26/18 1356     Updated:  08/26/18 1413    Narrative:       EXAMINATION: US RENAL BILATERAL - 8/26/2018     INDICATION:  A41.9-Sepsis, unspecified organism; J69.0-Pneumonitis due  to inhalation of food and vomit; N39.0-Urinary tract infection, site not  specified; R09.02-Hypoxemia.      TECHNIQUE: Ultrasound kidneys and urinary bladder.     COMPARISON: None.     FINDINGS: Right kidney measures 12 cm in length with severe right  hydronephrosis and cortical thinning present, however, no calculi or  soft tissue contour-deforming mass.     Left kidney measures 11.1 cm in length without evidence for  hydronephrosis, contour-deforming mass, or obvious calculi.     Urinary bladder is moderately distended and grossly unremarkable.       Impression:       Severe right hydronephrosis with cortical thinning, however,  no obstructive uropathy is clearly identified.      DICTATED:   8/26/2018  EDITED/ls :   8/26/2018        XR Chest 1 View [[id number]] Collected:  08/25/18 0504     Updated:  08/25/18 0650    Addenda:        IMPRESSION:       Bibasal atelectasis and consolidation suspicious for ASPIRATION   PNEUMONIA.   Recommend followup to complete resolution.      THIS DOCUMENT HAS BEEN ELECTRONICALLY SIGNED BY YOBANI LOERA MD  Signed:  08/25/18 0650 by Yobani Loera MD    Impression:       :      THIS DOCUMENT HAS BEEN ELECTRONICALLY SIGNED BY YOBANI LOERA MD            Impression:   --Acute severe sepsis with fever/tachycardia/leukocytosis, hypotension, elevated lactate/pro-calcitonin and metabolic encephalopathy out of proportion to her dementia.  Urinary source and respiratory infection are primary concerns.    Resuscitative efforts per internal medicine;  Fever back up some 8/29-8/30 and recheck  urine/blood cultures, not producing sputum,CXR noted and added broader GPC coverage with vancomycin while cultures pending;  mycamine x 1 8/30;  abd benign at present, and no diarrhea;  IV site appears ok      --Acute E Coli septicemia.  Concern for urinary source.  Otherwise as below     --Acute pyelonephritis.  Hydronephrosis and renal following.  ?contributing to persistent fever;  Timing/option/threshold for intervention per urology     --Chronic urinary incontinence.  As above.     --Acute nausea/vomiting.  In setting of pyelonephritis and aspiration risk.     --Acute bilateral pneumonia.  Concern for aspiration as above, also with ongoing treatment for other HCAP organisms etc.   If significant pleural effusions evolve, you could give consideration to diagnostic/therapeutic thoracentesis etc. Repeat CXR 8/30     --Chronic dementia with acute metabolic encephalopathy     --Acute lactic acidosis     --Thrombocytopenia.  Monitor       PLAN:    --IV vancomycin/zosyn, doxycycline;  mycamine x 1 8/30     --Check/review labs cultures and scans     --partial history per nursing     --Discussed with microbiology     --Highly complex set of issues with high risk for further serious morbidity and other serious sequela/mortality    --urology following      Deepak Ellsworth MD  9/4/2018

## 2018-09-04 NOTE — PLAN OF CARE
Problem: Patient Care Overview  Goal: Plan of Care Review  Outcome: Ongoing (interventions implemented as appropriate)   09/03/18 0358 09/04/18 0855 09/04/18 1636   Coping/Psychosocial   Plan of Care Reviewed With --  patient --    Plan of Care Review   Progress improving --  --    OTHER   Outcome Summary --  --  VSS, pt still plesantly confused. Up in chair visiting with family. Will continue to monitor.       Problem: Fall Risk (Adult)  Goal: Absence of Fall  Outcome: Ongoing (interventions implemented as appropriate)      Problem: Skin Injury Risk (Adult)  Goal: Skin Health and Integrity  Outcome: Ongoing (interventions implemented as appropriate)

## 2018-09-05 LAB
BACTERIA FLD CULT: ABNORMAL
L PNEUMO1 AG UR QL IA: NEGATIVE
Lab: ABNORMAL
ORGANISM ID: ABNORMAL
S PNEUM AG SPEC QL LA: POSITIVE
SPECIMEN SOURCE: ABNORMAL

## 2018-09-05 PROCEDURE — 25010000002 PIPERACILLIN SOD-TAZOBACTAM PER 1 G: Performed by: INTERNAL MEDICINE

## 2018-09-05 PROCEDURE — 97110 THERAPEUTIC EXERCISES: CPT

## 2018-09-05 PROCEDURE — 25010000002 HEPARIN (PORCINE) PER 1000 UNITS

## 2018-09-05 PROCEDURE — 99232 SBSQ HOSP IP/OBS MODERATE 35: CPT | Performed by: INTERNAL MEDICINE

## 2018-09-05 PROCEDURE — 97530 THERAPEUTIC ACTIVITIES: CPT

## 2018-09-05 PROCEDURE — 25010000002 VANCOMYCIN

## 2018-09-05 PROCEDURE — 92526 ORAL FUNCTION THERAPY: CPT

## 2018-09-05 RX ADMIN — HEPARIN SODIUM 5000 UNITS: 5000 INJECTION, SOLUTION INTRAVENOUS; SUBCUTANEOUS at 08:56

## 2018-09-05 RX ADMIN — TAZOBACTAM SODIUM AND PIPERACILLIN SODIUM 3.38 G: 375; 3 INJECTION, SOLUTION INTRAVENOUS at 16:04

## 2018-09-05 RX ADMIN — VANCOMYCIN HYDROCHLORIDE 500 MG: 500 INJECTION, POWDER, LYOPHILIZED, FOR SOLUTION INTRAVENOUS at 20:40

## 2018-09-05 RX ADMIN — DOXYCYCLINE 100 MG: 100 CAPSULE ORAL at 20:35

## 2018-09-05 RX ADMIN — VANCOMYCIN HYDROCHLORIDE 500 MG: 500 INJECTION, POWDER, LYOPHILIZED, FOR SOLUTION INTRAVENOUS at 08:55

## 2018-09-05 RX ADMIN — NYSTATIN: 100000 POWDER TOPICAL at 08:56

## 2018-09-05 RX ADMIN — DOXYCYCLINE 100 MG: 100 CAPSULE ORAL at 08:55

## 2018-09-05 RX ADMIN — FAMOTIDINE 20 MG: 20 TABLET ORAL at 08:55

## 2018-09-05 RX ADMIN — MEMANTINE 10 MG: 10 TABLET ORAL at 20:35

## 2018-09-05 RX ADMIN — FAMOTIDINE 20 MG: 20 TABLET ORAL at 20:35

## 2018-09-05 RX ADMIN — MIRTAZAPINE 15 MG: 15 TABLET, FILM COATED ORAL at 20:35

## 2018-09-05 RX ADMIN — NYSTATIN: 100000 POWDER TOPICAL at 20:36

## 2018-09-05 RX ADMIN — DONEPEZIL HYDROCHLORIDE 10 MG: 10 TABLET, FILM COATED ORAL at 20:35

## 2018-09-05 RX ADMIN — OXYBUTYNIN CHLORIDE 5 MG: 5 TABLET ORAL at 20:35

## 2018-09-05 RX ADMIN — MEMANTINE 10 MG: 10 TABLET ORAL at 08:55

## 2018-09-05 RX ADMIN — TAZOBACTAM SODIUM AND PIPERACILLIN SODIUM 3.38 G: 375; 3 INJECTION, SOLUTION INTRAVENOUS at 01:16

## 2018-09-05 RX ADMIN — OXYBUTYNIN CHLORIDE 5 MG: 5 TABLET ORAL at 08:56

## 2018-09-05 RX ADMIN — HEPARIN SODIUM 5000 UNITS: 5000 INJECTION, SOLUTION INTRAVENOUS; SUBCUTANEOUS at 20:35

## 2018-09-05 RX ADMIN — OXYBUTYNIN CHLORIDE 5 MG: 5 TABLET ORAL at 16:04

## 2018-09-05 RX ADMIN — TAZOBACTAM SODIUM AND PIPERACILLIN SODIUM 3.38 G: 375; 3 INJECTION, SOLUTION INTRAVENOUS at 08:55

## 2018-09-05 NOTE — PROGRESS NOTES
[facility] Progress Note        Antibiotics:  Anti-Infectives     Ordered     Dose/Rate Route Frequency Start Stop    09/05/18 0725  Pharmacy to dose vancomycin     Ordering Provider:  Deepak Ellsworth MD     Does not apply Continuous PRN 09/05/18 0725 09/12/18 0724    09/02/18 1056  vancomycin 500 mg/100 mL 0.9% NS IVPB (mbp)     Harpreet Moreno [facility] reviewed the order on 09/05/18 0730.   Ordering Provider:  Harpreet Moreno RPH    500 mg  over 60 Minutes Intravenous Every 12 Hours 09/02/18 2100 09/12/18 2359    08/30/18 1411  vancomycin (VANCOCIN) in iso-osmotic dextrose IVPB 1 g (premix) 200 mL     Beata Watkins RPH reviewed the order on 09/02/18 1056.   Ordering Provider:  Beata Watkins RPH    1,000 mg  over 60 Minutes Intravenous Daily 08/31/18 0900 09/02/18 1246    08/30/18 0749  micafungin 100 mg/100 mL 0.9% NS IVPB (mbp)     Ordering Provider:  Deepak Ellsworth MD    100 mg  over 60 Minutes Intravenous Once 08/30/18 0900 08/30/18 1005    08/30/18 0749  vancomycin (VANCOCIN) in iso-osmotic dextrose IVPB 1 g (premix) 200 mL     Ordering Provider:  Deepak Ellsworth MD    1 g Intravenous Once 08/30/18 0900 08/30/18 1024    08/28/18 0714  piperacillin-tazobactam (ZOSYN) 3.375 g in iso-osmotic dextrose 50 ml (premix)     Ordering Provider:  Deepak Ellsworth MD    3.375 g  over 4 Hours Intravenous Every 8 Hours 08/28/18 1700 09/11/18 1659    08/28/18 0714  piperacillin-tazobactam (ZOSYN) 3.375 g in iso-osmotic dextrose 50 ml (premix)     Ordering Provider:  Deepak Ellsworth MD    3.375 g  over 30 Minutes Intravenous Once 08/28/18 0900 08/28/18 0849    08/28/18 0715  doxycycline (MONODOX) capsule 100 mg     Deepak Ellsworth MD reviewed the order on 09/03/18 0845.   Ordering Provider:  Deepak Ellsworth MD    100 mg Oral Every 12 Hours Scheduled 08/28/18 0900 09/10/18 0844    08/26/18 0934  meropenem (MERREM) 1 g/100 mL 0.9% NS VTB (mbp)     Ordering Provider:  Deepak Ellsworth MD     1 g  over 30 Minutes Intravenous Once 08/26/18 1030 08/26/18 1144    08/25/18 0532  piperacillin-tazobactam (ZOSYN) 3.375 g in iso-osmotic dextrose 50 ml (premix)     Ordering Provider:  Latoya Petersen MD    3.375 g  100 mL/hr over 30 Minutes Intravenous Once 08/25/18 0534 08/25/18 0613    08/25/18 0532  vancomycin (VANCOCIN) in iso-osmotic dextrose IVPB 1 g (premix) 200 mL     Ordering Provider:  Latoya Petersen MD    20 mg/kg × 52.9 kg  200 mL/hr over 60 Minutes Intravenous Once 08/25/18 0534 08/25/18 0717          CC: fever    HPI:  Patient is a 89 y.o.  Yr old female with history of dementia, chronically wheelchair bound after hip fracture February 2018 and resides at the [place] [facility].  Chronic urinary incontinence per son and sent to the emergency room August 25 with mental status worse from baseline with encephalopathy, nausea/vomiting and fever to 103 with concern for UTI and aspiration.  She was initially hypotensive per records, with tachycardia and leukocytosis including high pro-calcitonin/lactate consistent with acute severe sepsis and subsequently found to have gram-negative ynes bacteremia and abnormal urinalysis.  Chest x-ray with bibasilar consolidation concerning for aspiration.     Patient with severe dementia and encephalopathy and not cooperative with details of history or review of systems.  Son reports no preceding symptoms of headache/photophobia or neck stiffness, no hemoptysis or other bleeding and no diarrhea/abdominal pain.     Son reports that she has not seen urology that he knows of.  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     Otherwise complete review of systems  "unable to obtain.    ROS:      9/5/18 per nursing, No f/c/s. No n/v/d. No rash. No new ADR to Abx.     Otherwise unable to obtain as above    PE: nursing/chaperone present  /66 (BP Location: Right arm, Patient Position: Lying)   Pulse 100   Temp 98.4 °F (36.9 °C) (Oral)   Resp 16   Ht 152.4 cm (60\")   Wt 45.6 kg (100 lb 9.6 oz)   SpO2 100%   BMI 19.65 kg/m²     GENERAL: sleepy, in no acute distress.   HEENT: Normocephalic, atraumatic.   No conjunctival injection. No icterus. Oropharynx clear without evidence of thrush or exudate. No evidence of peridontal disease.    NECK: Supple without nuchal rigidity. No mass.  LYMPH: No cervical, axillary or inguinal lymphadenopathy.  HEART: RRR; No murmur, rubs, gallops.   LUNGS: Diminished at bases with scattered rhonchi/crackles. Normal respiratory effort. Nonlabored. No dullness.  ABDOMEN: Soft, slight suprapubic tenderness but no CVA tenderness, nondistended. Positive bowel sounds. No rebound or guarding. NO mass or HSM.  EXT:  No cyanosis, clubbing or edema. No cord.  : Genitalia generally unremarkable.    MSK: FROM without joint effusions noted arms/legs.    SKIN: Warm and dry without cutaneous eruptions on Inspection/palpation.       No peripheral stigmata/phenomena of endocarditis     IV no redenss or purulence    Laboratory Data      Results from last 7 days  Lab Units 09/04/18  0900 08/31/18  0433 08/30/18  0452   WBC 10*3/mm3 9.94 11.64* 14.66*   HEMOGLOBIN g/dL 10.6* 9.7* 10.2*   HEMATOCRIT % 33.1* 30.5* 31.3*   PLATELETS 10*3/mm3 349 188 148*       Results from last 7 days  Lab Units 09/04/18  0833   SODIUM mmol/L 139   POTASSIUM mmol/L 3.8   CHLORIDE mmol/L 105   CO2 mmol/L 27.0   BUN mg/dL 13   CREATININE mg/dL 0.49*   GLUCOSE mg/dL 119*   CALCIUM mg/dL 9.0       Results from last 7 days  Lab Units 09/04/18  0833   ALK PHOS U/L 198*   BILIRUBIN mg/dL 0.6   ALT (SGPT) U/L 18   AST (SGOT) U/L 16               Estimated Creatinine Clearance: 34.3 " mL/min (A) (by C-G formula based on SCr of 0.49 mg/dL (L)).      Microbiology:      Radiology:  Imaging Results (last 72 hours)     Procedure Component Value Units Date/Time    US Renal Bilateral [[id number]] Collected:  08/26/18 1356     Updated:  08/26/18 1413    Narrative:       EXAMINATION: US RENAL BILATERAL - 8/26/2018     INDICATION:  A41.9-Sepsis, unspecified organism; J69.0-Pneumonitis due  to inhalation of food and vomit; N39.0-Urinary tract infection, site not  specified; R09.02-Hypoxemia.      TECHNIQUE: Ultrasound kidneys and urinary bladder.     COMPARISON: None.     FINDINGS: Right kidney measures 12 cm in length with severe right  hydronephrosis and cortical thinning present, however, no calculi or  soft tissue contour-deforming mass.     Left kidney measures 11.1 cm in length without evidence for  hydronephrosis, contour-deforming mass, or obvious calculi.     Urinary bladder is moderately distended and grossly unremarkable.       Impression:       Severe right hydronephrosis with cortical thinning, however,  no obstructive uropathy is clearly identified.      DICTATED:   8/26/2018  EDITED/ls :   8/26/2018        XR Chest 1 View [[id number]] Collected:  08/25/18 0504     Updated:  08/25/18 0650    Addenda:        IMPRESSION:       Bibasal atelectasis and consolidation suspicious for ASPIRATION   PNEUMONIA.   Recommend followup to complete resolution.      THIS DOCUMENT HAS BEEN ELECTRONICALLY SIGNED BY YOBANI LOERA MD  Signed:  08/25/18 0650 by Yobani Loera MD    Impression:       :      THIS DOCUMENT HAS BEEN ELECTRONICALLY SIGNED BY YOBANI LOERA MD            Impression:   --Acute severe sepsis with fever/tachycardia/leukocytosis, hypotension, elevated lactate/pro-calcitonin and metabolic encephalopathy out of proportion to her dementia.  Urinary source and respiratory infection are primary concerns.    Resuscitative efforts per internal medicine;  Fever back up some 8/29-8/30 and recheck  urine/blood cultures, not producing sputum,CXR noted and added broader GPC coverage with vancomycin while cultures pending;  mycamine x 1 8/30;  abd benign at present, and no diarrhea;  IV site appears ok      --Acute E Coli septicemia.  Concern for urinary source.  Otherwise as below     --Acute pyelonephritis.  Hydronephrosis and renal following.  ?contributing to persistent fever;  Timing/option/threshold for intervention per urology     --Chronic urinary incontinence.  As above.     --Acute nausea/vomiting.  In setting of pyelonephritis and aspiration risk.     --Acute bilateral pneumonia.  Concern for aspiration as above, also with ongoing treatment for other HCAP organisms etc.   If significant pleural effusions evolve, you could give consideration to diagnostic/therapeutic thoracentesis etc. Repeat CXR 8/30     --Chronic dementia with acute metabolic encephalopathy     --Acute lactic acidosis     --Thrombocytopenia.  Monitor       PLAN:    --IV vancomycin/zosyn, doxycycline     --Check/review labs cultures and scans     --partial history per nursing     --Discussed with microbiology     --Highly complex set of issues with high risk for further serious morbidity and other serious sequela/mortality    --urology following      Deepak Ellsworth MD  9/5/2018

## 2018-09-05 NOTE — PLAN OF CARE
Problem: Patient Care Overview  Goal: Plan of Care Review  Outcome: Ongoing (interventions implemented as appropriate)   09/05/18 1134   Coping/Psychosocial   Plan of Care Reviewed With patient   SLP treatment completed. Pt presented w/ no overt s/s aspiration during trials of NTL. Demonstrating no apparent aversion to NTL. Attempted dysphagia exercises but pt unable to consistently follow commands to complete exercises 2/2 baseline dementia. Will continue to follow for family ed and tx as needed. Please see note for further details and recommendations.

## 2018-09-05 NOTE — PROGRESS NOTES
[facility] Medicine Services  PROGRESS NOTE    Patient Name: Zainab Worthy  : 2/3/1929  MRN: [id number]    Date of Admission: 2018  Length of Stay: 11  Primary Care Physician: Aguilar Kelly MD    Subjective     CC: sepsis    HPI:  Resting in a chair in no acute distress.  Tells me that she is comfortable and does not have any specific complaints. More confused today.    Review of Systems  Unable to obtain.    Otherwise ROS is negative except as mentioned in the HPI.    Objective     Vital Signs:   Temp:  [98.4 °F (36.9 °C)-99.1 °F (37.3 °C)] 98.4 °F (36.9 °C)  Heart Rate:  [] 100  Resp:  [16-18] 16  BP: (123-134)/(60-66) 123/66        Physical Exam:  Constitutional: No acute distress, awake, alert   HENT: NCAT, mucous membranes moist  Respiratory: Clear to auscultation bilaterally, respiratory effort normal on room air  Cardiovascular: rrr, no murmurs, on tele  Gastrointestinal: Positive bowel sounds, soft, nontender, nondistended  Musculoskeletal: No bilateral ankle edema  Psychiatric: sleeping but appropriate when wakes  Neurologic: confused but follows commands. No focal deficits.  Skin: No rashes    Results Reviewed:  I have personally reviewed current lab, radiology, and data and agree.      Results from last 7 days  Lab Units 18  0918  0433 18  0452   WBC 10*3/mm3 9.94 11.64* 14.66*   HEMOGLOBIN g/dL 10.6* 9.7* 10.2*   HEMATOCRIT % 33.1* 30.5* 31.3*   PLATELETS 10*3/mm3 349 188 148*       Results from last 7 days  Lab Units 18  0833 18  0918 18  0433 18  0452   SODIUM mmol/L 139 137 137 133   POTASSIUM mmol/L 3.8 4.3 4.2 3.6   CHLORIDE mmol/L 105 104 103 101   CO2 mmol/L 27.0 27.0 26.0 23.0   BUN mg/dL 13 15 23 14   CREATININE mg/dL 0.49* 0.47* 0.65 0.50*   GLUCOSE mg/dL 119* 116* 115* 106*   CALCIUM mg/dL 9.0 8.2* 8.1* 8.2*   ALT (SGPT) U/L 18  --  16 18   AST (SGOT) U/L 16  --  15 13        Estimated Creatinine  Clearance: 34.3 mL/min (A) (by C-G formula based on SCr of 0.49 mg/dL (L)).  No results found for: BNP    Microbiology Results Abnormal     Procedure Component Value - Date/Time    Legionella Antigen, Urine - Urine, Urine, Clean Catch [[id number]] Collected:  09/02/18 1346    Lab Status:  Final result Specimen:  Urine from Urine, Clean Catch Updated:  09/05/18 1520     L. pneumophila Serogp 1 Ur Ag Negative     Comment: Presumptive negative for L. pneumophila serogroup 1 antigen in urine,  suggesting no recent or current infection. Legionnaires' disease  cannot be ruled out since other serogroups and species may also cause  disease.  POS Step. Pneumo reported to [facility] on 9/5/18 at 2:30.  Fax sent to [phone] AV.       Narrative:       Performed at:  [address] [place] [address]  [id number]  : Rik Leos MD, Phone:  [phone]    S. Pneumo Ag Urine or CSF - Urine, Urine, Clean Catch [[id number]]  (Abnormal) Collected:  09/02/18 1346    Lab Status:  Final result Specimen:  Urine from Urine, Clean Catch Updated:  09/05/18 1310     Specimen Source Urine     STREP PNEUMONIAE ANTIGEN Positive (A)     Body Fluid Culture, Sterile Not Indicated     Organism ID Not indicated.     Please note Comment     Comment: College [employer]ologists standards require a culture to be  performed on CSF specimens submitted for bacterial antigen testing.  (CAP [id number]) Urine specimens will not be cultured.       Narrative:       Performed at:   - [address]  [id number]  : Rik Leos MD, Phone:  [phone]    Blood Culture - Blood, [[id number]]  (Normal) Collected:  08/30/18 0849    Lab Status:  Final result Specimen:  Blood from Arm, Right Updated:  09/04/18 0945     Blood Culture No growth at 5 days    Blood Culture - Blood, [[id number]]  (Normal) Collected:  08/30/18 0859    Lab Status:  Final result Specimen:  Blood from  Hand, Right Updated:  09/04/18 0945     Blood Culture No growth at 5 days    Urine Culture - Urine, [[id number]]  (Normal) Collected:  08/30/18 1627    Lab Status:  Final result Specimen:  Urine from Urine, Clean Catch Updated:  09/01/18 0740     Urine Culture No growth at 2 days    Legionella Antigen, Urine - Urine, Urine, Clean Catch [[id number]] Collected:  08/25/18 0526    Lab Status:  Final result Specimen:  Urine from Urine, Clean Catch Updated:  08/29/18 1518     L. pneumophila Serogp 1 Ur Ag Negative     Comment: Presumptive negative for L. pneumophila serogroup 1 antigen in urine,  suggesting no recent or current infection. Legionnaires' disease  cannot be ruled out since other serogroups and species may also cause  disease.       Narrative:       Performed at:  [address] [place] [address]  [id number]  : Rik Leos MD, Phone:  [phone]    S. Pneumo Ag Urine or CSF - Urine, Urine, Clean Catch [[id number]] Collected:  08/25/18 0526    Lab Status:  Final result Specimen:  Urine from Urine, Clean Catch Updated:  08/29/18 1518     Specimen Source Urine     STREP PNEUMONIAE ANTIGEN Negative     Body Fluid Culture, Sterile Not Indicated     Organism ID Not indicated.     Please note Comment     Comment: College of American Pathologists standards require a culture to be  performed on CSF specimens submitted for bacterial antigen testing.  (CAP [id number]) Urine specimens will not be cultured.       Narrative:       Performed at:   - [address]  [id number]  : Rik Leos MD, Phone:  [phone]    Urine Culture - Urine, [[id number]]  (Abnormal)  (Susceptibility) Collected:  08/25/18 0526    Lab Status:  Final result Specimen:  Urine from Urine, Catheter Updated:  08/27/18 1413     Urine Culture >100,000 CFU/mL Escherichia coli (A)    Susceptibility      Escherichia coli     KANIKA     Ampicillin >16 ug/ml Resistant      Ampicillin + Sulbactam >16/8 ug/ml Resistant     Aztreonam <=8 ug/ml Susceptible     Cefepime <=8 ug/ml Susceptible     Cefotaxime <=2 ug/ml Susceptible     Ceftriaxone <=8 ug/ml Susceptible     Cefuroxime sodium 8 ug/ml Susceptible     Cephalothin >16 ug/ml Resistant     Ertapenem <=1 ug/ml Susceptible     Gentamicin <=4 ug/ml Susceptible     Levofloxacin <=2 ug/ml Susceptible     Meropenem <=1 ug/ml Susceptible     Nitrofurantoin <=32 ug/ml Susceptible     Piperacillin + Tazobactam <=16 ug/ml Susceptible     Tetracycline >8 ug/ml Resistant     Tobramycin <=4 ug/ml Susceptible     Trimethoprim + Sulfamethoxazole <=2/38 ug/ml Susceptible                    Blood Culture - Blood, [[id number]]  (Abnormal) Collected:  08/25/18 0520    Lab Status:  Final result Specimen:  Blood from Arm, Right Updated:  08/27/18 1157     Blood Culture Gram Negative Bacilli (A)     Isolated from Aerobic and Anaerobic Bottles     Gram Stain Result Anaerobic Bottle Gram negative bacilli      Aerobic Bottle Gram negative bacilli    Narrative:       SEE CULTURE [id number] FOR ID AND SUSCEPTIBILITIES    Blood Culture - Blood, [[id number]]  (Abnormal)  (Susceptibility) Collected:  08/25/18 0520    Lab Status:  Final result Specimen:  Blood from Arm, Left Updated:  08/27/18 1156     Blood Culture Escherichia coli (A)     Isolated from Aerobic and Anaerobic Bottles     Gram Stain Result Anaerobic Bottle Gram negative bacilli      Aerobic Bottle Gram negative bacilli    Susceptibility      Escherichia coli     KANIKA     Ampicillin >16 ug/ml Resistant     Ampicillin + Sulbactam 16/8 ug/ml Intermediate     Aztreonam <=8 ug/ml Susceptible     Cefepime <=8 ug/ml Susceptible     Cefotaxime <=2 ug/ml Susceptible     Ceftriaxone <=8 ug/ml Susceptible     Cefuroxime sodium 16 ug/ml Intermediate     Ertapenem <=1 ug/ml Susceptible     Gentamicin <=4 ug/ml Susceptible     Levofloxacin <=2 ug/ml Susceptible     Meropenem <=1 ug/ml Susceptible     Piperacillin +  Tazobactam <=16 ug/ml Susceptible     Tetracycline >8 ug/ml Resistant     Tobramycin <=4 ug/ml Susceptible     Trimethoprim + Sulfamethoxazole <=2/38 ug/ml Susceptible                    Blood Culture ID, PCR - Blood, [[id number]]  (Abnormal) Collected:  08/25/18 0520    Lab Status:  Final result Specimen:  Blood from Arm, Left Updated:  08/25/18 2045     BCID, PCR Escherichia coli. Identification by BCID PCR. (C)    MRSA Screen, PCR - Swab, Nares [[id number]]  (Normal) Collected:  08/25/18 1714    Lab Status:  Final result Specimen:  Swab from Nares Updated:  08/25/18 1859     MRSA, PCR Negative    Narrative:         MRSA Negative        Imaging Results (last 24 hours)     ** No results found for the last 24 hours. **        I have reviewed the medications.      [START ON 9/9/2018] Pharmacy Consult  Does not apply Once   donepezil 10 mg Oral Nightly   doxycycline 100 mg Oral Q12H   famotidine 20 mg Oral BID   heparin (porcine) 5,000 Units Subcutaneous Q12H   memantine 10 mg Oral Q12H   mirtazapine 15 mg Oral Nightly   nystatin  Topical Q12H   oxybutynin 5 mg Oral TID   piperacillin-tazobactam 3.375 g Intravenous Q8H   vancomycin 500 mg Intravenous Q12H     Assessment / Plan     Hospital Problem List     * (Principal)Sepsis (CMS/HCC)    Endometrial cancer (CMS/HCC)    UTI (urinary tract infection)    Late onset Alzheimer's disease without behavioral disturbance    Sepsis associated hypotension (CMS/HCC)    Leukocytosis    Normocytic anemia    Thrombocytopenia (CMS/HCC)    Lactic acidosis    E coli bacteremia        Brief Hospital Course to date:  Zainab Worthy is a 89 y.o. female w/ PMH of normocytic anemia, dementia, HTN, and remote h/o endometrial cancer who resides at the [facility] and presented with sepsis (fever, leukocytosis) due to aspiration PNA and UTI. Patient found to have E coli bacteremia as well, suspect this is from urinary source.      Plan:  - Appreciate ID evaluation. Doxy/vanc for now with 1 time dose  of micafungin given 8/30. Final abx duration to be decided by ID.   -- no fevers in last 72h- have sent recultures of blood/urine NGTD. CXR with concern for possible aspiration, continue with ongoing CAP coverage.  Monitor cultures  - UCx  >100K Ecoli. Sensitivities reviewed  Blood Cx with Ecoli. ABX per ID. Urology consulted given hydronephrosis - per Dr. Rankin, patient has a known right UPJ obstruction, likely congenital   - Continue to hold antihypertensives, she is normotensive at this time  - Thrombocytopenia, likely related to sepsis. Resolved.   - Continue home meds for dementia.   - PRN for Hgb < 7.   - Replete K+ PRN.    PLAN:  - cont current care  - back to [facility] soon, when stable and OK with ID.    DVT prophylaxis: LATHA  CODE STATUS:   Code Status and Medical Interventions:   Ordered at: 08/25/18 1141     Level Of Support Discussed With:    Next of Kin (If No Surrogate)     Code Status:    CPR     Medical Interventions (Level of Support Prior to Arrest):    Full     Disposition: back to [facility] when ok withg ID  Electronically signed by Michi Colby MD, 09/05/18, 5:20 PM.

## 2018-09-05 NOTE — THERAPY TREATMENT NOTE
Acute Care - Speech Language Pathology Treatment Note  [facility]     Patient Name: Zainab Worthy  : 2/3/1929  MRN: [id number]  Today's Date: 2018         Admit Date: 2018    Visit Dx:      ICD-10-CM ICD-9-CM   1. Sepsis, due to unspecified organism (CMS/HCC) A41.9 038.9     995.91   2. Aspiration pneumonia of both lower lobes due to gastric secretions (CMS/HCC) J69.0 507.0   3. Acute urinary tract infection N39.0 599.0   4. Hypoxia R09.02 799.02   5. Impaired functional mobility, balance, gait, and endurance Z74.09 V49.89   6. Pharyngeal dysphagia R13.13 787.23     Patient Active Problem List   Diagnosis   • Gonalgia   • Hypertrophic polyarthritis   • Allergic rhinitis, seasonal   • Skin growth   • Endometrial cancer (CMS/HCC)   • Post-operative state   • Rectal cancer (CMS/HCC)   • Hydronephrosis   • Hypertension   • Anorexia   • Onychomycosis   • IUD complication (CMS/HCC)   • Memory loss   • Urinary frequency   • Fall at home   • Closed fracture of pelvis (CMS/HCC)   • UTI (urinary tract infection)   • Fracture of femoral neck, right (CMS/HCC)   • Late onset Alzheimer's disease without behavioral disturbance   • Metastatic colon cancer in female (CMS/HCC)   • Recurrent falls   • Closed fracture of neck of right femur (CMS/HCC)   • Sepsis (CMS/HCC)   • Sepsis associated hypotension (CMS/HCC)   • Leukocytosis   • Normocytic anemia   • Thrombocytopenia (CMS/HCC)   • Lactic acidosis   • E coli bacteremia        Therapy Treatment    Therapy Treatment / Health Promotion    Treatment Time/Intention  Discipline: speech language pathologist (18 1100 : Toby Ireland MS, CFY-SLP)  Document Type: therapy note (daily note) (18 1100 : Toby Ireland MS, CFY-SLP)  Subjective Information: no complaints (18 1100 : Toby Ireland MS, CFY-SLP)  Patient/Family Observations: No family present (18 1100 : Toby Ireland MS, CFY-SLP)  Therapy Frequency (Swallow): 5 days per week, PRN  (09/05/18 1100 : Toby Ireland MS, CFY-SLP)  Patient Effort: good (09/05/18 1100 : Toby Ireland MS, CFY-SLP)  Plan of Care Review  Plan of Care Reviewed With: patient (09/05/18 1134 : Toby Ireland MS, CFY-SLP)    Vitals/Pain/Safety  Pain Assessment  Additional Documentation: Pain Scale: FACES Pre/Post-Treatment (Group) (09/05/18 1100 : Toby Ireland MS, CFY-SLP)  Pain Scale: FACES Pre/Post-Treatment  Pain: FACES Scale, Pretreatment: 2-->hurts little bit (09/05/18 1100 : Toby Ireland MS, CFY-SLP)  Pain: FACES Scale, Post-Treatment: 2-->hurts little bit (09/05/18 1100 : Toby Ireland MS, CFY-SLP)    Cognition, Communication, Swallow  Recommendations  Therapy Frequency (Swallow): 5 days per week, PRN (09/05/18 1100 : Toby Ireland MS, CFY-SLP)  Anticipated Dischage Disposition: skilled nursing facility, anticipate therapy at next level of care (09/05/18 1100 : Toby Ireland MS, CFY-SLP)    Outcome Summary  Outcome Summary/Treatment Plan (SLP)  Daily Summary of Progress (SLP): progress toward functional goals is good (09/05/18 1100 : Toby Ireland MS, CFY-SLP)  Barriers to Overall Progress (SLP): Baseline dementia/cog status (09/05/18 1100 : Toby Ireland MS, CFY-SLP)  Plan for Continued Treatment (SLP): F/u for family ed and tx as needed (09/05/18 1100 : Toby Ireland MS, CFY-SLP)  Anticipated Dischage Disposition: skilled nursing facility, anticipate therapy at next level of care (09/05/18 1100 : Toby Ireland MS, CFY-SLP)      EDUCATION  The patient has been educated in the following areas:   Dysphagia (Swallowing Impairment).    SLP Recommendation and Plan           Anticipated Dischage Disposition: skilled nursing facility, anticipate therapy at next level of care     Therapy Frequency (Swallow): 5 days per week, PRN          Plan of Care Reviewed With: patient  Plan of Care Review  Plan of Care Reviewed With: patient  Daily Summary of Progress (SLP): progress  toward functional goals is good  Plan for Continued Treatment (SLP): F/u for family ed and tx as needed          SLP GOALS     Row Name 09/05/18 1100 09/02/18 1400          Oral Nutrition/Hydration Goal 1 (SLP)    Oral Nutrition/Hydration Goal 1, SLP LTG: Pt will tolerate mechanical soft diet with NTL w/o s/s aspiration in order to prevent PNA w/ 100% acc and minimal cues  -MP Pt will tolerate mechanical soft diet with NTL without aspiration in order to prevent pneumonia  -JW     Time Frame (Oral Nutrition/Hydration Goal 1, SLP) by discharge  -MP by discharge  -JW     Barriers (Oral Nutrition/Hydration Goal 1, SLP) Pt presented no overt s/s aspiration with trials of NTL in 3/3 trials  -MP  --     Progress/Outcomes (Oral Nutrition/Hydration Goal 1, SLP) good progress toward goal  -MP  --        Pharyngeal Strengthening Exercise Goal 1 (SLP)    Activity (Pharyngeal Strengthening Goal 1, SLP) increase superior movement of the hyolaryngeal complex;increase epiglottic inversion and retroflexion;increase closure at entrance to airway/closure of airway at glottis;increase tongue base retraction  -MP increase superior movement of the hyolaryngeal complex;increase epiglottic inversion and retroflexion;increase closure at entrance to airway/closure of airway at glottis;increase tongue base retraction  -JW     Increase Superior Movement of the Hyolaryngeal Complex falsetto  -MP falsetto  -JW     Increase Epiglottic Inversion and Retroflexion falsetto;Mendelsohn  -MP falsetto;Mendelsohn  -JW     Increase Closure at Entrance to Airway/Closure of Airway at Glottis super-supraglottic swallow  -MP super-supraglottic swallow  -JW     Increase Tongue Base Retraction norah  -MP norah  -JW     [place]/Accuracy (Pharyngeal Strengthening Goal 1, SLP) with moderate cues (50-74% accuracy)  -MP with moderate cues (50-74% accuracy)  -JW     Time Frame (Pharyngeal Strengthening Goal 1, SLP) short term goal (STG);by discharge  -MP short  term goal (STG);by discharge  -JW     Barriers (Pharyngeal Strengthening Goal 1, SLP) Pt unable to follow commands to complete exercises 2/2 baseline dementia  -MP  --     Progress/Outcomes (Pharyngeal Strengthening Goal 1, SLP) goal no longer appropriate   Pt unable to follow commands to complete exercises  -MP  --        Swallow Compensatory Strategies Goal 2 (SLP)    Activity (Swallow Compensatory Strategies/Techniques Goal 2, SLP) compensatory strategies;during p.o. trials;small bites;alternate food/liquid intake  -MP compensatory strategies;during p.o. trials;small bites;alternate food/liquid intake  -     [place]/Accuracy (Swallow Compensatory Strategies/Techniques Goal 1, SLP) with minimal cues (75-90% accuracy)  -MP with minimal cues (75-90% accuracy)  -JW     Time Frame (Swallow Compensatory Strategies/Techniques Goal 2, SLP) short term goal (STG);by discharge  -MP short term goal (STG);by discharge  -     Progress/Outcomes (Swallow Compensatory Strategies/Techniques Goal 2, SLP) goal ongoing  -MP  --       User Key  (r) = Recorded By, (t) = Taken By, (c) = Cosigned By    Initials Name Provider Type    Yissel Munroe MS CCC-SLP Speech and Language Pathologist    Toby Li MS, CFY-SLP Speech and Language Pathologist                Time Calculation:           Time Calculation- SLP     Row Name 09/05/18 1142             Time Calculation- SLP    SLP Start Time 1100  -MP      SLP Received On 09/05/18  -        User Key  (r) = Recorded By, (t) = Taken By, (c) = Cosigned By    Initials Name Provider Type    Toby Li MS, CFY-SLP Speech and Language Pathologist          Therapy Charges for Today     Code Description Service Date Service Provider Modifiers Qty    [id number]  ST TREATMENT SWALLOW 3 9/5/2018 Toby Ireland MS, CFY-SLP GN 1                     Toby Ireland MS, LAUREEN-SLP  9/5/2018

## 2018-09-05 NOTE — THERAPY TREATMENT NOTE
Acute Care - Physical Therapy Treatment Note  [facility]     Patient Name: Zainab Worthy  : 2/3/1929  MRN: [id number]  Today's Date: 2018  Onset of Illness/Injury or Date of Surgery: 18  Date of Referral to PT: 18  Referring Physician: Dasha Lacy MD    Admit Date: 2018    Visit Dx:    ICD-10-CM ICD-9-CM   1. Sepsis, due to unspecified organism (CMS/HCC) A41.9 038.9     995.91   2. Aspiration pneumonia of both lower lobes due to gastric secretions (CMS/HCC) J69.0 507.0   3. Acute urinary tract infection N39.0 599.0   4. Hypoxia R09.02 799.02   5. Impaired functional mobility, balance, gait, and endurance Z74.09 V49.89   6. Pharyngeal dysphagia R13.13 787.23     Patient Active Problem List   Diagnosis   • Gonalgia   • Hypertrophic polyarthritis   • Allergic rhinitis, seasonal   • Skin growth   • Endometrial cancer (CMS/HCC)   • Post-operative state   • Rectal cancer (CMS/HCC)   • Hydronephrosis   • Hypertension   • Anorexia   • Onychomycosis   • IUD complication (CMS/HCC)   • Memory loss   • Urinary frequency   • Fall at home   • Closed fracture of pelvis (CMS/HCC)   • UTI (urinary tract infection)   • Fracture of femoral neck, right (CMS/HCC)   • Late onset Alzheimer's disease without behavioral disturbance   • Metastatic colon cancer in female (CMS/HCC)   • Recurrent falls   • Closed fracture of neck of right femur (CMS/HCC)   • Sepsis (CMS/HCC)   • Sepsis associated hypotension (CMS/HCC)   • Leukocytosis   • Normocytic anemia   • Thrombocytopenia (CMS/HCC)   • Lactic acidosis   • E coli bacteremia       Therapy Treatment          Rehabilitation Treatment Summary     Row Name 18 1100 18 1053          Treatment Time/Intention    Discipline speech language pathologist  -MP physical therapist  -EH     Document Type therapy note (daily note)  -MP therapy note (daily note)  -EH     Subjective Information no complaints  -MP no complaints  -     Mode of Treatment  -- individual  therapy;physical therapy  -EH     Patient/Family Observations No family present  -MP no family present  -EH     Therapy Frequency (PT Clinical Impression)  -- 5 times/wk  -EH     Therapy Frequency ([place]) 5 days per week;PRN  -MP  --     Patient Effort good  -MP good  -     Existing Precautions/Restrictions  -- fall;other (see comments)   demenita  -EH     Recorded by [MP] Toby Ireland MS, CFY-SLP 09/05/18 1129 [EH] Janet Barone, PT 09/05/18 1126     Row Name 09/05/18 1053             Cognitive Assessment/Intervention- PT/OT    Affect/Mental Status (Cognitive) confused  -      Orientation Status (Cognition) oriented to;person;disoriented to;place;situation   knew hospital, unsure of which one  -EH      Follows Commands (Cognition) follows one step commands;50-74% accuracy;initiation impaired;repetition of directions required;verbal cues/prompting required;visual queue  -      Cognitive Function (Cognitive) safety deficit  -      Safety Deficit (Cognitive) moderate deficit;safety precautions awareness  -      Personal Safety Interventions fall prevention program maintained;gait belt;nonskid shoes/slippers when out of bed  -      Recorded by [EH] Janet Baroen, PT 09/05/18 1126      Row Name 09/05/18 1053             Bed Mobility Assessment/Treatment    Bed Mobility Assessment/Treatment supine-sit;scooting/bridging  -      Scooting/Bridging [place] (Bed Mobility) moderate assist (50% patient effort)  -      Supine-Sit [place] (Bed Mobility) moderate assist (50% patient effort)  -      Sit-Supine [place] (Bed Mobility) moderate assist (50% patient effort);verbal cues   Simultaneous filing. User may not have seen previous data.  -MP,EH2      Assistive Device (Bed Mobility) bed rails;head of bed elevated  -      Comment (Bed Mobility) waffle cushion  -      Recorded by [] Jnaet Barone, PT 09/05/18 1126  [MP,EH2] Toby Ireland, MS, CFY-SLP (r) Abisai  Janet ROCA, PT (t) 09/05/18 1129      Row Name 09/05/18 1053             Transfer Assessment/Treatment    Transfer Assessment/Treatment stand-sit transfer;sit-stand transfer  -EH      Comment (Transfers) used RWx to get from bed to chair. Pt unable to step with knee buckling occasionally so she pivots BLEs to get to chair with cueing  -EH      Recorded by [EH] Janet Barone, PT 09/05/18 1126      Row Name 09/05/18 1053             Bed-Chair Transfer    Bed-Chair [place] (Transfers) moderate assist (50% patient effort)  -      Assistive Device (Bed-Chair Transfers) walker, front-wheeled  -EH      Recorded by [EH] Janet Barone, PT 09/05/18 1126      Row Name 09/05/18 1053             Sit-Stand Transfer    Sit-Stand [place] (Transfers) moderate assist (50% patient effort)  -EH      Assistive Device (Sit-Stand Transfers) walker, front-wheeled  -EH      Recorded by [EH] Janet Barone, PT 09/05/18 1126      Row Name 09/05/18 1053             Stand-Sit Transfer    Stand-Sit [place] (Transfers) minimum assist (75% patient effort)  -      Assistive Device (Stand-Sit Transfers) walker, front-wheeled  -EH      Recorded by [EH] Jante Barone, PT 09/05/18 1126      Row Name 09/05/18 1053             Gait/Stairs Assessment/Training    Comment (Gait/Stairs) deferred 2/2 BLE knees buckling, history  -EH      Recorded by [EH] Janet Barone, PT 09/05/18 1126      Row Name 09/05/18 1100 09/05/18 1053          Motor Skills Assessment/Interventions    Additional Documentation --  - Therapeutic Exercise (Group)  -EH     Recorded by [] Janet Barone, PT 09/05/18 1130 [EH] Janet Barone, PT 09/05/18 1130     Row Name 09/05/18 1053             Therapeutic Exercise    Therapeutic Exercise seated, lower extremities;supine, lower extremities  -      Additional Documentation Therapeutic Exercise (Row)  -      84823 - PT Therapeutic Exercise Minutes 11  -EH      32615 - PT  Therapeutic Activity Minutes 12  -EH      Recorded by [] Janet Barone, PT 09/05/18 1130      Row Name 09/05/18 1053             Lower Extremity Seated Therapeutic Exercise    Performed, Seated Lower Extremity (Therapeutic Exercise) LAQ (long arc quad), knee extension;ankle dorsiflexion/plantarflexion;hip flexion/extension   QS; hip adduction; attempted GS but pt unable to perform  -EH      Exercise Type, Seated Lower Extremity (Therapeutic Exercise) isotonic contraction, concentric  -EH      Sets/Reps Detail, Seated Lower Extremity (Therapeutic Exercise) 1/10 with cueing for each rep  -EH      Comment, Seated Lower Extremity (Therapeutic Exercise) Pt unable to perform glut set at this time.  -EH      Recorded by [] Janet Barone, PT 09/05/18 1130      [facility] Name 09/05/18 1053             Positioning and Restraints    Pre-Treatment Position in bed  -EH      Post Treatment Position chair  -EH      In Chair notified nsg;reclined;call light within reach;encouraged to call for assist;exit alarm on;waffle cushion;with SLP  -EH      Recorded by [] Janet Barone, PT 09/05/18 1126      [facility] Name 09/05/18 1100             Pain Assessment    Additional Documentation Pain Scale: FACES Pre/Post-Treatment (Group)  -MP      Recorded by [MP] Toby Ireland MS, CFY-SLP 09/05/18 1129      Row Name 09/05/18 1053             Pain Scale: Numbers Pre/Post-Treatment    Pain Scale: Numbers, Pretreatment 0/10 - no pain  -      Pain Scale: Numbers, Post-Treatment 0/10 - no pain  -      Recorded by [] Janet Barone, PT 09/05/18 1126      [facility] 09/05/18 1100             Pain Scale: FACES Pre/Post-Treatment    Pain: FACES Scale, Pretreatment 2-->hurts little bit  -MP      Pain: FACES Scale, Post-Treatment 2-->hurts little bit  -MP      Recorded by [MP] Toby Ireland MS, CFY-SLP 09/05/18 1129      Row Name 09/05/18 1053             Outcome Summary/Treatment Plan (PT)    Daily Summary of Progress (PT)  progress toward functional goals is good  -EH      Anticipated Discharge Disposition (PT) skilled nursing facility  -EH      Recorded by [EH] Janet Barone, PT 09/05/18 1126      Row Name 09/05/18 1100             Outcome Summary/Treatment Plan (SLP)    Daily Summary of Progress (SLP) progress toward functional goals is good  -MP      Barriers to Overall Progress (SLP) Baseline dementia/cog status  -MP      Plan for Continued Treatment (SLP) F/u for family ed and tx as needed  -MP      Anticipated Dischage Disposition skilled nursing facility;anticipate therapy at next level of care  -MP      Recorded by [MP] Toby Ireland MS, CFY-SLP 09/05/18 1129        User Key  (r) = Recorded By, (t) = Taken By, (c) = Cosigned By    Initials Name Effective Dates Discipline     Janet Barone, PT 06/19/15 -  PT    MP Toby Ireland MS, CFY-SLP 08/15/18 -  SLP                     Physical Therapy Education     Title: PT OT SLP Therapies (Active)     Topic: Physical Therapy (Active)     Point: Mobility training (Active)    Learning Progress Summary     Learner Status Readiness Method Response Comment Documented by    Patient Active Acceptance E NR   09/05/18 1127     Active Acceptance E,D NR  LM 09/03/18 1033     Done Acceptance E VU,NR  BR 08/28/18 1046          Point: Home exercise program (Active)    Learning Progress Summary     Learner Status Readiness Method Response Comment Documented by    Patient Active Acceptance E NR   09/05/18 1127     Done Acceptance E VU,NR  BR 08/28/18 1046          Point: Body mechanics (Active)    Learning Progress Summary     Learner Status Readiness Method Response Comment Documented by    Patient Active Acceptance E NR   09/05/18 1127     Done Acceptance E VU,NR  BR 08/28/18 1046          Point: Precautions (Active)    Learning Progress Summary     Learner Status Readiness Method Response Comment Documented by    Patient Active Acceptance E NR   09/05/18 1127      Active Acceptance E,D NR  LM 09/03/18 1033     Done Acceptance E VU,NR  BR 08/28/18 1046                      User Key     Initials Effective Dates Name Provider Type Discipline     06/19/15 -  Janet Barone, PT Physical Therapist PT     06/15/16 -  Sugey Reed, PT Physical Therapist PT    BR 06/16/16 -  Deborah Thomas RN Registered Nurse Nurse                    PT Recommendation and Plan  Anticipated Discharge Disposition (PT): skilled nursing facility  Therapy Frequency (PT Clinical Impression): 5 times/wk  Outcome Summary/Treatment Plan (PT)  Daily Summary of Progress (PT): progress toward functional goals is good  Anticipated Discharge Disposition (PT): skilled nursing facility  Plan of Care Reviewed With: patient  Outcome Summary: Pt with improved stand>sit independence, otherwise little improvement seen in mobility. Pt participates well with ther ex, limited by understanding of GS.          Outcome Measures     Row Name 09/05/18 1053 09/03/18 0900          How much help from another person do you currently need...    Turning from your back to your side while in flat bed without using bedrails? 2  -EH 2  -LM     Moving from lying on back to sitting on the side of a flat bed without bedrails? 2  -EH 2  -LM     Moving to and from a bed to a chair (including a wheelchair)? 2  -EH 2  -LM     Standing up from a chair using your arms (e.g., wheelchair, bedside chair)? 2  -EH 2  -LM     Climbing 3-5 steps with a railing? 1  -EH 1  -LM     To walk in hospital room? 1  -EH 1  -LM     AM-PAC 6 Clicks Score 10  -EH 10  -LM        Functional Assessment    Outcome Measure Options AM-PAC 6 Clicks Basic Mobility (PT)  -EH AM-PAC 6 Clicks Basic Mobility (PT)  -LM       User Key  (r) = Recorded By, (t) = Taken By, (c) = Cosigned By    Initials Name Provider Type     Janet Barone, PT Physical Therapist     Sugey Reed, PT Physical Therapist           Time Calculation:         PT Charges     Row Name  09/05/18 1053             Time Calculation    Start Time 1053  -      PT Received On 09/05/18  -      PT Goal Re-Cert Due Date 09/13/18  -         Time Calculation- PT    Total Timed Code Minutes- PT 23 minute(s)  -         Timed Charges    07585 - PT Therapeutic Exercise Minutes 11  -EH      40485 - PT Therapeutic Activity Minutes 12  -EH        User Key  (r) = Recorded By, (t) = Taken By, (c) = Cosigned By    Initials Name Provider Type     Janet Barone PT Physical Therapist        Therapy Suggested Charges     Code   Minutes Charges    12221 (CPT®) Hc Pt Neuromusc Re Education Ea 15 Min      08525 (CPT®) Hc Pt Ther Proc Ea 15 Min 11 1    84195 (CPT®) Hc Gait Training Ea 15 Min      13846 (CPT®) Hc Pt Therapeutic Act Ea 15 Min 12 1    12671 (CPT®) Hc Pt Manual Therapy Ea 15 Min      22636 (CPT®) Hc Pt Iontophoresis Ea 15 Min      33623 (CPT®) Hc Pt Elec Stim Ea-Per 15 Min      05807 (CPT®) Hc Pt Ultrasound Ea 15 Min      59126 (CPT®) Hc Pt Self Care/Mgmt/Train Ea 15 Min      45752 (CPT®) Hc Pt Prosthetic (S) Train Initial Encounter, Each 15 Min      97157 (CPT®) Hc Pt Orthotic(S)/Prosthetic(S) Encounter, Each 15 Min      86115 (CPT®) Hc Orthotic(S) Mgmt/Train Initial Encounter, Each 15min      Total  23 2        Therapy Charges for Today     Code Description Service Date Service Provider Modifiers Qty    33611820238 HC PT THER PROC EA 15 MIN 9/5/2018 Janet Barone, PT GP 1    14660747566 HC PT THERAPEUTIC ACT EA 15 MIN 9/5/2018 Janet Barone, PT GP 1          PT G-Codes  Outcome Measure Options: AM-PAC 6 Clicks Basic Mobility (PT)  AM-PAC 6 Clicks Score: 10    Janet Barone, PT  9/5/2018

## 2018-09-05 NOTE — PLAN OF CARE
Problem: Patient Care Overview  Goal: Plan of Care Review  Outcome: Ongoing (interventions implemented as appropriate)   09/05/18 1130   Coping/Psychosocial   Plan of Care Reviewed With patient   OTHER   Outcome Summary Pt with improved stand>sit independence to MIN A, otherwise little improvement seen in mobility, requiring MOD A for BM and t/f to chair using RWx. BLE knee buckling noted when pt attempted to off weight either LE, but she was able to scoot BLE feet. Pt participates well with ther ex, limited by understanding of GS.

## 2018-09-05 NOTE — PROGRESS NOTES
[facility] Medicine Services  PROGRESS NOTE    Patient Name: Zainab Worthy  : 2/3/1929  MRN: [id number]    Date of Admission: 2018  Length of Stay: 10  Primary Care Physician: Aguilar Kelly MD    Subjective     CC: sepsis    HPI:  Resting in a chair in no acute distress.  Tells me that she is comfortable and does not have any specific complaints.     Review of Systems  Gen- - fevers, - chills  CV- No chest pain, palpitations  Resp- No cough, dyspnea  GI- No N/V/D, abd pain    Otherwise ROS is negative except as mentioned in the HPI.    Objective     Vital Signs:   Temp:  [97.8 °F (36.6 °C)-99.1 °F (37.3 °C)] 99.1 °F (37.3 °C)  Heart Rate:  [90-98] 98  Resp:  [18] 18  BP: (134-136)/(60-71) 134/60        Physical Exam:  Constitutional: No acute distress, awake, alert   HENT: NCAT, mucous membranes moist  Respiratory: Clear to auscultation bilaterally, respiratory effort normal on room air  Cardiovascular: rrr, no murmurs, on tele  Gastrointestinal: Positive bowel sounds, soft, nontender, nondistended  Musculoskeletal: No bilateral ankle edema  Psychiatric: sleeping but appropriate when wakes  Neurologic: Oriented to self. No focal deficits, follows commands.  Skin: No rashes    Results Reviewed:  I have personally reviewed current lab, radiology, and data and agree.      Results from last 7 days  Lab Units 18  0918  0433 18  0452   WBC 10*3/mm3 9.94 11.64* 14.66*   HEMOGLOBIN g/dL 10.6* 9.7* 10.2*   HEMATOCRIT % 33.1* 30.5* 31.3*   PLATELETS 10*3/mm3 349 188 148*       Results from last 7 days  Lab Units 18  0833 18  0433 18  0452   SODIUM mmol/L 139 137 137 133   POTASSIUM mmol/L 3.8 4.3 4.2 3.6   CHLORIDE mmol/L 105 104 103 101   CO2 mmol/L 27.0 27.0 26.0 23.0   BUN mg/dL 13 15 23 14   CREATININE mg/dL 0.49* 0.47* 0.65 0.50*   GLUCOSE mg/dL 119* 116* 115* 106*   CALCIUM mg/dL 9.0 8.2* 8.1* 8.2*   ALT (SGPT) U/L 18  --  16  18   AST (SGOT) U/L 16  --  15 13        Estimated Creatinine Clearance: 34.3 mL/min (A) (by C-G formula based on SCr of 0.49 mg/dL (L)).  No results found for: BNP    Microbiology Results Abnormal     Procedure Component Value - Date/Time    Blood Culture - Blood, [[id number]]  (Normal) Collected:  08/30/18 0849    Lab Status:  Final result Specimen:  Blood from Arm, Right Updated:  09/04/18 0945     Blood Culture No growth at 5 days    Blood Culture - Blood, [[id number]]  (Normal) Collected:  08/30/18 0859    Lab Status:  Final result Specimen:  Blood from Hand, Right Updated:  09/04/18 0945     Blood Culture No growth at 5 days    Urine Culture - Urine, [[id number]]  (Normal) Collected:  08/30/18 1627    Lab Status:  Final result Specimen:  Urine from Urine, Clean Catch Updated:  09/01/18 0740     Urine Culture No growth at 2 days    Legionella Antigen, Urine - Urine, Urine, Clean Catch [[id number]] Collected:  08/25/18 0526    Lab Status:  Final result Specimen:  Urine from Urine, Clean Catch Updated:  08/29/18 1518     L. pneumophila Serogp 1 Ur Ag Negative     Comment: Presumptive negative for L. pneumophila serogroup 1 antigen in urine,  suggesting no recent or current infection. Legionnaires' disease  cannot be ruled out since other serogroups and species may also cause  disease.       Narrative:       Performed at:  [address] [place] [address]  [id number]  : Rik Leos MD, Phone:  [phone]    S. Pneumo Ag Urine or CSF - Urine, Urine, Clean Catch [[id number]] Collected:  08/25/18 0526    Lab Status:  Final result Specimen:  Urine from Urine, Clean Catch Updated:  08/29/18 1518     Specimen Source Urine     STREP PNEUMONIAE ANTIGEN Negative     Body Fluid Culture, Sterile Not Indicated     Organism ID Not indicated.     Please note Comment     Comment: College [employer] standards require a culture to be  performed on CSF specimens submitted for  bacterial antigen testing.  (CAP [id number]) Urine specimens will not be cultured.       Narrative:       Performed at:  [address] [place] [address]  [id number]  : Rik Leos MD, Phone:  [phone]    Urine Culture - Urine, [[id number]]  (Abnormal)  (Susceptibility) Collected:  08/25/18 0526    Lab Status:  Final result Specimen:  Urine from Urine, Catheter Updated:  08/27/18 1413     Urine Culture >100,000 CFU/mL Escherichia coli (A)    Susceptibility      Escherichia coli     KANIKA     Ampicillin >16 ug/ml Resistant     Ampicillin + Sulbactam >16/8 ug/ml Resistant     Aztreonam <=8 ug/ml Susceptible     Cefepime <=8 ug/ml Susceptible     Cefotaxime <=2 ug/ml Susceptible     Ceftriaxone <=8 ug/ml Susceptible     Cefuroxime sodium 8 ug/ml Susceptible     Cephalothin >16 ug/ml Resistant     Ertapenem <=1 ug/ml Susceptible     Gentamicin <=4 ug/ml Susceptible     Levofloxacin <=2 ug/ml Susceptible     Meropenem <=1 ug/ml Susceptible     Nitrofurantoin <=32 ug/ml Susceptible     Piperacillin + Tazobactam <=16 ug/ml Susceptible     Tetracycline >8 ug/ml Resistant     Tobramycin <=4 ug/ml Susceptible     Trimethoprim + Sulfamethoxazole <=2/38 ug/ml Susceptible                    Blood Culture - Blood, [[id number]]  (Abnormal) Collected:  08/25/18 0520    Lab Status:  Final result Specimen:  Blood from Arm, Right Updated:  08/27/18 1157     Blood Culture Gram Negative Bacilli (A)     Isolated from Aerobic and Anaerobic Bottles     Gram Stain Result Anaerobic Bottle Gram negative bacilli      Aerobic Bottle Gram negative bacilli    Narrative:       SEE CULTURE [id number] FOR ID AND SUSCEPTIBILITIES    Blood Culture - Blood, [[id number]]  (Abnormal)  (Susceptibility) Collected:  08/25/18 0520    Lab Status:  Final result Specimen:  Blood from Arm, Left Updated:  08/27/18 1156     Blood Culture Escherichia coli (A)     Isolated from Aerobic and Anaerobic Bottles     Gram Stain Result  Anaerobic Bottle Gram negative bacilli      Aerobic Bottle Gram negative bacilli    Susceptibility      Escherichia coli     KANIKA     Ampicillin >16 ug/ml Resistant     Ampicillin + Sulbactam 16/8 ug/ml Intermediate     Aztreonam <=8 ug/ml Susceptible     Cefepime <=8 ug/ml Susceptible     Cefotaxime <=2 ug/ml Susceptible     Ceftriaxone <=8 ug/ml Susceptible     Cefuroxime sodium 16 ug/ml Intermediate     Ertapenem <=1 ug/ml Susceptible     Gentamicin <=4 ug/ml Susceptible     Levofloxacin <=2 ug/ml Susceptible     Meropenem <=1 ug/ml Susceptible     Piperacillin + Tazobactam <=16 ug/ml Susceptible     Tetracycline >8 ug/ml Resistant     Tobramycin <=4 ug/ml Susceptible     Trimethoprim + Sulfamethoxazole <=2/38 ug/ml Susceptible                    Blood Culture ID, PCR - Blood, [[id number]]  (Abnormal) Collected:  08/25/18 0520    Lab Status:  Final result Specimen:  Blood from Arm, Left Updated:  08/25/18 2045     BCID, PCR Escherichia coli. Identification by BCID PCR. (C)    MRSA Screen, PCR - Swab, Nares [[id number]]  (Normal) Collected:  08/25/18 1714    Lab Status:  Final result Specimen:  Swab from Nares Updated:  08/25/18 1859     MRSA, PCR Negative    Narrative:         MRSA Negative        Imaging Results (last 24 hours)     ** No results found for the last 24 hours. **        I have reviewed the medications.      donepezil 10 mg Oral Nightly   doxycycline 100 mg Oral Q12H   famotidine 20 mg Oral BID   heparin (porcine) 5,000 Units Subcutaneous Q12H   memantine 10 mg Oral Q12H   mirtazapine 15 mg Oral Nightly   nystatin  Topical Q12H   oxybutynin 5 mg Oral TID   piperacillin-tazobactam 3.375 g Intravenous Q8H   vancomycin 500 mg Intravenous Q12H     Assessment / Plan     Hospital Problem List     * (Principal)Sepsis (CMS/HCC)    Endometrial cancer (CMS/HCC)    UTI (urinary tract infection)    Late onset Alzheimer's disease without behavioral disturbance    Sepsis associated hypotension (CMS/HCC)     Leukocytosis    Normocytic anemia    Thrombocytopenia (CMS/HCC)    Lactic acidosis    E coli bacteremia        Brief Hospital Course to date:  Zainab Worthy is a 89 y.o. female w/ PMH of normocytic anemia, dementia, HTN, and remote h/o endometrial cancer who resides at the [facility] and presented with sepsis (fever, leukocytosis) due to aspiration PNA and UTI. Patient found to have E coli bacteremia as well, suspect this is from urinary source.      Plan:  - Appreciate ID evaluation. Doxy/vanc for now with 1 time dose of micafungin given 8/30. Final abx duration to be decided by ID.   -- no fevers in last 72h- have sent recultures of blood/urine NGTD. CXR with concern for possible aspiration, continue with ongoing CAP coverage.  Monitor cultures  - UCx  >100K Ecoli. Sensitivities reviewed  Blood Cx with Ecoli. ABX per ID. Urology consulted given hydronephrosis - per Dr. Rankin, patient has a known right UPJ obstruction, likely congenital   - Continue to hold antihypertensives, she is normotensive at this time  - Thrombocytopenia, likely related to sepsis. Resolved.   - Continue home meds for dementia.   - PRN for Hgb < 7.   - Replete K+ PRN.    PLAN:  - cont current care  - back to [facility] soon, when stable and OK with ID.    DVT prophylaxis: LATHA  CODE STATUS:   Code Status and Medical Interventions:   Ordered at: 08/25/18 1141     Level Of Support Discussed With:    Next of Kin (If No Surrogate)     Code Status:    CPR     Medical Interventions (Level of Support Prior to Arrest):    Full     Disposition: back to [facility] when ok withg ID  Electronically signed by Michi Colby MD, 09/04/18, 9:21 PM.

## 2018-09-05 NOTE — PLAN OF CARE
Problem: Patient Care Overview  Goal: Plan of Care Review  Outcome: Ongoing (interventions implemented as appropriate)   09/05/18 0224   Coping/Psychosocial   Plan of Care Reviewed With patient       Problem: Fall Risk (Adult)  Goal: Absence of Fall  Outcome: Ongoing (interventions implemented as appropriate)      Problem: Skin Injury Risk (Adult)  Goal: Skin Health and Integrity  Outcome: Ongoing (interventions implemented as appropriate)

## 2018-09-05 NOTE — PLAN OF CARE
Problem: Patient Care Overview  Goal: Plan of Care Review  Outcome: Ongoing (interventions implemented as appropriate)   09/03/18 0358 09/04/18 2000   Coping/Psychosocial   Plan of Care Reviewed With --  patient   Plan of Care Review   Progress improving --      Goal: Discharge Needs Assessment  Outcome: Ongoing (interventions implemented as appropriate)   08/27/18 1447 08/28/18 1416 09/03/18 0358   Discharge Needs Assessment   Readmission Within the Last 30 Days --  current reason for admission unrelated to previous admission --    Concerns to be Addressed --  --  no discharge needs identified   Patient/Family Anticipates Transition to --  --  long term care facility   Patient/Family Anticipated Services at Transition --  --     Transportation Concerns --  --  other (see comments)  (tbd)   Transportation Anticipated --  --  other (see comments)  (tbd)   Anticipated Changes Related to Illness --  --  none   Equipment Needed After Discharge --  --  oxygen;other (see comments)  (tbd)   Outpatient/Agency/Support Group Needs --  --  skilled nursing facility   Discharge Facility/Level of Care Needs --  --  nursing facility, skilled;nursing facility, intermediate   Current Discharge Risk --  --  cognitively impaired;dependent with mobility/activities of daily living   Disability   Equipment Currently Used at Home wheelchair --  --      Goal: Interprofessional Rounds/Family Conf  Outcome: Ongoing (interventions implemented as appropriate)   08/27/18 0425   Interdisciplinary Rounds/Family Conf   Participants ;family;nursing;patient;physician       Problem: Fall Risk (Adult)  Goal: Absence of Fall  Outcome: Ongoing (interventions implemented as appropriate)   09/05/18 0327   Fall Risk (Adult)   Absence of Fall making progress toward outcome       Problem: Skin Injury Risk (Adult)  Goal: Skin Health and Integrity  Outcome: Ongoing (interventions implemented as appropriate)   09/05/18 0327   Skin Injury  Risk (Adult)   Skin Health and Integrity making progress toward outcome

## 2018-09-06 ENCOUNTER — ANCILLARY PROCEDURE (OUTPATIENT)
Dept: SPEECH THERAPY | Facility: HOSPITAL | Age: 83
End: 2018-09-06
Attending: INTERNAL MEDICINE

## 2018-09-06 PROCEDURE — 97530 THERAPEUTIC ACTIVITIES: CPT

## 2018-09-06 PROCEDURE — 25010000002 VANCOMYCIN

## 2018-09-06 PROCEDURE — 97110 THERAPEUTIC EXERCISES: CPT

## 2018-09-06 PROCEDURE — 25010000002 HEPARIN (PORCINE) PER 1000 UNITS

## 2018-09-06 PROCEDURE — 25010000002 PIPERACILLIN SOD-TAZOBACTAM PER 1 G: Performed by: INTERNAL MEDICINE

## 2018-09-06 PROCEDURE — 92612 ENDOSCOPY SWALLOW (FEES) VID: CPT

## 2018-09-06 PROCEDURE — 99232 SBSQ HOSP IP/OBS MODERATE 35: CPT | Performed by: INTERNAL MEDICINE

## 2018-09-06 PROCEDURE — 92526 ORAL FUNCTION THERAPY: CPT

## 2018-09-06 RX ORDER — FAMOTIDINE 20 MG/1
20 TABLET, FILM COATED ORAL DAILY
Status: DISCONTINUED | OUTPATIENT
Start: 2018-09-07 | End: 2018-09-10 | Stop reason: HOSPADM

## 2018-09-06 RX ADMIN — DOXYCYCLINE 100 MG: 100 CAPSULE ORAL at 08:23

## 2018-09-06 RX ADMIN — MIRTAZAPINE 15 MG: 15 TABLET, FILM COATED ORAL at 21:11

## 2018-09-06 RX ADMIN — NYSTATIN: 100000 POWDER TOPICAL at 08:23

## 2018-09-06 RX ADMIN — NYSTATIN: 100000 POWDER TOPICAL at 21:11

## 2018-09-06 RX ADMIN — OXYBUTYNIN CHLORIDE 5 MG: 5 TABLET ORAL at 21:11

## 2018-09-06 RX ADMIN — VANCOMYCIN HYDROCHLORIDE 500 MG: 500 INJECTION, POWDER, LYOPHILIZED, FOR SOLUTION INTRAVENOUS at 22:11

## 2018-09-06 RX ADMIN — HEPARIN SODIUM 5000 UNITS: 5000 INJECTION, SOLUTION INTRAVENOUS; SUBCUTANEOUS at 21:11

## 2018-09-06 RX ADMIN — OXYBUTYNIN CHLORIDE 5 MG: 5 TABLET ORAL at 08:23

## 2018-09-06 RX ADMIN — FAMOTIDINE 20 MG: 20 TABLET ORAL at 08:23

## 2018-09-06 RX ADMIN — TAZOBACTAM SODIUM AND PIPERACILLIN SODIUM 3.38 G: 375; 3 INJECTION, SOLUTION INTRAVENOUS at 16:28

## 2018-09-06 RX ADMIN — OXYBUTYNIN CHLORIDE 5 MG: 5 TABLET ORAL at 16:28

## 2018-09-06 RX ADMIN — DOXYCYCLINE 100 MG: 100 CAPSULE ORAL at 21:11

## 2018-09-06 RX ADMIN — MEMANTINE 10 MG: 10 TABLET ORAL at 08:23

## 2018-09-06 RX ADMIN — TAZOBACTAM SODIUM AND PIPERACILLIN SODIUM 3.38 G: 375; 3 INJECTION, SOLUTION INTRAVENOUS at 00:34

## 2018-09-06 RX ADMIN — HEPARIN SODIUM 5000 UNITS: 5000 INJECTION, SOLUTION INTRAVENOUS; SUBCUTANEOUS at 08:23

## 2018-09-06 RX ADMIN — VANCOMYCIN HYDROCHLORIDE 500 MG: 500 INJECTION, POWDER, LYOPHILIZED, FOR SOLUTION INTRAVENOUS at 08:24

## 2018-09-06 RX ADMIN — DONEPEZIL HYDROCHLORIDE 10 MG: 10 TABLET, FILM COATED ORAL at 21:11

## 2018-09-06 RX ADMIN — TAZOBACTAM SODIUM AND PIPERACILLIN SODIUM 3.38 G: 375; 3 INJECTION, SOLUTION INTRAVENOUS at 08:24

## 2018-09-06 RX ADMIN — MEMANTINE 10 MG: 10 TABLET ORAL at 21:11

## 2018-09-06 NOTE — PROGRESS NOTES
"Clinical Nutrition   Reason For Visit: Follow-up protocol    Patient Name: Zainab Worthy  YOB: 1929  MRN: [id number]  Date of Encounter: 09/06/18 10:10 AM  Admission date: 8/25/2018        Nutrition Assessment     Hospital Problem List  Principal Problem:    Sepsis (CMS/HCC)  Active Problems:    Endometrial cancer (CMS/HCC)    UTI (urinary tract infection)    Late onset Alzheimer's disease without behavioral disturbance    Sepsis associated hypotension (CMS/HCC)    Leukocytosis    Normocytic anemia    Thrombocytopenia (CMS/HCC)    Lactic acidosis    E coli bacteremia        PMH: She  has a past medical history of Anorexia; Arthritis; Asthma; Cataract; Colon cancer (CMS/HCC); Colon cancer metastasized to liver (CMS/HCC); DDD (degenerative disc disease), lumbar; Hearing loss; History of transfusion; Hypertension; Liver cancer (CMS/HCC); Macular degeneration; Onychomycosis; and Urine frequency.   PSxH: She  has a past surgical history that includes Liver surgery (12/09/2014); Partial hysterectomy; Gallbladder surgery; Colectomy (06/16/2009); Cataract extraction (Bilateral); and Hip hemiArthroplasty (Right, 2/8/2018).     Other nutrition related factors:  Assisted living resident  W/C bound (since Feb 2018)        Applicable medical tests/procedures since admission:  SLP (9/2) - SLP performed clinical swallow evaluation. Recommend FEES.  SLP (9/2) - FEES completed. Recommend level 4 mechanical soft, NTL.       Reported/Observed/Food/Nutrition Related History   Per RN patient continues to eat about 25% at meals, occasionally closer to 50%. Drinks about 1/2 of Boost GC supplement 2x daily. No additional preferences/requests.       Anthropometrics   Height: 152.4 cm (60\")  Weight: 45.6 kg (100 lb 9 .6 oz) - bed scale weight per charting (8/25)  BMI: 19.65 kg/m²  BMI classification: Normal: 18.5-24.9kg/m2      Per previous RD note: unintentional weight loss of 15 lbs x 1.5 weeks based on current admission bed " wt and previous MD office visit wt; note this seems unlikely given report of poor PO intake x past 2 days only. RN unable to obtain standing weight at this time d/t patient's limited physical ability. Thus, RD unable to verify weight change.         Labs reviewed   Labs reviewed: Yes    Medications reviewed   Medications reviewed: Yes  Pertinent: Remeron      Current Nutrition Prescription   PO: Diet Dysphagia; IV - Mechanical Soft No Mixed Consistencies; Nectar / Syrup Thick; Cardiac, Consistent Carbohydrate  Oral Nutrition Supplement: Boost GC 2x daily    Evaluation of Received Nutrient/Fluid Intake: 32% / 7 meals (prior to 3 days ago)      Nutrition Diagnosis     (8/26) updated (9/6)  Problem Inadequate oral intake   Etiology Poor appetite   Signs/Symptoms PO Intake (32% of 7 meals)   Status: ongoing      Intervention   Intervention: Follow treatment progress, Care plan reviewed, Adjusted supplement, Encourage intake     Discontinued ice cream portion of Boost GC shake 2/2 patient now requires NTL      Goal:   General: Nutrition support treatment  PO: Increase intake      Monitoring/Evaluation:       Monitoring/Evaluation: Per protocol, PO intake, Supplement intake  Will Continue to follow per protocol  Jagruti Sparks RD  Time Spent: 20 min

## 2018-09-06 NOTE — PROGRESS NOTES
[facility] Medicine Services  PROGRESS NOTE    Patient Name: Zainab Worthy  : 2/3/1929  MRN: [id number]    Date of Admission: 2018  Length of Stay: 12  Primary Care Physician: Aguilar Kelly MD    Subjective     CC: sepsis    HPI:  Resting in a chair in no acute distress.  Tells me that she is comfortable and does not have any specific complaints.  No significant change since yesterday.    Review of Systems  Unable to obtain.    Otherwise ROS is negative except as mentioned in the HPI.    Objective     Vital Signs:   Temp:  [98.3 °F (36.8 °C)-99.1 °F (37.3 °C)] 98.3 °F (36.8 °C)  Heart Rate:  [] 95  Resp:  [16] 16  BP: (125-139)/(66-71) 139/71        Physical Exam:  Constitutional: No acute distress, awake, alert   HENT: NCAT, mucous membranes moist  Respiratory: Clear to auscultation bilaterally, respiratory effort normal on room air  Cardiovascular: rrr, no murmurs, on tele  Gastrointestinal: Positive bowel sounds, soft, nontender, nondistended  Musculoskeletal: No bilateral ankle edema  Psychiatric: sleeping but appropriate when wakes  Neurologic: confused but follows commands. No focal deficits.  Skin: No rashes    Results Reviewed:  I have personally reviewed current lab, radiology, and data and agree.      Results from last 7 days  Lab Units 18  0900 18  0433   WBC 10*3/mm3 9.94 11.64*   HEMOGLOBIN g/dL 10.6* 9.7*   HEMATOCRIT % 33.1* 30.5*   PLATELETS 10*3/mm3 349 188       Results from last 7 days  Lab Units 18  0833 18  0918 18  0433   SODIUM mmol/L 139 137 137   POTASSIUM mmol/L 3.8 4.3 4.2   CHLORIDE mmol/L 105 104 103   CO2 mmol/L 27.0 27.0 26.0   BUN mg/dL 13 15 23   CREATININE mg/dL 0.49* 0.47* 0.65   GLUCOSE mg/dL 119* 116* 115*   CALCIUM mg/dL 9.0 8.2* 8.1*   ALT (SGPT) U/L 18  --  16   AST (SGOT) U/L 16  --  15        Estimated Creatinine Clearance: 34.3 mL/min (A) (by C-G formula based on SCr of 0.49 mg/dL (L)).  No results  found for: BNP    Microbiology Results Abnormal     Procedure Component Value - Date/Time    Legionella Antigen, Urine - Urine, Urine, Clean Catch [[id number]] Collected:  09/02/18 1346    Lab Status:  Final result Specimen:  Urine from Urine, Clean Catch Updated:  09/05/18 1520     L. pneumophila Serogp 1 Ur Ag Negative     Comment: Presumptive negative for L. pneumophila serogroup 1 antigen in urine,  suggesting no recent or current infection. Legionnaires' disease  cannot be ruled out since other serogroups and species may also cause  disease.  POS Step. Pneumo reported to [facility] on 9/5/18 at 2:30.  Fax sent to [phone]. AV.       Narrative:       Performed at:  [address] [place] [address]  [id number]  : Rik Leos MD, Phone:  [phone]    S. Pneumo Ag Urine or CSF - Urine, Urine, Clean Catch [[id number]]  (Abnormal) Collected:  09/02/18 1346    Lab Status:  Final result Specimen:  Urine from Urine, Clean Catch Updated:  09/05/18 1310     Specimen Source Urine     STREP PNEUMONIAE ANTIGEN Positive (A)     Body Fluid Culture, Sterile Not Indicated     Organism ID Not indicated.     Please note Comment     Comment: College [employer] standards require a culture to be  performed on CSF specimens submitted for bacterial antigen testing.  (CAP [id number]) Urine specimens will not be cultured.       Narrative:       Performed at:   - [address]  [id number]  : Rik Leos MD, Phone:  [phone]    Blood Culture - Blood, [[id number]]  (Normal) Collected:  08/30/18 0849    Lab Status:  Final result Specimen:  Blood from Arm, Right Updated:  09/04/18 0945     Blood Culture No growth at 5 days    Blood Culture - Blood, [[id number]]  (Normal) Collected:  08/30/18 0859    Lab Status:  Final result Specimen:  Blood from Hand, Right Updated:  09/04/18 0945     Blood Culture No growth at 5 days    Urine  Culture - Urine, [[id number]]  (Normal) Collected:  08/30/18 1627    Lab Status:  Final result Specimen:  Urine from Urine, Clean Catch Updated:  09/01/18 0740     Urine Culture No growth at 2 days    Legionella Antigen, Urine - Urine, Urine, Clean Catch [[id number]] Collected:  08/25/18 0526    Lab Status:  Final result Specimen:  Urine from Urine, Clean Catch Updated:  08/29/18 1518     L. pneumophila Serogp 1 Ur Ag Negative     Comment: Presumptive negative for L. pneumophila serogroup 1 antigen in urine,  suggesting no recent or current infection. Legionnaires' disease  cannot be ruled out since other serogroups and species may also cause  disease.       Narrative:       Performed at:  [address] [place] [address]  [id number]  : Rik Leos MD, Phone:  [phone]    S. Pneumo Ag Urine or CSF - Urine, Urine, Clean Catch [[id number]] Collected:  08/25/18 0526    Lab Status:  Final result Specimen:  Urine from Urine, Clean Catch Updated:  08/29/18 1518     Specimen Source Urine     STREP PNEUMONIAE ANTIGEN Negative     Body Fluid Culture, Sterile Not Indicated     Organism ID Not indicated.     Please note Comment     Comment: College [employer] Pathologists standards require a culture to be  performed on CSF specimens submitted for bacterial antigen testing.  (CAP [id number]) Urine specimens will not be cultured.       Narrative:       Performed at:  [address] [place] [address]  [id number]  : Rik Leos MD, Phone:  [phone]    Urine Culture - Urine, [[id number]]  (Abnormal)  (Susceptibility) Collected:  08/25/18 0526    Lab Status:  Final result Specimen:  Urine from Urine, Catheter Updated:  08/27/18 1413     Urine Culture >100,000 CFU/mL Escherichia coli (A)    Susceptibility      Escherichia coli     KANIKA     Ampicillin >16 ug/ml Resistant     Ampicillin + Sulbactam >16/8 ug/ml Resistant     Aztreonam <=8 ug/ml Susceptible      Cefepime <=8 ug/ml Susceptible     Cefotaxime <=2 ug/ml Susceptible     Ceftriaxone <=8 ug/ml Susceptible     Cefuroxime sodium 8 ug/ml Susceptible     Cephalothin >16 ug/ml Resistant     Ertapenem <=1 ug/ml Susceptible     Gentamicin <=4 ug/ml Susceptible     Levofloxacin <=2 ug/ml Susceptible     Meropenem <=1 ug/ml Susceptible     Nitrofurantoin <=32 ug/ml Susceptible     Piperacillin + Tazobactam <=16 ug/ml Susceptible     Tetracycline >8 ug/ml Resistant     Tobramycin <=4 ug/ml Susceptible     Trimethoprim + Sulfamethoxazole <=2/38 ug/ml Susceptible                    Blood Culture - Blood, [[id number]]  (Abnormal) Collected:  08/25/18 0520    Lab Status:  Final result Specimen:  Blood from Arm, Right Updated:  08/27/18 1157     Blood Culture Gram Negative Bacilli (A)     Isolated from Aerobic and Anaerobic Bottles     Gram Stain Result Anaerobic Bottle Gram negative bacilli      Aerobic Bottle Gram negative bacilli    Narrative:       SEE CULTURE [id number] FOR ID AND SUSCEPTIBILITIES    Blood Culture - Blood, [[id number]]  (Abnormal)  (Susceptibility) Collected:  08/25/18 0520    Lab Status:  Final result Specimen:  Blood from Arm, Left Updated:  08/27/18 1156     Blood Culture Escherichia coli (A)     Isolated from Aerobic and Anaerobic Bottles     Gram Stain Result Anaerobic Bottle Gram negative bacilli      Aerobic Bottle Gram negative bacilli    Susceptibility      Escherichia coli     KANIKA     Ampicillin >16 ug/ml Resistant     Ampicillin + Sulbactam 16/8 ug/ml Intermediate     Aztreonam <=8 ug/ml Susceptible     Cefepime <=8 ug/ml Susceptible     Cefotaxime <=2 ug/ml Susceptible     Ceftriaxone <=8 ug/ml Susceptible     Cefuroxime sodium 16 ug/ml Intermediate     Ertapenem <=1 ug/ml Susceptible     Gentamicin <=4 ug/ml Susceptible     Levofloxacin <=2 ug/ml Susceptible     Meropenem <=1 ug/ml Susceptible     Piperacillin + Tazobactam <=16 ug/ml Susceptible     Tetracycline >8 ug/ml Resistant      Tobramycin <=4 ug/ml Susceptible     Trimethoprim + Sulfamethoxazole <=2/38 ug/ml Susceptible                    Blood Culture ID, PCR - Blood, [[id number]]  (Abnormal) Collected:  08/25/18 0520    Lab Status:  Final result Specimen:  Blood from Arm, Left Updated:  08/25/18 2045     BCID, PCR Escherichia coli. Identification by BCID PCR. (C)    MRSA Screen, PCR - Swab, Nares [[id number]]  (Normal) Collected:  08/25/18 1714    Lab Status:  Final result Specimen:  Swab from Nares Updated:  08/25/18 1859     MRSA, PCR Negative    Narrative:         MRSA Negative        Imaging Results (last 24 hours)     Procedure Component Value Units Date/Time    Fiberoptic Endo (fees) [[id number]] Resulted:  09/06/18 1624     Updated:  09/06/18 1624    Narrative:       This procedure was auto-finalized with no dictation required.        I have reviewed the medications.      [START ON 9/9/2018] Pharmacy Consult  Does not apply Once   donepezil 10 mg Oral Nightly   doxycycline 100 mg Oral Q12H   [START ON 9/7/2018] famotidine 20 mg Oral Daily   heparin (porcine) 5,000 Units Subcutaneous Q12H   memantine 10 mg Oral Q12H   mirtazapine 15 mg Oral Nightly   nystatin  Topical Q12H   oxybutynin 5 mg Oral TID   piperacillin-tazobactam 3.375 g Intravenous Q8H   vancomycin 500 mg Intravenous Q12H     Assessment / Plan     Hospital Problem List     * (Principal)Sepsis (CMS/HCC)    Endometrial cancer (CMS/[facility])    UTI (urinary tract infection)    Late onset Alzheimer's disease without behavioral disturbance    Sepsis associated hypotension (CMS/[facility])    Leukocytosis    Normocytic anemia    Thrombocytopenia (CMS/HCC)    Lactic acidosis    E coli bacteremia        Brief Hospital Course to date:  Zainab Worthy is a 89 y.o. female w/ PMH of normocytic anemia, dementia, HTN, and remote h/o endometrial cancer who resides at [facility] and presented with sepsis (fever, leukocytosis) due to aspiration PNA and UTI. Patient found to have E coli bacteremia as  well, suspect this is from urinary source.      Plan:  - Appreciate ID evaluation. Doxy/vanc for now with 1 time dose of micafungin given 8/30. Final abx duration to be decided by ID.   -- no fevers in last 72h- have sent recultures of blood/urine NGTD. CXR with concern for possible aspiration, continue with ongoing CAP coverage.  Monitor cultures  - UCx  >100K Ecoli. Sensitivities reviewed  Blood Cx with Ecoli. ABX per ID. Urology consulted given hydronephrosis - per Dr. Rankin, patient has a known right UPJ obstruction, likely congenital   - Continue to hold antihypertensives, she is normotensive at this time  - Thrombocytopenia, likely related to sepsis. Resolved.   - Continue home meds for dementia.   - PRN for Hgb < 7.   - Replete K+ PRN.    PLAN:  - cont current care  - back to [facility] soon, when stable and OK with ID.    DVT prophylaxis: LATHA  CODE STATUS:   Code Status and Medical Interventions:   Ordered at: 08/25/18 1141     Level Of Support Discussed With:    Next of Kin (If No Surrogate)     Code Status:    CPR     Medical Interventions (Level of Support Prior to Arrest):    Full     Disposition: back to [facility] when ok withg ID  Electronically signed by Michi Colby MD, 09/06/18, 5:12 PM.

## 2018-09-06 NOTE — THERAPY DISCHARGE NOTE
Acute Care - Speech Language Pathology   Swallow Eval/Discharge [facility]   Dysphagia Treatment &   Fiberoptic Endoscopic Evaluation of Swallowing (FEES)       Patient Name: Zainab Worthy  : 2/3/1929  MRN: [id number]  Today's Date: 2018  Onset of Illness/Injury or Date of Surgery: 18     Referring Physician: Dasha Lacy MD      Admit Date: 2018    Visit Dx:    ICD-10-CM ICD-9-CM   1. Sepsis, due to unspecified organism (CMS/HCC) A41.9 038.9     995.91   2. Aspiration pneumonia of both lower lobes due to gastric secretions (CMS/HCC) J69.0 507.0   3. Acute urinary tract infection N39.0 599.0   4. Hypoxia R09.02 799.02   5. Impaired functional mobility, balance, gait, and endurance Z74.09 V49.89   6. Pharyngeal dysphagia R13.13 787.23     Patient Active Problem List   Diagnosis   • Gonalgia   • Hypertrophic polyarthritis   • Allergic rhinitis, seasonal   • Skin growth   • Endometrial cancer (CMS/HCC)   • Post-operative state   • Rectal cancer (CMS/HCC)   • Hydronephrosis   • Hypertension   • Anorexia   • Onychomycosis   • IUD complication (CMS/HCC)   • Memory loss   • Urinary frequency   • Fall at home   • Closed fracture of pelvis (CMS/HCC)   • UTI (urinary tract infection)   • Fracture of femoral neck, right (CMS/HCC)   • Late onset Alzheimer's disease without behavioral disturbance   • Metastatic colon cancer in female (CMS/HCC)   • Recurrent falls   • Closed fracture of neck of right femur (CMS/HCC)   • Sepsis (CMS/HCC)   • Sepsis associated hypotension (CMS/HCC)   • Leukocytosis   • Normocytic anemia   • Thrombocytopenia (CMS/HCC)   • Lactic acidosis   • E coli bacteremia     Past Medical History:   Diagnosis Date   • Anorexia    • Arthritis    • Asthma    • Cataract    • Colon cancer (CMS/HCC)    • Colon cancer metastasized to liver (CMS/HCC)    • DDD (degenerative disc disease), lumbar    • Hearing loss    • History of transfusion    • Hypertension    • Liver cancer (CMS/HCC)    • Macular  degeneration    • Onychomycosis    • Urine frequency      Past Surgical History:   Procedure Laterality Date   • CATARACT EXTRACTION Bilateral    • COLON RESECTION  06/16/2009    Low anterior resection   • GALLBLADDER SURGERY      Resection of gallbladder   • HIP HEMIARTHROPLASTY Right 2/8/2018    Procedure: HIP HEMIARTHROPLASTY;  Surgeon: Fernando Dominguez MD;  Location: [facility];  Service:    • LIVER SURGERY  12/09/2014    Liver tumor, 1/2 was removed.   • PARTIAL HYSTERECTOMY            SWALLOW EVALUATION (last 72 hours)      SLP Adult Swallow Evaluation     Row Name 09/06/18 1515                   Rehab Evaluation    Document Type re-evaluation  -AC        Subjective Information no complaints  -AC        Patient Observations alert;cooperative  -AC        Patient/Family Observations Pt's dtr present.   -AC        Patient Effort good  -AC           General Information    Patient Profile Reviewed yes  -AC        Pertinent History Of Current Problem Adm from LTC w/ sepsis, UTI, asp pna. Hx dementia.  -AC        Current Method of Nutrition mechanical soft, no mixed consistencies;nectar/syrup-thick liquids  -AC        Prior Level of Function-Swallowing no diet consistency restrictions;safe, efficient swallowing in all situations;other (see comments)   per dtr  -AC        Plans/Goals Discussed with patient and family;agreed upon  -AC        Barriers to Rehab cognitive status  -AC        Patient's Goals for Discharge patient did not state  -AC        Family Goals for Discharge patient able to return to regular diet  -AC           Pain Scale: Numbers Pre/Post-Treatment    Pain Scale: Numbers, Pretreatment 0/10 - no pain  -AC        Pain Scale: Numbers, Post-Treatment 0/10 - no pain  -AC           Fiberoptic Endoscopic Evaluation of Swallowing (FEES)    Risks/Benefits Reviewed risks/benefits explained;patient;family;agreed to eval  -AC        Nasal Entry right:  -AC           Anatomy and Physiology    Anatomic  Considerations no anatomic structural deviation  -AC        Velopharyngeal CNA  -AC        Base of Tongue symmetrical  -AC        Epiglottis WFL  -AC        Laryngeal Function Breathing symmetrical  -AC        Laryngeal Function Phonation symmetrical  -AC        Laryngeal Function to Breath Hold CNA  -AC        Secretion Rating Scale (Paul et al. 1996) 1- secretions present around the laryngeal vestibule  -AC        Secretion Description thick;clear  -AC        Ice Chips DNA  -AC        Spontaneous Swallow frequency adequate  -AC        Sensory sensed scope  -AC        Utensils Used Spoon;Cup;Straw  -AC        Consistencies Trialed thin liquids;pudding/puree;mixed consistency;Regular textures  -AC           FEES Interpretation    Oral Phase WFL  -AC           Initiation of Pharyngeal Swallow    Initiation of Pharyngeal Swallow WFL  -AC        Penetration During the Swallow thin liquids;other (see comments)   minimal amount initially - expelled w/ spontaneous swallows  -AC        Rosenbek's Scale thin:;2-->Level 2;pudding/puree:;mixed consistencies:;regular textures:;1-->Level 1  -AC        Residue all consistencies tested;valleculae;other (see comments)   mild amount, cleared w/ subsequent swallows  -AC        Response to Residue cleared residue;with spontaneous subsequent swallow  -AC        FEES Summary Improved swallow function. Minimal penetration during the swallow w/ initial drinks of thin liquid. Penetrated material expelled w/ subseqeuent spontaneous swallows w/o aspiration. No penetration of thin liquid via large/consecutive drinks by the end of the study. No penetration/aspiration w/ pudding/mixed consistency/regular solid. Mild diffuse pharyngeal residue easily cleared w/ subsequent swallows w/o posing aspiration risk.     Overall, pt presented w/ safe and functional swallow for her age. Suspect pt's swallowing has returned to baseline.  -AC           Clinical Impression    SLP Swallowing Diagnosis  functional oral phase;functional pharyngeal phase  -        Swallow Criteria for Skilled Therapeutic Interventions Met no problems identified which require skilled intervention;baseline status  -           Recommendations    SLP Diet Recommendation soft textures;chopped;thin liquids;other (see comments)   chopped 2' vision deficits/diff cutting food (adjust PRN)  -        Recommended Precautions and Strategies upright posture during/after eating;other (see comments)   general aspiration precautions & oral care BID/PRN  -        SLP Rec. for Method of Medication Administration meds whole;with pudding or applesauce;as tolerated  -        Anticipated Dischage Disposition [facility] nursing facility  -          User Key  (r) = Recorded By, (t) = Taken By, (c) = Cosigned By    Initials Name Effective Dates     Filomena Maya MS CCC-SLP 07/27/17 -                   Therapy Treatment    Therapy Treatment / Health Promotion    Treatment Time/Intention  Discipline: speech language pathologist (09/06/18 1500 : Filomena Maya, MS CCC-SLP)  Document Type: re-evaluation (09/06/18 1515 : Filomena Maya, MS CCC-SLP)  Subjective Information: no complaints (09/06/18 1515 : Filomena Maya, MS CCC-SLP)  Patient/Family Observations: Pt's dtr present.  (09/06/18 1515 : Filomena Maya, MS CCC-SLP)  Care Plan Review: evaluation/treatment results reviewed, care plan/treatment goals reviewed, risks/benefits reviewed, current/potential barriers reviewed, patient/other agree to care plan (09/06/18 1500 : Filomena Maya, MS CCC-SLP)  Care Plan Review, Other Participant(s): daughter (09/06/18 1500 : Filomena Maya, MS CCC-SLP)  Patient Effort: good (09/06/18 1515 : Filomena Maya, MS CCC-SLP)  Plan of Care Review  Plan of Care Reviewed With: patient, daughter (09/06/18 1639 : Filomena Maya, MS CCC-SLP)    Vitals/Pain/Safety  Pain Scale: Numbers Pre/Post-Treatment  Pain Scale: Numbers, Pretreatment: 0/10 - no pain (09/06/18 1515 :  Filomena Maya, MS CCC-SLP)  Pain Scale: Numbers, Post-Treatment: 0/10 - no pain (09/06/18 1515 : Filomena Maya, MS CCC-SLP)    Cognition, Communication, Swallow  Clinical Impression  SLP Swallowing Diagnosis: functional oral phase, functional pharyngeal phase (09/06/18 1515 : Filomena Maya, [facility])  Swallow Criteria for Skilled Therapeutic Interventions Met: no problems identified which require skilled intervention, baseline status (09/06/18 1515 : Filomena Maya, MS CCC-SLP)  Recommendations  SLP Diet Recommendation: soft textures, chopped, thin liquids, other (see comments) (chopped 2' vision deficits/diff cutting food (adjust PRN)) (09/06/18 1515 : Filomena Maya, MS CCC-SLP)  Recommended Precautions and Strategies: upright posture during/after eating, other (see comments) (general aspiration precautions & oral care BID/PRN) (09/06/18 1515 : Filomena Maya, MS CCC-SLP)  SLP Rec. for Method of Medication Administration: meds whole, with pudding or applesauce, as tolerated (09/06/18 1515 : Filomena Maya, MS CCC-SLP)  Anticipated Dischage Disposition: skilled nursing facility (09/06/18 1515 : Filomena Maya, MS CCC-SLP)    Outcome Summary  Outcome Summary/Treatment Plan (SLP)  Daily Summary of Progress (SLP): progress toward functional goals as expected (09/06/18 1500 : Filomena Maya, MS CCC-SLP)  Plan for Continued Treatment (SLP): Pt demonstrating overall improved strength. Dtr denied prior hx dysphagia/pna and reported pt close to baseline. Initiated repeat FEES. (09/06/18 1500 : Filomena Maya, MS CCC-SLP)  Anticipated Dischage Disposition: skilled nursing facility (09/06/18 1515 : Filomena Maya, MS CCC-SLP)  Reason for Discharge: all goals and outcomes met, no further needs identified (09/06/18 1641 : Filomena Maya, [facility]-SLP)        SLP GOALS     Row Name 09/06/18 1500 09/05/18 1100          Oral Nutrition/Hydration Goal 1 (SLP)    Oral Nutrition/Hydration Goal 1, SLP LTG: Pt will tolerate  mechanical soft diet with NTL w/o s/s aspiration in order to prevent PNA w/ 100% acc and minimal cues  -AC LTG: Pt will tolerate mechanical soft diet with NTL w/o s/s aspiration in order to prevent PNA w/ 100% acc and minimal cues  -MP     Time Frame (Oral Nutrition/Hydration Goal 1, SLP) by discharge  -AC by discharge  -MP     Barriers (Oral Nutrition/Hydration Goal 1, SLP) Pt demonstrating improved overall strength, so repeated FEES. Pt demonstrated improved swallow function. Now back to soft diet & thin liquids. Suspect this is pt's baseline.  -AC Pt presented no overt s/s aspiration with trials of NTL in 3/3 trials  -MP     Progress/Outcomes (Oral Nutrition/Hydration Goal 1, SLP) goal met  -AC good progress toward goal  -MP        Pharyngeal Strengthening Exercise Goal 1 (SLP)    Activity (Pharyngeal Strengthening Goal 1, SLP)  -- increase superior movement of the hyolaryngeal complex;increase epiglottic inversion and retroflexion;increase closure at entrance to airway/closure of airway at glottis;increase tongue base retraction  -MP     Increase Superior Movement of the Hyolaryngeal Complex falsetto  -AC falsetto  -MP     Increase Epiglottic Inversion and Retroflexion falsetto;Mendelsohn  -AC falsetto;Mendelsohn  -MP     Increase Closure at Entrance to Airway/Closure of Airway at Glottis super-supraglottic swallow  -AC super-supraglottic swallow  -MP     Increase Tongue Base Retraction norah  -AC norah  -MP     [place]/Accuracy (Pharyngeal Strengthening Goal 1, SLP) with moderate cues (50-74% accuracy)  -AC with moderate cues (50-74% accuracy)  -MP     Time Frame (Pharyngeal Strengthening Goal 1, SLP) short term goal (STG);by discharge  -AC short term goal (STG);by discharge  -MP     Barriers (Pharyngeal Strengthening Goal 1, SLP) DNA exercises during tx, as pt was showing improvement. Given that FEES revealed improved swallow fxn and it is felt pt's swallowing is back to baseline, exercises no longer  needed.  -AC Pt unable to follow commands to complete exercises 2/2 baseline dementia  -MP     Progress/Outcomes (Pharyngeal Strengthening Goal 1, SLP) goal met  -AC goal no longer appropriate   Pt unable to follow commands to complete exercises  -MP        Swallow Compensatory Strategies Goal 2 (SLP)    Activity (Swallow Compensatory Strategies/Techniques Goal 2, SLP) compensatory strategies;during p.o. trials;small bites;alternate food/liquid intake  -AC compensatory strategies;during p.o. trials;small bites;alternate food/liquid intake  -MP     [place]/Accuracy (Swallow Compensatory Strategies/Techniques Goal 1, SLP) with minimal cues (75-90% accuracy)  -AC with minimal cues (75-90% accuracy)  -MP     Time Frame (Swallow Compensatory Strategies/Techniques Goal 2, SLP) short term goal (STG);by discharge  -AC short term goal (STG);by discharge  -MP     Progress/Outcomes (Swallow Compensatory Strategies/Techniques Goal 2, SLP) goal met  -AC goal ongoing  -MP       User Key  (r) = Recorded By, (t) = Taken By, (c) = Cosigned By    Initials Name Provider Type    Filomena Fowler MS CCC-SLP Speech and Language Pathologist    Toby Li MS, CFY-SLP Speech and Language Pathologist          EDUCATION  The patient has been educated in the following areas:   Dysphagia (Swallowing Impairment) Oral Care/Hydration Modified Diet Instruction.    SLP Recommendation and Plan  SLP Swallowing Diagnosis: functional oral phase, functional pharyngeal phase  SLP Diet Recommendation: soft textures, chopped, thin liquids, other (see comments) (chopped 2' vision deficits/diff cutting food (adjust PRN))     Swallow Criteria for Skilled Therapeutic Interventions Met: no problems identified which require skilled intervention, baseline status  Anticipated Dischage Disposition: skilled nursing facility          Time Calculation:         Time Calculation- SLP     Row Name 09/06/18 4836             Time Calculation- SLP    SLP  Start Time 1500  -      SLP Received On 09/06/18  -        User Key  (r) = Recorded By, (t) = Taken By, (c) = Cosigned By    Initials Name Provider Type    Filomena Fowler MS CCC-SLP Speech and Language Pathologist          Therapy Charges for Today     Code Description Service Date Service Provider Modifiers Qty    33245677271 HC ST TREATMENT SWALLOW 2 9/6/2018 Filomena Maya MS CCC-SLP GN 1    [id number] HC ST FIBEROPTIC ENDO EVAL SWALL 5 9/6/2018 Filomena Maya MS CCC-SLP GN 1           SLP Discharge Summary  Anticipated Dischage Disposition: skilled nursing facility  Reason for Discharge: all goals and outcomes met, no further needs identified  Progress Toward Achieving Short/long Term Goals: all goals met within established timelines    MS KATJA HackettSLP  9/6/2018

## 2018-09-06 NOTE — PLAN OF CARE
Problem: Patient Care Overview  Goal: Plan of Care Review  Outcome: Ongoing (interventions implemented as appropriate)   09/06/18 0304   Coping/Psychosocial   Plan of Care Reviewed With patient   Plan of Care Review   Progress improving   OTHER   Outcome Summary Pt very cooperative of PT activities. gait is very unsteady, pt assisted to chair /mod assist. Generalized weakness, dementia  and decreased active ROM LE's Continue with PT goals.

## 2018-09-06 NOTE — PROGRESS NOTES
[facility] Progress Note        Antibiotics:  Anti-Infectives     Ordered     Dose/Rate Route Frequency Start Stop    09/05/18 0725  Pharmacy to dose vancomycin     Ordering Provider:  Deepak Ellsworth MD     Does not apply Continuous PRN 09/05/18 0725 09/12/18 0724    09/02/18 1056  vancomycin 500 mg/100 mL 0.9% NS IVPB (mbp)     Harpreet Moreno [facility] reviewed the order on 09/05/18 0730.   Ordering Provider:  Harpreet Moreno RPH    500 mg  over 60 Minutes Intravenous Every 12 Hours 09/02/18 2100 09/12/18 2359    08/30/18 1411  vancomycin (VANCOCIN) in iso-osmotic dextrose IVPB 1 g (premix) 200 mL     Beata Watkins RPH reviewed the order on 09/02/18 1056.   Ordering Provider:  Baeta Watkins RPH    1,000 mg  over 60 Minutes Intravenous Daily 08/31/18 0900 09/02/18 1246    08/30/18 0749  micafungin 100 mg/100 mL 0.9% NS IVPB (mbp)     Ordering Provider:  Deepak Ellsworth MD    100 mg  over 60 Minutes Intravenous Once 08/30/18 0900 08/30/18 1005    08/30/18 0749  vancomycin (VANCOCIN) in iso-osmotic dextrose IVPB 1 g (premix) 200 mL     Ordering Provider:  Deepak Ellsworth MD    1 g Intravenous Once 08/30/18 0900 08/30/18 1024    08/28/18 0714  piperacillin-tazobactam (ZOSYN) 3.375 g in iso-osmotic dextrose 50 ml (premix)     Ordering Provider:  Deepak Ellsworth MD    3.375 g  over 4 Hours Intravenous Every 8 Hours 08/28/18 1700 09/11/18 1659    08/28/18 0714  piperacillin-tazobactam (ZOSYN) 3.375 g in iso-osmotic dextrose 50 ml (premix)     Ordering Provider:  Deepak Ellsworth MD    3.375 g  over 30 Minutes Intravenous Once 08/28/18 0900 08/28/18 0849    08/28/18 0715  doxycycline (MONODOX) capsule 100 mg     Deepak Ellsworth MD reviewed the order on 09/03/18 0845.   Ordering Provider:  Deepak Ellsworth MD    100 mg Oral Every 12 Hours Scheduled 08/28/18 0900 09/10/18 0844    08/26/18 0934  meropenem (MERREM) 1 g/100 mL 0.9% NS VTB (mbp)     Ordering Provider:  Deepak Ellsworth MD     1 g  over 30 Minutes Intravenous Once 08/26/18 1030 08/26/18 1144    08/25/18 0532  piperacillin-tazobactam (ZOSYN) 3.375 g in iso-osmotic dextrose 50 ml (premix)     Ordering Provider:  Latoya Petersen MD    3.375 g  100 mL/hr over 30 Minutes Intravenous Once 08/25/18 0534 08/25/18 0613    08/25/18 0532  vancomycin (VANCOCIN) in iso-osmotic dextrose IVPB 1 g (premix) 200 mL     Ordering Provider:  Latoya Petersen MD    20 mg/kg × 52.9 kg  200 mL/hr over 60 Minutes Intravenous Once 08/25/18 0534 08/25/18 0717          CC: fever    HPI:  Patient is a 89 y.o.  Yr old female with history of dementia, chronically wheelchair bound after hip fracture February 2018 and resides at the [facility].  Chronic urinary incontinence per son and sent to the emergency room August 25 with mental status worse from baseline with encephalopathy, nausea/vomiting and fever to 103 with concern for UTI and aspiration.  She was initially hypotensive per records, with tachycardia and leukocytosis including high pro-calcitonin/lactate consistent with acute severe sepsis and subsequently found to have gram-negative ynes bacteremia and abnormal urinalysis.  Chest x-ray with bibasilar consolidation concerning for aspiration.     Patient with severe dementia and encephalopathy and not cooperative with details of history or review of systems.  Son reports no preceding symptoms of headache/photophobia or neck stiffness, no hemoptysis or other bleeding and no diarrhea/abdominal pain.     Son reports that she has not seen urology that he knows of.  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     Otherwise complete review of systems  "unable to obtain.    ROS:      9/6/18 per nursing, No f/c/s. No n/v/d. No rash. No new ADR to Abx.     Otherwise unable to obtain as above    PE: nursing/chaperone present  /71 (BP Location: Right arm, Patient Position: Lying)   Pulse 95   Temp 98.3 °F (36.8 °C) (Oral)   Resp 16   Ht 152.4 cm (60\")   Wt 45.6 kg (100 lb 9.6 oz)   SpO2 95%   BMI 19.65 kg/m²     GENERAL: sleepy, in no acute distress.   HEENT: Normocephalic, atraumatic.   No conjunctival injection. No icterus. Oropharynx clear without evidence of thrush or exudate. No evidence of peridontal disease.    NECK: Supple without nuchal rigidity. No mass.  LYMPH: No cervical, axillary or inguinal lymphadenopathy.  HEART: RRR; No murmur, rubs, gallops.   LUNGS: Diminished at bases with scattered rhonchi/crackles. Normal respiratory effort. Nonlabored. No dullness.  ABDOMEN: Soft, slight suprapubic tenderness but no CVA tenderness, nondistended. Positive bowel sounds. No rebound or guarding. NO mass or HSM.  EXT:  No cyanosis, clubbing or edema. No cord.  : Genitalia generally unremarkable.    MSK: FROM without joint effusions noted arms/legs.    SKIN: Warm and dry without cutaneous eruptions on Inspection/palpation.       No peripheral stigmata/phenomena of endocarditis     IV no redenss or purulence    Laboratory Data      Results from last 7 days  Lab Units 09/04/18  0900 08/31/18  0433   WBC 10*3/mm3 9.94 11.64*   HEMOGLOBIN g/dL 10.6* 9.7*   HEMATOCRIT % 33.1* 30.5*   PLATELETS 10*3/mm3 349 188       Results from last 7 days  Lab Units 09/04/18  0833   SODIUM mmol/L 139   POTASSIUM mmol/L 3.8   CHLORIDE mmol/L 105   CO2 mmol/L 27.0   BUN mg/dL 13   CREATININE mg/dL 0.49*   GLUCOSE mg/dL 119*   CALCIUM mg/dL 9.0       Results from last 7 days  Lab Units 09/04/18  0833   ALK PHOS U/L 198*   BILIRUBIN mg/dL 0.6   ALT (SGPT) U/L 18   AST (SGOT) U/L 16               Estimated Creatinine Clearance: 34.3 mL/min (A) (by C-G formula based on SCr of " 0.49 mg/dL (L)).      Microbiology:      Radiology:  Imaging Results (last 72 hours)     Procedure Component Value Units Date/Time    US Renal Bilateral [[id number]] Collected:  08/26/18 1356     Updated:  08/26/18 1413    Narrative:       EXAMINATION: US RENAL BILATERAL - 8/26/2018     INDICATION:  A41.9-Sepsis, unspecified organism; J69.0-Pneumonitis due  to inhalation of food and vomit; N39.0-Urinary tract infection, site not  specified; R09.02-Hypoxemia.      TECHNIQUE: Ultrasound kidneys and urinary bladder.     COMPARISON: None.     FINDINGS: Right kidney measures 12 cm in length with severe right  hydronephrosis and cortical thinning present, however, no calculi or  soft tissue contour-deforming mass.     Left kidney measures 11.1 cm in length without evidence for  hydronephrosis, contour-deforming mass, or obvious calculi.     Urinary bladder is moderately distended and grossly unremarkable.       Impression:       Severe right hydronephrosis with cortical thinning, however,  no obstructive uropathy is clearly identified.      DICTATED:   8/26/2018  EDITED/ls :   8/26/2018        XR Chest 1 View [[id number]] Collected:  08/25/18 0504     Updated:  08/25/18 0650    Addenda:        IMPRESSION:       Bibasal atelectasis and consolidation suspicious for ASPIRATION   PNEUMONIA.   Recommend followup to complete resolution.      THIS DOCUMENT HAS BEEN ELECTRONICALLY SIGNED BY YOBANI LOERA MD  Signed:  08/25/18 0650 by Yobani Loera MD    Impression:       :      THIS DOCUMENT HAS BEEN ELECTRONICALLY SIGNED BY YOBANI LOERA MD            Impression:     --Acute severe sepsis with fever/tachycardia/leukocytosis, hypotension, elevated lactate/pro-calcitonin and metabolic encephalopathy out of proportion to her dementia.  Urinary source and respiratory infection are primary concerns.    Resuscitative efforts per internal medicine;  Fever back up some 8/29-8/30 and recheck urine/blood cultures, not producing sputum,CXR  noted and added broader GPC coverage with vancomycin while cultures pending;  mycamine x 1 8/30;  abd benign at present, and no diarrhea;  IV site appears ok      --Acute E Coli septicemia.  Concern for urinary source.  Otherwise as below     --Acute pyelonephritis.  Hydronephrosis and renal following.  ?contributing to persistent fever;  Timing/option/threshold for intervention per urology     --Chronic urinary incontinence.  As above.     --Acute nausea/vomiting.  In setting of pyelonephritis and aspiration risk.     --Acute bilateral pneumonia.  Concern for aspiration as above, also with ongoing treatment for other HCAP organisms and S Pneumo Ag positive etc.   If significant pleural effusions evolve, you could give consideration to diagnostic/therapeutic thoracentesis etc.  Repeat CXR 8/30 noted     --Chronic dementia with acute metabolic encephalopathy     --Acute lactic acidosis     --Thrombocytopenia.  Monitor       PLAN:    --IV vancomycin/zosyn, doxycycline;  Better with addition of vancomycin and therefore continued;  Likely finish course of therapy with current regimen empirically given improvements      --Check/review labs cultures and scans     --partial history per nursing     --Discussed with microbiology     --Highly complex set of issues with high risk for further serious morbidity and other serious sequela/mortality    --urology following      Deepak Ellsworth MD  9/6/2018

## 2018-09-06 NOTE — THERAPY TREATMENT NOTE
Acute Care - Physical Therapy Treatment Note  [facility]     Patient Name: Zainab Worthy  : 2/3/1929  MRN: [id number]  Today's Date: 2018  Onset of Illness/Injury or Date of Surgery: 18  Date of Referral to PT: 18  Referring Physician: Dasha Lacy MD    Admit Date: 2018    Visit Dx:    ICD-10-CM ICD-9-CM   1. Sepsis, due to unspecified organism (CMS/HCC) A41.9 038.9     995.91   2. Aspiration pneumonia of both lower lobes due to gastric secretions (CMS/HCC) J69.0 507.0   3. Acute urinary tract infection N39.0 599.0   4. Hypoxia R09.02 799.02   5. Impaired functional mobility, balance, gait, and endurance Z74.09 V49.89   6. Pharyngeal dysphagia R13.13 787.23     Patient Active Problem List   Diagnosis   • Gonalgia   • Hypertrophic polyarthritis   • Allergic rhinitis, seasonal   • Skin growth   • Endometrial cancer (CMS/HCC)   • Post-operative state   • Rectal cancer (CMS/HCC)   • Hydronephrosis   • Hypertension   • Anorexia   • Onychomycosis   • IUD complication (CMS/HCC)   • Memory loss   • Urinary frequency   • Fall at home   • Closed fracture of pelvis (CMS/HCC)   • UTI (urinary tract infection)   • Fracture of femoral neck, right (CMS/HCC)   • Late onset Alzheimer's disease without behavioral disturbance   • Metastatic colon cancer in female (CMS/HCC)   • Recurrent falls   • Closed fracture of neck of right femur (CMS/HCC)   • Sepsis (CMS/HCC)   • Sepsis associated hypotension (CMS/HCC)   • Leukocytosis   • Normocytic anemia   • Thrombocytopenia (CMS/HCC)   • Lactic acidosis   • E coli bacteremia       Therapy Treatment          Rehabilitation Treatment Summary     Row Name 18 1040             Treatment Time/Intention    Discipline physical therapist  -BD      Document Type therapy note (daily note)  -BD      Subjective Information no complaints  -BD      Mode of Treatment physical therapy  -BD      Patient/Family Observations no family present. PT in bed, IV (x2).  -BD       Therapy Frequency (PT Clinical Impression) 5 times/wk  -BD      Patient Effort good  -BD      Existing Precautions/Restrictions fall;other (see comments)   dementia  -BD      Treatment Considerations/Comments Pt is cooperative  -BD      Patient Response to Treatment good  -BD      Recorded by [BD] Deborah Perez, PT 09/06/18 1247      Row Name 09/06/18 1040             Vital Signs    Pre Systolic BP Rehab 139  -BD      Pre Treatment Diastolic BP 74  -BD      Pretreatment Heart Rate (beats/min) 92  -BD      Posttreatment Heart Rate (beats/min) 96  -BD      Pre SpO2 (%) --   not on O2 or monitor, notified RN  -BD      O2 Delivery Pre Treatment room air  -BD      O2 Delivery Intra Treatment room air  -BD      O2 Delivery Post Treatment room air  -BD      Pre Patient Position Supine  -BD      Intra Patient Position Standing  -BD      Post Patient Position Sitting  -BD      Recorded by [BD] Deborah Perez, PT 09/06/18 1247      Row Name 09/06/18 1040             Motor Skills Assessment/Interventions    Additional Documentation Therapeutic Exercise (Group);Therapeutic Exercise Interventions (Group)  -BD      Recorded by [BD] Deborah Perez, PT 09/06/18 1253      Row Name 09/06/18 1040             Therapeutic Exercise    Therapeutic Exercise seated, lower extremities  -BD      59288 - PT Therapeutic Exercise Minutes 15  -BD      77401 - PT Therapeutic Activity Minutes 23  -BD      Recorded by [BD] Deborah Perez, PT 09/06/18 1253        User Key  (r) = Recorded By, (t) = Taken By, (c) = Cosigned By    Initials Name Effective Dates Discipline    BD Deborah Perez, PT 06/08/18 -  PT                     Physical Therapy Education     Title: PT OT SLP Therapies (Active)     Topic: Physical Therapy (Active)     Point: Mobility training (Active)    Learning Progress Summary     Learner Status Readiness Method Response Comment Documented by    Patient Active Acceptance E NR  BD 09/06/18 1247     Active  Acceptance E NR   09/05/18 1127     Active Acceptance E,D NR   09/03/18 1033     Done Acceptance E VU,NR  BR 08/28/18 1046          Point: Home exercise program (Active)    Learning Progress Summary     Learner Status Readiness Method Response Comment Documented by    Patient Active Acceptance E NR   09/06/18 1247     Active Acceptance E NR   09/05/18 1127     Done Acceptance E VU,NR  BR 08/28/18 1046          Point: Body mechanics (Active)    Learning Progress Summary     Learner Status Readiness Method Response Comment Documented by    Patient Active Acceptance E NR   09/06/18 1247     Active Acceptance E NR   09/05/18 1127     Done Acceptance E VU,NR  BR 08/28/18 1046          Point: Precautions (Active)    Learning Progress Summary     Learner Status Readiness Method Response Comment Documented by    Patient Active Acceptance E NR   09/06/18 1247     Active Acceptance E NR   09/05/18 1127     Active Acceptance E,D NR   09/03/18 1033     Done Acceptance E VU,NR  BR 08/28/18 1046                      User Key     Initials Effective Dates Name Provider Type Discipline     06/19/15 -  Janet Barone, PT Physical Therapist PT     06/15/16 -  Sugey Reed, PT Physical Therapist PT     06/16/16 -  Deborah Thomas, RN Registered Nurse Nurse     06/08/18 -  Deborah Perez, PT Physical Therapist PT                    PT Recommendation and Plan  Therapy Frequency (PT Clinical Impression): 5 times/wk  Plan of Care Reviewed With: patient  Progress: improving  Outcome Summary: Pt very cooperative of PT activities. gait is very unsteady, pt assisted to chair /mod assist. Generalized weakness and decreased active ROM LE's Continue with PT goals.          Outcome Measures     Row Name 09/06/18 1040 09/05/18 1053          How much help from another person do you currently need...    Turning from your back to your side while in flat bed without using bedrails? 3  -BD 2  -EH     Moving from  lying on back to sitting on the side of a flat bed without bedrails? 3  -BD 2  -EH     Moving to and from a bed to a chair (including a wheelchair)? 2  -BD 2  -EH     Standing up from a chair using your arms (e.g., wheelchair, bedside chair)? 3  -BD 2  -EH     Climbing 3-5 steps with a railing? 1  -BD 1  -EH     To walk in hospital room? 2  -BD 1  -EH     AM-PAC 6 Clicks Score 14  -BD 10  -EH        Functional Assessment    Outcome Measure Options AM-PAC 6 Clicks Basic Mobility (PT)  -BD AM-PAC 6 Clicks Basic Mobility (PT)  -       User Key  (r) = Recorded By, (t) = Taken By, (c) = Cosigned By    Initials Name Provider Type     Janet Barone, PT Physical Therapist    BD Deborah Perez, PT Physical Therapist           Time Calculation:         PT Charges     Row Name 09/06/18 1251 09/06/18 1040          Time Calculation    Start Time 1040  -  --     PT Received On 09/06/18  -  --        Time Calculation- PT    Total Timed Code Minutes- PT 38 minute(s)  -  --        Timed Charges    08672 - PT Therapeutic Exercise Minutes  -- 15  -BD     93919 - PT Therapeutic Activity Minutes  -- 23  -BD       User Key  (r) = Recorded By, (t) = Taken By, (c) = Cosigned By    Initials Name Provider Type    Deborah Carpio, PT Physical Therapist        Therapy Suggested Charges     Code   Minutes Charges    24082 (CPT®) Hc Pt Neuromusc Re Education Ea 15 Min      24415 (CPT®) Hc Pt Ther Proc Ea 15 Min 15 1    16476 (CPT®) Hc Gait Training Ea 15 Min      92479 (CPT®) Hc Pt Therapeutic Act Ea 15 Min 23 2    43747 (CPT®) Hc Pt Manual Therapy Ea 15 Min      10357 (CPT®) Hc Pt Iontophoresis Ea 15 Min      81145 (CPT®) Hc Pt Elec Stim Ea-Per 15 Min      77333 (CPT®) Hc Pt Ultrasound Ea 15 Min      79230 (CPT®) Hc Pt Self Care/Mgmt/Train Ea 15 Min      37321 (CPT®) Hc Pt Prosthetic (S) Train Initial Encounter, Each 15 Min      24146 (CPT®) Hc Pt Orthotic(S)/Prosthetic(S) Encounter, Each 15 Min      82275 (CPT®) Hc  Orthotic(S) Mgmt/Train Initial Encounter, Each 15min      Total  38 3        Therapy Charges for Today     Code Description Service Date Service Provider Modifiers Qty    76363326286 HC PT THERAPEUTIC ACT EA 15 MIN 9/6/2018 Deborah Perez, PT GP 2    97831229205 HC PT THER PROC EA 15 MIN 9/6/2018 Deborah Perez, PT GP 1    54144185401 HC PT THER SUPP EA 15 MIN 9/6/2018 Deborah Perez, PT GP 2          PT G-Codes  Outcome Measure Options: AM-PAC 6 Clicks Basic Mobility (PT)  AM-PAC 6 Clicks Score: 14    Deborah Perez, PT  9/6/2018

## 2018-09-06 NOTE — PROGRESS NOTES
Continued Stay Note  Psychiatric     Patient Name: Zainab Worthy  MRN: [id number]  Today's Date: 9/6/2018    Admit Date: 8/25/2018          Discharge Plan     Row Name 09/06/18 1229       Plan    Plan discharge plan    Patient/Family in Agreement with Plan yes    Plan Comments Plan remains back to [facility] when medically ready for discharge.  Pt remains on several IV antibiotics and [facility] will need IV antibiotics to be simplified before returning to [facility]. CM will cont to follow,              Discharge Codes    No documentation.       Expected Discharge Date and Time     Expected Discharge Date Expected Discharge Time    Sep 7, 2018             Rebecca Hines RN

## 2018-09-06 NOTE — PLAN OF CARE
Problem: Patient Care Overview  Goal: Plan of Care Review  Outcome: Ongoing (interventions implemented as appropriate)   09/06/18 1825   Coping/Psychosocial   Plan of Care Reviewed With patient   Plan of Care Review   Progress improving   OTHER   Outcome Summary pt was pleasant and cooperative during shift. VSS. will continue to monitor.        Problem: Fall Risk (Adult)  Goal: Absence of Fall  Outcome: Ongoing (interventions implemented as appropriate)   09/06/18 1825   Fall Risk (Adult)   Absence of Fall making progress toward outcome       Problem: Skin Injury Risk (Adult)  Goal: Skin Health and Integrity  Outcome: Ongoing (interventions implemented as appropriate)   09/06/18 1825   Skin Injury Risk (Adult)   Skin Health and Integrity making progress toward outcome

## 2018-09-06 NOTE — PLAN OF CARE
Problem: Patient Care Overview  Goal: Plan of Care Review  Outcome: Ongoing (interventions implemented as appropriate)   09/03/18 0358 09/05/18 2000   Coping/Psychosocial   Plan of Care Reviewed With --  patient   Plan of Care Review   Progress improving --      Goal: Discharge Needs Assessment  Outcome: Ongoing (interventions implemented as appropriate)   08/27/18 1447 08/28/18 1416 09/03/18 0358   Discharge Needs Assessment   Readmission Within the Last 30 Days --  current reason for admission unrelated to previous admission --    Concerns to be Addressed --  --  no discharge needs identified   Patient/Family Anticipates Transition to --  --  long term care facility   Patient/Family Anticipated Services at Transition --  --     Transportation Concerns --  --  other (see comments)  (tbd)   Transportation Anticipated --  --  other (see comments)  (tbd)   Anticipated Changes Related to Illness --  --  none   Equipment Needed After Discharge --  --  oxygen;other (see comments)  (tbd)   Outpatient/Agency/Support Group Needs --  --  skilled nursing facility   Discharge Facility/Level of Care Needs --  --  nursing facility, skilled;nursing facility, intermediate   Current Discharge Risk --  --  cognitively impaired;dependent with mobility/activities of daily living   Disability   Equipment Currently Used at Home wheelchair --  --      Goal: Interprofessional Rounds/Family Conf  Outcome: Ongoing (interventions implemented as appropriate)   08/27/18 0425   Interdisciplinary Rounds/Family Conf   Participants ;family;nursing;patient;physician       Problem: Fall Risk (Adult)  Goal: Absence of Fall  Outcome: Ongoing (interventions implemented as appropriate)   09/06/18 0313   Fall Risk (Adult)   Absence of Fall making progress toward outcome       Problem: Skin Injury Risk (Adult)  Goal: Skin Health and Integrity  Outcome: Ongoing (interventions implemented as appropriate)   09/06/18 0313   Skin Injury  Risk (Adult)   Skin Health and Integrity making progress toward outcome

## 2018-09-06 NOTE — PLAN OF CARE
Problem: Patient Care Overview  Goal: Plan of Care Review  Outcome: Ongoing (interventions implemented as appropriate)   09/06/18 9867   Coping/Psychosocial   Plan of Care Reviewed With Patient, daughter   SLP re-evaluation and treatment completed. Will sign-off as repeat FEES revealed improved, safe, & functional swallow. No aspiration w/ any consistency. Rec: soft/chopped diet (2' visual deficits/difficulty cutting meat), thin liquid; general aspiration precautions. Suspect pt is @ baseline status. Please see note for further details and recommendations.

## 2018-09-07 PROCEDURE — 25010000002 VANCOMYCIN

## 2018-09-07 PROCEDURE — 25010000002 PIPERACILLIN SOD-TAZOBACTAM PER 1 G: Performed by: INTERNAL MEDICINE

## 2018-09-07 PROCEDURE — 99232 SBSQ HOSP IP/OBS MODERATE 35: CPT | Performed by: INTERNAL MEDICINE

## 2018-09-07 PROCEDURE — 25010000002 HEPARIN (PORCINE) PER 1000 UNITS

## 2018-09-07 PROCEDURE — 97110 THERAPEUTIC EXERCISES: CPT

## 2018-09-07 RX ADMIN — NYSTATIN: 100000 POWDER TOPICAL at 21:26

## 2018-09-07 RX ADMIN — TAZOBACTAM SODIUM AND PIPERACILLIN SODIUM 3.38 G: 375; 3 INJECTION, SOLUTION INTRAVENOUS at 02:39

## 2018-09-07 RX ADMIN — TAZOBACTAM SODIUM AND PIPERACILLIN SODIUM 3.38 G: 375; 3 INJECTION, SOLUTION INTRAVENOUS at 08:14

## 2018-09-07 RX ADMIN — DOXYCYCLINE 100 MG: 100 CAPSULE ORAL at 21:24

## 2018-09-07 RX ADMIN — NYSTATIN: 100000 POWDER TOPICAL at 08:14

## 2018-09-07 RX ADMIN — VANCOMYCIN HYDROCHLORIDE 500 MG: 500 INJECTION, POWDER, LYOPHILIZED, FOR SOLUTION INTRAVENOUS at 08:13

## 2018-09-07 RX ADMIN — HEPARIN SODIUM 5000 UNITS: 5000 INJECTION, SOLUTION INTRAVENOUS; SUBCUTANEOUS at 08:13

## 2018-09-07 RX ADMIN — OXYBUTYNIN CHLORIDE 5 MG: 5 TABLET ORAL at 08:14

## 2018-09-07 RX ADMIN — MEMANTINE 10 MG: 10 TABLET ORAL at 21:25

## 2018-09-07 RX ADMIN — MEMANTINE 10 MG: 10 TABLET ORAL at 08:14

## 2018-09-07 RX ADMIN — TAZOBACTAM SODIUM AND PIPERACILLIN SODIUM 3.38 G: 375; 3 INJECTION, SOLUTION INTRAVENOUS at 16:22

## 2018-09-07 RX ADMIN — DONEPEZIL HYDROCHLORIDE 10 MG: 10 TABLET, FILM COATED ORAL at 21:24

## 2018-09-07 RX ADMIN — MIRTAZAPINE 15 MG: 15 TABLET, FILM COATED ORAL at 21:25

## 2018-09-07 RX ADMIN — OXYBUTYNIN CHLORIDE 5 MG: 5 TABLET ORAL at 16:22

## 2018-09-07 RX ADMIN — FAMOTIDINE 20 MG: 20 TABLET ORAL at 08:14

## 2018-09-07 RX ADMIN — DOXYCYCLINE 100 MG: 100 CAPSULE ORAL at 08:14

## 2018-09-07 RX ADMIN — HEPARIN SODIUM 5000 UNITS: 5000 INJECTION, SOLUTION INTRAVENOUS; SUBCUTANEOUS at 21:25

## 2018-09-07 RX ADMIN — OXYBUTYNIN CHLORIDE 5 MG: 5 TABLET ORAL at 21:24

## 2018-09-07 NOTE — PROGRESS NOTES
[facility] Progress Note        Antibiotics:  Anti-Infectives     Ordered     Dose/Rate Route Frequency Start Stop    09/05/18 0725  Pharmacy to dose vancomycin     Ordering Provider:  Deepak Ellsworth MD     Does not apply Continuous PRN 09/05/18 0725 09/12/18 0724    08/30/18 1411  vancomycin (VANCOCIN) in iso-osmotic dextrose IVPB 1 g (premix) 200 mL     Beata Watkins RPH reviewed the order on 09/02/18 1056.   Ordering Provider:  Beata Watkins RPH    1,000 mg  over 60 Minutes Intravenous Daily 08/31/18 0900 09/02/18 1246    08/30/18 0749  micafungin 100 mg/100 mL 0.9% NS IVPB (mbp)     Ordering Provider:  Deepak Ellsworth MD    100 mg  over 60 Minutes Intravenous Once 08/30/18 0900 08/30/18 1005    08/30/18 0749  vancomycin (VANCOCIN) in iso-osmotic dextrose IVPB 1 g (premix) 200 mL     Ordering Provider:  Deepak Ellsworth MD    1 g Intravenous Once 08/30/18 0900 08/30/18 1024    08/28/18 0714  piperacillin-tazobactam (ZOSYN) 3.375 g in iso-osmotic dextrose 50 ml (premix)     Ordering Provider:  Deepak Ellsworth MD    3.375 g  over 4 Hours Intravenous Every 8 Hours 08/28/18 1700 09/11/18 1659    08/28/18 0714  piperacillin-tazobactam (ZOSYN) 3.375 g in iso-osmotic dextrose 50 ml (premix)     Ordering Provider:  Deepak Ellsworth MD    3.375 g  over 30 Minutes Intravenous Once 08/28/18 0900 08/28/18 0849    08/28/18 0715  doxycycline (MONODOX) capsule 100 mg     Deepak Ellsworth MD reviewed the order on 09/03/18 0845.   Ordering Provider:  Deepak Ellsworth MD    100 mg Oral Every 12 Hours Scheduled 08/28/18 0900 09/10/18 0844    08/26/18 0934  meropenem (MERREM) 1 g/100 mL 0.9% NS VTB (mbp)     Ordering Provider:  Deepak Ellsworth MD    1 g  over 30 Minutes Intravenous Once 08/26/18 1030 08/26/18 1144    08/25/18 0532  piperacillin-tazobactam (ZOSYN) 3.375 g in iso-osmotic dextrose 50 ml (premix)     Ordering Provider:  Latoya Petersen MD    3.375 g  100 mL/hr over 30 Minutes  Intravenous Once 08/25/18 0534 08/25/18 0613    08/25/18 0532  vancomycin (VANCOCIN) in iso-osmotic dextrose IVPB 1 g (premix) 200 mL     Ordering Provider:  Latoya Petersen MD    20 mg/kg × 52.9 kg  200 mL/hr over 60 Minutes Intravenous Once 08/25/18 0534 08/25/18 0717          CC: fever    HPI:  Patient is a 89 y.o.  Yr old female with history of dementia, chronically wheelchair bound after hip fracture February 2018 and resides at the [facility].  Chronic urinary incontinence per son and sent to the emergency room August 25 with mental status worse from baseline with encephalopathy, nausea/vomiting and fever to 103 with concern for UTI and aspiration.  She was initially hypotensive per records, with tachycardia and leukocytosis including high pro-calcitonin/lactate consistent with acute severe sepsis and subsequently found to have gram-negative ynes bacteremia and abnormal urinalysis.  Chest x-ray with bibasilar consolidation concerning for aspiration.     Patient with severe dementia and encephalopathy and not cooperative with details of history or review of systems.  Son reports no preceding symptoms of headache/photophobia or neck stiffness, no hemoptysis or other bleeding and no diarrhea/abdominal pain.     Son reports that she has not seen urology that he knows of.  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     Otherwise complete review of systems unable to obtain.    ROS:      9/7/18 per nursing, No f/c/s. No n/v/d. No rash. No new ADR to Abx.     Otherwise unable to obtain as above    PE: nursing/chaperone present  /65 (BP Location: Right arm, Patient Position: Lying)   Pulse 88    "Temp 98.1 °F (36.7 °C) (Axillary)   Resp 16   Ht 152.4 cm (60\")   Wt 45.6 kg (100 lb 9.6 oz)   SpO2 95%   BMI 19.65 kg/m²     GENERAL: sleepy, in no acute distress.   HEENT: Normocephalic, atraumatic.   No conjunctival injection. No icterus. Oropharynx clear without evidence of thrush or exudate. No evidence of peridontal disease.    NECK: Supple without nuchal rigidity. No mass.  LYMPH: No cervical, axillary or inguinal lymphadenopathy.  HEART: RRR; No murmur, rubs, gallops.   LUNGS: Diminished at bases with scattered rhonchi/crackles. Normal respiratory effort. Nonlabored. No dullness.  ABDOMEN: Soft, slight suprapubic tenderness but no CVA tenderness, nondistended. Positive bowel sounds. No rebound or guarding. NO mass or HSM.  EXT:  No cyanosis, clubbing or edema. No cord.  : Genitalia generally unremarkable.    MSK: FROM without joint effusions noted arms/legs.    SKIN: Warm and dry without cutaneous eruptions on Inspection/palpation.       No peripheral stigmata/phenomena of endocarditis     IV no redenss or purulence    Laboratory Data      Results from last 7 days  Lab Units 09/04/18  0900   WBC 10*3/mm3 9.94   HEMOGLOBIN g/dL 10.6*   HEMATOCRIT % 33.1*   PLATELETS 10*3/mm3 349       Results from last 7 days  Lab Units 09/04/18  0833   SODIUM mmol/L 139   POTASSIUM mmol/L 3.8   CHLORIDE mmol/L 105   CO2 mmol/L 27.0   BUN mg/dL 13   CREATININE mg/dL 0.49*   GLUCOSE mg/dL 119*   CALCIUM mg/dL 9.0       Results from last 7 days  Lab Units 09/04/18  0833   ALK PHOS U/L 198*   BILIRUBIN mg/dL 0.6   ALT (SGPT) U/L 18   AST (SGOT) U/L 16               Estimated Creatinine Clearance: 34.3 mL/min (A) (by C-G formula based on SCr of 0.49 mg/dL (L)).      Microbiology:      Radiology:  Imaging Results (last 72 hours)     Procedure Component Value Units Date/Time    US Renal Bilateral [[id number]] Collected:  08/26/18 1356     Updated:  08/26/18 1413    Narrative:       EXAMINATION: US RENAL BILATERAL - " 8/26/2018     INDICATION:  A41.9-Sepsis, unspecified organism; J69.0-Pneumonitis due  to inhalation of food and vomit; N39.0-Urinary tract infection, site not  specified; R09.02-Hypoxemia.      TECHNIQUE: Ultrasound kidneys and urinary bladder.     COMPARISON: None.     FINDINGS: Right kidney measures 12 cm in length with severe right  hydronephrosis and cortical thinning present, however, no calculi or  soft tissue contour-deforming mass.     Left kidney measures 11.1 cm in length without evidence for  hydronephrosis, contour-deforming mass, or obvious calculi.     Urinary bladder is moderately distended and grossly unremarkable.       Impression:       Severe right hydronephrosis with cortical thinning, however,  no obstructive uropathy is clearly identified.      DICTATED:   8/26/2018  EDITED/ls :   8/26/2018        XR Chest 1 View [[id number]] Collected:  08/25/18 0504     Updated:  08/25/18 0650    Addenda:        IMPRESSION:       Bibasal atelectasis and consolidation suspicious for ASPIRATION   PNEUMONIA.   Recommend followup to complete resolution.      THIS DOCUMENT HAS BEEN ELECTRONICALLY SIGNED BY YOBANI LOERA MD  Signed:  08/25/18 0650 by Yobani Loera MD    Impression:       :      THIS DOCUMENT HAS BEEN ELECTRONICALLY SIGNED BY YOBANI LOERA MD            Impression:     --Acute severe sepsis with fever/tachycardia/leukocytosis, hypotension, elevated lactate/pro-calcitonin and metabolic encephalopathy out of proportion to her dementia.  Urinary source and respiratory infection are primary concerns.    Resuscitative efforts per internal medicine;  Fever back up some 8/29-8/30 and recheck urine/blood cultures, not producing sputum,CXR noted and added broader GPC coverage with vancomycin again;  mycamine x 1 8/30;  abd benign at present, and no diarrhea;  IV site appears ok      --Acute E Coli septicemia.  Concern for urinary source.  Otherwise as below     --Acute pyelonephritis.  Hydronephrosis and renal  following.  ?contributing to persistent fever;  Timing/option/threshold for intervention per urology     --Chronic urinary incontinence.  As above.     --Acute nausea/vomiting.  In setting of pyelonephritis and aspiration risk.     --Acute bilateral pneumonia.  Concern for aspiration as above, also with ongoing treatment for other HCAP organisms and S Pneumo Ag positive etc.   If significant pleural effusions evolve, you could give consideration to diagnostic/therapeutic thoracentesis etc.  Repeat CXR 8/30 noted     --Chronic dementia with acute metabolic encephalopathy     --Acute lactic acidosis     --Thrombocytopenia.  Monitor       PLAN:    --IV zosyn, doxycycline    -- >7 days vancomycin from her deterioration 8/30;  Vancomycin stopped 9/7;  If worse with this change then consider further workup and potentially empirically broadening abx again     --Check/review labs cultures and scans     --partial history per nursing     --Discussed with microbiology     --Highly complex set of issues with high risk for further serious morbidity and other serious sequela/mortality    --urology following      Deepak Ellsworth MD  9/7/2018

## 2018-09-07 NOTE — PROGRESS NOTES
Continued Stay Note  [facility]     Patient Name: Zainab Worthy  MRN: [id number]  Today's Date: 9/7/2018    Admit Date: 8/25/2018          Discharge Plan     Row Name 09/07/18 1633       Plan    Plan discharge plan    Plan Comments Pt remains on IV Zoysn.  [place] covering for Dasha Cannonows that pt will need a PICC line before returning to facility if remains on IV antibiotics. CM will follow up on Monday.               Discharge Codes    No documentation.       Expected Discharge Date and Time     Expected Discharge Date Expected Discharge Time    Sep 8, 2018             Rebecca Hines RN

## 2018-09-07 NOTE — THERAPY TREATMENT NOTE
Acute Care - Physical Therapy Treatment Note  [facility]     Patient Name: Zainab Worthy  : 2/3/1929  MRN: [id number]  Today's Date: 2018  Onset of Illness/Injury or Date of Surgery: 18  Date of Referral to PT: 18  Referring Physician: Dasha Lacy MD    Admit Date: 2018    Visit Dx:    ICD-10-CM ICD-9-CM   1. Sepsis, due to unspecified organism (CMS/HCC) A41.9 038.9     995.91   2. Aspiration pneumonia of both lower lobes due to gastric secretions (CMS/HCC) J69.0 507.0   3. Acute urinary tract infection N39.0 599.0   4. Hypoxia R09.02 799.02   5. Impaired functional mobility, balance, gait, and endurance Z74.09 V49.89   6. Pharyngeal dysphagia R13.13 787.23     Patient Active Problem List   Diagnosis   • Gonalgia   • Hypertrophic polyarthritis   • Allergic rhinitis, seasonal   • Skin growth   • Endometrial cancer (CMS/HCC)   • Post-operative state   • Rectal cancer (CMS/HCC)   • Hydronephrosis   • Hypertension   • Anorexia   • Onychomycosis   • IUD complication (CMS/HCC)   • Memory loss   • Urinary frequency   • Fall at home   • Closed fracture of pelvis (CMS/HCC)   • UTI (urinary tract infection)   • Fracture of femoral neck, right (CMS/HCC)   • Late onset Alzheimer's disease without behavioral disturbance   • Metastatic colon cancer in female (CMS/HCC)   • Recurrent falls   • Closed fracture of neck of right femur (CMS/HCC)   • Sepsis (CMS/HCC)   • Sepsis associated hypotension (CMS/HCC)   • Leukocytosis   • Normocytic anemia   • Thrombocytopenia (CMS/HCC)   • Lactic acidosis   • E coli bacteremia       Therapy Treatment          Rehabilitation Treatment Summary     Row Name 18 0900             Treatment Time/Intention    Discipline physical therapy assistant  -AS      Document Type therapy note (daily note)  -AS      Subjective Information no complaints  -AS      Mode of Treatment physical therapy  -AS      Patient/Family Observations no family at bedside, IV  -AS      Patient  Effort good  -AS      Existing Precautions/Restrictions fall;other (see comments)   dementia  -AS      Recorded by [AS] Alicia Tan, PTA 09/07/18 0956      Row Name 09/07/18 0900             Cognitive Assessment/Intervention- PT/OT    Affect/Mental Status (Cognitive) confused  -AS      Orientation Status (Cognition) oriented to;person;disoriented to;place;situation  -AS      Follows Commands (Cognition) follows one step commands;75-90% accuracy;verbal cues/prompting required;other (see comments)   [place]  -AS      Safety Deficit (Cognitive) moderate deficit;safety precautions awareness  -AS      Personal Safety Interventions fall prevention program maintained;gait belt;nonskid shoes/slippers when out of bed;other (see comments)   exit alarm  -AS      Recorded by [AS] Alicia Tan, PTA 09/07/18 0956      Row Name 09/07/18 0900             Bed Mobility Assessment/Treatment    Supine-Sit [place] (Bed Mobility) verbal cues;moderate assist (50% patient effort)  -AS      Sit-Supine [place] (Bed Mobility) verbal cues;maximum assist (25% patient effort)  -AS      Bed Mobility, Safety Issues decreased use of arms for pushing/pulling;decreased use of legs for bridging/pushing  -AS      Assistive Device (Bed Mobility) bed rails;head of bed elevated  -AS      Comment (Bed Mobility) verbal cues for sequencing, increased assist needed back to bed.  -AS      Recorded by [AS] Alicia Tan, PTA 09/07/18 0956      Row Name 09/07/18 0900             Sit-Stand Transfer    Sit-Stand [place] (Transfers) verbal cues;moderate assist (50% patient effort)  -AS      Assistive Device (Sit-Stand Transfers) walker, front-wheeled  -AS      Recorded by [AS] Alicia Tan, PTA 09/07/18 0956      Row Name 09/07/18 0900             Stand-Sit Transfer    Stand-Sit [place] (Transfers) verbal cues;moderate assist (50% patient effort)  -AS      Assistive Device (Stand-Sit Transfers) walker, front-wheeled  -AS       Recorded by [AS] Alicia Tan, PTA 09/07/18 0956      Row Name 09/07/18 0900             Motor Skills Assessment/Interventions    Additional Documentation Therapeutic Exercise (Group)  -AS      Recorded by [AS] Alicia Tan, PTA 09/07/18 0956      Row Name 09/07/18 0900             Therapeutic Exercise    30890 - PT Therapeutic Exercise Minutes 23  -AS      Recorded by [AS] Alicia Tan, [facility] 09/07/18 0956      Row Name 09/07/18 0900             Therapeutic Exercise    Lower Extremity (Therapeutic Exercise) heel slides, bilateral  -AS      Lower Extremity Range of Motion (Therapeutic Exercise) hip flexion/extension, bilateral;knee flexion/extension, bilateral;ankle dorsiflexion/plantar flexion, bilateral;hip abduction/adduction, bilateral;hip internal/external rotation, bilateral  -AS      Exercise Type (Therapeutic Exercise) AROM (active range of motion);AAROM (active assistive range of motion)  -AS      Position (Therapeutic Exercise) seated;supine  -AS      Sets/Reps (Therapeutic Exercise) 1/10  -AS      Recorded by [AS] Alicia Tan, [facility] 09/07/18 0956      Row Name 09/07/18 0900             Static Sitting Balance    Level of [place] (Unsupported Sitting, Static Balance) contact guard assist  -AS      Sitting Position (Unsupported Sitting, Static Balance) sitting on edge of bed  -AS      Time Able to Maintain Position (Unsupported Sitting, Static Balance) more than 5 minutes   performed B LE exercises EOB, back to bed do to needs x2.  -AS      Recorded by [AS] Alicia Tan, [facility] 09/07/18 0956      Row Name 09/07/18 0900             Positioning and Restraints    Pre-Treatment Position in bed  -AS      Post Treatment Position bed  -AS      In Bed supine;call light within reach;encouraged to call for assist;exit alarm on   back to bed do to need x2 for safe mobility  -AS      Recorded by [AS] Alicia Tan, PTA 09/07/18 0956      Row Name 09/07/18 0900             Pain  Scale: Numbers Pre/Post-Treatment    Pain Scale: Numbers, Pretreatment 0/10 - no pain  -AS      Pain Scale: Numbers, Post-Treatment 0/10 - no pain  -AS      Recorded by [AS] Alicia Tan, [facility] 09/07/18 0956        User Key  (r) = Recorded By, (t) = Taken By, (c) = Cosigned By    Initials Name Effective Dates Discipline    AS Alicia Tan, [facility] 06/22/15 -  PT                     Physical Therapy Education     Title: PT OT SLP Therapies (Active)     Topic: Physical Therapy (Active)     Point: Mobility training (Active)    Learning Progress Summary     Learner Status Readiness Method Response Comment Documented by    Patient Active Acceptance E NR  AS 09/07/18 0956     Active Acceptance E NR   09/06/18 1247     Active Acceptance E NR   09/05/18 1127     Active Acceptance E,D NR   09/03/18 1033     Done Acceptance E VU,NR  BR 08/28/18 1046          Point: Home exercise program (Active)    Learning Progress Summary     Learner Status Readiness Method Response Comment Documented by    Patient Active Acceptance E NR  AS 09/07/18 0956     Active Acceptance E NR   09/06/18 1247     Active Acceptance E NR   09/05/18 1127     Done Acceptance E VU,NR  BR 08/28/18 1046          Point: Body mechanics (Active)    Learning Progress Summary     Learner Status Readiness Method Response Comment Documented by    Patient Active Acceptance E NR  AS 09/07/18 0956     Active Acceptance E NR   09/06/18 1247     Active Acceptance E NR   09/05/18 1127     Done Acceptance E VU,NR  BR 08/28/18 1046          Point: Precautions (Active)    Learning Progress Summary     Learner Status Readiness Method Response Comment Documented by    Patient Active Acceptance E NR  AS 09/07/18 0956     Active Acceptance E NR   09/06/18 1247     Active Acceptance E NR   09/05/18 1127     Active Acceptance E,D NR   09/03/18 1033     Done Acceptance E VU,NR  BR 08/28/18 1046                      User Key     Initials Effective Dates  Name Provider Type Discipline     06/19/15 -  Janet Barone, PT Physical Therapist PT    AS 06/22/15 -  Alicia Tan, BETI Physical Therapy Assistant PT    LM 06/15/16 -  Sugey Reed, PT Physical Therapist PT    BR 06/16/16 -  Deborah Thomas RN Registered Nurse Nurse    BD 06/08/18 -  Deborah Perez, PT Physical Therapist PT                    PT Recommendation and Plan     Plan of Care Reviewed With: patient  Progress: improving  Outcome Summary: performed seated HEP at EOB, patient delcined OOB to chair, need of assist x2 for safe mobility back to bed.          Outcome Measures     Row Name 09/07/18 0900 09/06/18 1040 09/05/18 1053       How much help from another person do you currently need...    Turning from your back to your side while in flat bed without using bedrails? 2  -AS 3  -BD 2  -EH    Moving from lying on back to sitting on the side of a flat bed without bedrails? 2  -AS 3  -BD 2  -EH    Moving to and from a bed to a chair (including a wheelchair)? 2  -AS 2  -BD 2  -EH    Standing up from a chair using your arms (e.g., wheelchair, bedside chair)? 2  -AS 3  -BD 2  -EH    Climbing 3-5 steps with a railing? 1  -AS 1  -BD 1  -EH    To walk in hospital room? 2  -AS 2  -BD 1  -EH    AM-PAC 6 Clicks Score 11  -AS 14  -BD 10  -EH       Functional Assessment    Outcome Measure Options AM-PAC 6 Clicks Basic Mobility (PT)  -AS AM-PAC 6 Clicks Basic Mobility (PT)  -BD AM-PAC 6 Clicks Basic Mobility (PT)  -EH      User Key  (r) = Recorded By, (t) = Taken By, (c) = Cosigned By    Initials Name Provider Type     Janet Barone, PT Physical Therapist    AS Alicia Tan, BETI Physical Therapy Assistant    BD Deborah Perez, PT Physical Therapist           Time Calculation:         PT Charges     Row Name 09/07/18 0900             Time Calculation    Start Time 0900  -AS      PT Received On 09/07/18  -AS      PT Goal Re-Cert Due Date 09/13/18  -AS         Timed Charges     77362 - PT Therapeutic Exercise Minutes 23  -AS        User Key  (r) = Recorded By, (t) = Taken By, (c) = Cosigned By    Initials Name Provider Type    AS Alicia Tan, BETI Physical Therapy Assistant        Therapy Suggested Charges     Code   Minutes Charges    15284 (CPT®) Hc Pt Neuromusc Re Education Ea 15 Min      72920 (CPT®) Hc Pt Ther Proc Ea 15 Min 23 2    92787 (CPT®) Hc Gait Training Ea 15 Min      05744 (CPT®) Hc Pt Therapeutic Act Ea 15 Min      94926 (CPT®) Hc Pt Manual Therapy Ea 15 Min      54396 (CPT®) Hc Pt Iontophoresis Ea 15 Min      11687 (CPT®) Hc Pt Elec Stim Ea-Per 15 Min      12019 (CPT®) Hc Pt Ultrasound Ea 15 Min      98278 (CPT®) Hc Pt Self Care/Mgmt/Train Ea 15 Min      06123 (CPT®) Hc Pt Prosthetic (S) Train Initial Encounter, Each 15 Min      33586 (CPT®) Hc Pt Orthotic(S)/Prosthetic(S) Encounter, Each 15 Min      79231 (CPT®) Hc Orthotic(S) Mgmt/Train Initial Encounter, Each 15min      Total  23 2        Therapy Charges for Today     Code Description Service Date Service Provider Modifiers Qty    67582929517 HC PT THER PROC EA 15 MIN 9/7/2018 Alicia Tan, BETI GP 2          PT G-Codes  Outcome Measure Options: AM-PAC 6 Clicks Basic Mobility (PT)  AM-PAC 6 Clicks Score: 11    Alicia Tan PTA  9/7/2018

## 2018-09-07 NOTE — PROGRESS NOTES
[facility] Medicine Services  PROGRESS NOTE    Patient Name: Zainab Worthy  : 2/3/1929  MRN: [id number]    Date of Admission: 2018  Length of Stay: 13  Primary Care Physician: Aguilar Kelly MD    Subjective     CC: sepsis    HPI:  Resting in a chair in no acute distress. No skin changes yesterday.  No significant change since yesterday.    Review of Systems  Unable to obtain.        Objective     Vital Signs:   Temp:  [97.8 °F (36.6 °C)-98.1 °F (36.7 °C)] 98.1 °F (36.7 °C)  Heart Rate:  [88-98] 88  Resp:  [16] 16  BP: (132)/(65-66) 132/65        Physical Exam:  Constitutional: No acute distress, awake, alert   HENT: NCAT, mucous membranes moist  Respiratory: Clear to auscultation bilaterally, respiratory effort normal on room air  Cardiovascular: rrr, no murmurs, on tele  Gastrointestinal: Positive bowel sounds, soft, nontender, nondistended  Musculoskeletal: No bilateral ankle edema  Psychiatric: sleeping but appropriate when wakes  Neurologic: confused but follows commands. No focal deficits.  Skin: No rashes    Results Reviewed:  I have personally reviewed current lab, radiology, and data and agree.      Results from last 7 days  Lab Units 18  0900   WBC 10*3/mm3 9.94   HEMOGLOBIN g/dL 10.6*   HEMATOCRIT % 33.1*   PLATELETS 10*3/mm3 349       Results from last 7 days  Lab Units 18  0833 18  0918   SODIUM mmol/L 139 137   POTASSIUM mmol/L 3.8 4.3   CHLORIDE mmol/L 105 104   CO2 mmol/L 27.0 27.0   BUN mg/dL 13 15   CREATININE mg/dL 0.49* 0.47*   GLUCOSE mg/dL 119* 116*   CALCIUM mg/dL 9.0 8.2*   ALT (SGPT) U/L 18  --    AST (SGOT) U/L 16  --         Estimated Creatinine Clearance: 34.3 mL/min (A) (by C-G formula based on SCr of 0.49 mg/dL (L)).  No results found for: BNP    Microbiology Results Abnormal     Procedure Component Value - Date/Time    Legionella Antigen, Urine - Urine, Urine, Clean Catch [[id number]] Collected:  18 1346    Lab Status:   Final result Specimen:  Urine from Urine, Clean Catch Updated:  09/05/18 1520     L. pneumophila Serogp 1 Ur Ag Negative     Comment: Presumptive negative for L. pneumophila serogroup 1 antigen in urine,  suggesting no recent or current infection. Legionnaires' disease  cannot be ruled out since other serogroups and species may also cause  disease.  POS Step. Pneumo reported to [facility] on 9/5/18 at 2:30.  Fax sent to [phone] AV.       Narrative:       Performed at:  [address] [place] [address]  [id number]  : Rik Leos MD, Phone:  [phone]    S. Pneumo Ag Urine or CSF - Urine, Urine, Clean Catch [[id number]]  (Abnormal) Collected:  09/02/18 1346    Lab Status:  Final result Specimen:  Urine from Urine, Clean Catch Updated:  09/05/18 1310     Specimen Source Urine     STREP PNEUMONIAE ANTIGEN Positive (A)     Body Fluid Culture, Sterile Not Indicated     Organism ID Not indicated.     Please note Comment     Comment: College of [employer]ologists standards require a culture to be  performed on CSF specimens submitted for bacterial antigen testing.  (CAP [id number]) Urine specimens will not be cultured.       Narrative:       Performed at:  [address] [place] [address]  [id number]  : Rik Leos MD, Phone:  [phone]    Blood Culture - Blood, [[id number]]  (Normal) Collected:  08/30/18 0849    Lab Status:  Final result Specimen:  Blood from Arm, Right Updated:  09/04/18 0945     Blood Culture No growth at 5 days    Blood Culture - Blood, [[id number]]  (Normal) Collected:  08/30/18 0859    Lab Status:  Final result Specimen:  Blood from Hand, Right Updated:  09/04/18 0945     Blood Culture No growth at 5 days    Urine Culture - Urine, [[id number]]  (Normal) Collected:  08/30/18 1627    Lab Status:  Final result Specimen:  Urine from Urine, Clean Catch Updated:  09/01/18 0740     Urine Culture No growth at 2 days     Legionella Antigen, Urine - Urine, Urine, Clean Catch [[id number]] Collected:  08/25/18 0526    Lab Status:  Final result Specimen:  Urine from Urine, Clean Catch Updated:  08/29/18 1518     L. pneumophila Serogp 1 Ur Ag Negative     Comment: Presumptive negative for L. pneumophila serogroup 1 antigen in urine,  suggesting no recent or current infection. Legionnaires' disease  cannot be ruled out since other serogroups and species may also cause  disease.       Narrative:       Performed at:  [address] [place] [address]  [id number]  : Rik Leos MD, Phone:  [phone]    S. Pneumo Ag Urine or CSF - Urine, Urine, Clean Catch [[id number]] Collected:  08/25/18 0526    Lab Status:  Final result Specimen:  Urine from Urine, Clean Catch Updated:  08/29/18 1518     Specimen Source Urine     STREP PNEUMONIAE ANTIGEN Negative     Body Fluid Culture, Sterile Not Indicated     Organism ID Not indicated.     Please note Comment     Comment: College of American Pathologists standards require a culture to be  performed on CSF specimens submitted for bacterial antigen testing.  (CAP [id number]) Urine specimens will not be cultured.       Narrative:       Performed at:  [address] [place] [address]  [id number]  : Rik Leos MD, Phone:  [phone]    Urine Culture - Urine, [[id number]]  (Abnormal)  (Susceptibility) Collected:  08/25/18 0526    Lab Status:  Final result Specimen:  Urine from Urine, Catheter Updated:  08/27/18 1413     Urine Culture >100,000 CFU/mL Escherichia coli (A)    Susceptibility      Escherichia coli     KANIKA     Ampicillin >16 ug/ml Resistant     Ampicillin + Sulbactam >16/8 ug/ml Resistant     Aztreonam <=8 ug/ml Susceptible     Cefepime <=8 ug/ml Susceptible     Cefotaxime <=2 ug/ml Susceptible     Ceftriaxone <=8 ug/ml Susceptible     Cefuroxime sodium 8 ug/ml Susceptible     Cephalothin >16 ug/ml Resistant      Ertapenem <=1 ug/ml Susceptible     Gentamicin <=4 ug/ml Susceptible     Levofloxacin <=2 ug/ml Susceptible     Meropenem <=1 ug/ml Susceptible     Nitrofurantoin <=32 ug/ml Susceptible     Piperacillin + Tazobactam <=16 ug/ml Susceptible     Tetracycline >8 ug/ml Resistant     Tobramycin <=4 ug/ml Susceptible     Trimethoprim + Sulfamethoxazole <=2/38 ug/ml Susceptible                    Blood Culture - Blood, [[id number]]  (Abnormal) Collected:  08/25/18 0520    Lab Status:  Final result Specimen:  Blood from Arm, Right Updated:  08/27/18 1157     Blood Culture Gram Negative Bacilli (A)     Isolated from Aerobic and Anaerobic Bottles     Gram Stain Result Anaerobic Bottle Gram negative bacilli      Aerobic Bottle Gram negative bacilli    Narrative:       SEE CULTURE [id number] FOR ID AND SUSCEPTIBILITIES    Blood Culture - Blood, [[id number]]  (Abnormal)  (Susceptibility) Collected:  08/25/18 0520    Lab Status:  Final result Specimen:  Blood from Arm, Left Updated:  08/27/18 1156     Blood Culture Escherichia coli (A)     Isolated from Aerobic and Anaerobic Bottles     Gram Stain Result Anaerobic Bottle Gram negative bacilli      Aerobic Bottle Gram negative bacilli    Susceptibility      Escherichia coli     KANIKA     Ampicillin >16 ug/ml Resistant     Ampicillin + Sulbactam 16/8 ug/ml Intermediate     Aztreonam <=8 ug/ml Susceptible     Cefepime <=8 ug/ml Susceptible     Cefotaxime <=2 ug/ml Susceptible     Ceftriaxone <=8 ug/ml Susceptible     Cefuroxime sodium 16 ug/ml Intermediate     Ertapenem <=1 ug/ml Susceptible     Gentamicin <=4 ug/ml Susceptible     Levofloxacin <=2 ug/ml Susceptible     Meropenem <=1 ug/ml Susceptible     Piperacillin + Tazobactam <=16 ug/ml Susceptible     Tetracycline >8 ug/ml Resistant     Tobramycin <=4 ug/ml Susceptible     Trimethoprim + Sulfamethoxazole <=2/38 ug/ml Susceptible                    Blood Culture ID, PCR - Blood, [[id number]]  (Abnormal) Collected:  08/25/18 0520    Lab  Status:  Final result Specimen:  Blood from Arm, Left Updated:  08/25/18 2045     BCID, PCR Escherichia coli. Identification by BCID PCR. (C)    MRSA Screen, PCR - Swab, Nares [[id number]]  (Normal) Collected:  08/25/18 1714    Lab Status:  Final result Specimen:  Swab from Nares Updated:  08/25/18 1859     MRSA, PCR Negative    Narrative:         MRSA Negative        Imaging Results (last 24 hours)     ** No results found for the last 24 hours. **        I have reviewed the medications.      donepezil 10 mg Oral Nightly   doxycycline 100 mg Oral Q12H   famotidine 20 mg Oral Daily   heparin (porcine) 5,000 Units Subcutaneous Q12H   memantine 10 mg Oral Q12H   mirtazapine 15 mg Oral Nightly   nystatin  Topical Q12H   oxybutynin 5 mg Oral TID   piperacillin-tazobactam 3.375 g Intravenous Q8H     Assessment / Plan     Hospital Problem List     * (Principal)Sepsis (CMS/HCC)    Endometrial cancer (CMS/HCC)    UTI (urinary tract infection)    Late onset Alzheimer's disease without behavioral disturbance    Sepsis associated hypotension (CMS/HCC)    Leukocytosis    Normocytic anemia    Thrombocytopenia (CMS/HCC)    Lactic acidosis    E coli bacteremia        Brief Hospital Course to date:  Zainab Worthy is a 89 y.o. female w/ PMH of normocytic anemia, dementia, HTN, and remote h/o endometrial cancer who resides at the [facility] and presented with sepsis (fever, leukocytosis) due to aspiration PNA and UTI. Patient found to have E coli bacteremia as well, suspect this is from urinary source.      Plan:  - Appreciate ID evaluation. Doxy/vanc for now with 1 time dose of micafungin given 8/30. Final abx duration to be decided by ID.   -- no fevers in last 72h- have sent recultures of blood/urine NGTD. CXR with concern for possible aspiration, continue with ongoing CAP coverage.  Monitor cultures  - UCx  >100K Ecoli. Sensitivities reviewed  Blood Cx with Ecoli. ABX per ID. Urology consulted given hydronephrosis - per Dr. Rankin,  patient has a known right UPJ obstruction, likely congenital   - Continue to hold antihypertensives, she is normotensive at this time  - Thrombocytopenia, likely related to sepsis. Resolved.   - Continue home meds for dementia.   - PRN for Hgb < 7.   - Replete K+ PRN.    PLAN:  - cont current care  - back to [facility] soon, when stable and OK with ID.    DVT prophylaxis: LATHA  CODE STATUS:   Code Status and Medical Interventions:   Ordered at: 08/25/18 1141     Level Of Support Discussed With:    Next of Kin (If No Surrogate)     Code Status:    CPR     Medical Interventions (Level of Support Prior to Arrest):    Full     Disposition: back to [facility] when ok withg ID  Electronically signed by Michi Colby MD, 09/07/18, 4:59 PM.

## 2018-09-07 NOTE — PLAN OF CARE
Problem: Patient Care Overview  Goal: Plan of Care Review  Outcome: Ongoing (interventions implemented as appropriate)   09/07/18 9630   Coping/Psychosocial   Plan of Care Reviewed With patient   Plan of Care Review   Progress improving   OTHER   Outcome Summary performed seated HEP at EOB, patient delcined OOB to chair, need of assist x2 for safe mobility back to bed.

## 2018-09-08 LAB
ALBUMIN SERPL-MCNC: 2.88 G/DL (ref 3.2–4.8)
ALBUMIN/GLOB SERPL: 1.3 G/DL (ref 1.5–2.5)
ALP SERPL-CCNC: 160 U/L (ref 25–100)
ALT SERPL W P-5'-P-CCNC: 14 U/L (ref 7–40)
ANION GAP SERPL CALCULATED.3IONS-SCNC: 9 MMOL/L (ref 3–11)
AST SERPL-CCNC: 15 U/L (ref 0–33)
BILIRUB SERPL-MCNC: 0.4 MG/DL (ref 0.3–1.2)
BUN BLD-MCNC: 15 MG/DL (ref 9–23)
BUN/CREAT SERPL: 31.3 (ref 7–25)
CALCIUM SPEC-SCNC: 8.2 MG/DL (ref 8.7–10.4)
CHLORIDE SERPL-SCNC: 100 MMOL/L (ref 99–109)
CO2 SERPL-SCNC: 27 MMOL/L (ref 20–31)
CREAT BLD-MCNC: 0.48 MG/DL (ref 0.6–1.3)
DEPRECATED RDW RBC AUTO: 43.9 FL (ref 37–54)
ERYTHROCYTE [DISTWIDTH] IN BLOOD BY AUTOMATED COUNT: 14.3 % (ref 11.3–14.5)
GFR SERPL CREATININE-BSD FRML MDRD: 122 ML/MIN/1.73
GLOBULIN UR ELPH-MCNC: 2.2 GM/DL
GLUCOSE BLD-MCNC: 108 MG/DL (ref 70–100)
HCT VFR BLD AUTO: 31.2 % (ref 34.5–44)
HGB BLD-MCNC: 9.9 G/DL (ref 11.5–15.5)
MCH RBC QN AUTO: 27 PG (ref 27–31)
MCHC RBC AUTO-ENTMCNC: 31.7 G/DL (ref 32–36)
MCV RBC AUTO: 85.2 FL (ref 80–99)
PLATELET # BLD AUTO: 345 10*3/MM3 (ref 150–450)
PMV BLD AUTO: 9.5 FL (ref 6–12)
POTASSIUM BLD-SCNC: 4 MMOL/L (ref 3.5–5.5)
PROT SERPL-MCNC: 5.1 G/DL (ref 5.7–8.2)
RBC # BLD AUTO: 3.66 10*6/MM3 (ref 3.89–5.14)
SODIUM BLD-SCNC: 136 MMOL/L (ref 132–146)
WBC NRBC COR # BLD: 10.46 10*3/MM3 (ref 3.5–10.8)

## 2018-09-08 PROCEDURE — 85027 COMPLETE CBC AUTOMATED: CPT | Performed by: INTERNAL MEDICINE

## 2018-09-08 PROCEDURE — 25010000002 PIPERACILLIN SOD-TAZOBACTAM PER 1 G: Performed by: INTERNAL MEDICINE

## 2018-09-08 PROCEDURE — 25010000002 HEPARIN (PORCINE) PER 1000 UNITS

## 2018-09-08 PROCEDURE — 99232 SBSQ HOSP IP/OBS MODERATE 35: CPT | Performed by: INTERNAL MEDICINE

## 2018-09-08 PROCEDURE — 80053 COMPREHEN METABOLIC PANEL: CPT | Performed by: INTERNAL MEDICINE

## 2018-09-08 RX ADMIN — TAZOBACTAM SODIUM AND PIPERACILLIN SODIUM 3.38 G: 375; 3 INJECTION, SOLUTION INTRAVENOUS at 09:11

## 2018-09-08 RX ADMIN — OXYBUTYNIN CHLORIDE 5 MG: 5 TABLET ORAL at 09:11

## 2018-09-08 RX ADMIN — MEMANTINE 10 MG: 10 TABLET ORAL at 21:10

## 2018-09-08 RX ADMIN — FAMOTIDINE 20 MG: 20 TABLET ORAL at 09:11

## 2018-09-08 RX ADMIN — OXYBUTYNIN CHLORIDE 5 MG: 5 TABLET ORAL at 16:04

## 2018-09-08 RX ADMIN — TAZOBACTAM SODIUM AND PIPERACILLIN SODIUM 3.38 G: 375; 3 INJECTION, SOLUTION INTRAVENOUS at 16:04

## 2018-09-08 RX ADMIN — DOXYCYCLINE 100 MG: 100 CAPSULE ORAL at 09:11

## 2018-09-08 RX ADMIN — OXYBUTYNIN CHLORIDE 5 MG: 5 TABLET ORAL at 21:10

## 2018-09-08 RX ADMIN — MIRTAZAPINE 15 MG: 15 TABLET, FILM COATED ORAL at 21:10

## 2018-09-08 RX ADMIN — DONEPEZIL HYDROCHLORIDE 10 MG: 10 TABLET, FILM COATED ORAL at 21:10

## 2018-09-08 RX ADMIN — HEPARIN SODIUM 5000 UNITS: 5000 INJECTION, SOLUTION INTRAVENOUS; SUBCUTANEOUS at 21:10

## 2018-09-08 RX ADMIN — NYSTATIN: 100000 POWDER TOPICAL at 21:00

## 2018-09-08 RX ADMIN — DOXYCYCLINE 100 MG: 100 CAPSULE ORAL at 21:10

## 2018-09-08 RX ADMIN — NYSTATIN: 100000 POWDER TOPICAL at 09:12

## 2018-09-08 RX ADMIN — TAZOBACTAM SODIUM AND PIPERACILLIN SODIUM 3.38 G: 375; 3 INJECTION, SOLUTION INTRAVENOUS at 01:30

## 2018-09-08 RX ADMIN — MEMANTINE 10 MG: 10 TABLET ORAL at 09:11

## 2018-09-08 RX ADMIN — HEPARIN SODIUM 5000 UNITS: 5000 INJECTION, SOLUTION INTRAVENOUS; SUBCUTANEOUS at 09:11

## 2018-09-08 NOTE — PROGRESS NOTES
[facility] Medicine Services  PROGRESS NOTE    Patient Name: Zainab Worthy  : 2/3/1929  MRN: [id number]    Date of Admission: 2018  Length of Stay: 14  Primary Care Physician: Aguilar Kelly MD    Subjective     CC: sepsis    HPI:  Resting in a chair in no acute distress. She is more animated and comfortable today.  Tells me she is fine and she needs nothing.      Review of Systems  Unable to obtain.        Objective     Vital Signs:   Temp:  [97.9 °F (36.6 °C)-99.5 °F (37.5 °C)] 97.9 °F (36.6 °C)  Heart Rate:  [] 90  Resp:  [16] 16  BP: (126-164)/(64-78) 126/64        Physical Exam:  Constitutional: No acute distress, awake, alert   HENT: NCAT, mucous membranes moist  Respiratory: Clear to auscultation bilaterally, respiratory effort normal on room air  Cardiovascular: rrr, no murmurs, on tele  Gastrointestinal: Positive bowel sounds, soft, nontender, nondistended  Musculoskeletal: No bilateral ankle edema  Psychiatric: sleeping but appropriate when wakes  Neurologic: confused but follows commands. No focal deficits.  Skin: No rashes    Results Reviewed:  I have personally reviewed current lab, radiology, and data and agree.      Results from last 7 days  Lab Units 18  0442 18  0900   WBC 10*3/mm3 10.46 9.94   HEMOGLOBIN g/dL 9.9* 10.6*   HEMATOCRIT % 31.2* 33.1*   PLATELETS 10*3/mm3 345 349       Results from last 7 days  Lab Units 18  0442 18  0833 18  0918   SODIUM mmol/L 136 139 137   POTASSIUM mmol/L 4.0 3.8 4.3   CHLORIDE mmol/L 100 105 104   CO2 mmol/L 27.0 27.0 27.0   BUN mg/dL 15 13 15   CREATININE mg/dL 0.48* 0.49* 0.47*   GLUCOSE mg/dL 108* 119* 116*   CALCIUM mg/dL 8.2* 9.0 8.2*   ALT (SGPT) U/L 14 18  --    AST (SGOT) U/L 15 16  --         Estimated Creatinine Clearance: 34.3 mL/min (A) (by C-G formula based on SCr of 0.48 mg/dL (L)).  No results found for: BNP    Microbiology Results Abnormal     Procedure Component Value -  Date/Time    Legionella Antigen, Urine - Urine, Urine, Clean Catch [[id number]] Collected:  09/02/18 1346    Lab Status:  Final result Specimen:  Urine from Urine, Clean Catch Updated:  09/05/18 1520     L. pneumophila Serogp 1 Ur Ag Negative     Comment: Presumptive negative for L. pneumophila serogroup 1 antigen in urine,  suggesting no recent or current infection. Legionnaires' disease  cannot be ruled out since other serogroups and species may also cause  disease.  POS Step. Pneumo reported to [facility] on 9/5/18 at 2:30.  Fax sent to [phone] AV.       Narrative:       Performed at:  [address] [place] [address]  [id number]  : Rik Leso MD, Phone:  [phone]    S. Pneumo Ag Urine or CSF - Urine, Urine, Clean Catch [[id number]]  (Abnormal) Collected:  09/02/18 1346    Lab Status:  Final result Specimen:  Urine from Urine, Clean Catch Updated:  09/05/18 1310     Specimen Source Urine     STREP PNEUMONIAE ANTIGEN Positive (A)     Body Fluid Culture, Sterile Not Indicated     Organism ID Not indicated.     Please note Comment     Comment: College of [employer] Pathologists standards require a culture to be  performed on CSF specimens submitted for bacterial antigen testing.  (CAP [id number]) Urine specimens will not be cultured.       Narrative:       Performed at:  [address] [place] [address]  [id number]  : Rik Leos MD, Phone:  [phone]    Blood Culture - Blood, [[id number]]  (Normal) Collected:  08/30/18 0849    Lab Status:  Final result Specimen:  Blood from Arm, Right Updated:  09/04/18 0945     Blood Culture No growth at 5 days    Blood Culture - Blood, [[id number]]  (Normal) Collected:  08/30/18 0859    Lab Status:  Final result Specimen:  Blood from Hand, Right Updated:  09/04/18 0945     Blood Culture No growth at 5 days    Urine Culture - Urine, [[id number]]  (Normal) Collected:  08/30/18 1627    Lab Status:   Final result Specimen:  Urine from Urine, Clean Catch Updated:  09/01/18 0740     Urine Culture No growth at 2 days    Legionella Antigen, Urine - Urine, Urine, Clean Catch [[id number]] Collected:  08/25/18 0526    Lab Status:  Final result Specimen:  Urine from Urine, Clean Catch Updated:  08/29/18 1518     L. pneumophila Serogp 1 Ur Ag Negative     Comment: Presumptive negative for L. pneumophila serogroup 1 antigen in urine,  suggesting no recent or current infection. Legionnaires' disease  cannot be ruled out since other serogroups and species may also cause  disease.       Narrative:       Performed at:  [facility]  [id number]  : Rik Leos MD, Phone:  [phone]    S. Pneumo Ag Urine or CSF - Urine, Urine, Clean Catch [[id number]] Collected:  08/25/18 0526    Lab Status:  Final result Specimen:  Urine from Urine, Clean Catch Updated:  08/29/18 1518     Specimen Source Urine     STREP PNEUMONIAE ANTIGEN Negative     Body Fluid Culture, Sterile Not Indicated     Organism ID Not indicated.     Please note Comment     Comment: College of [employer] Pathologists standards require a culture to be  performed on CSF specimens submitted for bacterial antigen testing.  (CAP [id number]) Urine specimens will not be cultured.       Narrative:       Performed at:  [address] [place] [address]  [id number]  : Rik Leos MD, Phone:  [phone]    Urine Culture - Urine, [[id number]]  (Abnormal)  (Susceptibility) Collected:  08/25/18 0526    Lab Status:  Final result Specimen:  Urine from Urine, Catheter Updated:  08/27/18 1413     Urine Culture >100,000 CFU/mL Escherichia coli (A)    Susceptibility      Escherichia coli     KANIKA     Ampicillin >16 ug/ml Resistant     Ampicillin + Sulbactam >16/8 ug/ml Resistant     Aztreonam <=8 ug/ml Susceptible     Cefepime <=8 ug/ml Susceptible     Cefotaxime <=2 ug/ml Susceptible      Ceftriaxone <=8 ug/ml Susceptible     Cefuroxime sodium 8 ug/ml Susceptible     Cephalothin >16 ug/ml Resistant     Ertapenem <=1 ug/ml Susceptible     Gentamicin <=4 ug/ml Susceptible     Levofloxacin <=2 ug/ml Susceptible     Meropenem <=1 ug/ml Susceptible     Nitrofurantoin <=32 ug/ml Susceptible     Piperacillin + Tazobactam <=16 ug/ml Susceptible     Tetracycline >8 ug/ml Resistant     Tobramycin <=4 ug/ml Susceptible     Trimethoprim + Sulfamethoxazole <=2/38 ug/ml Susceptible                    Blood Culture - Blood, [[id number]]  (Abnormal) Collected:  08/25/18 0520    Lab Status:  Final result Specimen:  Blood from Arm, Right Updated:  08/27/18 1157     Blood Culture Gram Negative Bacilli (A)     Isolated from Aerobic and Anaerobic Bottles     Gram Stain Result Anaerobic Bottle Gram negative bacilli      Aerobic Bottle Gram negative bacilli    Narrative:       SEE CULTURE [id number] FOR ID AND SUSCEPTIBILITIES    Blood Culture - Blood, [[id number]]  (Abnormal)  (Susceptibility) Collected:  08/25/18 0520    Lab Status:  Final result Specimen:  Blood from Arm, Left Updated:  08/27/18 1156     Blood Culture Escherichia coli (A)     Isolated from Aerobic and Anaerobic Bottles     Gram Stain Result Anaerobic Bottle Gram negative bacilli      Aerobic Bottle Gram negative bacilli    Susceptibility      Escherichia coli     KANIKA     Ampicillin >16 ug/ml Resistant     Ampicillin + Sulbactam 16/8 ug/ml Intermediate     Aztreonam <=8 ug/ml Susceptible     Cefepime <=8 ug/ml Susceptible     Cefotaxime <=2 ug/ml Susceptible     Ceftriaxone <=8 ug/ml Susceptible     Cefuroxime sodium 16 ug/ml Intermediate     Ertapenem <=1 ug/ml Susceptible     Gentamicin <=4 ug/ml Susceptible     Levofloxacin <=2 ug/ml Susceptible     Meropenem <=1 ug/ml Susceptible     Piperacillin + Tazobactam <=16 ug/ml Susceptible     Tetracycline >8 ug/ml Resistant     Tobramycin <=4 ug/ml Susceptible     Trimethoprim + Sulfamethoxazole <=2/38 ug/ml  Susceptible                    Blood Culture ID, PCR - Blood, [[id number]]  (Abnormal) Collected:  08/25/18 0520    Lab Status:  Final result Specimen:  Blood from Arm, Left Updated:  08/25/18 2045     BCID, PCR Escherichia coli. Identification by BCID PCR. (C)    MRSA Screen, PCR - Swab, Nares [[id number]]  (Normal) Collected:  08/25/18 1714    Lab Status:  Final result Specimen:  Swab from Nares Updated:  08/25/18 1859     MRSA, PCR Negative    Narrative:         MRSA Negative        Imaging Results (last 24 hours)     ** No results found for the last 24 hours. **        I have reviewed the medications.      donepezil 10 mg Oral Nightly   doxycycline 100 mg Oral Q12H   famotidine 20 mg Oral Daily   heparin (porcine) 5,000 Units Subcutaneous Q12H   memantine 10 mg Oral Q12H   mirtazapine 15 mg Oral Nightly   nystatin  Topical Q12H   oxybutynin 5 mg Oral TID   piperacillin-tazobactam 3.375 g Intravenous Q8H     Assessment / Plan     Hospital Problem List     * (Principal)Sepsis (CMS/HCC)    Endometrial cancer (CMS/HCC)    UTI (urinary tract infection)    Late onset Alzheimer's disease without behavioral disturbance    Sepsis associated hypotension (CMS/HCC)    Leukocytosis    Normocytic anemia    Thrombocytopenia (CMS/HCC)    Lactic acidosis    E coli bacteremia        Brief Hospital Course to date:  Zainab Worthy is a 89 y.o. female w/ PMH of normocytic anemia, dementia, HTN, and remote h/o endometrial cancer who resides at the [facility] and presented with sepsis (fever, leukocytosis) due to aspiration PNA and UTI. Patient found to have E coli bacteremia as well, suspect this is from urinary source.      Plan:  - Appreciate ID evaluation. Doxy/vanc for now with 1 time dose of micafungin given 8/30. Final abx duration to be decided by ID.   -- no fevers in last 72h- have sent recultures of blood/urine NGTD. CXR with concern for possible aspiration, continue with ongoing CAP coverage.  Monitor cultures  - UCx  >100K  Ecoli. Sensitivities reviewed  Blood Cx with Ecoli. ABX per ID. Urology consulted given hydronephrosis - per Dr. Rankin, patient has a known right UPJ obstruction, likely congenital   - Continue to hold antihypertensives, she is normotensive at this time  - Thrombocytopenia, likely related to sepsis. Resolved.   - Continue home meds for dementia.   - PRN for Hgb < 7.   - Replete K+ PRN.    PLAN:  - cont current care  - back to [facility] soon, when stable and OK with ID.    DVT prophylaxis: LATHA  CODE STATUS:   Code Status and Medical Interventions:   Ordered at: 08/25/18 1141     Level Of Support Discussed With:    Next of Kin (If No Surrogate)     Code Status:    CPR     Medical Interventions (Level of Support Prior to Arrest):    Full     Disposition: back to [facility] when ok withg ID  Electronically signed by Michi Colby MD, 09/08/18, 4:27 PM.

## 2018-09-08 NOTE — PROGRESS NOTES
[facility] Progress Note        Antibiotics:  Anti-Infectives     Ordered     Dose/Rate Route Frequency Start Stop    18 1411  vancomycin (VANCOCIN) in iso-osmotic dextrose IVPB 1 g (premix) 200 mL     Beata Watkins RPH reviewed the order on 18 1056.   Ordering Provider:  Beata Watkins RPH    1,000 mg  over 60 Minutes Intravenous Daily 18 0900 18 1246    18 0749  micafungin 100 mg/100 mL 0.9% NS IVPB (mbp)     Ordering Provider:  Deepak Ellsworth MD    100 mg  over 60 Minutes Intravenous Once 18 0900 18 1005    18 0749  vancomycin (VANCOCIN) in iso-osmotic dextrose IVPB 1 g (premix) 200 mL     Ordering Provider:  Deepak Ellsworth MD    1 g Intravenous Once 18 0900 18 1024    18 0714  piperacillin-tazobactam (ZOSYN) 3.375 g in iso-osmotic dextrose 50 ml (premix)     Ordering Provider:  Deepak Ellsworth MD    3.375 g  over 4 Hours Intravenous Every 8 Hours 18 1700 18 1659    18 0714  piperacillin-tazobactam (ZOSYN) 3.375 g in iso-osmotic dextrose 50 ml (premix)     Ordering Provider:  Deepak Ellsworth MD    3.375 g  over 30 Minutes Intravenous Once 18 0900 18 0849    18 0715  doxycycline (MONODOX) capsule 100 mg     Deepak Ellsworth MD let the order  on 18 0845.   Ordering Provider:  Deepak Ellsworth MD    100 mg Oral Every 12 Hours Scheduled 18 0900 09/10/18 0844    18 0934  meropenem (MERREM) 1 g/100 mL 0.9% NS VTB (mbp)     Ordering Provider:  Deepak Ellsworth MD    1 g  over 30 Minutes Intravenous Once 18 1030 18 1144    18 0532  piperacillin-tazobactam (ZOSYN) 3.375 g in iso-osmotic dextrose 50 ml (premix)     Ordering Provider:  Latoya Petersen MD    3.375 g  100 mL/hr over 30 Minutes Intravenous Once 18 0534 18 0613    18 0532  vancomycin (VANCOCIN) in iso-osmotic dextrose IVPB 1 g (premix) 200 mL     Ordering Provider:   "Latoya Petersen MD    20 mg/kg × 52.9 kg  200 mL/hr over 60 Minutes Intravenous Once 08/25/18 0534 08/25/18 0717          CC: fever    HPI:  Patient is a 89 y.o.  Yr old female with history of dementia, chronically wheelchair bound after hip fracture February 2018 and resides at the [facility].  Chronic urinary incontinence per son and sent to the emergency room August 25 with mental status worse from baseline with encephalopathy, nausea/vomiting and fever to 103 with concern for UTI and aspiration.  She was initially hypotensive per records, with tachycardia and leukocytosis including high pro-calcitonin/lactate consistent with acute severe sepsis and subsequently found to have gram-negative ynes bacteremia and abnormal urinalysis.  Chest x-ray with bibasilar consolidation concerning for aspiration.     Patient with severe dementia and encephalopathy and not cooperative with details of history or review of systems.  Son reports no preceding symptoms of headache/photophobia or neck stiffness, no hemoptysis or other bleeding and no diarrhea/abdominal pain.     Son reports that she has not seen urology that he knows of.  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  Per nursing, no new focal pain and  no diarrhea, MS stable and resp stable with no new distress with stable hemodynamics and no N/V/D or ADR to abx;  No other new focal pain.     Otherwise complete review of systems unable to obtain.    ROS:      9/8/18 per nursing, No f/c/s. No n/v/d. No rash. No new ADR to Abx.     Otherwise unable to obtain as above    PE: nursing/chaperone present  /64 (BP Location: Right arm, Patient Position: Lying)   Pulse 90   Temp 97.9 °F (36.6 °C) (Oral)   Resp 16   Ht 152.4 cm (60\")   Wt 45.6 kg (100 lb 9.6 oz)   SpO2 97%   BMI 19.65 kg/m² "     GENERAL: sleepy, in no acute distress.   HEENT: Normocephalic, atraumatic.   No conjunctival injection. No icterus. Oropharynx clear without evidence of thrush or exudate. No evidence of peridontal disease.    NECK: Supple without nuchal rigidity. No mass.  LYMPH: No cervical, axillary or inguinal lymphadenopathy.  HEART: RRR; No murmur, rubs, gallops.   LUNGS: Diminished at bases with scattered rhonchi/crackles. Normal respiratory effort. Nonlabored. No dullness.  ABDOMEN: Soft, slight suprapubic tenderness but no CVA tenderness, nondistended. Positive bowel sounds. No rebound or guarding. NO mass or HSM.  MSK: FROM without joint effusions noted arms/legs.    SKIN: Warm and dry without cutaneous eruptions on Inspection/palpation.       No peripheral stigmata/phenomena of endocarditis     IV no redenss or purulence    Laboratory Data      Results from last 7 days  Lab Units 09/08/18  0442 09/04/18  0900   WBC 10*3/mm3 10.46 9.94   HEMOGLOBIN g/dL 9.9* 10.6*   HEMATOCRIT % 31.2* 33.1*   PLATELETS 10*3/mm3 345 349       Results from last 7 days  Lab Units 09/08/18  0442   SODIUM mmol/L 136   POTASSIUM mmol/L 4.0   CHLORIDE mmol/L 100   CO2 mmol/L 27.0   BUN mg/dL 15   CREATININE mg/dL 0.48*   GLUCOSE mg/dL 108*   CALCIUM mg/dL 8.2*       Results from last 7 days  Lab Units 09/08/18  0442   ALK PHOS U/L 160*   BILIRUBIN mg/dL 0.4   ALT (SGPT) U/L 14   AST (SGOT) U/L 15               Estimated Creatinine Clearance: 34.3 mL/min (A) (by C-G formula based on SCr of 0.48 mg/dL (L)).      Microbiology:      Radiology:  Imaging Results (last 72 hours)     Procedure Component Value Units Date/Time    US Renal Bilateral [[id number]] Collected:  08/26/18 1356     Updated:  08/26/18 1413    Narrative:       EXAMINATION: US RENAL BILATERAL - 8/26/2018     INDICATION:  A41.9-Sepsis, unspecified organism; J69.0-Pneumonitis due  to inhalation of food and vomit; N39.0-Urinary tract infection, site not  specified; R09.02-Hypoxemia.       TECHNIQUE: Ultrasound kidneys and urinary bladder.     COMPARISON: None.     FINDINGS: Right kidney measures 12 cm in length with severe right  hydronephrosis and cortical thinning present, however, no calculi or  soft tissue contour-deforming mass.     Left kidney measures 11.1 cm in length without evidence for  hydronephrosis, contour-deforming mass, or obvious calculi.     Urinary bladder is moderately distended and grossly unremarkable.       Impression:       Severe right hydronephrosis with cortical thinning, however,  no obstructive uropathy is clearly identified.      DICTATED:   8/26/2018  EDITED/ls :   8/26/2018        XR Chest 1 View [[id number]] Collected:  08/25/18 0504     Updated:  08/25/18 0650    Addenda:        IMPRESSION:       Bibasal atelectasis and consolidation suspicious for ASPIRATION   PNEUMONIA.   Recommend followup to complete resolution.      THIS DOCUMENT HAS BEEN ELECTRONICALLY SIGNED BY YOBANI LOERA MD  Signed:  08/25/18 0650 by Yobani Loera MD    Impression:       :      THIS DOCUMENT HAS BEEN ELECTRONICALLY SIGNED BY YOBANI LOERA MD            Impression:     --Acute severe sepsis with fever/tachycardia/leukocytosis, hypotension, elevated lactate/pro-calcitonin and metabolic encephalopathy out of proportion to her dementia.  Urinary source and respiratory infection are primary concerns.    Resuscitative efforts per internal medicine;  Fever back up some 8/29-8/30 and recheck urine/blood cultures, not producing sputum,CXR noted and added broader GPC coverage with vancomycin again;  mycamine x 1 8/30;  abd benign at present, and no diarrhea;  IV site appears ok      --Acute E Coli septicemia.  Concern for urinary source.  Otherwise as below     --Acute pyelonephritis.  Hydronephrosis and renal following.  ?contributing to persistent fever;  Timing/option/threshold for intervention per urology     --Chronic urinary incontinence.  As above.     --Acute nausea/vomiting.  In setting  of pyelonephritis and aspiration risk.     --Acute bilateral pneumonia.  Concern for aspiration as above, also with ongoing treatment for other HCAP organisms and S Pneumo Ag positive etc.   If significant pleural effusions evolve, you could give consideration to diagnostic/therapeutic thoracentesis etc.  Repeat CXR 8/30 noted     --Chronic dementia with acute metabolic encephalopathy     --Acute lactic acidosis     --Thrombocytopenia.  Monitor       PLAN:    --IV zosyn, doxycycline; anticipate off IV antibiotics by time of discharge if continued stability/improvement    -- >7 days vancomycin from her deterioration 8/30;  Vancomycin stopped 9/7;  If worse with this change then consider further workup and potentially empirically broadening abx again     --Check/review labs cultures and scans     --partial history per nursing     --Discussed with microbiology     --Highly complex set of issues with high risk for further serious morbidity and other serious sequela/mortality    --urology following      Deepak Ellsworth MD  9/8/2018

## 2018-09-08 NOTE — PLAN OF CARE
Problem: Patient Care Overview  Goal: Plan of Care Review  Outcome: Ongoing (interventions implemented as appropriate)   09/08/18 1612   Coping/Psychosocial   Plan of Care Reviewed With patient   Plan of Care Review   Progress no change     Goal: Individualization and Mutuality  Outcome: Outcome(s) achieved Date Met: 09/08/18    Goal: Discharge Needs Assessment  Outcome: Ongoing (interventions implemented as appropriate)   08/27/18 1447 08/28/18 1416 09/03/18 0358   Discharge Needs Assessment   Readmission Within the Last 30 Days --  current reason for admission unrelated to previous admission --    Concerns to be Addressed --  --  no discharge needs identified   Patient/Family Anticipates Transition to --  --  long term care facility   Patient/Family Anticipated Services at Transition --  --     Anticipated Changes Related to Illness --  --  none   Equipment Needed After Discharge --  --  oxygen;other (see comments)  (tbd)   Outpatient/Agency/Support Group Needs --  --  skilled nursing facility   Discharge Facility/Level of Care Needs --  --  nursing facility, skilled;nursing facility, intermediate   Current Discharge Risk --  --  cognitively impaired;dependent with mobility/activities of daily living   Disability   Equipment Currently Used at Home wheelchair --  --      Goal: Interprofessional Rounds/Family Conf  Outcome: Ongoing (interventions implemented as appropriate)   09/08/18 1612   Interdisciplinary Rounds/Family Conf   Participants nursing       Problem: Fall Risk (Adult)  Goal: Absence of Fall  Outcome: Ongoing (interventions implemented as appropriate)   09/08/18 1612   Fall Risk (Adult)   Absence of Fall making progress toward outcome       Problem: Skin Injury Risk (Adult)  Goal: Skin Health and Integrity  Outcome: Ongoing (interventions implemented as appropriate)   09/08/18 1612   Skin Injury Risk (Adult)   Skin Health and Integrity making progress toward outcome

## 2018-09-09 LAB — VANCOMYCIN TROUGH SERPL-MCNC: 4.5 MCG/ML (ref 10–20)

## 2018-09-09 PROCEDURE — 25010000002 HEPARIN (PORCINE) PER 1000 UNITS

## 2018-09-09 PROCEDURE — 80202 ASSAY OF VANCOMYCIN: CPT

## 2018-09-09 PROCEDURE — 25010000002 PIPERACILLIN SOD-TAZOBACTAM PER 1 G: Performed by: INTERNAL MEDICINE

## 2018-09-09 PROCEDURE — 99231 SBSQ HOSP IP/OBS SF/LOW 25: CPT | Performed by: INTERNAL MEDICINE

## 2018-09-09 RX ADMIN — OXYBUTYNIN CHLORIDE 5 MG: 5 TABLET ORAL at 17:05

## 2018-09-09 RX ADMIN — OXYBUTYNIN CHLORIDE 5 MG: 5 TABLET ORAL at 20:04

## 2018-09-09 RX ADMIN — OXYBUTYNIN CHLORIDE 5 MG: 5 TABLET ORAL at 08:39

## 2018-09-09 RX ADMIN — TAZOBACTAM SODIUM AND PIPERACILLIN SODIUM 3.38 G: 375; 3 INJECTION, SOLUTION INTRAVENOUS at 01:03

## 2018-09-09 RX ADMIN — NYSTATIN: 100000 POWDER TOPICAL at 20:03

## 2018-09-09 RX ADMIN — NYSTATIN 1 APPLICATION: 100000 POWDER TOPICAL at 08:39

## 2018-09-09 RX ADMIN — FAMOTIDINE 20 MG: 20 TABLET ORAL at 08:39

## 2018-09-09 RX ADMIN — HEPARIN SODIUM 5000 UNITS: 5000 INJECTION, SOLUTION INTRAVENOUS; SUBCUTANEOUS at 08:39

## 2018-09-09 RX ADMIN — HEPARIN SODIUM 5000 UNITS: 5000 INJECTION, SOLUTION INTRAVENOUS; SUBCUTANEOUS at 20:04

## 2018-09-09 RX ADMIN — MEMANTINE 10 MG: 10 TABLET ORAL at 08:39

## 2018-09-09 RX ADMIN — TAZOBACTAM SODIUM AND PIPERACILLIN SODIUM 3.38 G: 375; 3 INJECTION, SOLUTION INTRAVENOUS at 17:06

## 2018-09-09 RX ADMIN — DOXYCYCLINE 100 MG: 100 CAPSULE ORAL at 08:39

## 2018-09-09 RX ADMIN — TAZOBACTAM SODIUM AND PIPERACILLIN SODIUM 3.38 G: 375; 3 INJECTION, SOLUTION INTRAVENOUS at 09:25

## 2018-09-09 RX ADMIN — MIRTAZAPINE 15 MG: 15 TABLET, FILM COATED ORAL at 20:04

## 2018-09-09 RX ADMIN — MEMANTINE 10 MG: 10 TABLET ORAL at 20:04

## 2018-09-09 RX ADMIN — DOXYCYCLINE 100 MG: 100 CAPSULE ORAL at 20:04

## 2018-09-09 RX ADMIN — DONEPEZIL HYDROCHLORIDE 10 MG: 10 TABLET, FILM COATED ORAL at 20:04

## 2018-09-09 NOTE — PROGRESS NOTES
[facility] Services  PROGRESS NOTE    Patient Name: Zainab Worthy  : 2/3/1929  MRN: [id number]    Date of Admission: 2018  Length of Stay: 15  Primary Care Physician: Aguilar Kelly MD    Subjective     CC: sepsis    HPI:  Resting in a chair in no acute distress.  No significant change since yesterday.  Tells me she is fine and she needs nothing.      Review of Systems  Unable to obtain.        Objective     Vital Signs:   Temp:  [98.4 °F (36.9 °C)-98.5 °F (36.9 °C)] 98.4 °F (36.9 °C)  Heart Rate:  [] 101  Resp:  [16] 16  BP: (112-146)/(71-85) 112/74        Physical Exam:  Constitutional: No acute distress, awake, alert   HENT: NCAT, mucous membranes moist  Respiratory: Clear to auscultation bilaterally, respiratory effort normal on room air  Cardiovascular: rrr, no murmurs, on tele  Gastrointestinal: Positive bowel sounds, soft, nontender, nondistended  Musculoskeletal: No bilateral ankle edema  Psychiatric: sleeping but appropriate when wakes  Neurologic: confused but follows commands. No focal deficits.  Skin: No rashes    Results Reviewed:  I have personally reviewed current lab, radiology, and data and agree.      Results from last 7 days  Lab Units 18  0442 18  0900   WBC 10*3/mm3 10.46 9.94   HEMOGLOBIN g/dL 9.9* 10.6*   HEMATOCRIT % 31.2* 33.1*   PLATELETS 10*3/mm3 345 349       Results from last 7 days  Lab Units 18  0442 18  0833   SODIUM mmol/L 136 139   POTASSIUM mmol/L 4.0 3.8   CHLORIDE mmol/L 100 105   CO2 mmol/L 27.0 27.0   BUN mg/dL 15 13   CREATININE mg/dL 0.48* 0.49*   GLUCOSE mg/dL 108* 119*   CALCIUM mg/dL 8.2* 9.0   ALT (SGPT) U/L 14 18   AST (SGOT) U/L 15 16        Estimated Creatinine Clearance: 34.3 mL/min (A) (by C-G formula based on SCr of 0.48 mg/dL (L)).  No results found for: BNP    Microbiology Results Abnormal     Procedure Component Value - Date/Time    Legionella Antigen, Urine - Urine, Urine, Clean Catch  [[id number]] Collected:  09/02/18 1346    Lab Status:  Final result Specimen:  Urine from Urine, Clean Catch Updated:  09/05/18 1520     L. pneumophila Serogp 1 Ur Ag Negative     Comment: Presumptive negative for L. pneumophila serogroup 1 antigen in urine,  suggesting no recent or current infection. Legionnaires' disease  cannot be ruled out since other serogroups and species may also cause  disease.  POS Step. Pneumo reported to [facility] on 9/5/18 at 2:30.  Fax sent to [phone] AV.       Narrative:       Performed at:  [address] [place] [address]  [id number]  : Rik Leos MD, Phone:  [phone]    S. Pneumo Ag Urine or CSF - Urine, Urine, Clean Catch [[id number]]  (Abnormal) Collected:  09/02/18 1346    Lab Status:  Final result Specimen:  Urine from Urine, Clean Catch Updated:  09/05/18 1310     Specimen Source Urine     STREP PNEUMONIAE ANTIGEN Positive (A)     Body Fluid Culture, Sterile Not Indicated     Organism ID Not indicated.     Please note Comment     Comment: College of [employer] Pathologists standards require a culture to be  performed on CSF specimens submitted for bacterial antigen testing.  (CAP [id number]) Urine specimens will not be cultured.       Narrative:       Performed at:  [address] [place] [address]  [id number]  : Rik Leos MD, Phone:  [phone]    Blood Culture - Blood, [[id number]]  (Normal) Collected:  08/30/18 0849    Lab Status:  Final result Specimen:  Blood from Arm, Right Updated:  09/04/18 0945     Blood Culture No growth at 5 days    Blood Culture - Blood, [[id number]]  (Normal) Collected:  08/30/18 0859    Lab Status:  Final result Specimen:  Blood from Hand, Right Updated:  09/04/18 0945     Blood Culture No growth at 5 days    Urine Culture - Urine, [[id number]]  (Normal) Collected:  08/30/18 1627    Lab Status:  Final result Specimen:  Urine from Urine, Clean Catch Updated:   09/01/18 0740     Urine Culture No growth at 2 days    Legionella Antigen, Urine - Urine, Urine, Clean Catch [[id number]] Collected:  08/25/18 0526    Lab Status:  Final result Specimen:  Urine from Urine, Clean Catch Updated:  08/29/18 1518     L. pneumophila Serogp 1 Ur Ag Negative     Comment: Presumptive negative for L. pneumophila serogroup 1 antigen in urine,  suggesting no recent or current infection. Legionnaires' disease  cannot be ruled out since other serogroups and species may also cause  disease.       Narrative:       Performed at:  [address] [place] [address]  [id number]  : Rik Leos MD, Phone:  [phone]    S. Pneumo Ag Urine or CSF - Urine, Urine, Clean Catch [[id number]] Collected:  08/25/18 0526    Lab Status:  Final result Specimen:  Urine from Urine, Clean Catch Updated:  08/29/18 1518     Specimen Source Urine     STREP PNEUMONIAE ANTIGEN Negative     Body Fluid Culture, Sterile Not Indicated     Organism ID Not indicated.     Please note Comment     Comment: College of American Pathologists standards require a culture to be  performed on CSF specimens submitted for bacterial antigen testing.  (CAP [id number]) Urine specimens will not be cultured.       Narrative:       Performed at:  [address]  [id number]  : Rik Leos MD, Phone:  [phone]    Urine Culture - Urine, [[id number]]  (Abnormal)  (Susceptibility) Collected:  08/25/18 0526    Lab Status:  Final result Specimen:  Urine from Urine, Catheter Updated:  08/27/18 1413     Urine Culture >100,000 CFU/mL Escherichia coli (A)    Susceptibility      Escherichia coli     KANIKA     Ampicillin >16 ug/ml Resistant     Ampicillin + Sulbactam >16/8 ug/ml Resistant     Aztreonam <=8 ug/ml Susceptible     Cefepime <=8 ug/ml Susceptible     Cefotaxime <=2 ug/ml Susceptible     Ceftriaxone <=8 ug/ml Susceptible     Cefuroxime sodium 8 ug/ml  Susceptible     Cephalothin >16 ug/ml Resistant     Ertapenem <=1 ug/ml Susceptible     Gentamicin <=4 ug/ml Susceptible     Levofloxacin <=2 ug/ml Susceptible     Meropenem <=1 ug/ml Susceptible     Nitrofurantoin <=32 ug/ml Susceptible     Piperacillin + Tazobactam <=16 ug/ml Susceptible     Tetracycline >8 ug/ml Resistant     Tobramycin <=4 ug/ml Susceptible     Trimethoprim + Sulfamethoxazole <=2/38 ug/ml Susceptible                    Blood Culture - Blood, [[id number]]  (Abnormal) Collected:  08/25/18 0520    Lab Status:  Final result Specimen:  Blood from Arm, Right Updated:  08/27/18 1157     Blood Culture Gram Negative Bacilli (A)     Isolated from Aerobic and Anaerobic Bottles     Gram Stain Result Anaerobic Bottle Gram negative bacilli      Aerobic Bottle Gram negative bacilli    Narrative:       SEE CULTURE [id number] FOR ID AND SUSCEPTIBILITIES    Blood Culture - Blood, [[id number]]  (Abnormal)  (Susceptibility) Collected:  08/25/18 0520    Lab Status:  Final result Specimen:  Blood from Arm, Left Updated:  08/27/18 1156     Blood Culture Escherichia coli (A)     Isolated from Aerobic and Anaerobic Bottles     Gram Stain Result Anaerobic Bottle Gram negative bacilli      Aerobic Bottle Gram negative bacilli    Susceptibility      Escherichia coli     KANIKA     Ampicillin >16 ug/ml Resistant     Ampicillin + Sulbactam 16/8 ug/ml Intermediate     Aztreonam <=8 ug/ml Susceptible     Cefepime <=8 ug/ml Susceptible     Cefotaxime <=2 ug/ml Susceptible     Ceftriaxone <=8 ug/ml Susceptible     Cefuroxime sodium 16 ug/ml Intermediate     Ertapenem <=1 ug/ml Susceptible     Gentamicin <=4 ug/ml Susceptible     Levofloxacin <=2 ug/ml Susceptible     Meropenem <=1 ug/ml Susceptible     Piperacillin + Tazobactam <=16 ug/ml Susceptible     Tetracycline >8 ug/ml Resistant     Tobramycin <=4 ug/ml Susceptible     Trimethoprim + Sulfamethoxazole <=2/38 ug/ml Susceptible                    Blood Culture ID, PCR - Blood,  [[id number]]  (Abnormal) Collected:  08/25/18 0520    Lab Status:  Final result Specimen:  Blood from Arm, Left Updated:  08/25/18 2045     BCID, PCR Escherichia coli. Identification by BCID PCR. (C)    MRSA Screen, PCR - Swab, Nares [[id number]]  (Normal) Collected:  08/25/18 1714    Lab Status:  Final result Specimen:  Swab from Nares Updated:  08/25/18 1859     MRSA, PCR Negative    Narrative:         MRSA Negative        Imaging Results (last 24 hours)     ** No results found for the last 24 hours. **        I have reviewed the medications.      donepezil 10 mg Oral Nightly   doxycycline 100 mg Oral Q12H   famotidine 20 mg Oral Daily   heparin (porcine) 5,000 Units Subcutaneous Q12H   memantine 10 mg Oral Q12H   mirtazapine 15 mg Oral Nightly   nystatin  Topical Q12H   oxybutynin 5 mg Oral TID   piperacillin-tazobactam 3.375 g Intravenous Q8H     Assessment / Plan     Hospital Problem List     * (Principal)Sepsis (CMS/HCC)    Endometrial cancer (CMS/HCC)    UTI (urinary tract infection)    Late onset Alzheimer's disease without behavioral disturbance    Sepsis associated hypotension (CMS/HCC)    Leukocytosis    Normocytic anemia    Thrombocytopenia (CMS/HCC)    Lactic acidosis    E coli bacteremia        Brief Hospital Course to date:  Zainab Worthy is a 89 y.o. female w/ PMH of normocytic anemia, dementia, HTN, and remote h/o endometrial cancer who resides at the [facility] and presented with sepsis (fever, leukocytosis) due to aspiration PNA and UTI. Patient found to have E coli bacteremia as well, suspect this is from urinary source.      Plan:  - Appreciate ID evaluation. Doxy/vanc for now with 1 time dose of micafungin given 8/30. Final abx duration to be decided by ID.   -- no fevers in last 72h- have sent recultures of blood/urine NGTD. CXR with concern for possible aspiration, continue with ongoing CAP coverage.  Monitor cultures  - UCx  >100K Ecoli. Sensitivities reviewed  Blood Cx with Ecoli. ABX per ID.  Urology consulted given hydronephrosis - per Dr. Rankin, patient has a known right UPJ obstruction, likely congenital   - Continue to hold antihypertensives, she is normotensive at this time  - Thrombocytopenia, likely related to sepsis. Resolved.   - Continue home meds for dementia.   - PRN for Hgb < 7.   - Replete K+ PRN.    PLAN:  - cont current care  - back to [facility] soon, when stable and OK with ID.    DVT prophylaxis: LATHA  CODE STATUS:   Code Status and Medical Interventions:   Ordered at: 08/25/18 1141     Level Of Support Discussed With:    Next of Kin (If No Surrogate)     Code Status:    CPR     Medical Interventions (Level of Support Prior to Arrest):    Full     Disposition: back to [facility] when ok withg ID  Electronically signed by Michi Colby MD, 09/09/18, 4:54 PM.

## 2018-09-09 NOTE — PROGRESS NOTES
[facility] Progress Note        Antibiotics:  Anti-Infectives     Ordered     Dose/Rate Route Frequency Start Stop    18 1411  vancomycin (VANCOCIN) in iso-osmotic dextrose IVPB 1 g (premix) 200 mL     Beata Watkins RPH reviewed the order on 18 1056.   Ordering Provider:  Beata Watkins RPH    1,000 mg  over 60 Minutes Intravenous Daily 18 0900 18 1246    18 0749  micafungin 100 mg/100 mL 0.9% NS IVPB (mbp)     Ordering Provider:  Deepak Ellsworth MD    100 mg  over 60 Minutes Intravenous Once 18 0900 18 1005    18 0749  vancomycin (VANCOCIN) in iso-osmotic dextrose IVPB 1 g (premix) 200 mL     Ordering Provider:  Deepak Ellsworth MD    1 g Intravenous Once 18 0900 18 1024    18 0714  piperacillin-tazobactam (ZOSYN) 3.375 g in iso-osmotic dextrose 50 ml (premix)     Ordering Provider:  Deepak Ellsworth MD    3.375 g  over 4 Hours Intravenous Every 8 Hours 18 1700 18 1659    18 0714  piperacillin-tazobactam (ZOSYN) 3.375 g in iso-osmotic dextrose 50 ml (premix)     Ordering Provider:  Deepak Ellsworth MD    3.375 g  over 30 Minutes Intravenous Once 18 0900 18 0849    18 0715  doxycycline (MONODOX) capsule 100 mg     Deepak Ellsworth MD let the order  on 18 0845.   Ordering Provider:  Deepak Ellsworth MD    100 mg Oral Every 12 Hours Scheduled 18 0900 09/10/18 0844    18 0934  meropenem (MERREM) 1 g/100 mL 0.9% NS VTB (mbp)     Ordering Provider:  Deepak Ellsworth MD    1 g  over 30 Minutes Intravenous Once 18 1030 18 1144    18 0532  piperacillin-tazobactam (ZOSYN) 3.375 g in iso-osmotic dextrose 50 ml (premix)     Ordering Provider:  Latoya Petersen MD    3.375 g  100 mL/hr over 30 Minutes Intravenous Once 18 0534 18 0613    18 0532  vancomycin (VANCOCIN) in iso-osmotic dextrose IVPB 1 g (premix) 200 mL     Ordering Provider:   "Latoya Petersen MD    20 mg/kg × 52.9 kg  200 mL/hr over 60 Minutes Intravenous Once 08/25/18 0534 08/25/18 0717          CC: fever    HPI:  Patient is a 89 y.o.  Yr old female with history of dementia, chronically wheelchair bound after hip fracture February 2018 and resides at the [place].  Chronic urinary incontinence per son and sent to the emergency room August 25 with mental status worse from baseline with encephalopathy, nausea/vomiting and fever to 103 with concern for UTI and aspiration.  She was initially hypotensive per records, with tachycardia and leukocytosis including high pro-calcitonin/lactate consistent with acute severe sepsis and subsequently found to have gram-negative ynes bacteremia and abnormal urinalysis.  Chest x-ray with bibasilar consolidation concerning for aspiration.     Patient with severe dementia and encephalopathy and not cooperative with details of history or review of systems.  Son reports no preceding symptoms of headache/photophobia or neck stiffness, no hemoptysis or other bleeding and no diarrhea/abdominal pain.     Son reports that she has not seen urology that he knows of.  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  Per nursing, no new focal pain and  no diarrhea, MS stable and resp stable with no new distress with stable hemodynamics and no N/V/D or ADR to abx;  No other new focal pain.     Otherwise complete review of systems unable to obtain.    ROS:      9/9/18 per nursing, No f/c/s. No n/v/d. No rash. No new ADR to Abx.     Otherwise unable to obtain as above    PE: nursing/chaperone present  /85 (BP Location: Left arm, Patient Position: Lying)   Pulse 114   Temp 98.5 °F (36.9 °C) (Oral)   Resp 16   Ht 152.4 cm (60\")   Wt 45.6 kg (100 lb 9.6 oz)   SpO2 100%   BMI 19.65 kg/m² "     GENERAL: sleepy, in no acute distress.   HEENT: Normocephalic, atraumatic.   No conjunctival injection. No icterus. Oropharynx clear without evidence of thrush or exudate. No evidence of peridontal disease.    HEART: RRR; No murmur, rubs, gallops.   LUNGS: Diminished at bases with scattered rhonchi/crackles. Normal respiratory effort. Nonlabored. No dullness.  ABDOMEN: Soft, slight suprapubic tenderness but no CVA tenderness, nondistended. Positive bowel sounds. No rebound or guarding. NO mass or HSM.  MSK: FROM without joint effusions noted arms/legs.    SKIN: Warm and dry without cutaneous eruptions on Inspection/palpation.       No peripheral stigmata/phenomena of endocarditis     IV no redenss or purulence    Laboratory Data      Results from last 7 days  Lab Units 09/08/18  0442 09/04/18  0900   WBC 10*3/mm3 10.46 9.94   HEMOGLOBIN g/dL 9.9* 10.6*   HEMATOCRIT % 31.2* 33.1*   PLATELETS 10*3/mm3 345 349       Results from last 7 days  Lab Units 09/08/18  0442   SODIUM mmol/L 136   POTASSIUM mmol/L 4.0   CHLORIDE mmol/L 100   CO2 mmol/L 27.0   BUN mg/dL 15   CREATININE mg/dL 0.48*   GLUCOSE mg/dL 108*   CALCIUM mg/dL 8.2*       Results from last 7 days  Lab Units 09/08/18  0442   ALK PHOS U/L 160*   BILIRUBIN mg/dL 0.4   ALT (SGPT) U/L 14   AST (SGOT) U/L 15               Estimated Creatinine Clearance: 34.3 mL/min (A) (by C-G formula based on SCr of 0.48 mg/dL (L)).      Microbiology:      Radiology:  Imaging Results (last 72 hours)     Procedure Component Value Units Date/Time    US Renal Bilateral [[id number]] Collected:  08/26/18 1356     Updated:  08/26/18 1413    Narrative:       EXAMINATION: US RENAL BILATERAL - 8/26/2018     INDICATION:  A41.9-Sepsis, unspecified organism; J69.0-Pneumonitis due  to inhalation of food and vomit; N39.0-Urinary tract infection, site not  specified; R09.02-Hypoxemia.      TECHNIQUE: Ultrasound kidneys and urinary bladder.     COMPARISON: None.     FINDINGS: Right kidney  measures 12 cm in length with severe right  hydronephrosis and cortical thinning present, however, no calculi or  soft tissue contour-deforming mass.     Left kidney measures 11.1 cm in length without evidence for  hydronephrosis, contour-deforming mass, or obvious calculi.     Urinary bladder is moderately distended and grossly unremarkable.       Impression:       Severe right hydronephrosis with cortical thinning, however,  no obstructive uropathy is clearly identified.      DICTATED:   8/26/2018  EDITED/ls :   8/26/2018        XR Chest 1 View [[id number]] Collected:  08/25/18 0504     Updated:  08/25/18 0650    Addenda:        IMPRESSION:       Bibasal atelectasis and consolidation suspicious for ASPIRATION   PNEUMONIA.   Recommend followup to complete resolution.      THIS DOCUMENT HAS BEEN ELECTRONICALLY SIGNED BY YOBANI LOERA MD  Signed:  08/25/18 0650 by Yobani Loera MD    Impression:       :      THIS DOCUMENT HAS BEEN ELECTRONICALLY SIGNED BY YOBANI LOERA MD            Impression:     --Acute severe sepsis with fever/tachycardia/leukocytosis, hypotension, elevated lactate/pro-calcitonin and metabolic encephalopathy out of proportion to her dementia.  Urinary source and respiratory infection are primary concerns.    Resuscitative efforts per internal medicine;  Fever back up some 8/29-8/30 and recheck urine/blood cultures, not producing sputum,CXR noted and added broader GPC coverage with vancomycin again;  mycamine x 1 8/30;  abd benign at present, and no diarrhea;  IV site appears ok      --Acute E Coli septicemia.  Concern for urinary source.  Otherwise as below     --Acute pyelonephritis.  Hydronephrosis and renal following.  ?contributing to persistent fever;  Timing/option/threshold for intervention per urology     --Chronic urinary incontinence.  As above.     --Acute nausea/vomiting.  In setting of pyelonephritis and aspiration risk.     --Acute bilateral pneumonia.  Concern for aspiration as  above, also with ongoing treatment for other HCAP organisms and S Pneumo Ag positive etc.   If significant pleural effusions evolve, you could give consideration to diagnostic/therapeutic thoracentesis etc.  Repeat CXR 8/30 noted     --Chronic dementia with acute metabolic encephalopathy     --Acute lactic acidosis     --Thrombocytopenia.  Monitor       PLAN:    --IV zosyn, doxycycline; anticipate off IV antibiotics by time of discharge if continued stability/improvement    -- >7 days vancomycin from her deterioration 8/30;  Vancomycin stopped 9/7;  If worse with this change then consider further workup and potentially empirically broadening abx again     --Check/review labs cultures and scans     --partial history per nursing     --Discussed with microbiology     --Highly complex set of issues with high risk for further serious morbidity and other serious sequela/mortality    --urology following      Deepak Ellsworth MD  9/9/2018

## 2018-09-09 NOTE — PLAN OF CARE
Problem: Patient Care Overview  Goal: Plan of Care Review  Outcome: Ongoing (interventions implemented as appropriate)   09/09/18 1732   Coping/Psychosocial   Plan of Care Reviewed With patient;family   Plan of Care Review   Progress improving       Problem: Fall Risk (Adult)  Goal: Absence of Fall  Outcome: Ongoing (interventions implemented as appropriate)   09/09/18 1732   Fall Risk (Adult)   Absence of Fall making progress toward outcome       Problem: Skin Injury Risk (Adult)  Goal: Skin Health and Integrity  Outcome: Ongoing (interventions implemented as appropriate)   09/09/18 1732   Skin Injury Risk (Adult)   Skin Health and Integrity making progress toward outcome

## 2018-09-10 VITALS
DIASTOLIC BLOOD PRESSURE: 64 MMHG | HEART RATE: 91 BPM | RESPIRATION RATE: 18 BRPM | TEMPERATURE: 98 F | BODY MASS INDEX: 19.75 KG/M2 | SYSTOLIC BLOOD PRESSURE: 124 MMHG | WEIGHT: 100.6 LBS | OXYGEN SATURATION: 98 % | HEIGHT: 60 IN

## 2018-09-10 PROBLEM — D72.829 LEUKOCYTOSIS: Status: RESOLVED | Noted: 2018-08-25 | Resolved: 2018-09-10

## 2018-09-10 PROBLEM — B96.20 E COLI BACTEREMIA: Status: RESOLVED | Noted: 2018-08-26 | Resolved: 2018-09-10

## 2018-09-10 PROBLEM — E87.20 LACTIC ACIDOSIS: Status: RESOLVED | Noted: 2018-08-25 | Resolved: 2018-09-10

## 2018-09-10 PROBLEM — N39.0 UTI (URINARY TRACT INFECTION): Status: RESOLVED | Noted: 2017-11-25 | Resolved: 2018-09-10

## 2018-09-10 PROBLEM — I95.9 SEPSIS ASSOCIATED HYPOTENSION (HCC): Status: RESOLVED | Noted: 2018-08-25 | Resolved: 2018-09-10

## 2018-09-10 PROBLEM — N13.5 URETEROPELVIC JUNCTION (UPJ) OBSTRUCTION, RIGHT: Status: ACTIVE | Noted: 2018-09-10

## 2018-09-10 PROBLEM — R78.81 E COLI BACTEREMIA: Status: RESOLVED | Noted: 2018-08-26 | Resolved: 2018-09-10

## 2018-09-10 PROBLEM — D69.6 THROMBOCYTOPENIA (HCC): Status: RESOLVED | Noted: 2018-08-25 | Resolved: 2018-09-10

## 2018-09-10 PROBLEM — A41.9 SEPSIS (HCC): Status: RESOLVED | Noted: 2018-08-25 | Resolved: 2018-09-10

## 2018-09-10 PROBLEM — A41.9 SEPSIS ASSOCIATED HYPOTENSION (HCC): Status: RESOLVED | Noted: 2018-08-25 | Resolved: 2018-09-10

## 2018-09-10 PROCEDURE — 25010000002 PIPERACILLIN SOD-TAZOBACTAM PER 1 G: Performed by: INTERNAL MEDICINE

## 2018-09-10 PROCEDURE — 99239 HOSP IP/OBS DSCHRG MGMT >30: CPT | Performed by: PHYSICIAN ASSISTANT

## 2018-09-10 PROCEDURE — 97530 THERAPEUTIC ACTIVITIES: CPT | Performed by: PHYSICAL THERAPIST

## 2018-09-10 PROCEDURE — 25010000002 HEPARIN (PORCINE) PER 1000 UNITS

## 2018-09-10 RX ORDER — SOLIFENACIN SUCCINATE 5 MG/1
5 TABLET, FILM COATED ORAL DAILY
Start: 2018-09-10

## 2018-09-10 RX ORDER — LORAZEPAM 0.5 MG/1
0.5 TABLET ORAL EVERY 8 HOURS PRN
Qty: 5 TABLET | Refills: 0 | Status: SHIPPED | OUTPATIENT
Start: 2018-09-10

## 2018-09-10 RX ORDER — NYSTATIN 100000 [USP'U]/G
POWDER TOPICAL EVERY 12 HOURS SCHEDULED
Start: 2018-09-10

## 2018-09-10 RX ADMIN — MEMANTINE 10 MG: 10 TABLET ORAL at 08:25

## 2018-09-10 RX ADMIN — TAZOBACTAM SODIUM AND PIPERACILLIN SODIUM 3.38 G: 375; 3 INJECTION, SOLUTION INTRAVENOUS at 00:44

## 2018-09-10 RX ADMIN — FAMOTIDINE 20 MG: 20 TABLET ORAL at 08:24

## 2018-09-10 RX ADMIN — HEPARIN SODIUM 5000 UNITS: 5000 INJECTION, SOLUTION INTRAVENOUS; SUBCUTANEOUS at 08:25

## 2018-09-10 RX ADMIN — TAZOBACTAM SODIUM AND PIPERACILLIN SODIUM 3.38 G: 375; 3 INJECTION, SOLUTION INTRAVENOUS at 08:25

## 2018-09-10 RX ADMIN — OXYBUTYNIN CHLORIDE 5 MG: 5 TABLET ORAL at 08:25

## 2018-09-10 RX ADMIN — NYSTATIN: 100000 POWDER TOPICAL at 08:25

## 2018-09-10 NOTE — PROGRESS NOTES
"Clinical Nutrition   Reason For Visit: Follow-up protocol    Patient Name: Zainab Worthy  YOB: 1929  MRN: [id number]  Date of Encounter: 09/10/18 11:46 AM  Admission date: 8/25/2018    Nutrition Assessment     Hospital Problem List  Principal Problem:    Sepsis (CMS/HCC)  Active Problems:    Endometrial cancer (CMS/HCC)    UTI (urinary tract infection)    Late onset Alzheimer's disease without behavioral disturbance    Sepsis associated hypotension (CMS/HCC)    Leukocytosis    Normocytic anemia    Thrombocytopenia (CMS/HCC)    Lactic acidosis    E coli bacteremia      PMH: She  has a past medical history of Anorexia; Arthritis; Asthma; Cataract; Colon cancer (CMS/HCC); Colon cancer metastasized to liver (CMS/HCC); DDD (degenerative disc disease), lumbar; Hearing loss; History of transfusion; Hypertension; Liver cancer (CMS/HCC); Macular degeneration; Onychomycosis; and Urine frequency.   PSxH: She  has a past surgical history that includes Liver surgery (12/09/2014); Partial hysterectomy; Gallbladder surgery; Colectomy (06/16/2009); Cataract extraction (Bilateral); and Hip hemiArthroplasty (Right, 2/8/2018).       Other nutrition related factors:  Assisted living resident  W/C bound (since Feb 2018)        Applicable medical tests/procedures since admission:  SLP (9/2) - SLP performed clinical swallow evaluation. Recommend FEES  SLP (9/2) - FEES completed. Recommend level 4 mechanical soft, NTL  SLP (9/6) - Rec: soft/chopped diet (2' visual deficits/difficulty cutting meat), thin liquid     Reported/Observed/Food/Nutrition Related History     Patient in chair eating breakfast. Observed patient had eaten about 25% of breakfast tray. Patient with history of dementia, saying yes to all questions. Observed patient drank all of Ensure HP supplement. No family/friends in at visit.      Anthropometrics   Height: 152.4 cm (60\")  Weight: 45.6 kg (100 lb 9.6 oz) - bed scale weight per charting (8/25)  BMI: 19.65 " kg/m²  BMI classification: Normal: 18.5-24.9kg/m2     Weight change per previous RD note:  unintentional weight loss of 15 lbs x 1.5 weeks based on current admission bed wt and previous MD office visit wt; note this seems unlikely given report of poor PO intake x past 2 days only. RN unable to obtain standing weight at this time d/t patient's limited physical ability. Thus, RD unable to verify weight change.     Labs reviewed   Labs reviewed: Yes    Medications reviewed   Medications reviewed: Yes    Current Nutrition Prescription   PO: Diet Soft Texture; Chopped; Nectar / Syrup Thick; Cardiac, Consistent Carbohydrate  Oral Nutrition Supplement: Boost Glucose Control x2/d; Boost HP x3/d    Evaluation of Received Nutrient/Fluid Intake:  4 Days: 46% of 6 meals from (9/6 - 9/9)   Patient also drinking Ensure supplements    Nutrition Diagnosis     (8/26) updated (8/28), (8/30), (9/3), (9/6), (9/10)  Problem Inadequate oral intake   Etiology Poor appetite   Signs/Symptoms PO Intake (46% of 6 meal)   Status: ongoing    Intervention   Intervention: Follow treatment progress, Care plan reviewed, Encourage intake  D/c Boost Glucose Control x2/d  Adjusted Supplement order for NTL consistency    Goal:   General: Nutrition support treatment  PO: Increase intake      Monitoring/Evaluation:       Monitoring/Evaluation: Per protocol, PO intake, Supplement intake, POC/GOC  Will Continue to follow per protocol  Nhi Mena  Time Spent: 20 minutes

## 2018-09-10 NOTE — PROGRESS NOTES
[facility] Progress Note        Antibiotics:  Anti-Infectives     Ordered     Dose/Rate Route Frequency Start Stop    08/30/18 1411  vancomycin (VANCOCIN) in iso-osmotic dextrose IVPB 1 g (premix) 200 mL     Beata Watkins RPH reviewed the order on 09/02/18 1056.   Ordering Provider:  Beata Watkins RPH    1,000 mg  over 60 Minutes Intravenous Daily 08/31/18 0900 09/02/18 1246    08/30/18 0749  micafungin 100 mg/100 mL 0.9% NS IVPB (mbp)     Ordering Provider:  Deepak Ellsworth MD    100 mg  over 60 Minutes Intravenous Once 08/30/18 0900 08/30/18 1005    08/30/18 0749  vancomycin (VANCOCIN) in iso-osmotic dextrose IVPB 1 g (premix) 200 mL     Ordering Provider:  Deepak Ellsworth MD    1 g Intravenous Once 08/30/18 0900 08/30/18 1024    08/28/18 0714  piperacillin-tazobactam (ZOSYN) 3.375 g in iso-osmotic dextrose 50 ml (premix)     Ordering Provider:  Deepak Ellsworth MD    3.375 g  over 4 Hours Intravenous Every 8 Hours 08/28/18 1700 09/11/18 1659    08/28/18 0714  piperacillin-tazobactam (ZOSYN) 3.375 g in iso-osmotic dextrose 50 ml (premix)     Ordering Provider:  Deepak Ellsworth MD    3.375 g  over 30 Minutes Intravenous Once 08/28/18 0900 08/28/18 0849    08/28/18 0715  doxycycline (MONODOX) capsule 100 mg     Deepak Ellsworth MD reviewed the order on 09/03/18 0845.   Ordering Provider:  Deepak Ellsworth MD    100 mg Oral Every 12 Hours Scheduled 08/28/18 0900 09/09/18 2004    08/26/18 0934  meropenem (MERREM) 1 g/100 mL 0.9% NS VTB (mbp)     Ordering Provider:  Deepak Ellsworth MD    1 g  over 30 Minutes Intravenous Once 08/26/18 1030 08/26/18 1144    08/25/18 0532  piperacillin-tazobactam (ZOSYN) 3.375 g in iso-osmotic dextrose 50 ml (premix)     Ordering Provider:  Latoya Petersen MD    3.375 g  100 mL/hr over 30 Minutes Intravenous Once 08/25/18 0534 08/25/18 0613    08/25/18 0532  vancomycin (VANCOCIN) in iso-osmotic dextrose IVPB 1 g (premix) 200 mL     Ordering Provider:   "Latoya Petersen MD    20 mg/kg × 52.9 kg  200 mL/hr over 60 Minutes Intravenous Once 08/25/18 0534 08/25/18 0717          CC: fever    HPI:  Patient is a 89 y.o.  Yr old female with history of dementia, chronically wheelchair bound after hip fracture February 2018 and resides at the [place].  Chronic urinary incontinence per son and sent to the emergency room August 25 with mental status worse from baseline with encephalopathy, nausea/vomiting and fever to 103 with concern for UTI and aspiration.  She was initially hypotensive per records, with tachycardia and leukocytosis including high pro-calcitonin/lactate consistent with acute severe sepsis and subsequently found to have gram-negative ynes bacteremia and abnormal urinalysis.  Chest x-ray with bibasilar consolidation concerning for aspiration.     Patient with severe dementia and encephalopathy and not cooperative with details of history or review of systems.  Son reports no preceding symptoms of headache/photophobia or neck stiffness, no hemoptysis or other bleeding and no diarrhea/abdominal pain.     Son reports that she has not seen urology that he knows of.  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  Per nursing, no new focal pain and  no diarrhea, MS stable and resp stable with no new distress with stable hemodynamics and no N/V/D or ADR to abx;  No other new focal pain.     Otherwise complete review of systems unable to obtain.    ROS:      9/10/18 per nursing, No f/c/s. No n/v/d. No rash. No new ADR to Abx.     Otherwise unable to obtain as above    PE: nursing/chaperone present  /64 (BP Location: Right arm, Patient Position: Lying)   Pulse 88   Temp 98 °F (36.7 °C) (Oral)   Resp 18   Ht 152.4 cm (60\")   Wt 45.6 kg (100 lb 9.6 oz)   SpO2 100%   BMI 19.65 kg/m² "     GENERAL: sleepy, in no acute distress.   HEENT: Normocephalic, atraumatic.   No conjunctival injection. No icterus. Oropharynx clear without evidence of thrush or exudate. No evidence of peridontal disease.    HEART: RRR; No murmur, rubs, gallops.   LUNGS: Diminished at bases with scattered rhonchi/crackles. Normal respiratory effort. Nonlabored. No dullness.  ABDOMEN: Soft, slight suprapubic tenderness but no CVA tenderness, nondistended. Positive bowel sounds. No rebound or guarding. NO mass or HSM.  MSK: FROM without joint effusions noted arms/legs.    SKIN: Warm and dry without cutaneous eruptions on Inspection/palpation.       No peripheral stigmata/phenomena of endocarditis     IV no redenss or purulence    Laboratory Data      Results from last 7 days  Lab Units 09/08/18  0442 09/04/18  0900   WBC 10*3/mm3 10.46 9.94   HEMOGLOBIN g/dL 9.9* 10.6*   HEMATOCRIT % 31.2* 33.1*   PLATELETS 10*3/mm3 345 349       Results from last 7 days  Lab Units 09/08/18  0442   SODIUM mmol/L 136   POTASSIUM mmol/L 4.0   CHLORIDE mmol/L 100   CO2 mmol/L 27.0   BUN mg/dL 15   CREATININE mg/dL 0.48*   GLUCOSE mg/dL 108*   CALCIUM mg/dL 8.2*       Results from last 7 days  Lab Units 09/08/18  0442   ALK PHOS U/L 160*   BILIRUBIN mg/dL 0.4   ALT (SGPT) U/L 14   AST (SGOT) U/L 15               Estimated Creatinine Clearance: 34.3 mL/min (A) (by C-G formula based on SCr of 0.48 mg/dL (L)).      Microbiology:      Radiology:  Imaging Results (last 72 hours)     Procedure Component Value Units Date/Time    US Renal Bilateral [[id number]] Collected:  08/26/18 1356     Updated:  08/26/18 1413    Narrative:       EXAMINATION: US RENAL BILATERAL - 8/26/2018     INDICATION:  A41.9-Sepsis, unspecified organism; J69.0-Pneumonitis due  to inhalation of food and vomit; N39.0-Urinary tract infection, site not  specified; R09.02-Hypoxemia.      TECHNIQUE: Ultrasound kidneys and urinary bladder.     COMPARISON: None.     FINDINGS: Right kidney  measures 12 cm in length with severe right  hydronephrosis and cortical thinning present, however, no calculi or  soft tissue contour-deforming mass.     Left kidney measures 11.1 cm in length without evidence for  hydronephrosis, contour-deforming mass, or obvious calculi.     Urinary bladder is moderately distended and grossly unremarkable.       Impression:       Severe right hydronephrosis with cortical thinning, however,  no obstructive uropathy is clearly identified.      DICTATED:   8/26/2018  EDITED/ls :   8/26/2018        XR Chest 1 View [[id number]] Collected:  08/25/18 0504     Updated:  08/25/18 0650    Addenda:        IMPRESSION:       Bibasal atelectasis and consolidation suspicious for ASPIRATION   PNEUMONIA.   Recommend followup to complete resolution.      THIS DOCUMENT HAS BEEN ELECTRONICALLY SIGNED BY YOBANI LOERA MD  Signed:  08/25/18 0650 by Yobani Loera MD    Impression:       :      THIS DOCUMENT HAS BEEN ELECTRONICALLY SIGNED BY YOBANI LOERA MD            Impression:     --Acute severe sepsis with fever/tachycardia/leukocytosis, hypotension, elevated lactate/pro-calcitonin and metabolic encephalopathy out of proportion to her dementia.  Urinary source and respiratory infection are primary concerns.    Resuscitative efforts per internal medicine;  Fever back up some 8/29-8/30 and recheck urine/blood cultures, not producing sputum,CXR noted and added broader GPC coverage with vancomycin again;  mycamine x 1 8/30;  abd benign at present, and no diarrhea;  IV site appears ok      --Acute E Coli septicemia.  Concern for urinary source.  Otherwise as below     --Acute pyelonephritis.  Hydronephrosis and renal following.  ?contributing to persistent fever;  Timing/option/threshold for intervention per urology     --Chronic urinary incontinence.  As above.     --Acute nausea/vomiting.  In setting of pyelonephritis and aspiration risk.     --Acute bilateral pneumonia.  Concern for aspiration as  above, also with ongoing treatment for other HCAP organisms and S Pneumo Ag positive etc.   If significant pleural effusions evolve, you could give consideration to diagnostic/therapeutic thoracentesis etc.  Repeat CXR 8/30 noted     --Chronic dementia with acute metabolic encephalopathy     --Acute lactic acidosis     --Thrombocytopenia.  Monitor       PLAN:    --IV zosyn, doxycycline to finish soon; anticipate off IV antibiotics by time of discharge if continued stability/improvement    -- >7 days vancomycin from her deterioration 8/30;  Vancomycin stopped 9/7;  If worse with this change then consider further workup and potentially empirically broadening abx again     --Check/review labs cultures and scans     --partial history per nursing     --Discussed with microbiology     --Highly complex set of issues with high risk for further serious morbidity and other serious sequela/mortality    --urology following      Deepak Ellsworth MD  9/10/2018

## 2018-09-10 NOTE — THERAPY TREATMENT NOTE
Acute Care - Physical Therapy Treatment Note  [facility]     Patient Name: Zainab Worthy  : 2/3/1929  MRN: [id number]  Today's Date: 9/10/2018  Onset of Illness/Injury or Date of Surgery: 18  Date of Referral to PT: 18  Referring Physician: Dasha Lacy MD    Admit Date: 2018    Visit Dx:    ICD-10-CM ICD-9-CM   1. Sepsis, due to unspecified organism (CMS/HCC) A41.9 038.9     995.91   2. Aspiration pneumonia of both lower lobes due to gastric secretions (CMS/HCC) J69.0 507.0   3. Acute urinary tract infection N39.0 599.0   4. Hypoxia R09.02 799.02   5. Impaired functional mobility, balance, gait, and endurance Z74.09 V49.89   6. Pharyngeal dysphagia R13.13 787.23     Patient Active Problem List   Diagnosis   • Gonalgia   • Hypertrophic polyarthritis   • Allergic rhinitis, seasonal   • Skin growth   • Endometrial cancer (CMS/HCC)   • Post-operative state   • Rectal cancer (CMS/HCC)   • Hydronephrosis   • Hypertension   • Anorexia   • Onychomycosis   • IUD complication (CMS/HCC)   • Memory loss   • Urinary frequency   • Fall at home   • Closed fracture of pelvis (CMS/HCC)   • UTI (urinary tract infection)   • Fracture of femoral neck, right (CMS/HCC)   • Late onset Alzheimer's disease without behavioral disturbance   • Metastatic colon cancer in female (CMS/HCC)   • Recurrent falls   • Closed fracture of neck of right femur (CMS/HCC)   • Sepsis (CMS/HCC)   • Sepsis associated hypotension (CMS/HCC)   • Leukocytosis   • Normocytic anemia   • Thrombocytopenia (CMS/HCC)   • Lactic acidosis   • E coli bacteremia       Therapy Treatment          Rehabilitation Treatment Summary     Row Name 09/10/18 0949             Treatment Time/Intention    Discipline physical therapist  -LM      Document Type therapy note (daily note)  -LM      Subjective Information complains of;weakness  -LM      Mode of Treatment individual therapy;physical therapy  -LM      Care Plan Review care plan/treatment goals  reviewed;risks/benefits reviewed;patient/other agree to care plan  -LM      Patient Effort good  -LM      Existing Precautions/Restrictions fall;oxygen therapy device and L/min;other (see comments)   Dementia  -LM      Recorded by [LM] Sugey Reed, PT 09/10/18 1027      Row Name 09/10/18 0949             Vital Signs    Pre Systolic BP Rehab --   VSS - RN cleared for tx  -LM      Pre Patient Position Supine  -LM      Intra Patient Position Standing  -LM      Post Patient Position Sitting  -LM      Recorded by [LM] Sugey Reed, PT 09/10/18 1027      Row Name 09/10/18 0949             Cognitive Assessment/Intervention    Additional Documentation Cognitive Assessment/Intervention (Group)  -LM      Recorded by [LM] Sugey Reed, PT 09/10/18 1027      Row Name 09/10/18 0949             Cognitive Assessment/Intervention- PT/OT    Affect/Mental Status (Cognitive) anxious   Anxious that she had soiled the bed  -LM      Orientation Status (Cognition) oriented to;person;disoriented to;place;situation;time  -LM      Follows Commands (Cognition) follows one step commands;50-74% accuracy;physical/tactile prompts required;verbal cues/prompting required  -LM      Cognitive Function (Cognitive) safety deficit  -LM      Safety Deficit (Cognitive) moderate deficit;judgment;problem solving;safety precautions awareness  -LM      Personal Safety Interventions fall prevention program maintained;gait belt;muscle strengthening facilitated;nonskid shoes/slippers when out of bed  -LM      Recorded by [LM] Sugey Reed, PT 09/10/18 1027      Row Name 09/10/18 0949             Bed Mobility Assessment/Treatment    Bed Mobility Assessment/Treatment supine-sit  -LM      Supine-Sit [place] (Bed Mobility) moderate assist (50% patient effort);verbal cues  -LM      Bed Mobility, Safety Issues cognitive deficits limit understanding  -LM      Assistive Device (Bed Mobility) bed rails;head of bed elevated  -LM      Recorded by [LM] Brianna  Sugey, PT 09/10/18 1027      Row Name 09/10/18 0949             Transfer Assessment/Treatment    Transfer Assessment/Treatment sit-stand transfer;stand-sit transfer  -LM      Comment (Transfers) Pt stood x 3 reps while receiving dependent pericare.    -LM      Recorded by [LM] Sugey Reed, PT 09/10/18 1027      Row Name 09/10/18 0949             Bed-Chair Transfer    Bed-Chair [place] (Transfers) moderate assist (50% patient effort);2 person assist  -LM      Assistive Device (Bed-Chair Transfers) walker, front-wheeled  -LM      Recorded by [LM] Sugey Reed, PT 09/10/18 1027      Row Name 09/10/18 0949             Sit-Stand Transfer    Sit-Stand [place] (Transfers) minimum assist (75% patient effort);2 person assist   Once standing - required ModA to maintain  -LM      Assistive Device (Sit-Stand Transfers) walker, front-wheeled  -LM      Recorded by [LM] Sugey Reed, PT 09/10/18 1027      Row Name 09/10/18 0949             Stand-Sit Transfer    Stand-Sit [place] (Transfers) minimum assist (75% patient effort);2 person assist  -LM      Assistive Device (Stand-Sit Transfers) walker, front-wheeled  -LM      Recorded by [LM] Sugey Reed, PT 09/10/18 1027      Row Name 09/10/18 0949             Therapeutic Exercise    35517 - PT Therapeutic Exercise Minutes 2  -LM      54775 - PT Therapeutic Activity Minutes 27  -LM      Recorded by [LM] Sugey Reed, PT 09/10/18 1027      Row Name 09/10/18 0949             Lower Extremity Supine Therapeutic Exercise    Performed, Supine Lower Extremity (Therapeutic Exercise) SLR (straight leg raise);ankle pumps  -LM      Exercise Type, Supine Lower Extremity (Therapeutic Exercise) AROM (active range of motion)  -LM      Sets/Reps Detail, Supine Lower Extremity (Therapeutic Exercise) x10 bilaterally  -LM      Recorded by [LM] Sugey Reed, PT 09/10/18 1027      Row Name 09/10/18 0949             Static Sitting Balance    Level of [place] (Unsupported Sitting,  Static Balance) contact guard assist  -LM      Sitting Position (Unsupported Sitting, Static Balance) sitting on edge of bed  -LM      Time Able to Maintain Position (Unsupported Sitting, Static Balance) more than 5 minutes  -LM      Recorded by [LM] Sugey Reed, PT 09/10/18 1027      Row Name 09/10/18 0949             Static Standing Balance    Level of [place] (Supported Standing, Static Balance) moderate assist, 50 to 74% patient effort  -LM      Time Able to Maintain Position (Supported Standing, Static Balance) 30 to 45 seconds  -LM      Assistive Device Utilized (Supported Standing, Static Balance) rolling walker  -LM      Recorded by [LM] Sugey Reed, PT 09/10/18 1027      Row Name 09/10/18 0949             Positioning and Restraints    Pre-Treatment Position in bed  -LM      Post Treatment Position chair  -LM      In Chair reclined;call light within reach;encouraged to call for assist;exit alarm on;waffle cushion;notified nsg  -LM      Recorded by [LM] Sugey Reed, PT 09/10/18 1027      Row Name 09/10/18 0949             Pain Scale: Numbers Pre/Post-Treatment    Pain Scale: Numbers, Pretreatment 0/10 - no pain  -LM      Pain Scale: Numbers, Post-Treatment 0/10 - no pain  -LM      Recorded by [LM] Sugey Reed, PT 09/10/18 1027      Row Name 09/10/18 0949             Plan of Care Review    Plan of Care Reviewed With patient  -LM      Recorded by [LM] Sugey Reed, PT 09/10/18 1027      Row Name 09/10/18 0949             Outcome Summary/Treatment Plan (PT)    Daily Summary of Progress (PT) progress toward functional goals as expected  -LM      Recorded by [LM] Sugey Reed, PT 09/10/18 1027        User Key  (r) = Recorded By, (t) = Taken By, (c) = Cosigned By    Initials Name Effective Dates Discipline    LM Sugey Reed, PT 06/15/16 -  PT                     Physical Therapy Education     Title: PT OT SLP Therapies (Active)     Topic: Physical Therapy (Active)     Point: Mobility training  (Active)    Learning Progress Summary     Learner Status Readiness Method Response Comment Documented by    Patient Active Acceptance E NR  BR 09/10/18 0922     Active Acceptance E NR  AS 09/07/18 0956     Active Acceptance E NR   09/06/18 1247     Active Acceptance E NR   09/05/18 1127     Active Acceptance E,D NR   09/03/18 1033     Done Acceptance E VU,NR  BR 08/28/18 1046          Point: Home exercise program (Active)    Learning Progress Summary     Learner Status Readiness Method Response Comment Documented by    Patient Active Acceptance E NR  BR 09/10/18 0922     Active Acceptance E NR  AS 09/07/18 0956     Active Acceptance E NR   09/06/18 1247     Active Acceptance E NR   09/05/18 1127     Done Acceptance E VU,NR  BR 08/28/18 1046          Point: Body mechanics (Active)    Learning Progress Summary     Learner Status Readiness Method Response Comment Documented by    Patient Active Acceptance E NR  BR 09/10/18 0922     Active Acceptance E NR  AS 09/07/18 0956     Active Acceptance E NR   09/06/18 1247     Active Acceptance E NR   09/05/18 1127     Done Acceptance E VU,NR  BR 08/28/18 1046          Point: Precautions (Active)    Learning Progress Summary     Learner Status Readiness Method Response Comment Documented by    Patient Active Acceptance E NR  BR 09/10/18 0922     Active Acceptance E NR  AS 09/07/18 0956     Active Acceptance E NR   09/06/18 1247     Active Acceptance E NR   09/05/18 1127     Active Acceptance E,D NR   09/03/18 1033     Done Acceptance E VU,NR  BR 08/28/18 1046                      User Key     Initials Effective Dates Name Provider Type Discipline     06/19/15 -  Janet Barone, PT Physical Therapist PT    AS 06/22/15 -  Alicia Tan, PTA Physical Therapy Assistant PT     06/15/16 -  Sugey Reed, PT Physical Therapist PT    BR 06/16/16 -  Deborah Thomas, RN Registered Nurse Nurse     06/08/18 -  Deborah Perez, PT Physical  Therapist PT                    PT Recommendation and Plan  Anticipated Discharge Disposition (PT): skilled nursing facility  Planned Therapy Interventions (PT Eval): balance training, bed mobility training, gait training, home exercise program, neuromuscular re-education, patient/family education, postural re-education, ROM (range of motion), strengthening, stretching, transfer training, wheelchair management/propulsion training  Therapy Frequency (PT Clinical Impression): daily  Outcome Summary/Treatment Plan (PT)  Daily Summary of Progress (PT): progress toward functional goals as expected  Anticipated Discharge Disposition (PT): skilled nursing facility  Plan of Care Reviewed With: patient  Outcome Summary: Pt progressing towards skilled PT goals as expected.  Pt stood x 3 reps with MinAx2 (ModA x1 to maintain for ~30-45 sec) while receiving dependent pericare.  Pt transferred bed-->chair with ModAx2.  Continue with skilled PT to improve mobility and safety prior to d/c.          Outcome Measures     Row Name 09/10/18 0949             How much help from another person do you currently need...    Turning from your back to your side while in flat bed without using bedrails? 2  -LM      Moving from lying on back to sitting on the side of a flat bed without bedrails? 2  -LM      Moving to and from a bed to a chair (including a wheelchair)? 2  -LM      Standing up from a chair using your arms (e.g., wheelchair, bedside chair)? 3  -LM      Climbing 3-5 steps with a railing? 1  -LM      To walk in hospital room? 1  -LM      AM-PAC 6 Clicks Score 11  -LM         Functional Assessment    Outcome Measure Options AM-PAC 6 Clicks Basic Mobility (PT)  -LM        User Key  (r) = Recorded By, (t) = Taken By, (c) = Cosigned By    Initials Name Provider Type    Sugey Gama PT Physical Therapist           Time Calculation:         PT Charges     Row Name 09/10/18 0949             Time Calculation    Start Time 0949  -LM       PT Received On 09/10/18  -LM      PT Goal Re-Cert Due Date 09/13/18  -LM         Timed Charges    00472 - PT Therapeutic Exercise Minutes 2  -LM      17924 - PT Therapeutic Activity Minutes 27  -LM        User Key  (r) = Recorded By, (t) = Taken By, (c) = Cosigned By    Initials Name Provider Type    Sugey Gama, PT Physical Therapist        Therapy Suggested Charges     Code   Minutes Charges    62982 (CPT®) Hc Pt Neuromusc Re Education Ea 15 Min      71546 (CPT®) Hc Pt Ther Proc Ea 15 Min 2     70452 (CPT®) Hc Gait Training Ea 15 Min      79465 (CPT®) Hc Pt Therapeutic Act Ea 15 Min 27 2    51673 (CPT®) Hc Pt Manual Therapy Ea 15 Min      10193 (CPT®) Hc Pt Iontophoresis Ea 15 Min      58345 (CPT®) Hc Pt Elec Stim Ea-Per 15 Min      14734 (CPT®) Hc Pt Ultrasound Ea 15 Min      61453 (CPT®) Hc Pt Self Care/Mgmt/Train Ea 15 Min      89438 (CPT®) Hc Pt Prosthetic (S) Train Initial Encounter, Each 15 Min      86208 (CPT®) Hc Pt Orthotic(S)/Prosthetic(S) Encounter, Each 15 Min      61781 (CPT®) Hc Orthotic(S) Mgmt/Train Initial Encounter, Each 15min      Total  29 2        Therapy Charges for Today     Code Description Service Date Service Provider Modifiers Qty    93696960088 HC PT THERAPEUTIC ACT EA 15 MIN 9/10/2018 Sugey Reed, PT GP 2          PT G-Codes  Outcome Measure Options: AM-PAC 6 Clicks Basic Mobility (PT)  AM-PAC 6 Clicks Score: 11    Sugey Reed PT  9/10/2018

## 2018-09-10 NOTE — PLAN OF CARE
Problem: Patient Care Overview  Goal: Plan of Care Review  Outcome: Ongoing (interventions implemented as appropriate)   09/10/18 1316   Coping/Psychosocial   Plan of Care Reviewed With patient   OTHER   Outcome Summary Pt progressing towards skilled PT goals as expected. Pt stood x 3 reps with MinAx2 (ModA x1 to maintain for ~30-45 sec) while receiving dependent pericare. Pt transferred bed-->chair with ModAx2. Continue with skilled PT to improve mobility and safety prior to d/c.

## 2018-09-10 NOTE — DISCHARGE SUMMARY
"    [facility] Hospital Medicine Services  DISCHARGE SUMMARY    Patient Name: Zainab Worthy  : 2/3/1929  MRN: [id number]    Date of Admission: 2018  Date of Discharge:  9/10/2018  Primary Care Physician: Aguilar Kelly MD    Consults     Date and Time Order Name Status Description    2018 0716 Inpatient Urology Consult      2018 0923 Inpatient Infectious Diseases Consult Completed     2018 0030 Inpatient Infectious Diseases Consult Completed         Hospital Course     Presenting Problem:   Sepsis, due to unspecified organism (CMS/HCC) [A41.9]    Active Hospital Problems    Diagnosis Date Noted   • Chronic ureteropelvic junction (UPJ) obstruction, right [N13.5] 09/10/2018   • Normocytic anemia [D64.9] 2018   • Late onset Alzheimer's disease without behavioral disturbance [G30.1, F02.80] 2018   • Hypertension [I10] 10/14/2016   • Endometrial cancer (CMS/HCC) [C54.1] 2016      Resolved Hospital Problems    Diagnosis Date Noted Date Resolved   • **Sepsis (CMS/HCC) [A41.9] 2018 09/10/2018   • E coli bacteremia [R78.81] 2018 09/10/2018   • Sepsis associated hypotension (CMS/HCC) [A41.9] 2018 09/10/2018   • Leukocytosis [D72.829] 2018 09/10/2018   • Thrombocytopenia (CMS/HCC) [D69.6] 2018 09/10/2018   • Lactic acidosis [E87.2] 2018 09/10/2018   • UTI (urinary tract infection) [N39.0] 2017 09/10/2018   • Hydronephrosis [N13.30] 10/14/2016 09/10/2018      Hospital Course:  Ms. Zainab Worthy is an 88yo female resident of the [facility] significant for HTN, arthritis and dementia. She is chronically wheelchair-bound following a hip fracture in 2018. She presented to [facility] on 18 for evaluation of worsening confusion. Per family, she had been doing well until approximately 2 days prior to presentation when she seemed \"more confused\" than her baseline. Daughter reported this is common when the " patient gets a UTI. Overnight on 8/24, the patient vomited and developed fever up to 103F. Due to concerns for aspiration PNA, she was brought to [facility] ED via EMS.    In the ED, patient found to be hypotensive and febrile (101.9). CXR concerning for bibasilar atelectasis vs. aspiration pneumonia. UA concerning for UTI. She was admitted to the hospital medicine service. Blood cultures returned (+) for gram negative rods. ID was consulted and Dr. Ellsworth followed the patient. She was started on IV Vancomycin/Merrem, later transitioned to Zosyn/Doxycycline. Due to fever on 8/30, coverage expanded to include Vancomycin again and she was given Mycamine x 1 dose on 8/30. Repeat blood cultures negative. Ms. Worthy remains afebrile and has completed almost an additional 10 days of IV antibiotics since 8/30. Will discharge back to the [facility] off of antibiotics.     Speech therapy evaluated the patient and she was found to have penetration with thin liquids. SLP recommended soft diet with chopped solids and nectar thick liquids to prevent future aspiration events.     Urology was consulted due to R hydronephrosis. Dr. Rankin felt that the patient's CT scan was unchanged from prior and he noted her long-standing R UPJ obstruction (likely congenital).     Ms. Worthy is improved. She will return to the [facility] in stable condition on 9/10/18.   No formal ID follow up needed.     Day of Discharge     HPI:   Sitting up in chair, starting lunch. She has no complaints. Denies pain, dyspnea, nausea or vomiting. She feels well and is asking when she can go back to her facility.     Review of Systems  Gen- No fevers, chills  CV- No chest pain, palpitations  Resp- No cough, dyspnea  GI- No N/V/D, abd pain    Otherwise ROS is negative except as mentioned in the HPI.    Vital Signs:   Temp:  [98 °F (36.7 °C)] 98 °F (36.7 °C)  Heart Rate:  [88-91] 91  Resp:  [18] 18  BP: (124-135)/(64) 124/64     Physical Exam:  Constitutional: No acute  distress, awake, alert  HENT: NCAT, mucous membranes moist  Respiratory: Clear to auscultation bilaterally, respiratory effort normal   Cardiovascular: RRR, no murmurs, rubs, or gallops, palpable pedal pulses bilaterally  Gastrointestinal: Positive bowel sounds, soft, nontender, nondistended  Musculoskeletal: No bilateral ankle edema  Psychiatric: Appropriate affect, cooperative  Neurologic: Oriented to self only, strength symmetric in all extremities, Cranial Nerves grossly intact to confrontation, speech clear  Skin: No rashes    Pertinent  and/or Most Recent Results       Results from last 7 days  Lab Units 09/08/18  0442 09/04/18  0900 09/04/18  0833   WBC 10*3/mm3 10.46 9.94  --    HEMOGLOBIN g/dL 9.9* 10.6*  --    HEMATOCRIT % 31.2* 33.1*  --    PLATELETS 10*3/mm3 345 349  --    SODIUM mmol/L 136  --  139   POTASSIUM mmol/L 4.0  --  3.8   CHLORIDE mmol/L 100  --  105   CO2 mmol/L 27.0  --  27.0   BUN mg/dL 15  --  13   CREATININE mg/dL 0.48*  --  0.49*   GLUCOSE mg/dL 108*  --  119*   CALCIUM mg/dL 8.2*  --  9.0       Results from last 7 days  Lab Units 09/08/18  0442 09/04/18  0833   BILIRUBIN mg/dL 0.4 0.6   ALK PHOS U/L 160* 198*   ALT (SGPT) U/L 14 18   AST (SGOT) U/L 15 16         Brief Urine Lab Results  (Last result in the past 365 days)      Color   Clarity   Blood   Leuk Est   Nitrite   Protein   CREAT   Urine HCG        08/30/18 1627 Yellow Cloudy(A) Negative Trace(A) Negative Negative             Microbiology Results Abnormal     Procedure Component Value - Date/Time    Legionella Antigen, Urine - Urine, Urine, Clean Catch [[id number]] Collected:  09/02/18 1346    Lab Status:  Final result Specimen:  Urine from Urine, Clean Catch Updated:  09/05/18 1520     L. pneumophila Serogp 1 Ur Ag Negative     Comment: Presumptive negative for L. pneumophila serogroup 1 antigen in urine,  suggesting no recent or current infection. Legionnaires' disease  cannot be ruled out since other serogroups and species may  also cause  disease.  POS Step. Pneumo reported to [facility] on 9/5/18 at 2:30.  Fax sent to [phone] AV.       Narrative:       Performed at:  [address] - [facility]  [id number]  : Rik Leos MD, Phone:  [phone]    S. Pneumo Ag Urine or CSF - Urine, Urine, Clean Catch [[id number]]  (Abnormal) Collected:  09/02/18 1346    Lab Status:  Final result Specimen:  Urine from Urine, Clean Catch Updated:  09/05/18 1310     Specimen Source Urine     STREP PNEUMONIAE ANTIGEN Positive (A)     Body Fluid Culture, Sterile Not Indicated     Organism ID Not indicated.     Please note Comment     Comment: College [employer] standards require a culture to be  performed on CSF specimens submitted for bacterial antigen testing.  (CAP [id number]) Urine specimens will not be cultured.       Narrative:       Performed at:  [address] - [facility]  [id number]  : Rik Leos MD, Phone:  [phone]    Blood Culture - Blood, [[id number]]  (Normal) Collected:  08/30/18 0849    Lab Status:  Final result Specimen:  Blood from Arm, Right Updated:  09/04/18 0945     Blood Culture No growth at 5 days    Blood Culture - Blood, [[id number]]  (Normal) Collected:  08/30/18 0859    Lab Status:  Final result Specimen:  Blood from Hand, Right Updated:  09/04/18 0945     Blood Culture No growth at 5 days    Urine Culture - Urine, [[id number]]  (Normal) Collected:  08/30/18 1627    Lab Status:  Final result Specimen:  Urine from Urine, Clean Catch Updated:  09/01/18 0740     Urine Culture No growth at 2 days    Legionella Antigen, Urine - Urine, Urine, Clean Catch [[id number]] Collected:  08/25/18 0526    Lab Status:  Final result Specimen:  Urine from Urine, Clean Catch Updated:  08/29/18 1518     L. pneumophila Serogp 1 Ur Ag Negative     Comment: Presumptive negative for L. pneumophila serogroup 1 antigen in urine,  suggesting no recent or  current infection. Legionnaires' disease  cannot be ruled out since other serogroups and species may also cause  disease.       Narrative:       Performed at:   - [address]  [id number]  : Rik Leos MD, Phone:  [phone]    S. Pneumo Ag Urine or CSF - Urine, Urine, Clean Catch [[id number]] Collected:  08/25/18 0526    Lab Status:  Final result Specimen:  Urine from Urine, Clean Catch Updated:  08/29/18 1518     Specimen Source Urine     STREP PNEUMONIAE ANTIGEN Negative     Body Fluid Culture, Sterile Not Indicated     Organism ID Not indicated.     Please note Comment     Comment: College of [employer] Pathologists standards require a culture to be  performed on CSF specimens submitted for bacterial antigen testing.  (CAP [id number]) Urine specimens will not be cultured.       Narrative:       Performed at:   - [address]  [id number]  : Rik Leos MD, Phone:  [phone]    Urine Culture - Urine, [[id number]]  (Abnormal)  (Susceptibility) Collected:  08/25/18 0526    Lab Status:  Final result Specimen:  Urine from Urine, Catheter Updated:  08/27/18 1413     Urine Culture >100,000 CFU/mL Escherichia coli (A)    Susceptibility      Escherichia coli     KANIKA     Ampicillin >16 ug/ml Resistant     Ampicillin + Sulbactam >16/8 ug/ml Resistant     Aztreonam <=8 ug/ml Susceptible     Cefepime <=8 ug/ml Susceptible     Cefotaxime <=2 ug/ml Susceptible     Ceftriaxone <=8 ug/ml Susceptible     Cefuroxime sodium 8 ug/ml Susceptible     Cephalothin >16 ug/ml Resistant     Ertapenem <=1 ug/ml Susceptible     Gentamicin <=4 ug/ml Susceptible     Levofloxacin <=2 ug/ml Susceptible     Meropenem <=1 ug/ml Susceptible     Nitrofurantoin <=32 ug/ml Susceptible     Piperacillin + Tazobactam <=16 ug/ml Susceptible     Tetracycline >8 ug/ml Resistant     Tobramycin <=4 ug/ml Susceptible     Trimethoprim +  Sulfamethoxazole <=2/38 ug/ml Susceptible                    Blood Culture - Blood, [[id number]]  (Abnormal) Collected:  08/25/18 0520    Lab Status:  Final result Specimen:  Blood from Arm, Right Updated:  08/27/18 1157     Blood Culture Gram Negative Bacilli (A)     Isolated from Aerobic and Anaerobic Bottles     Gram Stain Result Anaerobic Bottle Gram negative bacilli      Aerobic Bottle Gram negative bacilli    Narrative:       SEE CULTURE [id number] FOR ID AND SUSCEPTIBILITIES    Blood Culture - Blood, [[id number]]  (Abnormal)  (Susceptibility) Collected:  08/25/18 0520    Lab Status:  Final result Specimen:  Blood from Arm, Left Updated:  08/27/18 1156     Blood Culture Escherichia coli (A)     Isolated from Aerobic and Anaerobic Bottles     Gram Stain Result Anaerobic Bottle Gram negative bacilli      Aerobic Bottle Gram negative bacilli    Susceptibility      Escherichia coli     KANIKA     Ampicillin >16 ug/ml Resistant     Ampicillin + Sulbactam 16/8 ug/ml Intermediate     Aztreonam <=8 ug/ml Susceptible     Cefepime <=8 ug/ml Susceptible     Cefotaxime <=2 ug/ml Susceptible     Ceftriaxone <=8 ug/ml Susceptible     Cefuroxime sodium 16 ug/ml Intermediate     Ertapenem <=1 ug/ml Susceptible     Gentamicin <=4 ug/ml Susceptible     Levofloxacin <=2 ug/ml Susceptible     Meropenem <=1 ug/ml Susceptible     Piperacillin + Tazobactam <=16 ug/ml Susceptible     Tetracycline >8 ug/ml Resistant     Tobramycin <=4 ug/ml Susceptible     Trimethoprim + Sulfamethoxazole <=2/38 ug/ml Susceptible                    Blood Culture ID, PCR - Blood, [[id number]]  (Abnormal) Collected:  08/25/18 0520    Lab Status:  Final result Specimen:  Blood from Arm, Left Updated:  08/25/18 2045     BCID, PCR Escherichia coli. Identification by BCID PCR. (C)    MRSA Screen, PCR - Swab, Nares [[id number]]  (Normal) Collected:  08/25/18 1714    Lab Status:  Final result Specimen:  Swab from Nares Updated:  08/25/18 1859     MRSA, PCR Negative     Narrative:         MRSA Negative        Imaging Results (all)     Procedure Component Value Units Date/Time    Fiberoptic Endo (fees) [[id number]] Resulted:  09/06/18 1624     Updated:  09/06/18 1624    Narrative:       This procedure was auto-finalized with no dictation required.    Fiberoptic Endo (fees) [[id number]] Resulted:  09/02/18 1359     Updated:  09/02/18 1359    Narrative:       This procedure was auto-finalized with no dictation required.    XR Chest 1 View [[id number]] Collected:  08/30/18 1007     Updated:  08/31/18 0858    Narrative:       EXAMINATION: XR CHEST 1 VW- 08/30/2018     INDICATION: Cough; A41.9-Sepsis, unspecified organism; J69.0-Pneumonitis  due to inhalation of food and vomit; N39.0-Urinary tract infection, site  not specified; R09.02-Hypoxemia      COMPARISON: 08/25/2018     FINDINGS: Portable chest reveals heart to be enlarged. Underlying  chronic and emphysematous changes are seen within the lung fields  bilaterally. Degenerative changes seen within the spine. Degenerative  changes seen within the left shoulder. No acute bony abnormality.  Chronic changes seen throughout the lung fields bilaterally.           Impression:       Chronic changes seen throughout the lung fields. The heart  is enlarged. Prominence of the pulmonary vascularity bilaterally. No new  focal parenchymal opacification present.     D:  08/30/2018  E:  08/30/2018     This report was finalized on 8/31/2018 8:55 AM by Dr. Susan Gutierrez MD.       US Renal Bilateral [[id number]] Collected:  08/26/18 1356     Updated:  08/27/18 1005    Narrative:       EXAMINATION: US RENAL BILATERAL - 8/26/2018     INDICATION:  A41.9-Sepsis, unspecified organism; J69.0-Pneumonitis due  to inhalation of food and vomit; N39.0-Urinary tract infection, site not  specified; R09.02-Hypoxemia.      TECHNIQUE: Ultrasound kidneys and urinary bladder.     COMPARISON: None.     FINDINGS: Right kidney measures 12 cm in length with severe  right  hydronephrosis and cortical thinning present, however, no calculi or  soft tissue contour-deforming mass.     Left kidney measures 11.1 cm in length without evidence for  hydronephrosis, contour-deforming mass, or obvious calculi.     Urinary bladder is moderately distended and grossly unremarkable.       Impression:       Severe right hydronephrosis with cortical thinning, however,  no obstructive uropathy is clearly identified.      DICTATED:   8/26/2018  EDITED/ls :   8/26/2018      This report was finalized on 8/27/2018 10:03 AM by Dr. Alex Winchester.       XR Chest 1 View [[id number]] Collected:  08/25/18 0504     Updated:  08/25/18 0650    Addenda:        IMPRESSION:       Bibasal atelectasis and consolidation suspicious for ASPIRATION   PNEUMONIA.   Recommend followup to complete resolution.      THIS DOCUMENT HAS BEEN ELECTRONICALLY SIGNED BY YOBANI LOERA MD  Signed:  08/25/18 0650 by Yobani Loera MD    Impression:         THIS DOCUMENT HAS BEEN ELECTRONICALLY SIGNED BY YOBANI LOERA MD        Discharge Details        Discharge Medications      New Medications      Instructions Start Date   nystatin 694846 UNIT/GM powder  Commonly known as:  MYCOSTATIN   Topical, Every 12 Hours Scheduled         Changes to Medications      Instructions Start Date   diclofenac 1 % gel gel  Commonly known as:  VOLTAREN  What changed:  · how to take this  · when to take this  · reasons to take this   Topical, 4 Times Daily PRN      solifenacin 5 MG tablet  Commonly known as:  VESICARE  What changed:  how much to take   5 mg, Oral, Daily         Continue These Medications      Instructions Start Date   acetaminophen 325 MG tablet  Commonly known as:  TYLENOL   650 mg, Oral, Every 6 Hours PRN      b complex-C-folic acid 1 MG capsule   1 capsule, Oral, Daily      bisacodyl 10 MG suppository  Commonly known as:  DULCOLAX   10 mg, Rectal, Daily PRN      docusate sodium 100 MG capsule   100 mg, Oral, 2 Times Daily      donepezil  10 MG tablet  Commonly known as:  ARICEPT   10 mg, Oral, Nightly      famotidine 20 MG tablet  Commonly known as:  PEPCID   20 mg, Oral, 2 Times Daily      LORazepam 0.5 MG tablet  Commonly known as:  ATIVAN   0.5 mg, Oral, Every 8 Hours PRN      memantine 10 MG tablet  Commonly known as:  NAMENDA   10 mg, Oral, 2 Times Daily      multivitamin-antioxidants liquid liquid   Oral, Daily      oxybutynin 5 MG tablet  Commonly known as:  DITROPAN   5 mg, Oral, 3 Times Daily      polyethylene glycol pack packet  Commonly known as:  MIRALAX   17 g, Oral, Daily      REMERON 15 MG tablet  Generic drug:  mirtazapine   15 mg, Oral, Nightly         Stop These Medications    APLISOL 5 UNIT/0.1ML injection  Generic drug:  tuberculin     AZO-CRANBERRY PO     cefuroxime 250 MG tablet  Commonly known as:  CEFTIN     HYDROcodone-acetaminophen 5-325 MG per tablet  Commonly known as:  NORCO     IMODIUM A-D 2 MG capsule  Generic drug:  loperamide     lisinopril 2.5 MG tablet  Commonly known as:  PRINIVIL,ZESTRIL     raNITIdine 150 MG tablet  Commonly known as:  ZANTAC          Discharge Disposition:  Skilled Nursing Facility (DC - External)    Discharge Diet:  Diet Instructions     Diet: Regular, Soft Texture; Nectar / Syrup Thick Liquids; Chopped       Discharge Diet:   Regular  Soft Texture       Fluid Consistency:  Nectar / Syrup Thick Liquids    Soft Options:  Chopped        Discharge Activity:   Activity Instructions     Activity as Tolerated           Code Status/Level of Support:  Code Status and Medical Interventions:   Ordered at: 08/25/18 1141     Level Of Support Discussed With:    Next of Kin (If No Surrogate)     Code Status:    CPR     Medical Interventions (Level of Support Prior to Arrest):    Full     Future Appointments  Date Time Provider Department Center   11/15/2018 2:30 PM Fernando Dominguez MD [facility] None   11/28/2018 11:00 AM Yissel Maya PA-C [facility] None     Time Spent on Discharge:  40  minutes    Electronically signed by Lynn Kincaid PA-C, 09/10/18, 2:06 PM.

## 2018-09-10 NOTE — PLAN OF CARE
Problem: Patient Care Overview  Goal: Plan of Care Review  Outcome: Ongoing (interventions implemented as appropriate)   09/10/18 0437   Coping/Psychosocial   Plan of Care Reviewed With patient   Plan of Care Review   Progress improving   OTHER   Outcome Summary afebrile, VSS, WBC within normal limit. Continue on IV Abx.        Problem: Fall Risk (Adult)  Goal: Absence of Fall  Outcome: Ongoing (interventions implemented as appropriate)   09/10/18 0437   Fall Risk (Adult)   Absence of Fall making progress toward outcome       Problem: Skin Injury Risk (Adult)  Goal: Skin Health and Integrity  Outcome: Ongoing (interventions implemented as appropriate)   09/10/18 0437   Skin Injury Risk (Adult)   Skin Health and Integrity making progress toward outcome

## 2018-09-15 ENCOUNTER — LAB REQUISITION (OUTPATIENT)
Dept: LAB | Facility: HOSPITAL | Age: 83
End: 2018-09-15

## 2018-09-15 DIAGNOSIS — Z00.00 ROUTINE GENERAL MEDICAL EXAMINATION AT A HEALTH CARE FACILITY: ICD-10-CM

## 2018-09-15 DIAGNOSIS — R30.0 DYSURIA: ICD-10-CM

## 2018-09-15 LAB
BACTERIA UR QL AUTO: ABNORMAL /HPF
BILIRUB UR QL STRIP: NEGATIVE
CLARITY UR: CLEAR
COLOR UR: YELLOW
GLUCOSE UR STRIP-MCNC: NEGATIVE MG/DL
HGB UR QL STRIP.AUTO: NEGATIVE
HYALINE CASTS UR QL AUTO: ABNORMAL /LPF
KETONES UR QL STRIP: NEGATIVE
LEUKOCYTE ESTERASE UR QL STRIP.AUTO: NEGATIVE
NITRITE UR QL STRIP: NEGATIVE
PH UR STRIP.AUTO: 7 [PH] (ref 5–8)
PROT UR QL STRIP: NEGATIVE
RBC # UR: ABNORMAL /HPF
REF LAB TEST METHOD: ABNORMAL
SP GR UR STRIP: 1.01 (ref 1–1.03)
SQUAMOUS #/AREA URNS HPF: ABNORMAL /HPF
UROBILINOGEN UR QL STRIP: NORMAL
WBC UR QL AUTO: ABNORMAL /HPF

## 2018-09-15 PROCEDURE — 81001 URINALYSIS AUTO W/SCOPE: CPT

## 2018-11-15 ENCOUNTER — OFFICE VISIT (OUTPATIENT)
Dept: ORTHOPEDIC SURGERY | Facility: CLINIC | Age: 83
End: 2018-11-15

## 2018-11-15 VITALS — HEART RATE: 94 BPM | WEIGHT: 117 LBS | OXYGEN SATURATION: 98 % | HEIGHT: 60 IN | BODY MASS INDEX: 22.97 KG/M2

## 2018-11-15 DIAGNOSIS — M17.0 PRIMARY OSTEOARTHRITIS OF BOTH KNEES: Primary | ICD-10-CM

## 2018-11-15 PROCEDURE — 99214 OFFICE O/P EST MOD 30 MIN: CPT | Performed by: ORTHOPAEDIC SURGERY

## 2018-11-15 PROCEDURE — 20610 DRAIN/INJ JOINT/BURSA W/O US: CPT | Performed by: ORTHOPAEDIC SURGERY

## 2018-11-15 RX ORDER — LIDOCAINE HYDROCHLORIDE 10 MG/ML
3 INJECTION, SOLUTION INFILTRATION; PERINEURAL
Status: COMPLETED | OUTPATIENT
Start: 2018-11-15 | End: 2018-11-15

## 2018-11-15 RX ORDER — TRIAMCINOLONE ACETONIDE 40 MG/ML
80 INJECTION, SUSPENSION INTRA-ARTICULAR; INTRAMUSCULAR
Status: COMPLETED | OUTPATIENT
Start: 2018-11-15 | End: 2018-11-15

## 2018-11-15 RX ORDER — BUPIVACAINE HYDROCHLORIDE 2.5 MG/ML
3 INJECTION, SOLUTION INFILTRATION; PERINEURAL
Status: COMPLETED | OUTPATIENT
Start: 2018-11-15 | End: 2018-11-15

## 2018-11-15 RX ADMIN — BUPIVACAINE HYDROCHLORIDE 3 ML: 2.5 INJECTION, SOLUTION INFILTRATION; PERINEURAL at 15:01

## 2018-11-15 RX ADMIN — LIDOCAINE HYDROCHLORIDE 3 ML: 10 INJECTION, SOLUTION INFILTRATION; PERINEURAL at 15:01

## 2018-11-15 RX ADMIN — LIDOCAINE HYDROCHLORIDE 3 ML: 10 INJECTION, SOLUTION INFILTRATION; PERINEURAL at 15:00

## 2018-11-15 RX ADMIN — TRIAMCINOLONE ACETONIDE 80 MG: 40 INJECTION, SUSPENSION INTRA-ARTICULAR; INTRAMUSCULAR at 15:00

## 2018-11-15 RX ADMIN — TRIAMCINOLONE ACETONIDE 80 MG: 40 INJECTION, SUSPENSION INTRA-ARTICULAR; INTRAMUSCULAR at 15:01

## 2018-11-15 RX ADMIN — BUPIVACAINE HYDROCHLORIDE 3 ML: 2.5 INJECTION, SOLUTION INFILTRATION; PERINEURAL at 15:00

## 2018-11-15 NOTE — PROGRESS NOTES
Procedure   Large Joint Arthrocentesis: R knee  Date/Time: 11/15/2018 3:00 PM  Consent given by: patient  Site marked: site marked  Timeout: Immediately prior to procedure a time out was called to verify the correct patient, procedure, equipment, support staff and site/side marked as required   Supporting Documentation  Indications: pain   Procedure Details  Location: knee - R knee  Preparation: Patient was prepped and draped in the usual sterile fashion  Needle size: 22 G  Approach: anterolateral  Medications administered: 3 mL bupivacaine 0.25 %; 3 mL lidocaine 1 %; 80 mg triamcinolone acetonide 40 MG/ML  Patient tolerance: patient tolerated the procedure well with no immediate complications    Large Joint Arthrocentesis: L knee  Date/Time: 11/15/2018 3:01 PM  Consent given by: patient  Site marked: site marked  Timeout: Immediately prior to procedure a time out was called to verify the correct patient, procedure, equipment, support staff and site/side marked as required   Supporting Documentation  Indications: pain   Procedure Details  Location: knee - L knee  Preparation: Patient was prepped and draped in the usual sterile fashion  Needle size: 22 G  Approach: anterolateral  Medications administered: 3 mL bupivacaine 0.25 %; 3 mL lidocaine 1 %; 80 mg triamcinolone acetonide 40 MG/ML  Patient tolerance: patient tolerated the procedure well with no immediate complications

## 2018-11-15 NOTE — PROGRESS NOTES
Orthopaedic Clinic Note: Knee Established Patient    Chief Complaint   Patient presents with   • Follow-up     3 month f/u Primary osteoarthritis of both knees         HPI    It has been 3  month(s) since Ms. Worthy's last visit. She returns to clinic today for follow-up bilateral knee pain.  She received cortisone injections at her last visit the provided approximately 6 weeks of relief.  Her pain has gradually returned.  She rates her pain a 5/10 on the pain scale today.  She is ambulate with the assistance of a walker and remains at an assisted living facility.  Walking and standing increases her pain.  She is taking Tylenol for pain control.  She is continuing therapy at her facility.  Overall she is doing worse.    Past Medical History:   Diagnosis Date   • Anorexia    • Arthritis    • Asthma    • Cataract    • Colon cancer (CMS/HCC)    • Colon cancer metastasized to liver (CMS/HCC)    • DDD (degenerative disc disease), lumbar    • Hearing loss    • History of transfusion    • Hypertension    • Liver cancer (CMS/HCC)    • Macular degeneration    • Onychomycosis    • Urine frequency       Past Surgical History:   Procedure Laterality Date   • CATARACT EXTRACTION Bilateral    • COLON RESECTION  06/16/2009    Low anterior resection   • GALLBLADDER SURGERY      Resection of gallbladder   • LIVER SURGERY  12/09/2014    Liver tumor, 1/2 was removed.   • PARTIAL HYSTERECTOMY        Family History   Problem Relation Age of Onset   • Arthritis Other    • Cervical cancer Other    • Uterine cancer Other    • Colon cancer Other      Social History     Socioeconomic History   • Marital status:      Spouse name: Not on file   • Number of children: 3   • Years of education: Not on file   • Highest education level: Not on file   Social Needs   • Financial resource strain: Not on file   • Food insecurity - worry: Not on file   • Food insecurity - inability: Not on file   • Transportation needs - medical: Not on file   •  Transportation needs - non-medical: Not on file   Occupational History   • Occupation: homemaker    Tobacco Use   • Smoking status: Never Smoker   • Smokeless tobacco: Never Used   Substance and Sexual Activity   • Alcohol use: No   • Drug use: No   • Sexual activity: No   Other Topics Concern   • Not on file   Social History Narrative   • Not on file      Current Outpatient Medications on File Prior to Visit   Medication Sig Dispense Refill   • acetaminophen (TYLENOL) 325 MG tablet Take 2 tablets by mouth Every 6 (Six) Hours As Needed for Mild Pain .     • b complex-C-folic acid 1 MG capsule Take 1 capsule by mouth Daily.     • bisacodyl (DULCOLAX) 10 MG suppository Insert 1 suppository into the rectum Daily As Needed for Constipation.     • diclofenac (VOLTAREN) 1 % gel gel Apply  topically to the appropriate area as directed 4 (Four) Times a Day As Needed (joint pain).  0   • docusate sodium 100 MG capsule Take 100 mg by mouth 2 (Two) Times a Day.     • donepezil (ARICEPT) 10 MG tablet Take 10 mg by mouth Every Night.     • famotidine (PEPCID) 20 MG tablet Take 1 tablet by mouth 2 (Two) Times a Day.     • LORazepam (ATIVAN) 0.5 MG tablet Take 1 tablet by mouth Every 8 (Eight) Hours As Needed for Anxiety. 5 tablet 0   • memantine (NAMENDA) 10 MG tablet Take 10 mg by mouth 2 (Two) Times a Day.     • mirtazapine (REMERON) 15 MG tablet Take 15 mg by mouth Every Night.     • multivitamin-antioxidants (CERTAVITE) liquid liquid Take  by mouth Daily.     • nystatin (MYCOSTATIN) 359301 UNIT/GM powder Apply  topically to the appropriate area as directed Every 12 (Twelve) Hours.     • oxybutynin (DITROPAN) 5 MG tablet Take 5 mg by mouth 3 (Three) Times a Day.     • polyethylene glycol (MIRALAX) pack packet Take 17 g by mouth Daily.     • solifenacin (VESICARE) 5 MG tablet Take 1 tablet by mouth Daily.       No current facility-administered medications on file prior to visit.       No Known Allergies     Review of Systems  "  Constitutional: Negative.    HENT: Negative.    Eyes: Negative.    Respiratory: Negative.    Cardiovascular: Negative.    Gastrointestinal: Negative.    Endocrine: Negative.    Genitourinary: Negative.    Musculoskeletal: Positive for arthralgias (Bilateral knees).   Skin: Negative.    Allergic/Immunologic: Negative.    Neurological: Negative.    Hematological: Negative.    Psychiatric/Behavioral: Negative.         Physical Exam  Pulse 94, height 152.4 cm (60\"), weight 53.1 kg (117 lb), SpO2 98 %, not currently breastfeeding.    Body mass index is 22.85 kg/m².    GENERAL APPEARANCE: awake, alert, oriented, in no acute distress  LUNGS:  breathing nonlabored  EXTREMITIES: no clubbing, cyanosis  PERIPHERAL PULSES: palpable dorsalis pedis and posterior tibial pulses bilaterally.    GAIT:  Antalgic        ----------  Bilateral knee exam:  ----------  ALIGNMENT: 2° varus, correctable to neutral     RANGE OF MOTION:  Decreased (5 - 120 degrees)   LIGAMENTOUS STABILITY:   stable to varus and valgus stress at terminal extension and 30 degrees; slight retensioning of the MCL is appreciated with valgus stress at 30 degrees consistent with medial compartment degeneration     STRENGTH:  4/5 knee flexion, extension. 5/5 ankle dorsiflexion and plantarflexion.     PAIN WITH PALPATION: global  KNEE EFFUSION: yes, trace effusion  PAIN WITH KNEE ROM: yes, global   PATELLAR CREPITUS: yes, painful and symptomatic  SPECIAL EXAM FINDINGS:  none    REFLEXES:  PATELLAR 2+/4  ACHILLES 2+/4    CLONUS: no  STRAIGHT LEG TEST:   negative     SENSATION TO LIGHT TOUCH:  DEEP PERONEAL/SUPERFICIAL PERONEAL/SURAL/SAPHENOUS/TIBIAL:   intact     EDEMA:  no  ERYTHEMA:  no  WOUNDS/INCISIONS: no  _____________________________________________________________________  _____________________________________________________________________    RADIOGRAPHIC FINDINGS:   Indication: Bilateral knee pain     Comparison: Todays xrays were compared to previous xrays " from 5/17/18     Knee films: moderate to severe tricompartmental arthritis with genu varum alignment and bone-on-bone articulation medial compartment, periarticular osteophytes visualized in all compartments.  No significant changes where the prior radiographs.    Assessment/Plan:   Diagnosis Plan   1. Primary osteoarthritis of both knees  XR Knee 4+ View Bilateral    diclofenac (VOLTAREN) 1 % gel gel    Large Joint Arthrocentesis: R knee    Large Joint Arthrocentesis: L knee     The patient has failed conservative treatment measures and is a candidate for total joint arthroplasty.  Due to patient's advanced age, she declines surgical intervention. Alternative conservative treatment measures were discussed including bracing, therapy, topical/oral anti-inflammatories, activity modification, and weight loss.  The patient considered these treatment options and wishes to proceed with corticosteroid injection(s) today.  Therefore we will proceed with corticosteroid injection(s) today.  Follow-up in 3 months.  We'll obtain an x-ray AP pelvis upon return    Procedure Note:  I discussed with the patient the potential benefits of performing a therapeutic injections of the bilateral knees as well as potential risks including but not limited to infection, swelling, pain, bleeding, bruising, nerve/vessel damage, skin color changes, transient elevation in blood glucose levels, and fat atrophy. After informed consent and after the areas were prepped with alcohol, ethyl chloride was used to numb the skin. Via the superolateral approach, 3cc of 1% lidocaine, 3cc of 0.25% marcaine and 2 cc of 40mg/ml of Kenalog were each injected into the bilateral knees. The patient tolerated the procedures well. There were no complications. A sterile dressing was placed over each injection site.      Fernando Dominguez MD  11/15/18  3:04 PM

## 2018-12-22 ENCOUNTER — LAB REQUISITION (OUTPATIENT)
Dept: LAB | Facility: HOSPITAL | Age: 83
End: 2018-12-22

## 2018-12-22 DIAGNOSIS — Z00.00 ROUTINE GENERAL MEDICAL EXAMINATION AT A HEALTH CARE FACILITY: ICD-10-CM

## 2018-12-22 DIAGNOSIS — R30.0 DYSURIA: ICD-10-CM

## 2018-12-22 LAB
BACTERIA UR QL AUTO: ABNORMAL /HPF
BILIRUB UR QL STRIP: NEGATIVE
CLARITY UR: ABNORMAL
COLOR UR: YELLOW
GLUCOSE UR STRIP-MCNC: NEGATIVE MG/DL
HGB UR QL STRIP.AUTO: ABNORMAL
HYALINE CASTS UR QL AUTO: ABNORMAL /LPF
KETONES UR QL STRIP: NEGATIVE
LEUKOCYTE ESTERASE UR QL STRIP.AUTO: ABNORMAL
NITRITE UR QL STRIP: NEGATIVE
PH UR STRIP.AUTO: 6.5 [PH] (ref 5–8)
PROT UR QL STRIP: ABNORMAL
RBC # UR: ABNORMAL /HPF
REF LAB TEST METHOD: ABNORMAL
SP GR UR STRIP: 1.02 (ref 1–1.03)
SQUAMOUS #/AREA URNS HPF: ABNORMAL /HPF
UROBILINOGEN UR QL STRIP: ABNORMAL
WBC UR QL AUTO: ABNORMAL /HPF

## 2018-12-22 PROCEDURE — 81001 URINALYSIS AUTO W/SCOPE: CPT

## 2019-02-25 ENCOUNTER — OFFICE VISIT (OUTPATIENT)
Dept: NEUROLOGY | Facility: CLINIC | Age: 84
End: 2019-02-25

## 2019-02-25 VITALS
DIASTOLIC BLOOD PRESSURE: 68 MMHG | BODY MASS INDEX: 22.97 KG/M2 | WEIGHT: 117 LBS | HEIGHT: 60 IN | SYSTOLIC BLOOD PRESSURE: 126 MMHG

## 2019-02-25 DIAGNOSIS — F02.80 LATE ONSET ALZHEIMER'S DISEASE WITHOUT BEHAVIORAL DISTURBANCE (HCC): Primary | ICD-10-CM

## 2019-02-25 DIAGNOSIS — G30.1 LATE ONSET ALZHEIMER'S DISEASE WITHOUT BEHAVIORAL DISTURBANCE (HCC): Primary | ICD-10-CM

## 2019-02-25 PROCEDURE — 99214 OFFICE O/P EST MOD 30 MIN: CPT | Performed by: PHYSICIAN ASSISTANT

## 2019-02-25 RX ORDER — CIPROFLOXACIN 250 MG/1
250 TABLET, FILM COATED ORAL 2 TIMES DAILY
COMMUNITY

## 2019-02-25 NOTE — PROGRESS NOTES
"Subjective     Chief Complaint: memory loss      History of Present Illness   Zainab Worthy is a 89 y.o. female who returns to clinic today for evaluation of memory loss . Her family  has noted symptoms since approximately 2015 marked initially by forgetfulness. This has gradually worsened  over time. Additional associated symptoms have included impairments in essentially all spheres of cognition. She has had no definite symptoms of BPSD though there is a suspicion of delusions. There are not clear modifying factors. She is currently residing at [facility].     Prior evaluation has included screening blood work  and a head CT  which were unremarkable . She is currently taking donepezil and Namenda.    Today: Since her last visit in 5/18, she feels essentially unchanged cognitively and her family agrees.       I have reviewed and confirmed the past family, social and medical history as accurate on 2/25/19.     Review of Systems   Constitutional: Negative.    HENT: Negative.    Eyes: Negative.    Respiratory: Negative.    Cardiovascular: Negative.    Gastrointestinal: Negative.    Endocrine: Negative.    Genitourinary: Negative.    Musculoskeletal: Negative.    Skin: Negative.    Allergic/Immunologic: Negative.    Neurological:        Memory loss     Hematological: Negative.    Psychiatric/Behavioral: Negative.        Objective     /68   Ht 152.4 cm (60\")   Wt 53.1 kg (117 lb)   BMI 22.85 kg/m²     General appearance today is normal.       Physical Exam   Neurological: She has normal strength. She has a normal Finger-Nose-Finger Test.   Psychiatric: Her speech is normal.        Neurologic Exam     Mental Status   Oriented to person.   Oriented to place.   Disoriented to time. Disoriented to year, month, date and season. Oriented to day.   Registration: recalls 3 of 3 objects. Recall of objects at 5 minutes: 0/3 recall  Follows 3 step commands.   Attention: normal.   Speech: speech is normal   Level of " consciousness: alert  Able to name object. Able to read. Able to repeat. Able to write. Normal comprehension.     Cranial Nerves   Cranial nerves II through XII intact.     Motor Exam   Muscle bulk: normal  Overall muscle tone: normal    Strength   Strength 5/5 throughout.     Sensory Exam   Light touch normal.     Gait, Coordination, and Reflexes     Coordination   Finger to nose coordination: normal    Tremor   Resting tremor: absent        Results  MMSE=21 (15 in 5/18)       Assessment/Plan   Zainab was seen today for alzheimer's disease.    Diagnoses and all orders for this visit:    Late onset Alzheimer's disease without behavioral disturbance          Discussion/Summary   Zainab Worthy returns to clinic today for evaluation of Alzheimer's Disease . This was discussed with the patient and her family. After discussing potential treatment options, it was elected to continue on  donepezil and memantine unchanged. She will then follow up in 6 months, or sooner if needed.   I spent 25 minutes face to face with the patient and family with 15 minutes spent on discussing diagnosis, evaluation, current status, treatment options and management as discussed above.       As part of this visit I obtained additional history from the family which is incorporated in the HPI.      Yissel Maya PA-C

## 2019-04-11 ENCOUNTER — OFFICE VISIT (OUTPATIENT)
Dept: ORTHOPEDIC SURGERY | Facility: CLINIC | Age: 84
End: 2019-04-11

## 2019-04-11 VITALS — OXYGEN SATURATION: 90 % | HEART RATE: 87 BPM | WEIGHT: 135 LBS | BODY MASS INDEX: 26.5 KG/M2 | HEIGHT: 60 IN

## 2019-04-11 DIAGNOSIS — Z96.649 S/P HIP HEMIARTHROPLASTY: ICD-10-CM

## 2019-04-11 DIAGNOSIS — M17.0 PRIMARY OSTEOARTHRITIS OF BOTH KNEES: Primary | ICD-10-CM

## 2019-04-11 PROCEDURE — 99214 OFFICE O/P EST MOD 30 MIN: CPT | Performed by: ORTHOPAEDIC SURGERY

## 2019-04-11 PROCEDURE — 20610 DRAIN/INJ JOINT/BURSA W/O US: CPT | Performed by: ORTHOPAEDIC SURGERY

## 2019-04-11 RX ORDER — LIDOCAINE HYDROCHLORIDE 10 MG/ML
3 INJECTION, SOLUTION INFILTRATION; PERINEURAL
Status: COMPLETED | OUTPATIENT
Start: 2019-04-11 | End: 2019-04-11

## 2019-04-11 RX ORDER — TRIAMCINOLONE ACETONIDE 40 MG/ML
80 INJECTION, SUSPENSION INTRA-ARTICULAR; INTRAMUSCULAR
Status: COMPLETED | OUTPATIENT
Start: 2019-04-11 | End: 2019-04-11

## 2019-04-11 RX ORDER — BUPIVACAINE HYDROCHLORIDE 2.5 MG/ML
3 INJECTION, SOLUTION INFILTRATION; PERINEURAL
Status: COMPLETED | OUTPATIENT
Start: 2019-04-11 | End: 2019-04-11

## 2019-04-11 RX ADMIN — BUPIVACAINE HYDROCHLORIDE 3 ML: 2.5 INJECTION, SOLUTION INFILTRATION; PERINEURAL at 15:01

## 2019-04-11 RX ADMIN — TRIAMCINOLONE ACETONIDE 80 MG: 40 INJECTION, SUSPENSION INTRA-ARTICULAR; INTRAMUSCULAR at 15:01

## 2019-04-11 RX ADMIN — LIDOCAINE HYDROCHLORIDE 3 ML: 10 INJECTION, SOLUTION INFILTRATION; PERINEURAL at 15:01

## 2019-04-11 NOTE — PROGRESS NOTES
Orthopaedic Clinic Note: Hip Established Patient    Chief Complaint   Patient presents with   • Right Hip - Follow-up     14 month status post Right Hip Hemiarthroplasty 02/08/2018   • Left Knee - Follow-up     Primary Osteoarthritis of Both Knees  Bilateral Knee Cortisone Injection given 11/15/2018   • Right Knee - Follow-up     Primary Osteoarthritis of Both Knees  Bilateral Knee Cortisone Injection given 11/15/2018        HPI    It has been 5  month(s) since Ms. Worthy's last visit. She returns to clinic today for one-year follow-up status post right hip hemiarthroplasty as well as follow-up for bilateral knee osteoarthritis.  She received cortisone injections back in November that provided good relief for a couple of months.  Her pain is gradually returned in both knees.  She rates the pain a 3/10 on the pain scale.  She denies any complications from the hemiarthroplasty.  She is happy with the outcome from that.  She has recently been diagnosed with a DVT in the right lower extremity is currently on Eliquis.  She is ambulating with the assistance of a walker primarily but uses a wheelchair for long-distance mobility.  Overall, her hip is doing extremely well.  Her knees are doing worse.    Past Medical History:   Diagnosis Date   • Anorexia    • Arthritis    • Asthma    • Cataract    • Colon cancer (CMS/HCC)    • Colon cancer metastasized to liver (CMS/HCC)    • DDD (degenerative disc disease), lumbar    • Hearing loss    • History of transfusion    • Hypertension    • Liver cancer (CMS/HCC)    • Macular degeneration    • Onychomycosis    • Urine frequency       Past Surgical History:   Procedure Laterality Date   • CATARACT EXTRACTION Bilateral    • COLON RESECTION  06/16/2009    Low anterior resection   • GALLBLADDER SURGERY      Resection of gallbladder   • HIP HEMIARTHROPLASTY Right 2/8/2018    Procedure: HIP HEMIARTHROPLASTY;  Surgeon: Fernando Dominguez MD;  Location: [facility];  Service:    • LIVER  SURGERY  12/09/2014    Liver tumor, 1/2 was removed.   • PARTIAL HYSTERECTOMY        Family History   Problem Relation Age of Onset   • Arthritis Other    • Cervical cancer Other    • Uterine cancer Other    • Colon cancer Other      Social History     Socioeconomic History   • Marital status:      Spouse name: Not on file   • Number of children: 3   • Years of education: Not on file   • Highest education level: Not on file   Occupational History   • Occupation: homemaker    Tobacco Use   • Smoking status: Never Smoker   • Smokeless tobacco: Never Used   Substance and Sexual Activity   • Alcohol use: No   • Drug use: No   • Sexual activity: No      Current Outpatient Medications on File Prior to Visit   Medication Sig Dispense Refill   • acetaminophen (TYLENOL) 325 MG tablet Take 2 tablets by mouth Every 6 (Six) Hours As Needed for Mild Pain .     • b complex-C-folic acid 1 MG capsule Take 1 capsule by mouth Daily.     • bisacodyl (DULCOLAX) 10 MG suppository Insert 1 suppository into the rectum Daily As Needed for Constipation.     • ciprofloxacin (CIPRO) 250 MG tablet Take 250 mg by mouth 2 (Two) Times a Day. FOR UTI     • diclofenac (VOLTAREN) 1 % gel gel Apply 4 g topically to the appropriate area as directed 4 (Four) Times a Day. Small amount to affected area 300 g 3   • docusate sodium 100 MG capsule Take 100 mg by mouth 2 (Two) Times a Day.     • donepezil (ARICEPT) 10 MG tablet Take 10 mg by mouth Every Night.     • famotidine (PEPCID) 20 MG tablet Take 1 tablet by mouth 2 (Two) Times a Day.     • LORazepam (ATIVAN) 0.5 MG tablet Take 1 tablet by mouth Every 8 (Eight) Hours As Needed for Anxiety. 5 tablet 0   • memantine (NAMENDA) 10 MG tablet Take 10 mg by mouth 2 (Two) Times a Day.     • mirtazapine (REMERON) 15 MG tablet Take 15 mg by mouth Every Night.     • multivitamin-antioxidants (CERTAVITE) liquid liquid Take  by mouth Daily.     • nystatin (MYCOSTATIN) 252957 UNIT/GM powder Apply  topically  "to the appropriate area as directed Every 12 (Twelve) Hours.     • oxybutynin (DITROPAN) 5 MG tablet Take 5 mg by mouth 3 (Three) Times a Day.     • polyethylene glycol (MIRALAX) pack packet Take 17 g by mouth Daily.     • solifenacin (VESICARE) 5 MG tablet Take 1 tablet by mouth Daily.       No current facility-administered medications on file prior to visit.       Allergies   Allergen Reactions   • Asa [Aspirin] Unknown (See Comments)     UNKNOWN   • Bactrim [Sulfamethoxazole-Trimethoprim] Unknown (See Comments)     UNKNOWN   • Ciprofloxacin Unknown (See Comments)     UNKNOWN     • Lopressor [Metoprolol] Unknown (See Comments)     UNKNOWN   • Penicillins Unknown (See Comments)     UNKNOWN     • Ultram [Tramadol] Unknown (See Comments)     UNKNOWN          Review of Systems   Constitutional: Negative.    HENT: Negative.    Eyes: Negative.    Respiratory: Negative.    Cardiovascular: Negative.    Gastrointestinal: Negative.    Endocrine: Negative.    Genitourinary: Negative.    Musculoskeletal: Positive for arthralgias and joint swelling.   Skin: Negative.    Allergic/Immunologic: Negative.    Neurological: Negative.    Hematological: Negative.    Psychiatric/Behavioral: Negative.         Physical Exam  Pulse 87, height 152.4 cm (60\"), weight 61.2 kg (135 lb), SpO2 90 %, not currently breastfeeding.    Body mass index is 26.37 kg/m².    GENERAL APPEARANCE: awake, alert, oriented, in no acute distress and well developed, well nourished  LUNGS:  breathing nonlabored  EXTREMITIES: no clubbing, cyanosis  PERIPHERAL PULSES: palpable dorsalis pedis and posterior tibial pulses bilaterally.    GAIT: Not assessed            Hip Exam:  Right    RANGE OF MOTION:  EXTENSION/FLEXION:  normal (0-110 degrees)  IR (at 90 degrees of flexion):  20  ER (at 90 degrees of flexion):  35  PAIN WITH HIP MOTION:  no  PAIN WITH LOGROLL:  no     STINCHFIELD TEST: negative    STRENGTH:  ABDUCTOR:  4/5  ADDUCTOR:  5/5  HIP FLEXION:  " 5/5    GREATER TROCHANTER BURSAL PAIN:  no    SENSATION TO LIGHT TOUCH:  DEEP PERONEAL/SUPERFICIAL PERONEAL/SURAL/SAPHENOUS/TIBIAL:   intact    -----------------  Bilateral knee exam:  ----------  ALIGNMENT: 2° varus, correctable to neutral     RANGE OF MOTION:  Decreased (5 - 110 degrees)   LIGAMENTOUS STABILITY:   stable to varus and valgus stress at terminal extension and 30 degrees; slight retensioning of the MCL is appreciated with valgus stress at 30 degrees consistent with medial compartment degeneration     STRENGTH:  4/5 knee flexion, extension. 5/5 ankle dorsiflexion and plantarflexion.     PAIN WITH PALPATION: global  KNEE EFFUSION: yes, mild effusion  PAIN WITH KNEE ROM: yes, global   PATELLAR CREPITUS: yes, painful and symptomatic  SPECIAL EXAM FINDINGS:  none    REFLEXES:  PATELLAR 2+/4  ACHILLES 2+/4    CLONUS: no  STRAIGHT LEG TEST:   negative     EDEMA: 1+ pitting edema in right lower extremity only  ERYTHEMA:  no  WOUNDS/INCISIONS: no  _________________________________________________________________  _________________________________________________________________    RADIOGRAPHIC FINDINGS:   Indication: Status post right hip hemiarthroplasty    Comparison: Todays xrays were compared to previous xrays from 8/16/18     AP pelvis: Right: Demonstrate a well positioned hip hemiarthroplasty without evidence of wear, loosening, fracture or osteolysis, femoral head is concentrically reduced within the acetabulum    Assessment/Plan:   Diagnosis Plan   1. Primary osteoarthritis of both knees  Large Joint Arthrocentesis: R knee    Large Joint Arthrocentesis: L knee   2. S/P hip hemiarthroplasty  XR Pelvis 1 or 2 View     Patient is doing well 1 year status post right hip hemiarthroplasty.  I recommended continued activity as tolerated.  In regards to the bilateral knees, she received excellent relief with prior cortisone injections.  I recommended repeat cortisone injections bilateral knees.  She will  follow-up in 4 months with repeat x-ray 4 views bilateral knees.    Procedure Note:  I discussed with the patient the potential benefits of performing a therapeutic injections of the bilateral knees as well as potential risks including but not limited to infection, swelling, pain, bleeding, bruising, nerve/vessel damage, skin color changes, transient elevation in blood glucose levels, and fat atrophy. After informed consent and after the areas were prepped with alcohol, ethyl chloride was used to numb the skin. Via the superolateral approach, 3cc of 1% lidocaine, 3cc of 0.25% marcaine and 2 cc of 40mg/ml of Kenalog were each injected into the bilateral knees. The patient tolerated the procedures well. There were no complications. A sterile dressing was placed over each injection site.    Fernando Dominguez MD  04/11/19  3:14 PM

## 2019-04-11 NOTE — PROGRESS NOTES
Procedure   Large Joint Arthrocentesis: R knee  Date/Time: 4/11/2019 3:01 PM  Consent given by: patient  Site marked: site marked  Timeout: Immediately prior to procedure a time out was called to verify the correct patient, procedure, equipment, support staff and site/side marked as required   Supporting Documentation  Indications: pain   Procedure Details  Location: knee - R knee  Preparation: Patient was prepped and draped in the usual sterile fashion  Needle size: 22 G  Approach: anterolateral  Medications administered: 3 mL bupivacaine 0.25 %; 3 mL lidocaine 1 %; 80 mg triamcinolone acetonide 40 MG/ML  Patient tolerance: patient tolerated the procedure well with no immediate complications    Large Joint Arthrocentesis: L knee  Date/Time: 4/11/2019 3:01 PM  Consent given by: patient  Site marked: site marked  Timeout: Immediately prior to procedure a time out was called to verify the correct patient, procedure, equipment, support staff and site/side marked as required   Supporting Documentation  Indications: pain   Procedure Details  Location: knee - L knee  Preparation: Patient was prepped and draped in the usual sterile fashion  Needle size: 22 G  Approach: anterolateral  Medications administered: 3 mL bupivacaine 0.25 %; 3 mL lidocaine 1 %; 80 mg triamcinolone acetonide 40 MG/ML  Patient tolerance: patient tolerated the procedure well with no immediate complications

## 2019-04-11 NOTE — PROGRESS NOTES
Orthopaedic Clinic Note: Knee Established Patient    Chief Complaint   Patient presents with   • Right Hip - Follow-up     14 month status post Right Hip Hemiarthroplasty 2018   • Left Knee - Follow-up     Primary Osteoarthritis of Both Knees  Bilateral Knee Cortisone Injection given 11/15/2018   • Right Knee - Follow-up     Primary Osteoarthritis of Both Knees  Bilateral Knee Cortisone Injection given 11/15/2018        HPI    It has been {Numbers; 0-30:07832}  {DAYS, WEEKS, MONTHS, YEARS:66143} since Ms. Worthy's last visit. She returns to clinic today for ***. She rates her pain a {0-10:42464}/10 on the pain scale and is currently taking {Meds Pt is Takin} for pain. She is ambulating with {Ambulating Devices:37076}. She {completed/continuin} {therapy/home exercise program:34138}.  She {denies/admits drainage:13857}.  Overall, she is doing {better worse same:60733}. ***    Past Medical History:   Diagnosis Date   • Anorexia    • Arthritis    • Asthma    • Cataract    • Colon cancer (CMS/HCC)    • Colon cancer metastasized to liver (CMS/HCC)    • DDD (degenerative disc disease), lumbar    • Hearing loss    • History of transfusion    • Hypertension    • Liver cancer (CMS/HCC)    • Macular degeneration    • Onychomycosis    • Urine frequency       Past Surgical History:   Procedure Laterality Date   • CATARACT EXTRACTION Bilateral    • COLON RESECTION  2009    Low anterior resection   • GALLBLADDER SURGERY      Resection of gallbladder   • HIP HEMIARTHROPLASTY Right 2018    Procedure: HIP HEMIARTHROPLASTY;  Surgeon: Fernando Dominguez MD;  Location: [facility];  Service:    • LIVER SURGERY  2014    Liver tumor,  was removed.   • PARTIAL HYSTERECTOMY        Family History   Problem Relation Age of Onset   • Arthritis Other    • Cervical cancer Other    • Uterine cancer Other    • Colon cancer Other      Social History     Socioeconomic History   • Marital status:       Spouse name: Not on file   • Number of children: 3   • Years of education: Not on file   • Highest education level: Not on file   Occupational History   • Occupation: homemaker    Tobacco Use   • Smoking status: Never Smoker   • Smokeless tobacco: Never Used   Substance and Sexual Activity   • Alcohol use: No   • Drug use: No   • Sexual activity: No      Current Outpatient Medications on File Prior to Visit   Medication Sig Dispense Refill   • acetaminophen (TYLENOL) 325 MG tablet Take 2 tablets by mouth Every 6 (Six) Hours As Needed for Mild Pain .     • b complex-C-folic acid 1 MG capsule Take 1 capsule by mouth Daily.     • bisacodyl (DULCOLAX) 10 MG suppository Insert 1 suppository into the rectum Daily As Needed for Constipation.     • ciprofloxacin (CIPRO) 250 MG tablet Take 250 mg by mouth 2 (Two) Times a Day. FOR UTI     • diclofenac (VOLTAREN) 1 % gel gel Apply 4 g topically to the appropriate area as directed 4 (Four) Times a Day. Small amount to affected area 300 g 3   • docusate sodium 100 MG capsule Take 100 mg by mouth 2 (Two) Times a Day.     • donepezil (ARICEPT) 10 MG tablet Take 10 mg by mouth Every Night.     • famotidine (PEPCID) 20 MG tablet Take 1 tablet by mouth 2 (Two) Times a Day.     • LORazepam (ATIVAN) 0.5 MG tablet Take 1 tablet by mouth Every 8 (Eight) Hours As Needed for Anxiety. 5 tablet 0   • memantine (NAMENDA) 10 MG tablet Take 10 mg by mouth 2 (Two) Times a Day.     • mirtazapine (REMERON) 15 MG tablet Take 15 mg by mouth Every Night.     • multivitamin-antioxidants (CERTAVITE) liquid liquid Take  by mouth Daily.     • nystatin (MYCOSTATIN) 601089 UNIT/GM powder Apply  topically to the appropriate area as directed Every 12 (Twelve) Hours.     • oxybutynin (DITROPAN) 5 MG tablet Take 5 mg by mouth 3 (Three) Times a Day.     • polyethylene glycol (MIRALAX) pack packet Take 17 g by mouth Daily.     • solifenacin (VESICARE) 5 MG tablet Take 1 tablet by mouth Daily.       No current  "facility-administered medications on file prior to visit.       Allergies   Allergen Reactions   • Asa [Aspirin] Unknown (See Comments)     UNKNOWN   • Bactrim [Sulfamethoxazole-Trimethoprim] Unknown (See Comments)     UNKNOWN   • Ciprofloxacin Unknown (See Comments)     UNKNOWN     • Lopressor [Metoprolol] Unknown (See Comments)     UNKNOWN   • Penicillins Unknown (See Comments)     UNKNOWN     • Ultram [Tramadol] Unknown (See Comments)     UNKNOWN          Review of Systems     Physical Exam  Pulse 87, height 152.4 cm (60\"), weight 61.2 kg (135 lb), SpO2 90 %, not currently breastfeeding.    Body mass index is 26.37 kg/m².    GENERAL APPEARANCE: {General Appearance:97297::\"awake, alert, oriented, in no acute distress\",\"well developed, well nourished\"}  LUNGS:  breathing nonlabored  EXTREMITIES: no clubbing, cyanosis  PERIPHERAL PULSES: palpable dorsalis pedis and posterior tibial pulses bilaterally.    GAIT:  {Gait:18867}        ----------  {RIGHT LEFT BILATERAL:79284} Knee Exam:  ----------  ALIGNMENT: {Neutral/varus:62855}  ----------  RANGE OF MOTION:  {Alberto Knee ROM:03822}  LIGAMENTOUS STABILITY:   {Alberto Stable:97588}  ----------  STRENGTH:  KNEE FLEXION {0-5:61114::\"5\"}/5  KNEE EXTENSION  {0-5:14595::\"5\"}/5  ANKLE DORSIFLEXION  {0-5:03464::\"5\"}/5  ANKLE PLANTARFLEXION  {0-5:31469::\"5\"}/5  ----------  PAIN WITH PALPATION:{Knee pain location:78915}  KNEE EFFUSION: {Yes/No:08737::\"no\"}  PAIN WITH KNEE ROM: {YES NO:65854}  PATELLAR CREPITUS:  {knee crepitus:69032::\"no\"}  ----------  SENSATION TO LIGHT TOUCH:  DEEP PERONEAL/SUPERFICIAL PERONEAL/SURAL/SAPHENOUS/TIBIAL:    {Sensation:00037::\"intact\"}  ----------  EDEMA:  {Yes/No:84666::\"no\"}  ERYTHEMA:    {Yes/No:53133::\"no\"}  WOUNDS/INCISIONS:  {Yes/No:83188::\"no\"}  _____________________________________________________________________  _____________________________________________________________________    RADIOGRAPHIC FINDINGS:   Indication: " ***    Comparison: {Alberto XR comparison:67455}    Knee films: {Alberto knee XR findings:19986}    Assessment/Plan:   Diagnosis Plan   1. History of bilateral hip hemiarthroplasty     2. S/P hip hemiarthroplasty  XR Pelvis 1 or 2 View     ***      Patience Melendez MA  04/11/19  2:50 PM

## 2019-09-09 ENCOUNTER — OFFICE VISIT (OUTPATIENT)
Dept: NEUROLOGY | Facility: CLINIC | Age: 84
End: 2019-09-09

## 2019-09-09 VITALS
SYSTOLIC BLOOD PRESSURE: 124 MMHG | WEIGHT: 135 LBS | DIASTOLIC BLOOD PRESSURE: 66 MMHG | HEIGHT: 60 IN | BODY MASS INDEX: 26.5 KG/M2

## 2019-09-09 DIAGNOSIS — G30.1 LATE ONSET ALZHEIMER'S DISEASE WITHOUT BEHAVIORAL DISTURBANCE (HCC): Primary | ICD-10-CM

## 2019-09-09 DIAGNOSIS — F02.80 LATE ONSET ALZHEIMER'S DISEASE WITHOUT BEHAVIORAL DISTURBANCE (HCC): Primary | ICD-10-CM

## 2019-09-09 PROCEDURE — 99214 OFFICE O/P EST MOD 30 MIN: CPT | Performed by: PHYSICIAN ASSISTANT

## 2019-09-09 RX ORDER — MIRTAZAPINE 15 MG/1
15 TABLET, FILM COATED ORAL NIGHTLY
Qty: 30 TABLET | Refills: 11 | Status: SHIPPED | OUTPATIENT
Start: 2019-09-09

## 2019-09-09 NOTE — PROGRESS NOTES
"Subjective     Chief Complaint: memory loss      History of Present Illness   Zainab Worthy is a 89 y.o. female who returns to clinic today for evaluation of memory loss . Her family  has noted symptoms since approximately 2015 marked initially by forgetfulness. This has gradually worsened  over time. Additional associated symptoms have included impairments in essentially all spheres of cognition. She has had no definite symptoms of BPSD though there is a suspicion of delusions. There are not clear modifying factors. She is currently residing at [facility].      Prior evaluation has included screening blood work  and a head CT  which were unremarkable . She is currently taking donepezil and Namenda. She previously took mirtazapine, though this has been discontinued for unclear reasons.     Today: Since her last visit in 2/19, she and her family have noted a continued cognitive decline. Her family has also noted anxiety and difficulty sleeping.       I have reviewed and confirmed the past family, social and medical history as accurate on 9/9/19.     Review of Systems   Constitutional: Negative.    HENT: Negative.    Eyes: Negative.    Respiratory: Negative.    Cardiovascular: Negative.    Gastrointestinal: Negative.    Endocrine: Negative.    Genitourinary: Negative.    Musculoskeletal: Negative.    Skin: Negative.    Allergic/Immunologic: Negative.    Neurological:        Memory loss     Hematological: Negative.        Objective     /66   Ht 152.4 cm (60\")   Wt 61.2 kg (135 lb)   BMI 26.37 kg/m²     General appearance today is normal.     Physical Exam   Neurological: She has normal strength. She has a normal Finger-Nose-Finger Test.   Psychiatric: Her speech is normal.        Neurologic Exam     Mental Status   Oriented to person.   Disoriented to place.   Disoriented to time. Oriented to day and season.   Registration: recalls 3 of 3 objects. Recall of objects at 5 minutes: 0/3 objects  Follows 3 step " commands.   Attention: decreased.   Speech: speech is normal   Level of consciousness: alert  Able to name object. Able to read. Able to repeat. Able to write. Normal comprehension.     Cranial Nerves   Cranial nerves II through XII intact.     Motor Exam   Muscle bulk: normal  Overall muscle tone: normal    Strength   Strength 5/5 throughout.     Gait, Coordination, and Reflexes     Coordination   Finger to nose coordination: normal    Tremor   Resting tremor: absent        Results  MMSE=16      Assessment/Plan   Zainab was seen today for alzheimer's disease.    Diagnoses and all orders for this visit:    Late onset Alzheimer's disease without behavioral disturbance    Other orders  -     mirtazapine (REMERON) 15 MG tablet; Take 1 tablet by mouth Every Night.          Discussion/Summary   Zainab Worthy returns to clinic today for evaluation of Alzheimer's Disease . I again reviewed her current status and treatment options. After discussing potential treatment options, it was elected to restart mirtazapine for her mood and continue on donepezil and memantine. She will then follow up in 6 months, or sooner if needed.   I spent 25 minutes face to face with the patient and family with 20 minutes spent on discussing diagnosis, evaluation, current status, treatment options and management as discussed above.       As part of this visit I obtained additional history from the family which is incorporated in the HPI.      Yissel Maya PA-C

## 2019-09-10 ENCOUNTER — OFFICE VISIT (OUTPATIENT)
Dept: ORTHOPEDIC SURGERY | Facility: CLINIC | Age: 84
End: 2019-09-10

## 2019-09-10 VITALS — WEIGHT: 134.92 LBS | OXYGEN SATURATION: 95 % | HEART RATE: 102 BPM | BODY MASS INDEX: 26.49 KG/M2 | HEIGHT: 60 IN

## 2019-09-10 DIAGNOSIS — M17.0 PRIMARY OSTEOARTHRITIS OF BOTH KNEES: Primary | ICD-10-CM

## 2019-09-10 PROCEDURE — 20610 DRAIN/INJ JOINT/BURSA W/O US: CPT | Performed by: ORTHOPAEDIC SURGERY

## 2019-09-10 PROCEDURE — 99213 OFFICE O/P EST LOW 20 MIN: CPT | Performed by: ORTHOPAEDIC SURGERY

## 2019-09-10 RX ORDER — LIDOCAINE HYDROCHLORIDE 10 MG/ML
3 INJECTION, SOLUTION EPIDURAL; INFILTRATION; INTRACAUDAL; PERINEURAL
Status: COMPLETED | OUTPATIENT
Start: 2019-09-10 | End: 2019-09-10

## 2019-09-10 RX ORDER — TRIAMCINOLONE ACETONIDE 40 MG/ML
80 INJECTION, SUSPENSION INTRA-ARTICULAR; INTRAMUSCULAR
Status: COMPLETED | OUTPATIENT
Start: 2019-09-10 | End: 2019-09-10

## 2019-09-10 RX ADMIN — TRIAMCINOLONE ACETONIDE 80 MG: 40 INJECTION, SUSPENSION INTRA-ARTICULAR; INTRAMUSCULAR at 15:35

## 2019-09-10 RX ADMIN — LIDOCAINE HYDROCHLORIDE 3 ML: 10 INJECTION, SOLUTION EPIDURAL; INFILTRATION; INTRACAUDAL; PERINEURAL at 15:35

## 2019-09-10 NOTE — PROGRESS NOTES
Orthopaedic Clinic Note: Knee Established Patient    Chief Complaint   Patient presents with   • Follow-up     5 month f/u; Primary osteoarthritis of both knees (injections given last visit 4/11/19)        HPI    It has been 5  month(s) since Ms. Worthy's last visit. She returns to clinic today for follow-up bilateral knee arthritis.  She received injections in both knees 5 months ago.  The injections did provide some good relief.  However she has become less mobile due to ongoing pain in the knees.  She currently rates her pain 5/10 on the pain scale.  She is complaining of aching and throbbing in the knees is worse with weightbearing.  Sitting and resting eases her pain.  She is primarily using a wheelchair for mobility at this time.      Past Medical History:   Diagnosis Date   • Anorexia    • Arthritis    • Asthma    • Cataract    • Colon cancer (CMS/HCC)    • Colon cancer metastasized to liver (CMS/HCC)    • DDD (degenerative disc disease), lumbar    • Hearing loss    • History of transfusion    • Hypertension    • Liver cancer (CMS/HCC)    • Macular degeneration    • Onychomycosis    • Urine frequency       Past Surgical History:   Procedure Laterality Date   • CATARACT EXTRACTION Bilateral    • COLON RESECTION  06/16/2009    Low anterior resection   • GALLBLADDER SURGERY      Resection of gallbladder   • HIP HEMIARTHROPLASTY Right 2/8/2018    Procedure: HIP HEMIARTHROPLASTY;  Surgeon: Fernando Dominguez MD;  Location: [facility];  Service:    • LIVER SURGERY  12/09/2014    Liver tumor, 1/2 was removed.   • PARTIAL HYSTERECTOMY        Family History   Problem Relation Age of Onset   • Arthritis Other    • Cervical cancer Other    • Uterine cancer Other    • Colon cancer Other      Social History     Socioeconomic History   • Marital status:      Spouse name: Not on file   • Number of children: 3   • Years of education: Not on file   • Highest education level: Not on file   Occupational History   •  Occupation: homemaker    Tobacco Use   • Smoking status: Never Smoker   • Smokeless tobacco: Never Used   Substance and Sexual Activity   • Alcohol use: No   • Drug use: No   • Sexual activity: No      Current Outpatient Medications on File Prior to Visit   Medication Sig Dispense Refill   • acetaminophen (TYLENOL) 325 MG tablet Take 2 tablets by mouth Every 6 (Six) Hours As Needed for Mild Pain .     • b complex-C-folic acid 1 MG capsule Take 1 capsule by mouth Daily.     • bisacodyl (DULCOLAX) 10 MG suppository Insert 1 suppository into the rectum Daily As Needed for Constipation.     • ciprofloxacin (CIPRO) 250 MG tablet Take 250 mg by mouth 2 (Two) Times a Day. FOR UTI     • diclofenac (VOLTAREN) 1 % gel gel Apply 4 g topically to the appropriate area as directed 4 (Four) Times a Day. Small amount to affected area 300 g 3   • docusate sodium 100 MG capsule Take 100 mg by mouth 2 (Two) Times a Day.     • donepezil (ARICEPT) 10 MG tablet Take 10 mg by mouth Every Night.     • famotidine (PEPCID) 20 MG tablet Take 1 tablet by mouth 2 (Two) Times a Day.     • LORazepam (ATIVAN) 0.5 MG tablet Take 1 tablet by mouth Every 8 (Eight) Hours As Needed for Anxiety. 5 tablet 0   • memantine (NAMENDA) 10 MG tablet Take 10 mg by mouth 2 (Two) Times a Day.     • mirtazapine (REMERON) 15 MG tablet Take 1 tablet by mouth Every Night. 30 tablet 11   • multivitamin-antioxidants (CERTAVITE) liquid liquid Take  by mouth Daily.     • nystatin (MYCOSTATIN) 220826 UNIT/GM powder Apply  topically to the appropriate area as directed Every 12 (Twelve) Hours.     • oxybutynin (DITROPAN) 5 MG tablet Take 5 mg by mouth 3 (Three) Times a Day.     • polyethylene glycol (MIRALAX) pack packet Take 17 g by mouth Daily.     • solifenacin (VESICARE) 5 MG tablet Take 1 tablet by mouth Daily.       No current facility-administered medications on file prior to visit.       Allergies   Allergen Reactions   • Asa [Aspirin] Unknown (See Comments)      "UNKNOWN   • Bactrim [Sulfamethoxazole-Trimethoprim] Unknown (See Comments)     UNKNOWN   • Ciprofloxacin Unknown (See Comments)     UNKNOWN     • Lopressor [Metoprolol] Unknown (See Comments)     UNKNOWN   • Penicillins Unknown (See Comments)     UNKNOWN     • Ultram [Tramadol] Unknown (See Comments)     UNKNOWN          Review of Systems   Constitutional: Negative.    HENT: Negative.    Eyes: Negative.    Respiratory: Negative.    Cardiovascular: Negative.    Gastrointestinal: Negative.    Endocrine: Negative.    Genitourinary: Negative.    Musculoskeletal: Positive for arthralgias (knee pain).   Skin: Negative.    Allergic/Immunologic: Negative.    Neurological: Negative.    Hematological: Negative.    Psychiatric/Behavioral: Positive for confusion and decreased concentration.        The patient's Review of Systems was personally reviewed and confirmed as accurate.    Physical Exam  Pulse 102, height 152.4 cm (60\"), weight 61.2 kg (134 lb 14.7 oz), SpO2 95 %, not currently breastfeeding.    Body mass index is 26.35 kg/m².    GENERAL APPEARANCE: awake, alert, oriented, in no acute distress and well developed, well nourished  LUNGS:  breathing nonlabored  EXTREMITIES: no clubbing, cyanosis  PERIPHERAL PULSES: palpable dorsalis pedis and posterior tibial pulses bilaterally.    GAIT:  Not assessed        ----------  Bilateral knee exam:  ----------  ALIGNMENT: 2° varus, correctable to neutral     RANGE OF MOTION:  Decreased (5 - 110 degrees)   LIGAMENTOUS STABILITY:   stable to varus and valgus stress at terminal extension and 30 degrees; slight retensioning of the MCL is appreciated with valgus stress at 30 degrees consistent with medial compartment degeneration     STRENGTH:  4/5 knee flexion, extension. 5/5 ankle dorsiflexion and plantarflexion.     PAIN WITH PALPATION: global  KNEE EFFUSION: yes, mild effusion  PAIN WITH KNEE ROM: yes, global   PATELLAR CREPITUS: yes, painful and symptomatic  SPECIAL EXAM " FINDINGS:  none    REFLEXES:  PATELLAR 2+/4  ACHILLES 2+/4    CLONUS: no  STRAIGHT LEG TEST:   negative     EDEMA: 1+ pitting edema in right lower extremity only  ERYTHEMA:  no  WOUNDS/INCISIONS: no  _____________________________________________________________________  _____________________________________________________________________    RADIOGRAPHIC FINDINGS:   Indication: Bilateral knee pain    Comparison: Todays xrays were compared to previous xrays from 11/15/2018    Knee films: moderate to severe tricompartmental arthritis with genu varum alignment, periarticular osteophytes visualized in all compartments and No significant changes compared to prior radiographs.    Assessment/Plan:   Diagnosis Plan   1. Primary osteoarthritis of both knees  XR Knee 4+ View Bilateral    Large Joint Arthrocentesis: R knee    Large Joint Arthrocentesis: L knee     The patient has failed conservative treatment measures and is a candidate for total joint arthroplasty.  I discussed the total joint surgical process as well as the recovery and rehabilitation time frame.  The patient asked several questions regarding the total joint arthroplasty surgery, which were answered accordingly.  Ultimately, the patient declines surgical intervention at this time and wishes to continue with conservative treatment measures.  Alternative conservative treatment measures were discussed including bracing, therapy, topical/oral anti-inflammatories, activity modification, and weight loss.  The patient considered these treatment options and wishes to proceed with corticosteroid injection(s) today.  Therefore we will proceed with corticosteroid injection(s) today.  Follow-up 3 months    Procedure Note:  I discussed with the patient the potential benefits of performing a therapeutic injections of the bilateral knees as well as potential risks including but not limited to infection, swelling, pain, bleeding, bruising, nerve/vessel damage, skin color  changes, transient elevation in blood glucose levels, and fat atrophy. After informed consent and after the areas were prepped with alcohol, ethyl chloride was used to numb the skin. Via the superior lateral approach, 3cc of 1% lidocaine, 3cc of 0.25% marcaine and 2 cc of 40mg/ml of Kenalog were each injected into the bilateral knees. The patient tolerated the procedures well. There were no complications. A sterile dressing was placed over each injection site.      Fernando Dominguez MD  09/10/19  4:01 PM

## 2019-09-10 NOTE — PROGRESS NOTES
Procedure   Large Joint Arthrocentesis: R knee  Date/Time: 9/10/2019 3:35 PM  Consent given by: patient  Site marked: site marked  Timeout: Immediately prior to procedure a time out was called to verify the correct patient, procedure, equipment, support staff and site/side marked as required   Supporting Documentation  Indications: pain   Procedure Details  Location: knee - R knee  Preparation: Patient was prepped and draped in the usual sterile fashion  Needle size: 22 G  Approach: anterolateral  Medications administered: 80 mg triamcinolone acetonide 40 MG/ML; 3 mL lidocaine PF 1% 1 % (3CC BUPIVACAINE.25% ndc#[id number] lot# [id number] exp: [id number])  Patient tolerance: patient tolerated the procedure well with no immediate complications    Large Joint Arthrocentesis: L knee  Date/Time: 9/10/2019 3:35 PM  Consent given by: patient  Site marked: site marked  Timeout: Immediately prior to procedure a time out was called to verify the correct patient, procedure, equipment, support staff and site/side marked as required   Supporting Documentation  Indications: pain   Procedure Details  Location: knee - L knee  Preparation: Patient was prepped and draped in the usual sterile fashion  Needle size: 22 G  Approach: anterolateral  Medications administered: 80 mg triamcinolone acetonide 40 MG/ML; 3 mL lidocaine PF 1% 1 % (3CC BUPIVACAINE.25% ndc#[id number] lot# [id number] exp: [id number])  Patient tolerance: patient tolerated the procedure well with no immediate complications

## 2021-06-30 NOTE — TELEPHONE ENCOUNTER
06/30/21 0900   Daily Treatment   Symptoms Review Activity Group 0900 - 1000   Affect Anxious   Mood Anxious   Thought Process Ruminating   Psychosis None   Symptom Notation \"depressed\"   Psychosocial Stressors Financial;Academic/Career concerns;Health   Sleep Report Fair   Hours Slept 5-6   Meals Dinner;Breakfast   Goal Complete / Activity Worked in her garden    Treatment Plan Continue treatment plan   Patient Response Attentive;Appropriate feedback   RN Monitoring of Pain, Safety, and Relapse   Safety Concerns None   Use of Street Drugs or Alcohol No   Taking Medications as Prescribed Yes     Total in group:8     Helen Stewart LPC-IT      ----- Message from Magy Garcia sent at 6/6/2017 10:52 AM EDT -----  Milena [phone]  MILENA IS CALLING IN TO REPORT PT'S BP   5/26/17 /82  5/27/17 /74               /73  5/28/17 /69  PT WAS SUPPOSE TO DO IT EVERY DAY BUT PT DIDN'T

## 2024-01-01 ENCOUNTER — APPOINTMENT (OUTPATIENT)
Dept: CARDIOLOGY | Facility: HOSPITAL | Age: 89
DRG: 309 | End: 2024-01-01
Payer: MEDICARE

## 2024-01-01 ENCOUNTER — APPOINTMENT (OUTPATIENT)
Dept: GENERAL RADIOLOGY | Facility: HOSPITAL | Age: 89
DRG: 309 | End: 2024-01-01
Payer: MEDICARE

## 2024-01-01 ENCOUNTER — HOSPITAL ENCOUNTER (INPATIENT)
Facility: HOSPITAL | Age: 89
LOS: 6 days | DRG: 309 | End: 2024-06-03
Attending: EMERGENCY MEDICINE | Admitting: STUDENT IN AN ORGANIZED HEALTH CARE EDUCATION/TRAINING PROGRAM
Payer: MEDICARE

## 2024-01-01 ENCOUNTER — HOSPITAL ENCOUNTER (INPATIENT)
Facility: HOSPITAL | Age: 89
LOS: 2 days | DRG: 951 | End: 2024-06-05
Attending: INTERNAL MEDICINE | Admitting: INTERNAL MEDICINE
Payer: COMMERCIAL

## 2024-01-01 VITALS
TEMPERATURE: 101.3 F | OXYGEN SATURATION: 59 % | HEART RATE: 111 BPM | DIASTOLIC BLOOD PRESSURE: 45 MMHG | SYSTOLIC BLOOD PRESSURE: 113 MMHG | RESPIRATION RATE: 20 BRPM

## 2024-01-01 VITALS
OXYGEN SATURATION: 85 % | HEIGHT: 60 IN | DIASTOLIC BLOOD PRESSURE: 75 MMHG | RESPIRATION RATE: 18 BRPM | WEIGHT: 134 LBS | SYSTOLIC BLOOD PRESSURE: 87 MMHG | HEART RATE: 134 BPM | TEMPERATURE: 96.2 F | BODY MASS INDEX: 26.31 KG/M2

## 2024-01-01 DIAGNOSIS — I48.91 ATRIAL FIBRILLATION WITH RVR: Primary | ICD-10-CM

## 2024-01-01 DIAGNOSIS — R13.10 DYSPHAGIA, UNSPECIFIED TYPE: ICD-10-CM

## 2024-01-01 LAB
ALBUMIN SERPL-MCNC: 2.6 G/DL (ref 3.5–5.2)
ALBUMIN SERPL-MCNC: 3 G/DL (ref 3.5–5.2)
ALBUMIN/GLOB SERPL: 0.9 G/DL
ALBUMIN/GLOB SERPL: 1 G/DL
ALP SERPL-CCNC: 101 U/L (ref 39–117)
ALP SERPL-CCNC: 106 U/L (ref 39–117)
ALT SERPL W P-5'-P-CCNC: 15 U/L (ref 1–33)
ALT SERPL W P-5'-P-CCNC: 20 U/L (ref 1–33)
ANION GAP SERPL CALCULATED.3IONS-SCNC: 10 MMOL/L (ref 5–15)
ANION GAP SERPL CALCULATED.3IONS-SCNC: 11 MMOL/L (ref 5–15)
ANION GAP SERPL CALCULATED.3IONS-SCNC: 12 MMOL/L (ref 5–15)
ANION GAP SERPL CALCULATED.3IONS-SCNC: 13 MMOL/L (ref 5–15)
ANION GAP SERPL CALCULATED.3IONS-SCNC: 16 MMOL/L (ref 5–15)
AST SERPL-CCNC: 28 U/L (ref 1–32)
AST SERPL-CCNC: 34 U/L (ref 1–32)
BASOPHILS # BLD AUTO: 0.02 10*3/MM3 (ref 0–0.2)
BASOPHILS # BLD AUTO: 0.02 10*3/MM3 (ref 0–0.2)
BASOPHILS # BLD AUTO: 0.03 10*3/MM3 (ref 0–0.2)
BASOPHILS NFR BLD AUTO: 0.2 % (ref 0–1.5)
BASOPHILS NFR BLD AUTO: 0.3 % (ref 0–1.5)
BASOPHILS NFR BLD AUTO: 0.3 % (ref 0–1.5)
BH CV ECHO MEAS - AI P1/2T: 537.3 MSEC
BH CV ECHO MEAS - AO MAX PG: 3.3 MMHG
BH CV ECHO MEAS - AO MEAN PG: 2 MMHG
BH CV ECHO MEAS - AO ROOT DIAM: 2.7 CM
BH CV ECHO MEAS - AO V2 MAX: 91.2 CM/SEC
BH CV ECHO MEAS - AO V2 VTI: 14.3 CM
BH CV ECHO MEAS - AVA(I,D): 2.42 CM2
BH CV ECHO MEAS - EDV(CUBED): 79.5 ML
BH CV ECHO MEAS - EDV(MOD-SP2): 46.3 ML
BH CV ECHO MEAS - EDV(MOD-SP4): 44.4 ML
BH CV ECHO MEAS - EF(MOD-BP): 51.7 %
BH CV ECHO MEAS - EF(MOD-SP2): 49.5 %
BH CV ECHO MEAS - EF(MOD-SP4): 51.6 %
BH CV ECHO MEAS - ESV(CUBED): 27 ML
BH CV ECHO MEAS - ESV(MOD-SP2): 23.4 ML
BH CV ECHO MEAS - ESV(MOD-SP4): 21.5 ML
BH CV ECHO MEAS - FS: 30.2 %
BH CV ECHO MEAS - IVS/LVPW: 1 CM
BH CV ECHO MEAS - IVSD: 0.9 CM
BH CV ECHO MEAS - LA DIMENSION: 4.3 CM
BH CV ECHO MEAS - LAT PEAK E' VEL: 9.9 CM/SEC
BH CV ECHO MEAS - LV MASS(C)D: 123.3 GRAMS
BH CV ECHO MEAS - LV MAX PG: 2.9 MMHG
BH CV ECHO MEAS - LV MEAN PG: 1.67 MMHG
BH CV ECHO MEAS - LV V1 MAX: 85.2 CM/SEC
BH CV ECHO MEAS - LV V1 VTI: 11 CM
BH CV ECHO MEAS - LVIDD: 4.3 CM
BH CV ECHO MEAS - LVIDS: 3 CM
BH CV ECHO MEAS - LVOT AREA: 3.1 CM2
BH CV ECHO MEAS - LVOT DIAM: 2 CM
BH CV ECHO MEAS - LVPWD: 0.9 CM
BH CV ECHO MEAS - MED PEAK E' VEL: 4.5 CM/SEC
BH CV ECHO MEAS - MV A MAX VEL: 65 CM/SEC
BH CV ECHO MEAS - MV DEC SLOPE: 703 CM/SEC2
BH CV ECHO MEAS - MV DEC TIME: 0.16 SEC
BH CV ECHO MEAS - MV E MAX VEL: 105 CM/SEC
BH CV ECHO MEAS - MV E/A: 1.62
BH CV ECHO MEAS - MV MAX PG: 4.2 MMHG
BH CV ECHO MEAS - MV MEAN PG: 2 MMHG
BH CV ECHO MEAS - MV P1/2T: 42.1 MSEC
BH CV ECHO MEAS - MV V2 VTI: 17.6 CM
BH CV ECHO MEAS - MVA(P1/2T): 5.2 CM2
BH CV ECHO MEAS - MVA(VTI): 1.96 CM2
BH CV ECHO MEAS - PA ACC TIME: 0.1 SEC
BH CV ECHO MEAS - PA V2 MAX: 97.9 CM/SEC
BH CV ECHO MEAS - PI END-D VEL: 101 CM/SEC
BH CV ECHO MEAS - RAP SYSTOLE: 8 MMHG
BH CV ECHO MEAS - RVSP: 34 MMHG
BH CV ECHO MEAS - SV(LVOT): 34.5 ML
BH CV ECHO MEAS - SV(MOD-SP2): 22.9 ML
BH CV ECHO MEAS - SV(MOD-SP4): 22.9 ML
BH CV ECHO MEAS - TAPSE (>1.6): 1.68 CM
BH CV ECHO MEAS - TR MAX PG: 25.6 MMHG
BH CV ECHO MEAS - TR MAX VEL: 253 CM/SEC
BH CV ECHO MEASUREMENTS AVERAGE E/E' RATIO: 14.58
BH CV VAS BP LEFT ARM: NORMAL MMHG
BH CV XLRA - RV BASE: 2.9 CM
BH CV XLRA - RV LENGTH: 5.7 CM
BH CV XLRA - RV MID: 2 CM
BH CV XLRA - TDI S': 14.9 CM/SEC
BILIRUB SERPL-MCNC: 0.3 MG/DL (ref 0–1.2)
BILIRUB SERPL-MCNC: 0.5 MG/DL (ref 0–1.2)
BUN BLDA-MCNC: 54 MG/DL (ref 8–26)
BUN SERPL-MCNC: 27 MG/DL (ref 8–23)
BUN SERPL-MCNC: 28 MG/DL (ref 8–23)
BUN SERPL-MCNC: 34 MG/DL (ref 8–23)
BUN SERPL-MCNC: 42 MG/DL (ref 8–23)
BUN SERPL-MCNC: 63 MG/DL (ref 8–23)
BUN/CREAT SERPL: 37.8 (ref 7–25)
BUN/CREAT SERPL: 39.7 (ref 7–25)
BUN/CREAT SERPL: 44.7 (ref 7–25)
BUN/CREAT SERPL: 48.8 (ref 7–25)
BUN/CREAT SERPL: 50.4 (ref 7–25)
CA-I BLDA-SCNC: 1.07 MMOL/L (ref 1.2–1.32)
CALCIUM SPEC-SCNC: 7.5 MG/DL (ref 8.2–9.6)
CALCIUM SPEC-SCNC: 8 MG/DL (ref 8.2–9.6)
CALCIUM SPEC-SCNC: 8.1 MG/DL (ref 8.2–9.6)
CALCIUM SPEC-SCNC: 8.3 MG/DL (ref 8.2–9.6)
CALCIUM SPEC-SCNC: 8.6 MG/DL (ref 8.2–9.6)
CHLORIDE BLDA-SCNC: 113 MMOL/L (ref 98–109)
CHLORIDE SERPL-SCNC: 111 MMOL/L (ref 98–107)
CHLORIDE SERPL-SCNC: 112 MMOL/L (ref 98–107)
CHLORIDE SERPL-SCNC: 113 MMOL/L (ref 98–107)
CHLORIDE SERPL-SCNC: 115 MMOL/L (ref 98–107)
CHLORIDE SERPL-SCNC: 117 MMOL/L (ref 98–107)
CO2 BLDA-SCNC: 21 MMOL/L (ref 24–29)
CO2 SERPL-SCNC: 20 MMOL/L (ref 22–29)
CO2 SERPL-SCNC: 20 MMOL/L (ref 22–29)
CO2 SERPL-SCNC: 23 MMOL/L (ref 22–29)
CO2 SERPL-SCNC: 23 MMOL/L (ref 22–29)
CO2 SERPL-SCNC: 26 MMOL/L (ref 22–29)
CREAT BLDA-MCNC: 1.2 MG/DL (ref 0.6–1.3)
CREAT SERPL-MCNC: 0.68 MG/DL (ref 0.57–1)
CREAT SERPL-MCNC: 0.74 MG/DL (ref 0.57–1)
CREAT SERPL-MCNC: 0.76 MG/DL (ref 0.57–1)
CREAT SERPL-MCNC: 0.86 MG/DL (ref 0.57–1)
CREAT SERPL-MCNC: 1.25 MG/DL (ref 0.57–1)
D-LACTATE SERPL-SCNC: 2 MMOL/L (ref 0.5–2)
D-LACTATE SERPL-SCNC: 2.8 MMOL/L (ref 0.5–2)
DEPRECATED RDW RBC AUTO: 43.7 FL (ref 37–54)
DEPRECATED RDW RBC AUTO: 45.1 FL (ref 37–54)
DEPRECATED RDW RBC AUTO: 45.4 FL (ref 37–54)
DEPRECATED RDW RBC AUTO: 46.4 FL (ref 37–54)
EGFRCR SERPLBLD CKD-EPI 2021: 39.8 ML/MIN/1.73
EGFRCR SERPLBLD CKD-EPI 2021: 41.8 ML/MIN/1.73
EGFRCR SERPLBLD CKD-EPI 2021: 62.3 ML/MIN/1.73
EGFRCR SERPLBLD CKD-EPI 2021: 72.3 ML/MIN/1.73
EGFRCR SERPLBLD CKD-EPI 2021: 74.6 ML/MIN/1.73
EGFRCR SERPLBLD CKD-EPI 2021: 80.3 ML/MIN/1.73
EOSINOPHIL # BLD AUTO: 0.01 10*3/MM3 (ref 0–0.4)
EOSINOPHIL # BLD AUTO: 0.06 10*3/MM3 (ref 0–0.4)
EOSINOPHIL # BLD AUTO: 0.11 10*3/MM3 (ref 0–0.4)
EOSINOPHIL NFR BLD AUTO: 0.1 % (ref 0.3–6.2)
EOSINOPHIL NFR BLD AUTO: 0.9 % (ref 0.3–6.2)
EOSINOPHIL NFR BLD AUTO: 1.2 % (ref 0.3–6.2)
ERYTHROCYTE [DISTWIDTH] IN BLOOD BY AUTOMATED COUNT: 13.6 % (ref 12.3–15.4)
ERYTHROCYTE [DISTWIDTH] IN BLOOD BY AUTOMATED COUNT: 13.7 % (ref 12.3–15.4)
ERYTHROCYTE [DISTWIDTH] IN BLOOD BY AUTOMATED COUNT: 13.9 % (ref 12.3–15.4)
ERYTHROCYTE [DISTWIDTH] IN BLOOD BY AUTOMATED COUNT: 14.2 % (ref 12.3–15.4)
FLUAV SUBTYP SPEC NAA+PROBE: NOT DETECTED
FLUBV RNA ISLT QL NAA+PROBE: NOT DETECTED
GEN 5 2HR TROPONIN T REFLEX: 38 NG/L
GLOBULIN UR ELPH-MCNC: 2.8 GM/DL
GLOBULIN UR ELPH-MCNC: 3.1 GM/DL
GLUCOSE BLDC GLUCOMTR-MCNC: 110 MG/DL (ref 70–130)
GLUCOSE SERPL-MCNC: 111 MG/DL (ref 65–99)
GLUCOSE SERPL-MCNC: 116 MG/DL (ref 65–99)
GLUCOSE SERPL-MCNC: 77 MG/DL (ref 65–99)
GLUCOSE SERPL-MCNC: 82 MG/DL (ref 65–99)
GLUCOSE SERPL-MCNC: 92 MG/DL (ref 65–99)
HCT VFR BLD AUTO: 39 % (ref 34–46.6)
HCT VFR BLD AUTO: 42.5 % (ref 34–46.6)
HCT VFR BLD AUTO: 43.6 % (ref 34–46.6)
HCT VFR BLD AUTO: 44.2 % (ref 34–46.6)
HCT VFR BLDA CALC: 37 % (ref 38–51)
HGB BLD-MCNC: 12.5 G/DL (ref 12–15.9)
HGB BLD-MCNC: 13.8 G/DL (ref 12–15.9)
HGB BLD-MCNC: 13.8 G/DL (ref 12–15.9)
HGB BLD-MCNC: 14.1 G/DL (ref 12–15.9)
HGB BLDA-MCNC: 12.6 G/DL (ref 12–17)
IMM GRANULOCYTES # BLD AUTO: 0.06 10*3/MM3 (ref 0–0.05)
IMM GRANULOCYTES # BLD AUTO: 0.06 10*3/MM3 (ref 0–0.05)
IMM GRANULOCYTES # BLD AUTO: 0.08 10*3/MM3 (ref 0–0.05)
IMM GRANULOCYTES NFR BLD AUTO: 0.7 % (ref 0–0.5)
IMM GRANULOCYTES NFR BLD AUTO: 0.9 % (ref 0–0.5)
IMM GRANULOCYTES NFR BLD AUTO: 0.9 % (ref 0–0.5)
LEFT ATRIUM VOLUME INDEX: 35.3 ML/M2
LYMPHOCYTES # BLD AUTO: 1.43 10*3/MM3 (ref 0.7–3.1)
LYMPHOCYTES # BLD AUTO: 1.59 10*3/MM3 (ref 0.7–3.1)
LYMPHOCYTES # BLD AUTO: 1.85 10*3/MM3 (ref 0.7–3.1)
LYMPHOCYTES NFR BLD AUTO: 16 % (ref 19.6–45.3)
LYMPHOCYTES NFR BLD AUTO: 18.9 % (ref 19.6–45.3)
LYMPHOCYTES NFR BLD AUTO: 27.2 % (ref 19.6–45.3)
MAGNESIUM SERPL-MCNC: 2.3 MG/DL (ref 1.7–2.3)
MAGNESIUM SERPL-MCNC: 2.7 MG/DL (ref 1.7–2.3)
MCH RBC QN AUTO: 28 PG (ref 26.6–33)
MCH RBC QN AUTO: 28.5 PG (ref 26.6–33)
MCH RBC QN AUTO: 28.8 PG (ref 26.6–33)
MCH RBC QN AUTO: 29 PG (ref 26.6–33)
MCHC RBC AUTO-ENTMCNC: 31.7 G/DL (ref 31.5–35.7)
MCHC RBC AUTO-ENTMCNC: 31.9 G/DL (ref 31.5–35.7)
MCHC RBC AUTO-ENTMCNC: 32.1 G/DL (ref 31.5–35.7)
MCHC RBC AUTO-ENTMCNC: 32.5 G/DL (ref 31.5–35.7)
MCV RBC AUTO: 87.2 FL (ref 79–97)
MCV RBC AUTO: 88.7 FL (ref 79–97)
MCV RBC AUTO: 90.1 FL (ref 79–97)
MCV RBC AUTO: 90.8 FL (ref 79–97)
MONOCYTES # BLD AUTO: 0.56 10*3/MM3 (ref 0.1–0.9)
MONOCYTES # BLD AUTO: 0.59 10*3/MM3 (ref 0.1–0.9)
MONOCYTES # BLD AUTO: 0.72 10*3/MM3 (ref 0.1–0.9)
MONOCYTES NFR BLD AUTO: 7 % (ref 5–12)
MONOCYTES NFR BLD AUTO: 8.1 % (ref 5–12)
MONOCYTES NFR BLD AUTO: 8.2 % (ref 5–12)
NEUTROPHILS NFR BLD AUTO: 4.26 10*3/MM3 (ref 1.7–7)
NEUTROPHILS NFR BLD AUTO: 6.14 10*3/MM3 (ref 1.7–7)
NEUTROPHILS NFR BLD AUTO: 6.54 10*3/MM3 (ref 1.7–7)
NEUTROPHILS NFR BLD AUTO: 62.5 % (ref 42.7–76)
NEUTROPHILS NFR BLD AUTO: 73.1 % (ref 42.7–76)
NEUTROPHILS NFR BLD AUTO: 73.5 % (ref 42.7–76)
NRBC BLD AUTO-RTO: 0 /100 WBC (ref 0–0.2)
NT-PROBNP SERPL-MCNC: 8994 PG/ML (ref 0–1800)
NT-PROBNP SERPL-MCNC: 9951 PG/ML (ref 0–1800)
NT-PROBNP SERPL-MCNC: ABNORMAL PG/ML (ref 0–1800)
PLATELET # BLD AUTO: 199 10*3/MM3 (ref 140–450)
PLATELET # BLD AUTO: 214 10*3/MM3 (ref 140–450)
PLATELET # BLD AUTO: 220 10*3/MM3 (ref 140–450)
PLATELET # BLD AUTO: 250 10*3/MM3 (ref 140–450)
PMV BLD AUTO: 10.8 FL (ref 6–12)
PMV BLD AUTO: 11 FL (ref 6–12)
PMV BLD AUTO: 11.2 FL (ref 6–12)
PMV BLD AUTO: 11.5 FL (ref 6–12)
POTASSIUM BLDA-SCNC: 3.3 MMOL/L (ref 3.5–4.9)
POTASSIUM SERPL-SCNC: 3.5 MMOL/L (ref 3.5–5.2)
POTASSIUM SERPL-SCNC: 3.8 MMOL/L (ref 3.5–5.2)
POTASSIUM SERPL-SCNC: 3.8 MMOL/L (ref 3.5–5.2)
POTASSIUM SERPL-SCNC: 3.9 MMOL/L (ref 3.5–5.2)
POTASSIUM SERPL-SCNC: 3.9 MMOL/L (ref 3.5–5.2)
PROCALCITONIN SERPL-MCNC: 0.16 NG/ML (ref 0–0.25)
PROT SERPL-MCNC: 5.4 G/DL (ref 6–8.5)
PROT SERPL-MCNC: 6.1 G/DL (ref 6–8.5)
QT INTERVAL: 244 MS
QT INTERVAL: 252 MS
QT INTERVAL: 266 MS
QT INTERVAL: 364 MS
QT INTERVAL: 368 MS
QTC INTERVAL: 400 MS
QTC INTERVAL: 404 MS
QTC INTERVAL: 415 MS
QTC INTERVAL: 471 MS
QTC INTERVAL: 479 MS
RBC # BLD AUTO: 4.47 10*6/MM3 (ref 3.77–5.28)
RBC # BLD AUTO: 4.79 10*6/MM3 (ref 3.77–5.28)
RBC # BLD AUTO: 4.84 10*6/MM3 (ref 3.77–5.28)
RBC # BLD AUTO: 4.87 10*6/MM3 (ref 3.77–5.28)
SARS-COV-2 RNA RESP QL NAA+PROBE: NOT DETECTED
SODIUM BLD-SCNC: 148 MMOL/L (ref 138–146)
SODIUM SERPL-SCNC: 147 MMOL/L (ref 136–145)
SODIUM SERPL-SCNC: 148 MMOL/L (ref 136–145)
SODIUM SERPL-SCNC: 151 MMOL/L (ref 136–145)
TROPONIN T DELTA: 0 NG/L
TROPONIN T SERPL HS-MCNC: 38 NG/L
TROPONIN T SERPL HS-MCNC: 38 NG/L
TSH SERPL DL<=0.05 MIU/L-ACNC: 1.65 UIU/ML (ref 0.27–4.2)
TSH SERPL DL<=0.05 MIU/L-ACNC: 1.65 UIU/ML (ref 0.27–4.2)
WBC NRBC COR # BLD AUTO: 16.64 10*3/MM3 (ref 3.4–10.8)
WBC NRBC COR # BLD AUTO: 6.81 10*3/MM3 (ref 3.4–10.8)
WBC NRBC COR # BLD AUTO: 8.41 10*3/MM3 (ref 3.4–10.8)
WBC NRBC COR # BLD AUTO: 8.91 10*3/MM3 (ref 3.4–10.8)

## 2024-01-01 PROCEDURE — 25010000002 MORPHINE PER 10 MG: Performed by: INTERNAL MEDICINE

## 2024-01-01 PROCEDURE — 25010000002 MIDAZOLAM PER 1 MG: Performed by: FAMILY MEDICINE

## 2024-01-01 PROCEDURE — 99232 SBSQ HOSP IP/OBS MODERATE 35: CPT | Performed by: STUDENT IN AN ORGANIZED HEALTH CARE EDUCATION/TRAINING PROGRAM

## 2024-01-01 PROCEDURE — 25010000002 MORPHINE PER 10 MG: Performed by: NURSE PRACTITIONER

## 2024-01-01 PROCEDURE — 80048 BASIC METABOLIC PNL TOTAL CA: CPT | Performed by: NURSE PRACTITIONER

## 2024-01-01 PROCEDURE — 83605 ASSAY OF LACTIC ACID: CPT | Performed by: EMERGENCY MEDICINE

## 2024-01-01 PROCEDURE — 25010000002 MORPHINE PER 10 MG: Performed by: STUDENT IN AN ORGANIZED HEALTH CARE EDUCATION/TRAINING PROGRAM

## 2024-01-01 PROCEDURE — 93306 TTE W/DOPPLER COMPLETE: CPT

## 2024-01-01 PROCEDURE — 80047 BASIC METABLC PNL IONIZED CA: CPT

## 2024-01-01 PROCEDURE — G0378 HOSPITAL OBSERVATION PER HR: HCPCS

## 2024-01-01 PROCEDURE — 25010000002 FUROSEMIDE PER 20 MG: Performed by: INTERNAL MEDICINE

## 2024-01-01 PROCEDURE — 84484 ASSAY OF TROPONIN QUANT: CPT | Performed by: EMERGENCY MEDICINE

## 2024-01-01 PROCEDURE — 25010000002 DIGOXIN PER 500 MCG: Performed by: NURSE PRACTITIONER

## 2024-01-01 PROCEDURE — 25010000002 AMIODARONE IN DEXTROSE 5% 360-4.14 MG/200ML-% SOLUTION: Performed by: EMERGENCY MEDICINE

## 2024-01-01 PROCEDURE — 25810000003 LACTATED RINGERS PER 1000 ML: Performed by: STUDENT IN AN ORGANIZED HEALTH CARE EDUCATION/TRAINING PROGRAM

## 2024-01-01 PROCEDURE — 99239 HOSP IP/OBS DSCHRG MGMT >30: CPT | Performed by: STUDENT IN AN ORGANIZED HEALTH CARE EDUCATION/TRAINING PROGRAM

## 2024-01-01 PROCEDURE — 99232 SBSQ HOSP IP/OBS MODERATE 35: CPT | Performed by: NURSE PRACTITIONER

## 2024-01-01 PROCEDURE — 25010000002 DIGOXIN PER 500 MCG: Performed by: STUDENT IN AN ORGANIZED HEALTH CARE EDUCATION/TRAINING PROGRAM

## 2024-01-01 PROCEDURE — 80053 COMPREHEN METABOLIC PANEL: CPT | Performed by: EMERGENCY MEDICINE

## 2024-01-01 PROCEDURE — 25010000002 ONDANSETRON PER 1 MG: Performed by: STUDENT IN AN ORGANIZED HEALTH CARE EDUCATION/TRAINING PROGRAM

## 2024-01-01 PROCEDURE — 87636 SARSCOV2 & INF A&B AMP PRB: CPT | Performed by: EMERGENCY MEDICINE

## 2024-01-01 PROCEDURE — 99232 SBSQ HOSP IP/OBS MODERATE 35: CPT | Performed by: INTERNAL MEDICINE

## 2024-01-01 PROCEDURE — 25010000002 MORPHINE PER 10 MG: Performed by: FAMILY MEDICINE

## 2024-01-01 PROCEDURE — 84484 ASSAY OF TROPONIN QUANT: CPT | Performed by: INTERNAL MEDICINE

## 2024-01-01 PROCEDURE — 83735 ASSAY OF MAGNESIUM: CPT | Performed by: INTERNAL MEDICINE

## 2024-01-01 PROCEDURE — 83735 ASSAY OF MAGNESIUM: CPT | Performed by: EMERGENCY MEDICINE

## 2024-01-01 PROCEDURE — 83880 ASSAY OF NATRIURETIC PEPTIDE: CPT | Performed by: INTERNAL MEDICINE

## 2024-01-01 PROCEDURE — 93005 ELECTROCARDIOGRAM TRACING: CPT | Performed by: EMERGENCY MEDICINE

## 2024-01-01 PROCEDURE — 93306 TTE W/DOPPLER COMPLETE: CPT | Performed by: INTERNAL MEDICINE

## 2024-01-01 PROCEDURE — 80048 BASIC METABOLIC PNL TOTAL CA: CPT | Performed by: STUDENT IN AN ORGANIZED HEALTH CARE EDUCATION/TRAINING PROGRAM

## 2024-01-01 PROCEDURE — 84145 PROCALCITONIN (PCT): CPT | Performed by: EMERGENCY MEDICINE

## 2024-01-01 PROCEDURE — 71045 X-RAY EXAM CHEST 1 VIEW: CPT

## 2024-01-01 PROCEDURE — 80053 COMPREHEN METABOLIC PANEL: CPT | Performed by: INTERNAL MEDICINE

## 2024-01-01 PROCEDURE — 25010000002 MIDAZOLAM PER 1 MG: Performed by: STUDENT IN AN ORGANIZED HEALTH CARE EDUCATION/TRAINING PROGRAM

## 2024-01-01 PROCEDURE — 85027 COMPLETE CBC AUTOMATED: CPT | Performed by: NURSE PRACTITIONER

## 2024-01-01 PROCEDURE — 83880 ASSAY OF NATRIURETIC PEPTIDE: CPT | Performed by: EMERGENCY MEDICINE

## 2024-01-01 PROCEDURE — 85014 HEMATOCRIT: CPT

## 2024-01-01 PROCEDURE — 99291 CRITICAL CARE FIRST HOUR: CPT

## 2024-01-01 PROCEDURE — 25010000002 AMIODARONE IN DEXTROSE 5% 150-4.21 MG/100ML-% SOLUTION: Performed by: EMERGENCY MEDICINE

## 2024-01-01 PROCEDURE — 25810000003 SODIUM CHLORIDE 0.9 % SOLUTION: Performed by: EMERGENCY MEDICINE

## 2024-01-01 PROCEDURE — 25810000003 SODIUM CHLORIDE 0.9 % SOLUTION: Performed by: INTERNAL MEDICINE

## 2024-01-01 PROCEDURE — 25010000002 DIGOXIN PER 500 MCG

## 2024-01-01 PROCEDURE — 25010000002 KETOROLAC TROMETHAMINE PER 15 MG: Performed by: FAMILY MEDICINE

## 2024-01-01 PROCEDURE — 99222 1ST HOSP IP/OBS MODERATE 55: CPT | Performed by: INTERNAL MEDICINE

## 2024-01-01 PROCEDURE — 92610 EVALUATE SWALLOWING FUNCTION: CPT

## 2024-01-01 PROCEDURE — 84443 ASSAY THYROID STIM HORMONE: CPT | Performed by: INTERNAL MEDICINE

## 2024-01-01 PROCEDURE — 99223 1ST HOSP IP/OBS HIGH 75: CPT | Performed by: INTERNAL MEDICINE

## 2024-01-01 PROCEDURE — 85025 COMPLETE CBC W/AUTO DIFF WBC: CPT | Performed by: EMERGENCY MEDICINE

## 2024-01-01 PROCEDURE — 85025 COMPLETE CBC W/AUTO DIFF WBC: CPT | Performed by: INTERNAL MEDICINE

## 2024-01-01 PROCEDURE — 25010000002 GLYCOPYRROLATE 0.2 MG/ML SOLUTION: Performed by: STUDENT IN AN ORGANIZED HEALTH CARE EDUCATION/TRAINING PROGRAM

## 2024-01-01 PROCEDURE — 25010000002 DIGOXIN PER 500 MCG: Performed by: INTERNAL MEDICINE

## 2024-01-01 PROCEDURE — 99232 SBSQ HOSP IP/OBS MODERATE 35: CPT

## 2024-01-01 PROCEDURE — 84443 ASSAY THYROID STIM HORMONE: CPT | Performed by: EMERGENCY MEDICINE

## 2024-01-01 PROCEDURE — 85025 COMPLETE CBC W/AUTO DIFF WBC: CPT | Performed by: STUDENT IN AN ORGANIZED HEALTH CARE EDUCATION/TRAINING PROGRAM

## 2024-01-01 RX ORDER — FLUORIDE TOOTHPASTE
1 TOOTHPASTE DENTAL 4 TIMES DAILY PRN
Status: DISCONTINUED | OUTPATIENT
Start: 2024-01-01 | End: 2024-06-05 | Stop reason: HOSPADM

## 2024-01-01 RX ORDER — MIRTAZAPINE 15 MG/1
15 TABLET, FILM COATED ORAL NIGHTLY
Status: DISCONTINUED | OUTPATIENT
Start: 2024-01-01 | End: 2024-01-01

## 2024-01-01 RX ORDER — MIDAZOLAM HYDROCHLORIDE 1 MG/ML
0.5 INJECTION INTRAMUSCULAR; INTRAVENOUS EVERY 4 HOURS
Status: DISCONTINUED | OUTPATIENT
Start: 2024-01-01 | End: 2024-06-05 | Stop reason: HOSPADM

## 2024-01-01 RX ORDER — DILTIAZEM HCL/D5W 125 MG/125
5-15 PLASTIC BAG, INJECTION (ML) INTRAVENOUS CONTINUOUS
Status: DISCONTINUED | OUTPATIENT
Start: 2024-01-01 | End: 2024-01-01

## 2024-01-01 RX ORDER — SODIUM CHLORIDE 0.9 % (FLUSH) 0.9 %
10 SYRINGE (ML) INJECTION EVERY 12 HOURS SCHEDULED
Status: DISCONTINUED | OUTPATIENT
Start: 2024-01-01 | End: 2024-01-01 | Stop reason: HOSPADM

## 2024-01-01 RX ORDER — HYDROCODONE BITARTRATE AND ACETAMINOPHEN 5; 325 MG/1; MG/1
1 TABLET ORAL EVERY 4 HOURS PRN
Status: DISPENSED | OUTPATIENT
Start: 2024-01-01 | End: 2024-01-01

## 2024-01-01 RX ORDER — ACETAMINOPHEN 160 MG
TABLET,DISINTEGRATING ORAL 4 TIMES DAILY PRN
Status: DISCONTINUED | OUTPATIENT
Start: 2024-01-01 | End: 2024-06-05 | Stop reason: HOSPADM

## 2024-01-01 RX ORDER — FAMOTIDINE 20 MG/1
10 TABLET, FILM COATED ORAL
Status: DISCONTINUED | OUTPATIENT
Start: 2024-01-01 | End: 2024-01-01

## 2024-01-01 RX ORDER — MORPHINE SULFATE 4 MG/ML
4 INJECTION, SOLUTION INTRAMUSCULAR; INTRAVENOUS
Status: DISCONTINUED | OUTPATIENT
Start: 2024-01-01 | End: 2024-01-01 | Stop reason: HOSPADM

## 2024-01-01 RX ORDER — MORPHINE SULFATE 2 MG/ML
2 INJECTION, SOLUTION INTRAMUSCULAR; INTRAVENOUS
Status: DISCONTINUED | OUTPATIENT
Start: 2024-01-01 | End: 2024-01-01

## 2024-01-01 RX ORDER — FLUORIDE TOOTHPASTE
1 TOOTHPASTE DENTAL 4 TIMES DAILY PRN
Status: DISCONTINUED | OUTPATIENT
Start: 2024-01-01 | End: 2024-01-01 | Stop reason: HOSPADM

## 2024-01-01 RX ORDER — DIGOXIN 0.25 MG/ML
500 INJECTION INTRAMUSCULAR; INTRAVENOUS ONCE
Qty: 2 ML | Refills: 0 | Status: COMPLETED | OUTPATIENT
Start: 2024-01-01 | End: 2024-01-01

## 2024-01-01 RX ORDER — GLYCOPYRROLATE 0.2 MG/ML
0.1 INJECTION INTRAMUSCULAR; INTRAVENOUS EVERY 4 HOURS PRN
Status: DISCONTINUED | OUTPATIENT
Start: 2024-01-01 | End: 2024-01-01 | Stop reason: HOSPADM

## 2024-01-01 RX ORDER — SODIUM CHLORIDE 0.9 % (FLUSH) 0.9 %
10 SYRINGE (ML) INJECTION AS NEEDED
Status: DISCONTINUED | OUTPATIENT
Start: 2024-01-01 | End: 2024-01-01 | Stop reason: HOSPADM

## 2024-01-01 RX ORDER — DIGOXIN 0.25 MG/ML
250 INJECTION INTRAMUSCULAR; INTRAVENOUS
Status: DISCONTINUED | OUTPATIENT
Start: 2024-01-01 | End: 2024-01-01 | Stop reason: HOSPADM

## 2024-01-01 RX ORDER — SODIUM CHLORIDE 0.9 % (FLUSH) 0.9 %
10 SYRINGE (ML) INJECTION EVERY 12 HOURS SCHEDULED
Status: DISCONTINUED | OUTPATIENT
Start: 2024-01-01 | End: 2024-06-05 | Stop reason: HOSPADM

## 2024-01-01 RX ORDER — BISACODYL 10 MG
10 SUPPOSITORY, RECTAL RECTAL DAILY PRN
Status: DISCONTINUED | OUTPATIENT
Start: 2024-01-01 | End: 2024-01-01 | Stop reason: HOSPADM

## 2024-01-01 RX ORDER — UREA 10 %
5 LOTION (ML) TOPICAL NIGHTLY
COMMUNITY

## 2024-01-01 RX ORDER — DIGOXIN 0.25 MG/ML
INJECTION INTRAMUSCULAR; INTRAVENOUS
Status: COMPLETED
Start: 2024-01-01 | End: 2024-01-01

## 2024-01-01 RX ORDER — POTASSIUM CHLORIDE 1.5 G/1.58G
40 POWDER, FOR SOLUTION ORAL ONCE
Status: COMPLETED | OUTPATIENT
Start: 2024-01-01 | End: 2024-01-01

## 2024-01-01 RX ORDER — SODIUM CHLORIDE, SODIUM LACTATE, POTASSIUM CHLORIDE, CALCIUM CHLORIDE 600; 310; 30; 20 MG/100ML; MG/100ML; MG/100ML; MG/100ML
50 INJECTION, SOLUTION INTRAVENOUS CONTINUOUS
Status: ACTIVE | OUTPATIENT
Start: 2024-01-01 | End: 2024-01-01

## 2024-01-01 RX ORDER — NITROGLYCERIN 0.4 MG/1
0.4 TABLET SUBLINGUAL
Status: DISCONTINUED | OUTPATIENT
Start: 2024-01-01 | End: 2024-01-01 | Stop reason: HOSPADM

## 2024-01-01 RX ORDER — KETOROLAC TROMETHAMINE 15 MG/ML
15 INJECTION, SOLUTION INTRAMUSCULAR; INTRAVENOUS EVERY 6 HOURS PRN
Status: DISCONTINUED | OUTPATIENT
Start: 2024-01-01 | End: 2024-06-05 | Stop reason: HOSPADM

## 2024-01-01 RX ORDER — FUROSEMIDE 10 MG/ML
20 INJECTION INTRAMUSCULAR; INTRAVENOUS ONCE
Status: COMPLETED | OUTPATIENT
Start: 2024-01-01 | End: 2024-01-01

## 2024-01-01 RX ORDER — GLYCOPYRROLATE 0.2 MG/ML
0.2 INJECTION INTRAMUSCULAR; INTRAVENOUS EVERY 4 HOURS PRN
Status: DISCONTINUED | OUTPATIENT
Start: 2024-01-01 | End: 2024-06-05 | Stop reason: HOSPADM

## 2024-01-01 RX ORDER — MEMANTINE HYDROCHLORIDE 10 MG/1
10 TABLET ORAL EVERY 12 HOURS SCHEDULED
Status: DISCONTINUED | OUTPATIENT
Start: 2024-01-01 | End: 2024-01-01

## 2024-01-01 RX ORDER — ONDANSETRON 2 MG/ML
4 INJECTION INTRAMUSCULAR; INTRAVENOUS EVERY 6 HOURS PRN
Status: DISCONTINUED | OUTPATIENT
Start: 2024-01-01 | End: 2024-01-01 | Stop reason: HOSPADM

## 2024-01-01 RX ORDER — IPRATROPIUM BROMIDE AND ALBUTEROL SULFATE 2.5; .5 MG/3ML; MG/3ML
3 SOLUTION RESPIRATORY (INHALATION)
Status: DISCONTINUED | OUTPATIENT
Start: 2024-01-01 | End: 2024-06-05 | Stop reason: HOSPADM

## 2024-01-01 RX ORDER — NALOXONE HCL 0.4 MG/ML
0.1 VIAL (ML) INJECTION
Status: DISCONTINUED | OUTPATIENT
Start: 2024-01-01 | End: 2024-01-01

## 2024-01-01 RX ORDER — HALOPERIDOL 5 MG/ML
1 INJECTION INTRAMUSCULAR EVERY 4 HOURS PRN
Status: DISCONTINUED | OUTPATIENT
Start: 2024-01-01 | End: 2024-06-05 | Stop reason: HOSPADM

## 2024-01-01 RX ORDER — DIGOXIN 125 MCG
125 TABLET ORAL
Status: DISCONTINUED | OUTPATIENT
Start: 2024-01-01 | End: 2024-01-01

## 2024-01-01 RX ORDER — SODIUM CHLORIDE 9 MG/ML
75 INJECTION, SOLUTION INTRAVENOUS CONTINUOUS
Status: DISCONTINUED | OUTPATIENT
Start: 2024-01-01 | End: 2024-01-01

## 2024-01-01 RX ORDER — MORPHINE SULFATE 1 MG/ML
INJECTION INTRAVENOUS CONTINUOUS
Status: DISCONTINUED | OUTPATIENT
Start: 2024-01-01 | End: 2024-06-05 | Stop reason: HOSPADM

## 2024-01-01 RX ORDER — SODIUM CHLORIDE 9 MG/ML
40 INJECTION, SOLUTION INTRAVENOUS AS NEEDED
Status: DISCONTINUED | OUTPATIENT
Start: 2024-01-01 | End: 2024-01-01 | Stop reason: HOSPADM

## 2024-01-01 RX ORDER — ACETAMINOPHEN 160 MG
TABLET,DISINTEGRATING ORAL 4 TIMES DAILY PRN
Status: DISCONTINUED | OUTPATIENT
Start: 2024-01-01 | End: 2024-01-01 | Stop reason: HOSPADM

## 2024-01-01 RX ORDER — DIGOXIN 0.25 MG/ML
125 INJECTION INTRAMUSCULAR; INTRAVENOUS
Status: DISCONTINUED | OUTPATIENT
Start: 2024-01-01 | End: 2024-01-01

## 2024-01-01 RX ORDER — METOPROLOL TARTRATE 1 MG/ML
2.5 INJECTION, SOLUTION INTRAVENOUS EVERY 6 HOURS PRN
Status: DISCONTINUED | OUTPATIENT
Start: 2024-01-01 | End: 2024-01-01 | Stop reason: HOSPADM

## 2024-01-01 RX ORDER — MIDAZOLAM HYDROCHLORIDE 1 MG/ML
0.5 INJECTION INTRAMUSCULAR; INTRAVENOUS
Status: DISCONTINUED | OUTPATIENT
Start: 2024-01-01 | End: 2024-06-05 | Stop reason: HOSPADM

## 2024-01-01 RX ORDER — POLYVINYL ALCOHOL 14 MG/ML
1 SOLUTION/ DROPS OPHTHALMIC
Status: DISCONTINUED | OUTPATIENT
Start: 2024-01-01 | End: 2024-06-05 | Stop reason: HOSPADM

## 2024-01-01 RX ORDER — DIGOXIN 0.25 MG/ML
500 INJECTION INTRAMUSCULAR; INTRAVENOUS ONCE
Status: COMPLETED | OUTPATIENT
Start: 2024-01-01 | End: 2024-01-01

## 2024-01-01 RX ORDER — MIDAZOLAM HYDROCHLORIDE 1 MG/ML
0.5 INJECTION INTRAMUSCULAR; INTRAVENOUS EVERY 4 HOURS PRN
Status: DISCONTINUED | OUTPATIENT
Start: 2024-01-01 | End: 2024-01-01 | Stop reason: HOSPADM

## 2024-01-01 RX ORDER — DONEPEZIL HYDROCHLORIDE 10 MG/1
10 TABLET, FILM COATED ORAL NIGHTLY
Status: DISCONTINUED | OUTPATIENT
Start: 2024-01-01 | End: 2024-01-01

## 2024-01-01 RX ORDER — ONDANSETRON 4 MG/1
4 TABLET, ORALLY DISINTEGRATING ORAL EVERY 6 HOURS PRN
Status: DISCONTINUED | OUTPATIENT
Start: 2024-01-01 | End: 2024-01-01 | Stop reason: HOSPADM

## 2024-01-01 RX ORDER — MAGNESIUM CARB/ALUMINUM HYDROX 105-160MG
30 TABLET,CHEWABLE ORAL 4 TIMES DAILY PRN
Status: DISCONTINUED | OUTPATIENT
Start: 2024-01-01 | End: 2024-01-01 | Stop reason: HOSPADM

## 2024-01-01 RX ORDER — LORAZEPAM 0.5 MG/1
0.5 TABLET ORAL EVERY 8 HOURS PRN
Status: DISCONTINUED | OUTPATIENT
Start: 2024-01-01 | End: 2024-01-01

## 2024-01-01 RX ORDER — OXYBUTYNIN CHLORIDE 5 MG/1
5 TABLET ORAL 3 TIMES DAILY
Status: DISCONTINUED | OUTPATIENT
Start: 2024-01-01 | End: 2024-01-01

## 2024-01-01 RX ORDER — SODIUM CHLORIDE 0.9 % (FLUSH) 0.9 %
10 SYRINGE (ML) INJECTION EVERY 12 HOURS SCHEDULED
Status: CANCELLED | OUTPATIENT
Start: 2024-01-01

## 2024-01-01 RX ORDER — MAGNESIUM CARB/ALUMINUM HYDROX 105-160MG
30 TABLET,CHEWABLE ORAL 4 TIMES DAILY PRN
Status: DISCONTINUED | OUTPATIENT
Start: 2024-01-01 | End: 2024-06-05 | Stop reason: HOSPADM

## 2024-01-01 RX ORDER — POTASSIUM CHLORIDE 750 MG/1
40 CAPSULE, EXTENDED RELEASE ORAL ONCE
Status: DISCONTINUED | OUTPATIENT
Start: 2024-01-01 | End: 2024-01-01

## 2024-01-01 RX ORDER — MORPHINE SULFATE 2 MG/ML
0.5 INJECTION, SOLUTION INTRAMUSCULAR; INTRAVENOUS
Status: DISCONTINUED | OUTPATIENT
Start: 2024-01-01 | End: 2024-06-05 | Stop reason: HOSPADM

## 2024-01-01 RX ORDER — BISACODYL 10 MG
10 SUPPOSITORY, RECTAL RECTAL DAILY PRN
Status: DISCONTINUED | OUTPATIENT
Start: 2024-01-01 | End: 2024-06-05 | Stop reason: HOSPADM

## 2024-01-01 RX ORDER — ACETAMINOPHEN 325 MG/1
650 TABLET ORAL EVERY 4 HOURS PRN
Status: DISCONTINUED | OUTPATIENT
Start: 2024-01-01 | End: 2024-01-01 | Stop reason: HOSPADM

## 2024-01-01 RX ORDER — MORPHINE SULFATE 20 MG/ML
4 SOLUTION ORAL EVERY 4 HOURS PRN
Status: DISCONTINUED | OUTPATIENT
Start: 2024-01-01 | End: 2024-01-01 | Stop reason: HOSPADM

## 2024-01-01 RX ADMIN — MORPHINE SULFATE 4 MG: 4 INJECTION, SOLUTION INTRAMUSCULAR; INTRAVENOUS at 03:57

## 2024-01-01 RX ADMIN — Medication 10 ML: at 10:01

## 2024-01-01 RX ADMIN — METOPROLOL TARTRATE 25 MG: 25 TABLET, FILM COATED ORAL at 08:55

## 2024-01-01 RX ADMIN — METOPROLOL TARTRATE 25 MG: 25 TABLET, FILM COATED ORAL at 12:46

## 2024-01-01 RX ADMIN — MORPHINE SULFATE 4 MG: 10 SOLUTION ORAL at 20:09

## 2024-01-01 RX ADMIN — HYDROCODONE BITARTRATE AND ACETAMINOPHEN 1 TABLET: 5; 325 TABLET ORAL at 09:28

## 2024-01-01 RX ADMIN — Medication 10 ML: at 20:09

## 2024-01-01 RX ADMIN — MORPHINE SULFATE 4 MG: 4 INJECTION, SOLUTION INTRAMUSCULAR; INTRAVENOUS at 00:48

## 2024-01-01 RX ADMIN — DIGOXIN 250 MCG: 0.25 INJECTION INTRAMUSCULAR; INTRAVENOUS at 09:01

## 2024-01-01 RX ADMIN — MEMANTINE 10 MG: 10 TABLET ORAL at 23:07

## 2024-01-01 RX ADMIN — OXYBUTYNIN CHLORIDE 5 MG: 5 TABLET ORAL at 17:18

## 2024-01-01 RX ADMIN — Medication 10 ML: at 21:45

## 2024-01-01 RX ADMIN — Medication 10 ML: at 08:58

## 2024-01-01 RX ADMIN — Medication 10 ML: at 22:18

## 2024-01-01 RX ADMIN — FAMOTIDINE 10 MG: 20 TABLET, FILM COATED ORAL at 17:18

## 2024-01-01 RX ADMIN — APIXABAN 2.5 MG: 2.5 TABLET, FILM COATED ORAL at 09:28

## 2024-01-01 RX ADMIN — MORPHINE SULFATE 0.5 MG: 2 INJECTION, SOLUTION INTRAMUSCULAR; INTRAVENOUS at 03:40

## 2024-01-01 RX ADMIN — MORPHINE SULFATE 4 MG: 4 INJECTION, SOLUTION INTRAMUSCULAR; INTRAVENOUS at 09:01

## 2024-01-01 RX ADMIN — MIDAZOLAM HYDROCHLORIDE 0.5 MG: 1 INJECTION, SOLUTION INTRAMUSCULAR; INTRAVENOUS at 13:17

## 2024-01-01 RX ADMIN — Medication 10 ML: at 20:59

## 2024-01-01 RX ADMIN — OXYBUTYNIN CHLORIDE 5 MG: 5 TABLET ORAL at 20:38

## 2024-01-01 RX ADMIN — Medication 12.5 MG: at 12:10

## 2024-01-01 RX ADMIN — MIDAZOLAM HYDROCHLORIDE 0.5 MG: 1 INJECTION, SOLUTION INTRAMUSCULAR; INTRAVENOUS at 11:57

## 2024-01-01 RX ADMIN — Medication 5 MG/HR: at 18:58

## 2024-01-01 RX ADMIN — DIGOXIN 125 MCG: 125 TABLET ORAL at 12:38

## 2024-01-01 RX ADMIN — APIXABAN 2.5 MG: 2.5 TABLET, FILM COATED ORAL at 09:59

## 2024-01-01 RX ADMIN — MIDAZOLAM HYDROCHLORIDE 0.5 MG: 1 INJECTION, SOLUTION INTRAMUSCULAR; INTRAVENOUS at 15:35

## 2024-01-01 RX ADMIN — DIGOXIN 500 MCG: 0.25 INJECTION INTRAMUSCULAR; INTRAVENOUS at 19:20

## 2024-01-01 RX ADMIN — SODIUM CHLORIDE 1000 ML: 9 INJECTION, SOLUTION INTRAVENOUS at 18:05

## 2024-01-01 RX ADMIN — KETOROLAC TROMETHAMINE 15 MG: 15 INJECTION, SOLUTION INTRAMUSCULAR; INTRAVENOUS at 11:56

## 2024-01-01 RX ADMIN — DIGOXIN 125 MCG: 125 TABLET ORAL at 12:10

## 2024-01-01 RX ADMIN — Medication 10 ML: at 08:55

## 2024-01-01 RX ADMIN — FAMOTIDINE 10 MG: 20 TABLET, FILM COATED ORAL at 08:53

## 2024-01-01 RX ADMIN — APIXABAN 2.5 MG: 2.5 TABLET, FILM COATED ORAL at 23:07

## 2024-01-01 RX ADMIN — DIGOXIN 125 MCG: 125 TABLET ORAL at 12:39

## 2024-01-01 RX ADMIN — MIDAZOLAM HYDROCHLORIDE 0.5 MG: 1 INJECTION, SOLUTION INTRAMUSCULAR; INTRAVENOUS at 20:56

## 2024-01-01 RX ADMIN — AMIODARONE HYDROCHLORIDE 1 MG/MIN: 1.8 INJECTION, SOLUTION INTRAVENOUS at 20:05

## 2024-01-01 RX ADMIN — LORAZEPAM 0.5 MG: 0.5 TABLET ORAL at 23:07

## 2024-01-01 RX ADMIN — MIDAZOLAM HYDROCHLORIDE 0.5 MG: 1 INJECTION, SOLUTION INTRAMUSCULAR; INTRAVENOUS at 16:33

## 2024-01-01 RX ADMIN — APIXABAN 2.5 MG: 2.5 TABLET, FILM COATED ORAL at 20:51

## 2024-01-01 RX ADMIN — HYDROCODONE BITARTRATE AND ACETAMINOPHEN 1 TABLET: 5; 325 TABLET ORAL at 20:15

## 2024-01-01 RX ADMIN — OXYBUTYNIN CHLORIDE 5 MG: 5 TABLET ORAL at 08:55

## 2024-01-01 RX ADMIN — DIGOXIN 125 MCG: 0.25 INJECTION INTRAMUSCULAR; INTRAVENOUS at 14:28

## 2024-01-01 RX ADMIN — APIXABAN 2.5 MG: 2.5 TABLET, FILM COATED ORAL at 13:49

## 2024-01-01 RX ADMIN — METOPROLOL TARTRATE 37.5 MG: 25 TABLET, FILM COATED ORAL at 09:28

## 2024-01-01 RX ADMIN — Medication 10 ML: at 09:02

## 2024-01-01 RX ADMIN — MORPHINE SULFATE 2 MG: 2 INJECTION, SOLUTION INTRAMUSCULAR; INTRAVENOUS at 01:10

## 2024-01-01 RX ADMIN — GLYCOPYRROLATE 0.1 MG: 0.2 INJECTION INTRAMUSCULAR; INTRAVENOUS at 11:29

## 2024-01-01 RX ADMIN — MIDAZOLAM HYDROCHLORIDE 0.5 MG: 1 INJECTION, SOLUTION INTRAMUSCULAR; INTRAVENOUS at 00:07

## 2024-01-01 RX ADMIN — MIDAZOLAM HYDROCHLORIDE 0.5 MG: 1 INJECTION, SOLUTION INTRAMUSCULAR; INTRAVENOUS at 10:15

## 2024-01-01 RX ADMIN — MORPHINE SULFATE 2 MG: 2 INJECTION, SOLUTION INTRAMUSCULAR; INTRAVENOUS at 10:47

## 2024-01-01 RX ADMIN — MIDAZOLAM HYDROCHLORIDE 0.5 MG: 1 INJECTION, SOLUTION INTRAMUSCULAR; INTRAVENOUS at 20:07

## 2024-01-01 RX ADMIN — Medication 10 ML: at 09:13

## 2024-01-01 RX ADMIN — APIXABAN 2.5 MG: 2.5 TABLET, FILM COATED ORAL at 20:09

## 2024-01-01 RX ADMIN — APIXABAN 2.5 MG: 2.5 TABLET, FILM COATED ORAL at 08:53

## 2024-01-01 RX ADMIN — AMIODARONE HYDROCHLORIDE 0.5 MG/MIN: 1.8 INJECTION, SOLUTION INTRAVENOUS at 02:06

## 2024-01-01 RX ADMIN — Medication 10 ML: at 20:57

## 2024-01-01 RX ADMIN — APIXABAN 2.5 MG: 2.5 TABLET, FILM COATED ORAL at 20:15

## 2024-01-01 RX ADMIN — MIRTAZAPINE 15 MG: 15 TABLET, FILM COATED ORAL at 23:07

## 2024-01-01 RX ADMIN — Medication 10 ML: at 20:07

## 2024-01-01 RX ADMIN — DIGOXIN 500 MCG: 0.25 INJECTION INTRAMUSCULAR; INTRAVENOUS at 13:48

## 2024-01-01 RX ADMIN — MIRTAZAPINE 15 MG: 15 TABLET, FILM COATED ORAL at 20:38

## 2024-01-01 RX ADMIN — MINERAL OIL 30 ML: 1000 SOLUTION ORAL at 11:30

## 2024-01-01 RX ADMIN — ONDANSETRON 4 MG: 2 INJECTION INTRAMUSCULAR; INTRAVENOUS at 17:53

## 2024-01-01 RX ADMIN — HYDROCODONE BITARTRATE AND ACETAMINOPHEN 1 TABLET: 5; 325 TABLET ORAL at 17:52

## 2024-01-01 RX ADMIN — Medication 10 ML: at 20:39

## 2024-01-01 RX ADMIN — APIXABAN 2.5 MG: 2.5 TABLET, FILM COATED ORAL at 20:38

## 2024-01-01 RX ADMIN — FUROSEMIDE 20 MG: 10 INJECTION, SOLUTION INTRAMUSCULAR; INTRAVENOUS at 13:48

## 2024-01-01 RX ADMIN — MIDAZOLAM HYDROCHLORIDE 0.5 MG: 1 INJECTION, SOLUTION INTRAMUSCULAR; INTRAVENOUS at 23:50

## 2024-01-01 RX ADMIN — MEMANTINE 10 MG: 10 TABLET ORAL at 09:57

## 2024-01-01 RX ADMIN — MIDAZOLAM HYDROCHLORIDE 0.5 MG: 1 INJECTION, SOLUTION INTRAMUSCULAR; INTRAVENOUS at 06:35

## 2024-01-01 RX ADMIN — Medication 10 ML: at 12:48

## 2024-01-01 RX ADMIN — MORPHINE SULFATE 4 MG: 4 INJECTION, SOLUTION INTRAMUSCULAR; INTRAVENOUS at 22:15

## 2024-01-01 RX ADMIN — FUROSEMIDE 20 MG: 10 INJECTION, SOLUTION INTRAMUSCULAR; INTRAVENOUS at 12:47

## 2024-01-01 RX ADMIN — MIDAZOLAM HYDROCHLORIDE 0.5 MG: 1 INJECTION, SOLUTION INTRAMUSCULAR; INTRAVENOUS at 08:57

## 2024-01-01 RX ADMIN — MORPHINE SULFATE 4 MG: 4 INJECTION, SOLUTION INTRAMUSCULAR; INTRAVENOUS at 12:42

## 2024-01-01 RX ADMIN — MEMANTINE 10 MG: 10 TABLET ORAL at 20:38

## 2024-01-01 RX ADMIN — OXYBUTYNIN CHLORIDE 5 MG: 5 TABLET ORAL at 10:00

## 2024-01-01 RX ADMIN — Medication 10 ML: at 20:15

## 2024-01-01 RX ADMIN — MORPHINE SULFATE 2 MG: 2 INJECTION, SOLUTION INTRAMUSCULAR; INTRAVENOUS at 04:54

## 2024-01-01 RX ADMIN — ACETAMINOPHEN 650 MG: 325 TABLET, FILM COATED ORAL at 20:52

## 2024-01-01 RX ADMIN — FAMOTIDINE 10 MG: 20 TABLET, FILM COATED ORAL at 08:28

## 2024-01-01 RX ADMIN — Medication 10 ML: at 10:49

## 2024-01-01 RX ADMIN — APIXABAN 2.5 MG: 2.5 TABLET, FILM COATED ORAL at 09:13

## 2024-01-01 RX ADMIN — FAMOTIDINE 10 MG: 20 TABLET, FILM COATED ORAL at 18:37

## 2024-01-01 RX ADMIN — MIDAZOLAM HYDROCHLORIDE 0.5 MG: 1 INJECTION, SOLUTION INTRAMUSCULAR; INTRAVENOUS at 20:04

## 2024-01-01 RX ADMIN — DONEPEZIL HYDROCHLORIDE 10 MG: 10 TABLET, FILM COATED ORAL at 23:07

## 2024-01-01 RX ADMIN — MEMANTINE 10 MG: 10 TABLET ORAL at 08:53

## 2024-01-01 RX ADMIN — SODIUM CHLORIDE, POTASSIUM CHLORIDE, SODIUM LACTATE AND CALCIUM CHLORIDE 50 ML/HR: 600; 310; 30; 20 INJECTION, SOLUTION INTRAVENOUS at 12:20

## 2024-01-01 RX ADMIN — MORPHINE SULFATE: 1 INJECTION INTRAVENOUS at 15:36

## 2024-01-01 RX ADMIN — OXYBUTYNIN CHLORIDE 5 MG: 5 TABLET ORAL at 08:28

## 2024-01-01 RX ADMIN — AMIODARONE HYDROCHLORIDE 150 MG: 1.5 INJECTION, SOLUTION INTRAVENOUS at 19:46

## 2024-01-01 RX ADMIN — SODIUM CHLORIDE 75 ML/HR: 9 INJECTION, SOLUTION INTRAVENOUS at 21:43

## 2024-01-01 RX ADMIN — MORPHINE SULFATE 2 MG: 2 INJECTION, SOLUTION INTRAMUSCULAR; INTRAVENOUS at 22:01

## 2024-01-01 RX ADMIN — AMIODARONE HYDROCHLORIDE 0.5 MG/MIN: 1.8 INJECTION, SOLUTION INTRAVENOUS at 08:29

## 2024-01-01 RX ADMIN — METOPROLOL TARTRATE 37.5 MG: 25 TABLET, FILM COATED ORAL at 09:13

## 2024-01-01 RX ADMIN — DONEPEZIL HYDROCHLORIDE 10 MG: 10 TABLET, FILM COATED ORAL at 20:38

## 2024-01-01 RX ADMIN — FAMOTIDINE 10 MG: 20 TABLET, FILM COATED ORAL at 09:59

## 2024-01-01 RX ADMIN — DIGOXIN 125 MCG: 125 TABLET ORAL at 12:46

## 2024-01-01 RX ADMIN — METOPROLOL TARTRATE 2.5 MG: 5 INJECTION INTRAVENOUS at 23:40

## 2024-01-01 RX ADMIN — MEMANTINE 10 MG: 10 TABLET ORAL at 10:21

## 2024-01-01 RX ADMIN — MIDAZOLAM HYDROCHLORIDE 0.5 MG: 1 INJECTION, SOLUTION INTRAMUSCULAR; INTRAVENOUS at 03:40

## 2024-01-01 RX ADMIN — KETOROLAC TROMETHAMINE 15 MG: 15 INJECTION, SOLUTION INTRAMUSCULAR; INTRAVENOUS at 17:41

## 2024-01-01 RX ADMIN — MORPHINE SULFATE 2 MG: 2 INJECTION, SOLUTION INTRAMUSCULAR; INTRAVENOUS at 17:56

## 2024-01-01 RX ADMIN — METOPROLOL TARTRATE 37.5 MG: 25 TABLET, FILM COATED ORAL at 20:09

## 2024-01-01 RX ADMIN — POTASSIUM CHLORIDE 40 MEQ: 1.5 POWDER, FOR SOLUTION ORAL at 01:30

## 2024-01-01 RX ADMIN — METOPROLOL TARTRATE 37.5 MG: 25 TABLET, FILM COATED ORAL at 20:51

## 2024-01-01 RX ADMIN — OXYBUTYNIN CHLORIDE 5 MG: 5 TABLET ORAL at 23:07

## 2024-01-01 RX ADMIN — OXYBUTYNIN CHLORIDE 5 MG: 5 TABLET ORAL at 18:27

## 2024-01-01 RX ADMIN — METOPROLOL TARTRATE 37.5 MG: 25 TABLET, FILM COATED ORAL at 20:14

## 2024-05-27 PROBLEM — I48.91 ATRIAL FIBRILLATION WITH RVR: Status: ACTIVE | Noted: 2024-01-01

## 2024-05-27 NOTE — ED PROVIDER NOTES
"Subjective   History of Present Illness    Pt presents from NH for \"lethargy\" and dyspnea progressing over several days.  Limited PO intake for the last few days.  EMS reports she was in AF with RVR en route.  Paperwork from the nursing home does not indicate a history of this but she does have a history of DVT and is on Eliquis.  Patient is quite confused and not helpful to history.  I asked her how she is feeling and she said \"I take care of myself\" and then sang me a jaunty little song.  There is no report of fever from the NH.  They reportedly felt her breath sounds were abnormal on the left and that pt had some difficulty breathing.    Family arrived and reported that they were told by NH that she had been like this for a few days now.  They were not aware of her heart rate.  They report she has never had AF that they are aware of.    History provided by:  Patient, EMS personnel, nursing home and relative  History limited by:  Dementia      Review of Systems   Unable to perform ROS: Dementia       Past Medical History:   Diagnosis Date    Anorexia     Arthritis     Asthma     Cataract     Colon cancer     Colon cancer metastasized to liver     DDD (degenerative disc disease), lumbar     Hearing loss     History of transfusion     Hypertension     Liver cancer     Macular degeneration     Onychomycosis     Urine frequency        Allergies   Allergen Reactions    Asa [Aspirin] Unknown (See Comments)     UNKNOWN    Bactrim [Sulfamethoxazole-Trimethoprim] Unknown (See Comments)     UNKNOWN    Penicillins Unknown (See Comments)     UNKNOWN      Ultram [Tramadol] Unknown (See Comments)     UNKNOWN         Past Surgical History:   Procedure Laterality Date    CATARACT EXTRACTION Bilateral     COLON RESECTION  06/16/2009    Low anterior resection    GALLBLADDER SURGERY      Resection of gallbladder    HIP HEMIARTHROPLASTY Right 2/8/2018    Procedure: HIP HEMIARTHROPLASTY;  Surgeon: Fernando Dominguez MD;  Location: [facility]" [facility];  Service:     LIVER SURGERY  12/09/2014    Liver tumor, 1/2 was removed.    SUBTOTAL HYSTERECTOMY         Family History   Problem Relation Age of Onset    Arthritis Other     Cervical cancer Other     Uterine cancer Other     Colon cancer Other        Social History     Socioeconomic History    Marital status:     Number of children: 3   Tobacco Use    Smoking status: Never    Smokeless tobacco: Never   Substance and Sexual Activity    Alcohol use: No    Drug use: No    Sexual activity: Never           Objective   Physical Exam  Vitals and nursing note reviewed.   Constitutional:       General: She is not in acute distress.     Appearance: Normal appearance. She is not ill-appearing.   HENT:      Head: Normocephalic and atraumatic.      Mouth/Throat:      Mouth: Mucous membranes are moist.   Eyes:      General: No scleral icterus.        Right eye: No discharge.         Left eye: No discharge.      Conjunctiva/sclera: Conjunctivae normal.   Cardiovascular:      Rate and Rhythm: Regular rhythm. Tachycardia present. Rhythm irregular.      Heart sounds: No murmur heard.  Pulmonary:      Effort: Pulmonary effort is normal. No respiratory distress.      Breath sounds: Normal breath sounds. No wheezing.   Abdominal:      General: Bowel sounds are normal. There is no distension.      Palpations: Abdomen is soft.      Tenderness: There is no abdominal tenderness. There is no guarding or rebound.   Musculoskeletal:         General: No swelling. Normal range of motion.      Cervical back: Normal range of motion and neck supple.   Skin:     General: Skin is warm and dry.      Findings: No rash.   Neurological:      General: No focal deficit present.      Mental Status: She is alert. Mental status is at baseline. She is disoriented.   Psychiatric:         Mood and Affect: Mood normal.         Behavior: Behavior normal.         Thought Content: Thought content normal.         Critical Care    Performed by:  Driss Christina MD  Authorized by: Driss Christina MD    Critical care provider statement:     Critical care time (minutes):  85    Critical care time was exclusive of:  Separately billable procedures and treating other patients    Critical care was necessary to treat or prevent imminent or life-threatening deterioration of the following conditions:  Cardiac failure    Critical care was time spent personally by me on the following activities:  Obtaining history from patient or surrogate, ordering and performing treatments and interventions, evaluation of patient's response to treatment, examination of patient, development of treatment plan with patient or surrogate, re-evaluation of patient's condition, review of old charts, ordering and review of radiographic studies and ordering and review of laboratory studies    I assumed direction of critical care for this patient from another provider in my specialty: no      Care discussed with: admitting provider               ED Course    I reviewed prior records.  PCP note 3/18/24 she is treated for dementia, HTN, DVT, GERD, colon cancer of uncertain staging or status.  This note reports gradual weight loss.      EKG AF RVR.  Labs fairly benign, c/w CHF which is unsurprising given apparent RVR for probably a few days.  CXR NAD.    Borderline pressures.  Fluid bolus given for BP support and then cardizem initiated bolus and drip.  She had hypotension and we tried to wait it out but she dropped enough that it was correct to stop it.  We gave a dose of digoxin IV and started amiodarone bolus and drip and we have had good improvement in BP and HR since then.    Discussed with family at length at the baseline.  They have MOST form and DNR forms.  They are aware fo advanced age and infirmity and report a fairly poor quality of life before this.  We discussed cardioversion, which would require sedation and I think the sedation would be riskier than the shock.   Discussed whether to escalate care if she declines further.  They are supportive of current medical mgmt and would like to continue this.  They agree that ECV is riskier than they would like to pursue.  They affirmed DNR/DNI status.    Patient improving but still ill on serial rechecks.  Discussed exam findings, test results so far and concerns in detail at the bedside.  Discussed need for admission for further evaluation and treatment.  I discussed the patient's presentation, test results and need for admission with the hospitalist.          [id number] Score: 4                                  Medical Decision Making  Problems Addressed:  Atrial fibrillation with RVR: complicated acute illness or injury with systemic symptoms that poses a threat to life or bodily functions    Amount and/or Complexity of Data Reviewed  Independent Historian: caregiver and EMS     Details: relative  External Data Reviewed: notes.  Labs: ordered. Decision-making details documented in ED Course.  Radiology: ordered and independent interpretation performed. Decision-making details documented in ED Course.     Details: CXR minor interstitial prominence  ECG/medicine tests: ordered and independent interpretation performed. Decision-making details documented in ED Course.     Details: EKG AF RVR read by me  Discussion of management or test interpretation with external provider(s): hospitalist    Risk  Prescription drug management.  Drug therapy requiring intensive monitoring for toxicity.  Decision regarding hospitalization.    Critical Care  Total time providing critical care: 85 minutes        Final diagnoses:   Atrial fibrillation with RVR       ED Disposition  ED Disposition       ED Disposition   Decision to Admit    Condition   --    Comment   --               No follow-up provider specified.       Medication List      No changes were made to your prescriptions during this visit.            Driss Christina MD  05/27/24  [id number]

## 2024-05-27 NOTE — LETTER
EMS Transport Request  For use at [facility], [place], [place], [place], and [place] only   Patient Name: Zainab Worthy : 2/3/1929   Weight:60.8 kg (134 lb) Pick-up Location: [facility] BLS/ALS: BLS/ALS: BLS   Insurance: MEDICARE Auth End Date:    Pre-Cert #: D/C Summary complete:    Destination: Other [place] [address] [place] [address]   Contact Precautions: None   Equipment (O2, Fluids, etc.): O2, settings 2 L   Arrive By Date/Time: Monday, 6/3/2024 12-4 Stretcher/WC: Stretcher   CM Requesting: Rebecca Hines RN Ext: [phone]   Notes/Medical Necessity: fall risk, supervision for safetyAMS     ______________________________________________________________________    *Only 2 patient bags OR 1 carry-on size bag are permitted.  Wheelchairs and walkers CANNOT transported with the patient. Acknowledge: Yes

## 2024-05-27 NOTE — LETTER
[facility]  [address] [facility] [address]  [phone]        Itzel 3, 2024      Patient: Zainab Worthy  YOB: 1929  Date of Visit: 5/27/2024      Possible inpatient at [facility].    Attending: Cecy CHU  Certifying: Dr. Lula Geiger  Referring: Dr. Leila Harden    Thank you,        Capri Mathis, RN

## 2024-05-27 NOTE — LETTER
"    EMS Transport Request  For use at [facility], [place], [place], [place], and [place] only   Patient Name: Zainab Worthy : 2/3/1929   Weight:60.8 kg (134 lb) Pick-up Location: [facility] BLS/ALS: {BLS/ALS:76200}   Insurance: MEDICARE Auth End Date: ***   Pre-Cert #: D/C Summary complete:    Destination: {Transport Locations:20599}   Contact Precautions: {Precautions:59954}   Equipment (O2, Fluids, etc.): {Equipment:64047}   Arrive By Date/Time: *** Stretcher/WC: {Stretcher/WC:49920}   CM Requesting: Justyna Lopez Ext: ***   Notes/Medical Necessity: ***     ______________________________________________________________________    *Only 2 patient bags OR 1 carry-on size bag are permitted.  Wheelchairs and walkers CANNOT transported with the patient. Acknowledge: {Acknowledge:36457::\"Yes\"}    "

## 2024-05-28 NOTE — PROGRESS NOTES
"          Clinical Nutrition Assessment     Patient Name: Zainab Worthy  YOB: 1929  MRN: [id number]  Date of Encounter: 05/28/24 16:28 EDT  Admission date: 5/27/2024  Reason for Visit: Identified at risk by screening criteria, MST score 2+, Unintentional weight loss, Reduced oral intake, \"Unsure\" unintentional weight loss    Assessment   Nutrition Assessment   Admission Diagnosis:  Atrial fibrillation with RVR [I48.91]    Problem List:    Atrial fibrillation with RVR      PMH:   She  has a past medical history of Anorexia, Arthritis, Asthma, Cataract, Colon cancer, Colon cancer metastasized to liver, DDD (degenerative disc disease), lumbar, Hearing loss, History of transfusion, Hypertension, Liver cancer, Macular degeneration, Onychomycosis, and Urine frequency.    PSH:  She  has a past surgical history that includes Liver surgery (12/09/2014); Subtotal Hysterectomy; Gallbladder surgery; Colectomy (06/16/2009); Cataract extraction (Bilateral); and Hip hemiArthroplasty (Right, 2/8/2018).    Applicable Nutrition History:   Colon ca w/ mets to the liver s/p colectomy and hepatectomy     Anthropometrics     Height: Height: 152.4 cm (60\")  Last Filed Weight: Weight: 60.8 kg (134 lb) (05/28/24 1018)  Method: Weight Method: Estimated  BMI: BMI (Calculated): 26.2    UBW:  3/18/24 110.6#  Weight change:  unknown; current weight is estimated and seems to be inaccurate; family @ BS reported last weight they recall is around 120# unsure time frame    Nutrition Focused Physical Exam    Date:  05/28/24        Not appropriate for NFPE at this time      Subjective   Reported/Observed/Food/Nutrition Related History:     05/28/24  Patient resting soundly at time of visit. Spoke with family at bedside who reported patient normally eats well for her age, however, the past few days she has been sleeping a lot and not eating like she normally does. She has been on ONS at her facilities before, they are unsure if she is " currently on them now, but would like her to have them while here. Reported patient loves sweets, coffee, and singing. Also stated patient was not able to bring her dentures with her to hospital. Agreeable to soft to chew, ground.    Current Nutrition Prescription   PO: Diet: Regular/House; Texture: Soft to Chew (NDD 3); Soft to Chew: Ground Meat; Fluid Consistency: Thin (IDDSI 0)  Oral Nutrition Supplement: n/a  Intake: Insufficient data    Assessment & Plan   Nutrition Diagnosis   Date:  05/28/24             Updated:    Problem Biting/chewing difficulty   Etiology Poor dentition   Signs/Symptoms Edentulous   Status: new; modified diet ordered    Goal:   Nutrition to support treatment and Establish PO      Nutrition Intervention      Follow treatment progress, Care plan reviewed, Interview for preferences, Encourage intake, Supplement provided    Patient does not meet malnutrition criteria at this time.  Continue to assess appropriateness for NFPE  Will modify diet to soft to chew, ground and order boost VHC @ B and magic cup @ D to support PO intake  Encourage adequate PO/ONS intake    Monitoring/Evaluation:   Per protocol, PO intake, Supplement intake, Weight, Symptoms, POC/GOC    Suzy Degroot MS,RD,LD  Time Spent: 30min

## 2024-05-28 NOTE — H&P
[facility] Medicine Services  HISTORY AND PHYSICAL    Patient Name: Zainab Worthy  : 2/3/1929  MRN: [id number]  Primary Care Physician: Aguilar Kelly MD  Date of admission: 2024      Subjective   Subjective     Chief Complaint:  Lethargy    HPI:  Zainab Worthy is a 89 y.o. female who has history of dementia and cannot provide any HPI information; all information comes from her children who accompany her in the ED room during my visit.  One of her children states that he was called earlier today () by the nursing home staff at the facility where the patient resides, at which time they voiced concern due to increased lethargy and some apparent shortness of breath exhibited by the patient.  Family agreed with nursing home plan to send the patient to the ED here for further evaluation.  After arrival, she was found to be in atrial fibrillation with RVR, which she has never had before.  Family also states that staff at the nursing home had mentioned that the patient had been increasingly lethargic over the last 2 days, and she has had significantly decreased oral intake of food and fluids over that time.  She was initially hypotensive here, according to the ED provider, and she received a dose of digoxin and then bolus dose of amiodarone, followed by amiodarone drip.  She had some improvement in her rhythm down to the 120-130s.  IV fluids were also started in the ED.  According to the family, the patient's medical history is significant for Alzheimer's dementia, history of colon cancer s/p partial resection, with metastases to the liver and subsequent partial hepatectomy; she currently sees no treatment for the cancer diagnosis.  Family also confirms DNR/DNI CODE STATUS.      Personal History     Past Medical History:   Diagnosis Date    Anorexia     Arthritis     Asthma     Cataract     Colon cancer     Colon cancer metastasized to liver     DDD (degenerative disc  disease), lumbar     Hearing loss     History of transfusion     Hypertension     Liver cancer     Macular degeneration     Onychomycosis     Urine frequency          Oncology Problem List:  Metastatic colon cancer in female (02/07/2018; Status: Active)  Adenocarcinoma of colon metastatic to liver (10/14/2016; Status:   Resolved)  Rectal cancer (07/19/2016; Status: Active)  Endometrial cancer (07/02/2016; Status: Active)  Oncology/Hematology History   Rectal cancer   7/19/2016 Initial Diagnosis    Rectal cancer     Adenocarcinoma of colon metastatic to liver (Resolved)   12/9/2014 Initial Diagnosis    Adenocarcinoma of colon metastatic to liver       Past Surgical History:   Procedure Laterality Date    CATARACT EXTRACTION Bilateral     COLON RESECTION  06/16/2009    Low anterior resection    GALLBLADDER SURGERY      Resection of gallbladder    HIP HEMIARTHROPLASTY Right 2/8/2018    Procedure: HIP HEMIARTHROPLASTY;  Surgeon: Fernando Dominguez MD;  Location: [facility];  Service:     LIVER SURGERY  12/09/2014    Liver tumor, 1/2 was removed.    SUBTOTAL HYSTERECTOMY         Family History: family history includes Arthritis in an other family member; Cervical cancer in an other family member; Colon cancer in an other family member; Uterine cancer in an other family member.     Social History:  reports that she has never smoked. She has never used smokeless tobacco. She reports that she does not drink alcohol and does not use drugs.  Social History     Social History Narrative    Not on file       Medications:  Available home medication information reviewed.  LORazepam, acetaminophen, b complex-C-folic acid, bisacodyl, ciprofloxacin, diclofenac, docusate sodium, donepezil, famotidine, memantine, mirtazapine, multivitamin-antioxidants, nystatin, oxybutynin, polyethylene glycol, and solifenacin    Allergies   Allergen Reactions    Asa [Aspirin] Unknown (See Comments)     UNKNOWN    Bactrim [Sulfamethoxazole-Trimethoprim]  Unknown (See Comments)     UNKNOWN    Penicillins Unknown (See Comments)     UNKNOWN      Ultram [Tramadol] Unknown (See Comments)     UNKNOWN         Objective   Objective     Vital Signs:   Temp:  [96 °F (35.6 °C)] 96 °F (35.6 °C)  Heart Rate:  [104-184] 133  Resp:  [22-36] 36  BP: ()/() 96/69  Flow (L/min):  [2] 2       Physical Exam   Constitutional: Awake, alert, NAD.  Eyes: PERRLA, sclerae anicteric, dry eyes but no conjunctival injection  HENT: NCAT, mucous membranes dry  Neck: Supple, no thyromegaly, no lymphadenopathy, trachea midline  Respiratory: Clear to auscultation bilaterally, nonlabored respirations   Cardiovascular: Tachycardic and irregularly irregular, no murmurs, rubs, or gallops, palpable pedal pulses bilaterally  Gastrointestinal: Positive but hypoactive bowel sounds, soft, nontender, nondistended  Musculoskeletal: No bilateral ankle edema, no clubbing or cyanosis to extremities  Psychiatric: Appropriate affect, cooperative; confused.  Neurologic: Does not answer orientation questions appropriately.  Answers yes to all questions. CHYNA.  Skin: No rashes, poor turgor.    Result Review:  I have personally reviewed the results from the time of this admission to 5/27/2024 22:14 EDT and agree with these findings:  [x]  Laboratory list / accordion  []  Microbiology  [x]  Radiology  [x]  EKG/Telemetry   []  Cardiology/Vascular   []  Pathology  []  Old records  []  Other:  Most notable findings include: I reviewed EKG which by my read shows atrial fibrillation, RVR, rate approximately 160 bpm, normal axis, nonspecific ST/T wave changes.  I reviewed chest x-ray which is a single AP view in my mind and shows no infiltrate suggestive of pneumonia but some increased interstitial markings which raise suspicion for mild edema.      LAB RESULTS:      Lab 05/27/24  2135 05/27/24  1844 05/27/24  1823 05/27/24  1814   WBC  --   --   --  8.41   HEMOGLOBIN  --   --   --  13.8   HEMOGLOBIN, POC  --  12.6   --   --    HEMATOCRIT  --   --   --  42.5   HEMATOCRIT POC  --  37*  --   --    PLATELETS  --   --   --  220   NEUTROS ABS  --   --   --  6.14   IMMATURE GRANS (ABS)  --   --   --  0.06*   LYMPHS ABS  --   --   --  1.59   MONOS ABS  --   --   --  0.59   EOS ABS  --   --   --  0.01   MCV  --   --   --  88.7   PROCALCITONIN  --   --   --  0.16   LACTATE 2.0  --  2.8*  --          Lab 05/27/24  1844 05/27/24  1814   SODIUM  --  147*   POTASSIUM  --  3.8   CHLORIDE  --  111*   CO2  --  23.0   ANION GAP  --  13.0   BUN  --  63*   CREATININE 1.20 1.25*   EGFR 41.8* 39.8*   GLUCOSE  --  116*   CALCIUM  --  8.1*   MAGNESIUM  --  2.7*   TSH  --  1.650         Lab 05/27/24  1814   TOTAL PROTEIN 6.1   ALBUMIN 3.0*   GLOBULIN 3.1   ALT (SGPT) 15   AST (SGOT) 28   BILIRUBIN 0.5   ALK PHOS 106         Lab 05/27/24  1814   PROBNP 9,951.0*   HSTROP T 38*                     Microbiology Results (last 10 days)       Procedure Component Value - Date/Time    COVID PRE-OP / PRE-PROCEDURE SCREENING ORDER (NO ISOLATION) - Swab, Nasal Cavity [[id number]]  (Normal) Collected: 05/27/24 1730    Lab Status: Final result Specimen: Swab from Nasal Cavity Updated: 05/27/24 1908    Narrative:      The following orders were created for panel order COVID PRE-OP / PRE-PROCEDURE SCREENING ORDER (NO ISOLATION) - Swab, Nasal Cavity.  Procedure                               Abnormality         Status                     ---------                               -----------         ------                     COVID-19 and FLU A/B PCR...[[id number]]  Normal              Final result                 Please view results for these tests on the individual orders.    COVID-19 and FLU A/B PCR, 1 HR TAT - Swab, Nasopharynx [[id number]]  (Normal) Collected: 05/27/24 1730    Lab Status: Final result Specimen: Swab from Nasopharynx Updated: 05/27/24 1908     COVID19 Not Detected     Influenza A PCR Not Detected     Influenza B PCR Not Detected    Narrative:      Fact  sheet for providers: https://www.fda.gov/media/722252/download    Fact sheet for patients: https://www.fda.gov/media/387666/download    Test performed by PCR.            XR Chest 1 View    Result Date: 5/27/2024  XR CHEST 1 VW Date of Exam: 5/27/2024 5:31 PM EDT Indication: shortness of breath, coarse left breath sounds Comparison: Chest x-ray 8/30/2018 Findings: There is mild cardiomegaly. There is diffuse interstitial prominence. No focal consolidation. No definite pleural effusion. No pneumothorax. The bones are demineralized without acute osseous findings.     Impression: Impression: Diffuse interstitial prominence which may reflect chronic/senescent interstitial change with although component of interstitial edema is difficult to entirely exclude. Electronically Signed: Humphrey Santillan MD  5/27/2024 5:54 PM EDT  Workstation ID: [id number]         Assessment & Plan   Assessment & Plan       Atrial fibrillation with RVR      89 F with atrial fibrillation/RVR, new diagnosis for her    Atrial fibrillation with RVR  Chest x-ray appearance concerning for pulmonary edema  - Will continue light IVF hydration for now, she is clinically dry and lungs are clear on exam.  - Will check follow-up chest x-ray for Tuesday morning.  - Continue amiodarone drip; also received digoxin in the ED.  - Continue telemetry.  - Check a.m. troponin.  - Check 2D echo.  - Cardiology consult requested, input appreciated, will follow up with their recommendations.    Dementia  - Continue Namenda and Aricept from home regimen.  - Continue as needed Ativan, also Remeron at night.    History of colon cancer s/p partial resection  History of liver metastasis s/p partial hepatectomy  - No current treatment, chronic comorbidities.        Total time spent: 79 minutes  Time spent includes time reviewing chart, face-to-face time, counseling patient/family/caregiver, ordering medications/tests/procedures, communicating with other health care  professionals, documenting clinical information in the electronic health record, and coordination of care.       DVT prophylaxis:  Mechanical DVT prophylaxis orders are present.          CODE STATUS: DNR/DNI  Code Status and Medical Interventions:   Ordered at: 05/27/24 2131     Code Status (Patient has no pulse and is not breathing):    No CPR (Do Not Attempt to Resuscitate)     Medical Interventions (Patient has pulse or is breathing):    Full Support     Comments:    DNR/DNI in the event of cardiac and/or respiratory arrest, d/w children       Expected Discharge   tbd    Renzo Adam,III, DO  05/27/24

## 2024-05-28 NOTE — CONSULTS
[facility]   Consult Note    Patient Name: Zainab Worthy  : 2/3/1929  MRN: [id number]  Primary Care Physician:  Aguilar Kelly MD  Referring Physician: No ref. provider found  Date of admission: 2024    Inpatient Cardiology Consult  Consult performed by: Pascual Medrano MD  Consult ordered by: Renzo Adam III, DO        Subjective   Subjective   Problem List  -volume overloaded.  Pulmonary edema and elevated bnp  -HFpEF  -new onset afib with RVR  -severe dementia  -prior hx of metastatic colon cancer s/p partial colectomy and partial liver removal    Ms. Worthy is a 89 year old lady who was admitted afib.  Unclear if new as she was on eliquis as an outpatient but family not aware.  BP relatively low.  She is lethargic.  CXR and blood work suggestive of volume overload.  Exam challenging given she is difficult to wake up and goes back to sleep.  Mental status at baseline poor.  ROS negative except for the above.        Review of Systems   Respiratory:  Positive for shortness of breath.         Personal History     Past Medical History:   Diagnosis Date    Anorexia     Arthritis     Asthma     Cataract     Colon cancer     Colon cancer metastasized to liver     DDD (degenerative disc disease), lumbar     Hearing loss     History of transfusion     Hypertension     Liver cancer     Macular degeneration     Onychomycosis     Urine frequency        Past Surgical History:   Procedure Laterality Date    CATARACT EXTRACTION Bilateral     COLON RESECTION  2009    Low anterior resection    GALLBLADDER SURGERY      Resection of gallbladder    HIP HEMIARTHROPLASTY Right 2018    Procedure: HIP HEMIARTHROPLASTY;  Surgeon: Fernando Dominguez MD;  Location: [facility];  Service:     LIVER SURGERY  2014    Liver tumor,  was removed.    SUBTOTAL HYSTERECTOMY         Family History: family history includes Arthritis in an other family member; Cervical cancer in an other family  member; Colon cancer in an other family member; Uterine cancer in an other family member. Otherwise pertinent FHx was reviewed and not pertinent to current issue.    Social History:  reports that she has never smoked. She has never used smokeless tobacco. She reports that she does not drink alcohol and does not use drugs.    Home Medications:   LORazepam, acetaminophen, apixaban, b complex-C-folic acid, bisacodyl, ciprofloxacin, diclofenac, docusate sodium, donepezil, famotidine, melatonin, memantine, mirtazapine, multivitamin-antioxidants, nystatin, oxybutynin, polyethylene glycol, and solifenacin    Allergies:  Allergies   Allergen Reactions    Asa [Aspirin] Unknown (See Comments)     UNKNOWN    Bactrim [Sulfamethoxazole-Trimethoprim] Unknown (See Comments)     UNKNOWN    Penicillins Unknown (See Comments)     UNKNOWN      Ultram [Tramadol] Unknown (See Comments)     UNKNOWN         Objective    Objective     Vitals:  Temp:  [96 °F (35.6 °C)-97.8 °F (36.6 °C)] 97.7 °F (36.5 °C)  Heart Rate:  [103-184] 144  Resp:  [16-36] 16  BP: ()/() 102/63  Flow (L/min):  [2] 2    Physical Exam  Constitutional:       Appearance: She is ill-appearing.   HENT:      Head: Normocephalic.   Eyes:      Extraocular Movements: Extraocular movements intact.   Cardiovascular:      Rate and Rhythm: Tachycardia present. Rhythm irregular.      Heart sounds: No murmur heard.     No gallop.   Pulmonary:      Breath sounds: Normal breath sounds.   Abdominal:      Palpations: Abdomen is soft.   Musculoskeletal:      Right lower leg: No edema.      Left lower leg: No edema.   Skin:     General: Skin is warm and dry.   Neurological:      General: No focal deficit present.   Psychiatric:      Comments: Severe dementia and lethargic         Result Review    Result Review:  I have personally reviewed the results from the time of this admission to 5/28/2024 12:47 EDT and agree with these findings:  []  Laboratory list / accordion  []   Microbiology  []  Radiology  []  EKG/Telemetry   []  Cardiology/Vascular   []  Pathology  []  Old records  []  Other:        Assessment & Plan   Assessment / Plan     Brief Patient Summary:  -volume overloaded.  Pulmonary edema and elevated bnp  -HFpEF  -new onset afib with RVR  -severe dementia  -prior hx of metastatic colon cancer s/p partial colectomy and partial liver removal    Plan:   -Resume eliquis 5mg  -will give dose of lasix 5mg IV  -stop cardizem  -cont. Amiodarone currently, will finish digoxin load and start oral tomorrow  -give dose of lasix 20mg IV       Pascual Medrano MD

## 2024-05-28 NOTE — CASE MANAGEMENT/SOCIAL WORK
Discharge Planning Assessment  [facility]     Patient Name: Zainab Worthy  MRN: [id number]  Today's Date: 5/28/2024    Admit Date: 5/27/2024    Plan: discharge plan   Discharge Needs Assessment       Row Name 05/28/24 1300       Living Environment    People in Home facility resident  Pt is a resident at [facility] in [facility]    Duration at Residence 3 years    Primary Care Provided by other (see comments)  staff at [place], [place]    Provides Primary Care For no one, unable/limited ability to care for self    Family Caregiver if Needed child([place]), adult    Family Caregiver Names Adult children, Chad Worthy, Val Dawson, and Dereje Worthy    Quality of Family Relationships involved    Able to Return to Prior Arrangements yes    Living Arrangement Comments I spoke with pt's son, Chad Worthy on phone regarding discharge plan. Son reports pt resides at [facility] in [place] and has been there 3 years       Transition Planning    Patient/Family Anticipates Transition to home    Patient/Family Anticipated Services at Transition        Discharge Needs Assessment    Readmission Within the Last 30 Days no previous admission in last 30 days    Equipment Currently Used at Home wheelchair    Concerns to be Addressed discharge planning    Equipment Needed After Discharge wheelchair, manual    Discharge Coordination/Progress Pt's son, Chad Worthy pt is a resident at [facility] and has been there 3 years. He states he and his brother, Dereje Worthy have dual POA. Per Chad, pt is w/c bound and [facility] assists with meals, meds, bathing, dressing and transportation. The patient is able to feed herself. Pt has a history of dementia and here with Afib with RVR with a consult for cardiology. Per son, the plan is back to [facility] at discharge. Chad reports [facility] does provide some transportation. I attempted to call ALENA Juares  at [facility]([phone] and had to leave a voice message. CM will cont to follow                   Discharge Plan       Row Name 05/28/24 1311       Plan    Plan discharge plan    Plan Comments Per son, the plan is back to [facility] at discharge. Chad reports [facility] does provide some transportation. I attempted to call ALENA Juares at [facility]([phone] and had to leave a voice message. CM will cont to follow    Final Discharge Disposition Code 01 - home or self-care                  Continued Care and Services - Admitted Since 5/27/2024    No active coordination exists for this encounter.          Demographic Summary       Row Name 05/28/24 1258       General Information    General Information Comments Pt's PCP is SHRADDHA CHOUDHURY       Contact Information    Permission Granted to Share Info With     Contact Information Obtained for     Contact Information Comments Chad Worthy(son)[phone], Val Thurston(daughter) [phone], and Dereje Worthy(son)  [phone]                   Functional Status    No documentation.                  Psychosocial    No documentation.                  Abuse/Neglect    No documentation.                  Legal    No documentation.                  Substance Abuse    No documentation.                  Patient Forms    No documentation.                     Rebecca Hines RN

## 2024-05-28 NOTE — PROGRESS NOTES
[facility]  PROGRESS NOTE    Patient Name: Zainab Worthy  : 2/3/1929  MRN: [id number]    Date of Admission: 2024  Primary Care Physician: Aguilar Kelly MD    Subjective   Subjective     CC:  F/u afib rvr    HPI:  Resting comfortably, without acute complaints. No new issues overnight per nursing      Objective   Objective     Vital Signs:   Temp:  [96 °F (35.6 °C)-97.8 °F (36.6 °C)] 97.7 °F (36.5 °C)  Heart Rate:  [103-184] 144  Resp:  [16-36] 16  BP: ()/() 102/63  Flow (L/min):  [2] 2     Physical Exam:  Constitutional: No acute distress, awake, alert, elderly/frail appearing  HENT: NCAT, mucous membranes moist  Respiratory: Clear to auscultation bilaterally, respiratory effort normal   Cardiovascular: irregular, ranging in 100s-120s, no m/r/g  Gastrointestinal: Positive bowel sounds, soft, nontender, nondistended  Musculoskeletal: No bilateral ankle edema  Psychiatric: Appropriate affect, cooperative  Neurologic: oriented to person only, globally weak without focal deficits  Skin: No rashes      Results Reviewed:  LAB RESULTS:      Lab 24  0617 24  2135 24  1844 24  1823 24  1814   WBC 6.81  --   --   --  8.41   HEMOGLOBIN 12.5  --   --   --  13.8   HEMOGLOBIN, POC  --   --  12.6  --   --    HEMATOCRIT 39.0  --   --   --  42.5   HEMATOCRIT POC  --   --  37*  --   --    PLATELETS 199  --   --   --  220   NEUTROS ABS 4.26  --   --   --  6.14   IMMATURE GRANS (ABS) 0.06*  --   --   --  0.06*   LYMPHS ABS 1.85  --   --   --  1.59   MONOS ABS 0.56  --   --   --  0.59   EOS ABS 0.06  --   --   --  0.01   MCV 87.2  --   --   --  88.7   PROCALCITONIN  --   --   --   --  0.16   LACTATE  --  2.0  --  2.8*  --          Lab 24  0617 24  1844 24  1814   SODIUM 148*  --  147*   POTASSIUM 3.9  --  3.8   CHLORIDE 117*  --  111*   CO2 20.0*  --  23.0   ANION GAP 11.0  --  13.0   BUN 42*  --  63*   CREATININE 0.86  1.20 1.25*   EGFR 62.3 41.8* 39.8*   GLUCOSE 92  --  116*   CALCIUM 7.5*  --  8.1*   MAGNESIUM 2.3  --  2.7*   TSH  --   --  1.650  1.650         Lab 05/28/24  0617 05/27/24  1814   TOTAL PROTEIN 5.4* 6.1   ALBUMIN 2.6* 3.0*   GLOBULIN 2.8 3.1   ALT (SGPT) 20 15   AST (SGOT) 34* 28   BILIRUBIN 0.3 0.5   ALK PHOS 101 106         Lab 05/28/24  0828 05/28/24  0617 05/27/24  1814   PROBNP  --   --  9,951.0*   HSTROP T 38* 38* 38*                 Brief Urine Lab Results       None            Microbiology Results Abnormal       Procedure Component Value - Date/Time    COVID PRE-OP / PRE-PROCEDURE SCREENING ORDER (NO ISOLATION) - Swab, Nasal Cavity [[id number]]  (Normal) Collected: 05/27/24 1730    Lab Status: Final result Specimen: Swab from Nasal Cavity Updated: 05/27/24 1908    Narrative:      The following orders were created for panel order COVID PRE-OP / PRE-PROCEDURE SCREENING ORDER (NO ISOLATION) - Swab, Nasal Cavity.  Procedure                               Abnormality         Status                     ---------                               -----------         ------                     COVID-19 and FLU A/B PCR...[[id number]]  Normal              Final result                 Please view results for these tests on the individual orders.    COVID-19 and FLU A/B PCR, 1 HR TAT - Swab, Nasopharynx [[id number]]  (Normal) Collected: 05/27/24 1730    Lab Status: Final result Specimen: Swab from Nasopharynx Updated: 05/27/24 1908     COVID19 Not Detected     Influenza A PCR Not Detected     Influenza B PCR Not Detected    Narrative:      Fact sheet for providers: https://www.fda.gov/media/048642/download    Fact sheet for patients: https://www.fda.gov/media/950529/download    Test performed by PCR.            XR Chest 1 View    Result Date: 5/28/2024  XR CHEST 1 VW Date of Exam: 5/28/2024 2:45 AM EDT Indication: ?pulm edema Comparison: Chest x-ray dated 5/27/2024 Findings: Generally coarsened interstitial markings. Small  right pleural effusion and mild right-sided airspace disease, possibly related to edema or infiltrates. Cardiac silhouette is enlarged. No pneumothorax is seen. No acute osseous lesion is seen. There are senescent changes of the aorta and skeletal structures.     Impression: Impression: 1.Cardiomegaly with pulmonary vascular congestion. Vague right-sided airspace disease may represent edema or infiltrate. Small right pleural effusion. Electronically Signed: Paul Galindo MD  5/28/2024 7:45 AM EDT  Workstation ID: [id number]    XR Chest 1 View    Result Date: 5/27/2024  XR CHEST 1 VW Date of Exam: 5/27/2024 5:31 PM EDT Indication: shortness of breath, coarse left breath sounds Comparison: Chest x-ray 8/30/2018 Findings: There is mild cardiomegaly. There is diffuse interstitial prominence. No focal consolidation. No definite pleural effusion. No pneumothorax. The bones are demineralized without acute osseous findings.     Impression: Impression: Diffuse interstitial prominence which may reflect chronic/senescent interstitial change with although component of interstitial edema is difficult to entirely exclude. Electronically Signed: Humphrey Santillan MD  5/27/2024 5:54 PM EDT  Workstation ID: [id number]         Current medications:  Scheduled Meds:apixaban, 2.5 mg, Oral, Q12H  digoxin, 500 mcg, Intravenous, Once  [START ON 5/29/2024] digoxin, 125 mcg, Oral, Daily  donepezil, 10 mg, Oral, Nightly  famotidine, 10 mg, Oral, BID AC  furosemide, 20 mg, Intravenous, Once  memantine, 10 mg, Oral, Q12H  mirtazapine, 15 mg, Oral, Nightly  oxybutynin, 5 mg, Oral, TID  sodium chloride, 10 mL, Intravenous, Q12H      Continuous Infusions:amiodarone, 0.5 mg/min, Last Rate: 0.5 mg/min (05/28/24 1032)  sodium chloride, 75 mL/hr, Last Rate: 75 mL/hr (05/28/24 1031)      PRN Meds:.  acetaminophen    bisacodyl    Calcium Replacement - Follow Nurse / BPA Driven Protocol    HYDROcodone-acetaminophen    LORazepam    Magnesium Standard Dose  Replacement - Follow Nurse / BPA Driven Protocol    nitroglycerin    Phosphorus Replacement - Follow Nurse / BPA Driven Protocol    Potassium Replacement - Follow Nurse / BPA Driven Protocol    [COMPLETED] Insert Peripheral IV **AND** sodium chloride    sodium chloride    sodium chloride    Assessment & Plan   Assessment & Plan     Active Hospital Problems    Diagnosis  POA    **Atrial fibrillation with RVR [I48.91]  Yes      Resolved Hospital Problems   No resolved problems to display.        Brief Hospital Course to date:  Zainab Worthy is a 89 y.o. female with a history of dementia presenting with symptoms of increased lethargy and shortness of breath, she was found to be hypotensive with A-fib and RVR.  She received a dose of digoxin and bolused amiodarone.  Cardiology consulted    Afib w RVR  Pulm edema  --discussed w cardiology, recommends eliquis, lasix. PO amio to start tmrw      Dementia  --cont home meds  --complicates all aspects of care    Hx of colon cancer s/p partial resection  Hx of liver mets s/p partial hepatectomy            Expected Discharge Location and Transportation: back to facility  Expected Discharge   Expected Discharge Date: 5/29/2024; Expected Discharge Time:      DVT prophylaxis:  Medical and mechanical DVT prophylaxis orders are present.         AM-PAC 6 Clicks Score (PT): 6 (05/28/24 0800)    CODE STATUS:   Code Status and Medical Interventions:   Ordered at: 05/27/24 2131     Code Status (Patient has no pulse and is not breathing):    No CPR (Do Not Attempt to Resuscitate)     Medical Interventions (Patient has pulse or is breathing):    Full Support     Comments:    DNR/DNI in the event of cardiac and/or respiratory arrest, d/w children       Jamilah Hurley MD  05/28/24

## 2024-05-28 NOTE — ED NOTES
Zainab Worthy    Nursing Report ED to Floor:  Mental status: GCS 9 - Arouses to touch/voice/pain, withdraws from pain, incomprehensible speech. Disoriented x 4  Ambulatory status: 2+ Assist  Oxygen Therapy:  2L Nasal cannula  Cardiac Rhythm: A-Fib RVR  Admitted from: [facility]  Safety Concerns:  Fall Risk, pt on amiodarone drip  Social Issues: Alzheimer'slarissa  ED Room #:  32    ED Nurse Phone Extension - [phone] or may call [phone].      HPI:   Chief Complaint   Patient presents with    Shortness of Breath       Past Medical History:  Past Medical History:   Diagnosis Date    Anorexia     Arthritis     Asthma     Cataract     Colon cancer     Colon cancer metastasized to liver     DDD (degenerative disc disease), lumbar     Hearing loss     History of transfusion     Hypertension     Liver cancer     Macular degeneration     Onychomycosis     Urine frequency         Past Surgical History:  Past Surgical History:   Procedure Laterality Date    CATARACT EXTRACTION Bilateral     COLON RESECTION  06/16/2009    Low anterior resection    GALLBLADDER SURGERY      Resection of gallbladder    HIP HEMIARTHROPLASTY Right 2/8/2018    Procedure: HIP HEMIARTHROPLASTY;  Surgeon: Fernando Dominguez MD;  Location: [facility];  Service:     LIVER SURGERY  12/09/2014    Liver tumor, 1/2 was removed.    SUBTOTAL HYSTERECTOMY          Admitting Doctor:   Renzo Adam III, DO    Consulting Provider(s):  Consults       Date and Time Order Name Status Description    5/27/2024  9:31 PM Inpatient Cardiology Consult               Admitting Diagnosis:   The encounter diagnosis was Atrial fibrillation with RVR.    Most Recent Vitals:   Vitals:    05/27/24 2150 05/27/24 2155 05/27/24 2200 05/27/24 2205   BP: 112/80 115/90 113/51 96/69   Pulse: 104 117 117 (!) 133   Resp:       Temp:       TempSrc:       SpO2: 93% 94% 92% 92%   Weight:       Height:           Active LDAs/IV Access:   Lines, Drains & Airways        Active LDAs       Name Placement date Placement time Site Days    Peripheral IV 05/27/24 1719 Anterior;Right Forearm 05/27/24 1719  Forearm  less than 1    Peripheral IV 05/27/24 1843 Right Antecubital 05/27/24  1843  Antecubital  less than 1                    Labs (abnormal labs have a star):   Labs Reviewed   COMPREHENSIVE METABOLIC PANEL - Abnormal; Notable for the following components:       Result Value    Glucose 116 (*)     BUN 63 (*)     Creatinine 1.25 (*)     Sodium 147 (*)     Chloride 111 (*)     Calcium 8.1 (*)     Albumin 3.0 (*)     BUN/Creatinine Ratio 50.4 (*)     eGFR 39.8 (*)     All other components within normal limits    Narrative:     GFR Normal >60  Chronic Kidney Disease <60  Kidney Failure <15    The GFR formula is only valid for adults with stable renal function between ages 18 and 70.   LACTIC ACID, PLASMA - Abnormal; Notable for the following components:    Lactate 2.8 (*)     All other components within normal limits   SINGLE HS TROPONIN T - Abnormal; Notable for the following components:    HS Troponin T 38 (*)     All other components within normal limits    Narrative:     High Sensitive Troponin T Reference Range:  <14.0 ng/L- Negative Female for AMI  <22.0 ng/L- Negative Male for AMI  >=14 - Abnormal Female indicating possible myocardial injury.  >=22 - Abnormal Male indicating possible myocardial injury.   Clinicians would have to utilize clinical acumen, EKG, Troponin, and serial changes to determine if it is an Acute Myocardial Infarction or myocardial injury due to an underlying chronic condition.        BNP (IN-HOUSE) - Abnormal; Notable for the following components:    proBNP 9,951.0 (*)     All other components within normal limits    Narrative:     This assay is used as an aid in the diagnosis of individuals suspected of having heart failure. It can be used as an aid in the diagnosis of acute decompensated heart failure (ADHF) in patients presenting with signs and symptoms  of ADHF to the emergency department (ED). In addition, NT-proBNP of <300 pg/mL indicates ADHF is not likely.    Age Range Result Interpretation  NT-proBNP Concentration (pg/mL:      <50             Positive            >450                   Gray                 300-450                    Negative             <300    50-75           Positive            >900                  Gray                300-900                  Negative            <300      >75             Positive            >1800                  Gray                300-1800                  Negative            <300   MAGNESIUM - Abnormal; Notable for the following components:    Magnesium 2.7 (*)     All other components within normal limits   CBC WITH AUTO DIFFERENTIAL - Abnormal; Notable for the following components:    Lymphocyte % 18.9 (*)     Eosinophil % 0.1 (*)     Immature Grans % 0.7 (*)     Immature Grans, Absolute 0.06 (*)     All other components within normal limits   POCT CHEM 8 - Abnormal; Notable for the following components:    BUN 54 (*)     Sodium 148 (*)     POC Potassium 3.3 (*)     Chloride 113 (*)     Total CO2 21 (*)     Hematocrit 37 (*)     Ionized Calcium 1.07 (*)     eGFR 41.8 (*)     All other components within normal limits   COVID-19 AND FLU A/B, NP SWAB IN TRANSPORT MEDIA 1 HR TAT - Normal    Narrative:     Fact sheet for providers: https://www.fda.gov/media/394129/download    Fact sheet for patients: https://www.fda.gov/media/802768/download    Test performed by PCR.   PROCALCITONIN - Normal    Narrative:     As a Marker for Sepsis (Non-Neonates):    1. <0.5 ng/mL represents a low risk of severe sepsis and/or septic shock.  2. >2 ng/mL represents a high risk of severe sepsis and/or septic shock.    As a Marker for Lower Respiratory Tract Infections that require antibiotic therapy:    PCT on Admission    Antibiotic Therapy       6-12 Hrs later    >0.5                Strongly Recommended  >0.25 - <0.5        Recommended   0.1  "- 0.25          Discouraged              Remeasure/reassess PCT  <0.1                Strongly Discouraged     Remeasure/reassess PCT    As 28 day mortality risk marker: \"Change in Procalcitonin Result\" (>80% or <=80%) if Day 0 (or Day 1) and Day 4 values are available. Refer to http://www.[facility]pct-calculator.com    Change in PCT <=80%  A decrease of PCT levels below or equal to 80% defines a positive change in PCT test result representing a higher risk for 28-day all-cause mortality of patients diagnosed with severe sepsis for septic shock.    Change in PCT >80%  A decrease of PCT levels of more than 80% defines a negative change in PCT result representing a lower risk for 28-day all-cause mortality of patients diagnosed with severe sepsis or septic shock.      TSH - Normal   LACTIC ACID, REFLEX - Normal   TSH RFX ON ABNORMAL TO FREE T4 - Normal   COVID PRE-OP / PRE-PROCEDURE SCREENING ORDER (NO ISOLATION)    Narrative:     The following orders were created for panel order COVID PRE-OP / PRE-PROCEDURE SCREENING ORDER (NO ISOLATION) - Swab, Nasal Cavity.  Procedure                               Abnormality         Status                     ---------                               -----------         ------                     COVID-19 and FLU A/B PCR...[[id number]]  Normal              Final result                 Please view results for these tests on the individual orders.   CBC WITH AUTO DIFFERENTIAL   COMPREHENSIVE METABOLIC PANEL   MAGNESIUM   POCT CHEM 8   CBC AND DIFFERENTIAL    Narrative:     The following orders were created for panel order CBC & Differential.  Procedure                               Abnormality         Status                     ---------                               -----------         ------                     CBC Auto Differential[[id number]]        Abnormal            Final result                 Please view results for these tests on the individual orders.       Meds Given in ED: "   Medications   sodium chloride 0.9 % flush 10 mL (has no administration in time range)   dilTIAZem (CARDIZEM) 125 mg in 125 mL D5W infusion (0 mg/hr Intravenous Stopped 5/27/24 1919)   amiodarone 150 mg in 100 mL D5W (loading dose) (0 mg Intravenous Stopped 5/27/24 2010)     Followed by   amiodarone 360 mg in 200 mL D5W infusion (1 mg/min Intravenous Currently Infusing 5/27/24 2018)     Followed by   amiodarone 360 mg in 200 mL D5W infusion (has no administration in time range)   sodium chloride 0.9 % flush 10 mL (10 mL Intravenous Given 5/27/24 2145)   sodium chloride 0.9 % flush 10 mL (has no administration in time range)   sodium chloride 0.9 % infusion 40 mL (has no administration in time range)   nitroglycerin (NITROSTAT) SL tablet 0.4 mg (has no administration in time range)   sodium chloride 0.9 % infusion (75 mL/hr Intravenous New Bag 5/27/24 2143)   Potassium Replacement - Follow Nurse / BPA Driven Protocol (has no administration in time range)   Magnesium Standard Dose Replacement - Follow Nurse / BPA Driven Protocol (has no administration in time range)   Phosphorus Replacement - Follow Nurse / BPA Driven Protocol (has no administration in time range)   Calcium Replacement - Follow Nurse / BPA Driven Protocol (has no administration in time range)   acetaminophen (TYLENOL) tablet 650 mg (has no administration in time range)   HYDROcodone-acetaminophen (NORCO) 5-325 MG per tablet 1 tablet (has no administration in time range)   sodium chloride 0.9 % bolus 1,000 mL (0 mL Intravenous Stopped 5/27/24 1843)   dilTIAZem (CARDIZEM) bolus from bag 1 mg/mL solution 10 mg (10 mg Intravenous Bolus from Bag 5/27/24 1856)   digoxin (LANOXIN) injection 500 mcg (500 mcg Intravenous Given 5/27/24 1920)     amiodarone, 1 mg/min, Last Rate: 1 mg/min (05/27/24 2018)   Followed by  [START ON 5/28/2024] amiodarone, 0.5 mg/min  dilTIAZem, 5-15 mg/hr, Last Rate: Stopped (05/27/24 1905)  sodium chloride, 75 mL/hr, Last Rate: 75  mL/hr (05/27/24 2143)         Last NIH score:                                                          Dysphagia screening results:  Patient Factors Component (Dysphagia:Stroke or Rule-out)  Best Eye Response: 3-->(E3) to speech (05/27/24 1727)  Best Motor Response: 4-->(M4) withdraws from pain (05/27/24 1727)  Best Verbal Response: 2-->(V2) incomprehensible speech (05/27/24 1727)  Rye Coma Scale Score: 9 (05/27/24 1727)     Jomar Coma Scale:  No data recorded     CIWA:        Restraint Type:            Isolation Status:  No active isolations

## 2024-05-29 NOTE — PLAN OF CARE
Goal Outcome Evaluation:  Plan for return to [facility] @ discharge. Case management following.

## 2024-05-29 NOTE — PLAN OF CARE
Goal Outcome Evaluation:  Plan of Care Reviewed With: patient        Progress: improving         Anticipated Discharge Disposition (SLP): skilled nursing facility, anticipate therapy at next level of care          SLP Swallowing Diagnosis: mod-severe, oral dysphagia, suspected pharyngeal dysphagia, suspected esophageal dysphagia (05/29/24 9789)

## 2024-05-29 NOTE — PROGRESS NOTES
Subjective:     Encounter Date:05/27/2024      Patient ID: Zainab Worthy is a 89 y.o. female.    Update 5/29/24  Sodium a little higher.  Positive JVD.  BNP elevatd.  Echo suggestive of some degree of volume overload.      Chief Complaint:  Shortness of Breath            Review of Systems   Respiratory:  Positive for shortness of breath.        Procedures       Objective:     Constitutional:       Appearance: Frail. Chronically ill-appearing.   Neck:      Vascular: JVD elevated.   Pulmonary:      Effort: Pulmonary effort is normal.   Cardiovascular:      PMI at left midclavicular line. Tachycardia present. Irregular rhythm. Normal S1. Normal S2.       Murmurs: There is no murmur.      No gallop.  No click. No rub.   Pulses:     Intact distal pulses.   Edema:     Peripheral edema absent.   Abdominal:      Palpations: Abdomen is soft.   Musculoskeletal:         General: No tenderness. Skin:     General: Skin is dry.   Neurological:      Mental Status: Exhibits a cognitive deficit.         Lab Review:       Assessment:       -volume overloaded.  Pulmonary edema and elevated bnp  -HFpEF  -new onset afib with RVR  -severe dementia  -prior hx of metastatic colon cancer s/p partial colectomy and partial liver removal       Plan:       -Resume eliquis 2.5 mg bid  -one dose of lasix 20mg  -better rate controlled  -cont. Digoxin, adding metoprolol.

## 2024-05-29 NOTE — PROGRESS NOTES
[facility] Medicine Services  PROGRESS NOTE    Patient Name: Zainab Worthy  : 2/3/1929  MRN: [id number]    Date of Admission: 2024  Primary Care Physician: Aguilar Kelly MD    Subjective   Subjective     CC:  F/u afib rvr    HPI:  Net negative 1.7 L. On RA. HR low 100s.  Seen with SLP bedside.  Patient denied pain.  Has been coughing with certain consistencies.  Has difficulty chewing.    Objective   Objective     Vital Signs:   Temp:  [96.5 °F (35.8 °C)-97.8 °F (36.6 °C)] 97 °F (36.1 °C)  Heart Rate:  [] 104  Resp:  [16-18] 18  BP: (102-139)/(63-88) 111/68  Flow (L/min):  [2] (P) 2     Physical Exam:  Constitutional: No acute distress, awake, alert  HENT: NCAT, mucous membranes moist  Respiratory: Clear to auscultation bilaterally, respiratory effort normal   Cardiovascular: RRR, no murmurs  Gastrointestinal: Normoactive, bowel sounds, soft, nontender, nondistended  Musculoskeletal: No bilateral ankle edema  Psychiatric: Appropriate affect, cooperative  Neurologic: Oriented to self only, speech clear  Skin: No rashes    Results Reviewed:  LAB RESULTS:      Lab 24  0604 24  0617 24  2135 24  1844 24  1823 24  1814   WBC 8.91 6.81  --   --   --  8.41   HEMOGLOBIN 14.1 12.5  --   --   --  13.8   HEMOGLOBIN, POC  --   --   --  12.6  --   --    HEMATOCRIT 44.2 39.0  --   --   --  42.5   HEMATOCRIT POC  --   --   --  37*  --   --    PLATELETS 214 199  --   --   --  220   NEUTROS ABS 6.54 4.26  --   --   --  6.14   IMMATURE GRANS (ABS) 0.08* 0.06*  --   --   --  0.06*   LYMPHS ABS 1.43 1.85  --   --   --  1.59   MONOS ABS 0.72 0.56  --   --   --  0.59   EOS ABS 0.11 0.06  --   --   --  0.01   MCV 90.8 87.2  --   --   --  88.7   PROCALCITONIN  --   --   --   --   --  0.16   LACTATE  --   --  2.0  --  2.8*  --          Lab 24  0604 24  0617 24  1844 24  1814   SODIUM 148* 148*  --  147*   POTASSIUM 3.8 3.9  --  3.8    CHLORIDE 113* 117*  --  111*   CO2 23.0 20.0*  --  23.0   ANION GAP 12.0 11.0  --  13.0   BUN 28* 42*  --  63*   CREATININE 0.74 0.86 1.20 1.25*   EGFR 74.6 62.3 41.8* 39.8*   GLUCOSE 82 92  --  116*   CALCIUM 8.0* 7.5*  --  8.1*   MAGNESIUM  --  2.3  --  2.7*   TSH  --   --   --  1.650  1.650         Lab 05/28/24  0617 05/27/24  1814   TOTAL PROTEIN 5.4* 6.1   ALBUMIN 2.6* 3.0*   GLOBULIN 2.8 3.1   ALT (SGPT) 20 15   AST (SGOT) 34* 28   BILIRUBIN 0.3 0.5   ALK PHOS 101 106         Lab 05/29/24  0604 05/28/24  0828 05/28/24  0617 05/27/24  1814   PROBNP 10,626.0*  --   --  9,951.0*   HSTROP T  --  38* 38* 38*                 Brief Urine Lab Results       None            Microbiology Results Abnormal       Procedure Component Value - Date/Time    COVID PRE-OP / PRE-PROCEDURE SCREENING ORDER (NO ISOLATION) - Swab, Nasal Cavity [[id number]]  (Normal) Collected: 05/27/24 1730    Lab Status: Final result Specimen: Swab from Nasal Cavity Updated: 05/27/24 1908    Narrative:      The following orders were created for panel order COVID PRE-OP / PRE-PROCEDURE SCREENING ORDER (NO ISOLATION) - Swab, Nasal Cavity.  Procedure                               Abnormality         Status                     ---------                               -----------         ------                     COVID-19 and FLU A/B PCR...[[id number]]  Normal              Final result                 Please view results for these tests on the individual orders.    COVID-19 and FLU A/B PCR, 1 HR TAT - Swab, Nasopharynx [[id number]]  (Normal) Collected: 05/27/24 1730    Lab Status: Final result Specimen: Swab from Nasopharynx Updated: 05/27/24 1908     COVID19 Not Detected     Influenza A PCR Not Detected     Influenza B PCR Not Detected    Narrative:      Fact sheet for providers: https://www.fda.gov/media/261252/download    Fact sheet for patients: https://www.fda.gov/media/140159/download    Test performed by PCR.            Adult Transthoracic Echo  Complete w/ Color, Spectral and Contrast if necessary per protocol    Result Date: 5/28/2024    Left ventricular systolic function is normal. Calculated left ventricular EF = 51.7%   The left atrial cavity is dilated.   Left atrial volume is mildly increased.   Estimated right ventricular systolic pressure from tricuspid regurgitation is normal (<35 mmHg). Calculated right ventricular systolic pressure from tricuspid regurgitation is 34 mmHg.   mitral valve and aortic valve thickened.  mild AI and mild MR     XR Chest 1 View    Result Date: 5/28/2024  XR CHEST 1 VW Date of Exam: 5/28/2024 2:45 AM EDT Indication: ?pulm edema Comparison: Chest x-ray dated 5/27/2024 Findings: Generally coarsened interstitial markings. Small right pleural effusion and mild right-sided airspace disease, possibly related to edema or infiltrates. Cardiac silhouette is enlarged. No pneumothorax is seen. No acute osseous lesion is seen. There are senescent changes of the aorta and skeletal structures.     Impression: Impression: 1.Cardiomegaly with pulmonary vascular congestion. Vague right-sided airspace disease may represent edema or infiltrate. Small right pleural effusion. Electronically Signed: Paul Galindo MD  5/28/2024 7:45 AM EDT  Workstation ID: [id number]    XR Chest 1 View    Result Date: 5/27/2024  XR CHEST 1 VW Date of Exam: 5/27/2024 5:31 PM EDT Indication: shortness of breath, coarse left breath sounds Comparison: Chest x-ray 8/30/2018 Findings: There is mild cardiomegaly. There is diffuse interstitial prominence. No focal consolidation. No definite pleural effusion. No pneumothorax. The bones are demineralized without acute osseous findings.     Impression: Impression: Diffuse interstitial prominence which may reflect chronic/senescent interstitial change with although component of interstitial edema is difficult to entirely exclude. Electronically Signed: Humphrey Santillan MD  5/27/2024 5:54 PM EDT  Workstation ID: [id number]      Results for orders placed during the hospital encounter of 05/27/24    Adult Transthoracic Echo Complete w/ Color, Spectral and Contrast if necessary per protocol    Interpretation Summary    Left ventricular systolic function is normal. Calculated left ventricular EF = 51.7%    The left atrial cavity is dilated.    Left atrial volume is mildly increased.    Estimated right ventricular systolic pressure from tricuspid regurgitation is normal (<35 mmHg). Calculated right ventricular systolic pressure from tricuspid regurgitation is 34 mmHg.    mitral valve and aortic valve thickened.  mild AI and mild MR      Current medications:  Scheduled Meds:apixaban, 2.5 mg, Oral, Q12H  digoxin, 125 mcg, Oral, Daily  donepezil, 10 mg, Oral, Nightly  famotidine, 10 mg, Oral, BID AC  memantine, 10 mg, Oral, Q12H  mirtazapine, 15 mg, Oral, Nightly  oxybutynin, 5 mg, Oral, TID  sodium chloride, 10 mL, Intravenous, Q12H      Continuous Infusions:     PRN Meds:.  acetaminophen    bisacodyl    Calcium Replacement - Follow Nurse / BPA Driven Protocol    HYDROcodone-acetaminophen    LORazepam    Magnesium Standard Dose Replacement - Follow Nurse / BPA Driven Protocol    nitroglycerin    ondansetron ODT **OR** ondansetron    Phosphorus Replacement - Follow Nurse / BPA Driven Protocol    Potassium Replacement - Follow Nurse / BPA Driven Protocol    [COMPLETED] Insert Peripheral IV **AND** sodium chloride    sodium chloride    sodium chloride    Assessment & Plan   Assessment & Plan     Active Hospital Problems    Diagnosis  POA    **Atrial fibrillation with RVR [I48.91]  Yes      Resolved Hospital Problems   No resolved problems to display.        Brief Hospital Course to date:  Zainab Worthy is a 89 y.o. female with a history of dementia presenting with symptoms of increased lethargy and shortness of breath, she was found to be hypotensive with A-fib and RVR.  She received a dose of digoxin and bolused amiodarone.  Cardiology  consulted.    This patient's problems and plans were partially entered by my partner and updated as appropriate by me 05/29/24.    New onset Afib w RVR  Pulm edema  HFpEF  --Cardiology consulted and recommended Eliquis, Lasix x 1, digoxin load, status post amiodarone load  -- Continue oral digoxin    Mild hypernatremia  --Sodium 148, stable compared to 5/28  --Could be related to Lasix    Dementia  --Gradual decline over 7 years; over the past 18 months, she has significantly more difficulty with recognizing family, does not know the year or where she is at baseline   --cont home meds  --complicates all aspects of care    Dysphagia  --Discussed with Chad Worthy over the phone. He stated they would not want a repeat swallow evaluation and would like to do the safest diet for now and he will talk with his siblings    Hx of colon cancer s/p partial resection  Hx of liver mets s/p partial hepatectomy    Confirmed DNR/DNI status and updated order per family request - discussed with son (Chad Worthy)    Expected Discharge Location and Transportation: back to facility  Expected Discharge   Expected Discharge Date: 5/30/2024; Expected Discharge Time:      DVT prophylaxis:  Medical and mechanical DVT prophylaxis orders are present.         AM-PAC 6 Clicks Score (PT): 6 (05/28/24 2230)    CODE STATUS:   Code Status and Medical Interventions:   Ordered at: 05/27/24 2131     Code Status (Patient has no pulse and is not breathing):    No CPR (Do Not Attempt to Resuscitate)     Medical Interventions (Patient has pulse or is breathing):    Full Support     Comments:    DNR/DNI in the event of cardiac and/or respiratory arrest, d/w children       Leila Harden MD  05/29/24

## 2024-05-29 NOTE — CASE MANAGEMENT/SOCIAL WORK
Continued Stay Note  [facility]     Patient Name: Zainab Worthy  MRN: [id number]  Today's Date: 5/29/2024    Admit Date: 5/27/2024    Plan: discharge plan   Discharge Plan       Row Name 05/29/24 4067       Plan    Plan discharge plan    Plan Comments I received a call back from ALENA Juares at [facility]([phone]) and pt will need onsite visit before pt returns. Per Mor, he will be able to onsite visit Friday morning(5/31). CM will cont to follow    Final Discharge Disposition Code 01 - home or self-care                   Discharge Codes    No documentation.                 Expected Discharge Date and Time       Expected Discharge Date Expected Discharge Time    May 30, 2024               Rebecca Hines RN

## 2024-05-29 NOTE — THERAPY EVALUATION
Acute Care - Speech Language Pathology   Swallow Initial Evaluation [facility]  Clinical Swallow Evaluation       Patient Name: Zainab Worthy  : 2/3/1929  MRN: [id number]  Today's Date: 2024               Admit Date: 2024    Visit Dx:     ICD-10-CM ICD-9-CM   1. Atrial fibrillation with RVR  I48.91 427.31   2. Dysphagia, unspecified type  R13.10 787.20     Patient Active Problem List   Diagnosis    Gonalgia    Hypertrophic polyarthritis    Allergic rhinitis, seasonal    Skin growth    Endometrial cancer    Post-operative state    Rectal cancer    Hypertension    Anorexia    Onychomycosis    IUD complication    Memory loss    Urinary frequency    Fall at home    Closed fracture of pelvis    Fracture of femoral neck, right    Late onset Alzheimer's disease without behavioral disturbance    Metastatic colon cancer in female    Recurrent falls    Closed fracture of neck of right femur    Normocytic anemia    Chronic ureteropelvic junction (UPJ) obstruction, right    Atrial fibrillation with RVR     Past Medical History:   Diagnosis Date    Anorexia     Arthritis     Asthma     Cataract     Colon cancer     Colon cancer metastasized to liver     DDD (degenerative disc disease), lumbar     Hearing loss     History of transfusion     Hypertension     Liver cancer     Macular degeneration     Onychomycosis     Urine frequency      Past Surgical History:   Procedure Laterality Date    CATARACT EXTRACTION Bilateral     COLON RESECTION  2009    Low anterior resection    GALLBLADDER SURGERY      Resection of gallbladder    HIP HEMIARTHROPLASTY Right 2018    Procedure: HIP HEMIARTHROPLASTY;  Surgeon: Fernando Dominguez MD;  Location: [facility];  Service:     LIVER SURGERY  2014    Liver tumor, 2 was removed.    SUBTOTAL HYSTERECTOMY         SLP Recommendation and Plan  SLP Swallowing Diagnosis: mod-severe, oral dysphagia, suspected pharyngeal dysphagia, suspected esophageal dysphagia (24  0830)  SLP Diet Recommendation: puree, thin liquids (05/29/24 0830)  Recommended Precautions and Strategies: upright posture during/after eating, small bites of food and sips of liquid, check mouth frequently for oral residue/pocketing, general aspiration precautions, fatigue precautions, 1:1 supervision (05/29/24 0830)  SLP Rec. for Method of Medication Administration: meds crushed, with puree (05/29/24 0830)     Monitor for Signs of Aspiration: yes, notify SLP if any concerns (05/29/24 0830)  Recommended Diagnostics: reassess via clinical swallow evaluation, other (see comments) (If MD agrees consider instrumental. At time of eval MD waiting to see if family wanting comfort measures.) (05/29/24 0830)     Anticipated Discharge Disposition (SLP): skilled nursing facility, anticipate therapy at next level of care (05/29/24 0830)  Rehab Potential/Prognosis, Swallowing: adequate, monitor progress closely (05/29/24 0830)  Therapy Frequency (Swallow): evaluation only (05/29/24 0830)     Oral Care Recommendations: Suction toothbrush (05/29/24 0830)                                        Plan of Care Reviewed With: patient  Progress: improving      SWALLOW EVALUATION (Last 72 Hours)       SLP Adult Swallow Evaluation       Row Name 05/29/24 0830                   Rehab Evaluation    Document Type evaluation  -GA        Subjective Information no complaints  -GA        Patient Observations lethargic  -GA        Symptoms Noted During/After Treatment fatigue  -GA        Oral Care patient refused intervention;other (see comments)  -GA           General Information    Patient Profile Reviewed yes  -GA        Pertinent History Of Current Problem 5 y.o. female with a history of dementia presenting with symptoms of increased lethargy and shortness of breath, she was found to be hypotensive with A-fib and RVR.   Chest x-ray appearance concerning for pulmonary edema  -GA        Current Method of Nutrition soft to chew  textures;ground;thin liquids  -GA        Precautions/Limitations, Vision difficult to assess  -GA        Precautions/Limitations, Hearing difficult to assess  -GA        Prior Level of Function-Communication cognitive-linguistic impairment  -GA        Prior Level of Function-Swallowing other (see comments);unknown  Patient's admission diet is thin liquids/ground solids. However previous FEES conducted in 09/2028 recommended nectar liquids d/t deep penetration and inability to clear independently. Able to clear with cued secondary swallow but not always reliable  -GA        Plans/Goals Discussed with patient  -GA        Barriers to Rehab cognitive status;medically complex  -GA           Pain    Additional Documentation Pain Scale: FACES Pre/Post-Treatment (Group)  -GA           Pain Scale: FACES Pre/Post-Treatment    Pain: FACES Scale, Pretreatment 0-->no hurt  -GA        Posttreatment Pain Rating 0-->no hurt  -GA           Oral Motor Structure and Function    Dentition Assessment edentulous  -GA        Secretion Management dried secretions in oral cavity  -GA        Volitional Cough weak  -GA           Oral Musculature and Cranial Nerve Assessment    Oral Motor General Assessment generalized oral motor weakness  -GA        Oral Motor, Comment difficult to assess oral motor function d/t impaired cognitive status at baseline (dementia).  -GA           General Eating/Swallowing Observations    Respiratory Support Currently in Use nasal cannula  -GA        Eating/Swallowing Skills fed by SLP;needed assist;rate is too slow;unaware of safety concerns  -GA        Positioning During Eating upright 90 degree  -GA        Utensils Used spoon;cup;straw  -GA        Consistencies Trialed soft to chew textures;ground;thin liquids;pureed  -GA           Respiratory    Respiratory Status WFL  -GA           Clinical Swallow Eval    Oral Prep Phase impaired  -GA        Oral Transit impaired  -GA        Oral Residue impaired  -GA         Pharyngeal Phase suspected pharyngeal impairment  -GA        Esophageal Phase suspected esophageal impairment  -GA        Clinical Swallow Evaluation Summary Patient's arousal and cognitive status impacting oral and pharyngeal swallow function. Patient presented with inadequate mastication of ground solids and poor awareness of bolus in oral cavity, oral stasis, and moderate residue. Required puree or liquid wash in order to clear ground solid residue. Patient coughing 1x during liquid wash (2 conseuctive sips via straw) of thin liquids when clearing egg residue. Very weak cough. No other overt s/sx of aspiration/penetration with puree solids, single cup sips, and single straw sips. Recommend puree solids and single cup/straw sips of thin liquids when patient upright and awake. Patient with belching with thin liquids. When discussing swallow function with MD, MD requested we place her on least restrictive diet and wait until family determines goal of care prior to completing instrumental assessment.  -GA           Oral Prep Concerns    Oral Prep Concerns oral holding;prolonged mastication;inefficient mastication;anterior loss;incomplete bolus preparation  -GA           Oral Transit Concerns    Oral Transit Concerns delayed initiation of bolus transit;other (see comments)  required another bolus to clear ground solids bolus  -GA        Delayed Intiation of Bolus Transit pudding;mechanical soft  -GA           Oral Residue Concerns    Oral Residue Concerns sulci residue, bilateral  -GA        Sulci Residue, Bilateral mechanical soft  -GA           Pharyngeal Phase Concerns    Pharyngeal Phase Concerns cough  -GA        Cough other (see comments);thin  consecutive sips. Did not demonstrate s/s with small single cup or straw sips  -GA           Esophageal Phase Concerns    Esophageal Phase Concerns belching  -GA        Belching thin  -GA           SLP Evaluation Clinical Impression    SLP Swallowing Diagnosis  mod-severe;oral dysphagia;suspected pharyngeal dysphagia;suspected esophageal dysphagia  -GA        Functional Impact risk of aspiration/pneumonia;risk of dehydration;risk of malnutrition  -GA        Rehab Potential/Prognosis, Swallowing adequate, monitor progress closely  -GA           Recommendations    Therapy Frequency (Swallow) evaluation only  -GA        SLP Diet Recommendation puree;thin liquids  -GA        Recommended Diagnostics reassess via clinical swallow evaluation;other (see comments)  If MD agrees consider instrumental. At time of eval MD waiting to see if family wanting comfort measures.  -GA        Recommended Precautions and Strategies upright posture during/after eating;small bites of food and sips of liquid;check mouth frequently for oral residue/pocketing;general aspiration precautions;fatigue precautions;1:1 supervision  -GA        Oral Care Recommendations Suction toothbrush  -GA        SLP Rec. for Method of Medication Administration meds crushed;with puree  -GA        Monitor for Signs of Aspiration yes;notify SLP if any concerns  -GA        Anticipated Discharge Disposition (SLP) skilled nursing facility;anticipate therapy at next level of care  -GA                  User Key  (r) = Recorded By, (t) = Taken By, (c) = Cosigned By      Initials Name Effective Dates    GA Stacia Meyer, MS CCC-SLP 09/14/23 -                     EDUCATION  The patient has been educated in the following areas:   Dysphagia (Swallowing Impairment) Oral Care/Hydration.                Time Calculation:    Time Calculation- SLP       Row Name 05/29/24 1007             Time Calculation- SLP    SLP Start Time 0830  -GA      SLP Received On 05/29/24  -GA         Untimed Charges    34338-JB Eval Oral Pharyng Swallow Minutes 55  -GA         Total Minutes    Untimed Charges Total Minutes 55  -GA       Total Minutes 55  -GA                User Key  (r) = Recorded By, (t) = Taken By, (c) = Cosigned By      Initials Name  Provider Type    Stacia Valerio MS CCC-SLP Speech and Language Pathologist                    Therapy Charges for Today       Code Description Service Date Service Provider Modifiers Qty    43904072406 HC ST EVAL ORAL PHARYNG SWALLOW 4 5/29/2024 Stacia Meyer MS CCC-SLP GN 1                 Stacia Meyer MS CCC-SLP  5/29/2024

## 2024-05-30 NOTE — PLAN OF CARE
Sodium continues to rise.  Will hold on further diuretics at this point.  May need to consider some fluids.  Will work on rate controlling agents as able.  SABRINA to see today.

## 2024-05-30 NOTE — THERAPY TREATMENT NOTE
Acute Care - Speech Language Pathology   Swallow Re-Evaluation   [facility]    Clinical Swallow Evaluation       Patient Name: Zainab Worthy  : 2/3/1929  MRN: [id number]  Today's Date: 2024               Admit Date: 2024    Visit Dx:     ICD-10-CM ICD-9-CM   1. Atrial fibrillation with RVR  I48.91 427.31   2. Dysphagia, unspecified type  R13.10 787.20     Patient Active Problem List   Diagnosis    Gonalgia    Hypertrophic polyarthritis    Allergic rhinitis, seasonal    Skin growth    Endometrial cancer    Post-operative state    Rectal cancer    Hypertension    Anorexia    Onychomycosis    IUD complication    Memory loss    Urinary frequency    Fall at home    Closed fracture of pelvis    Fracture of femoral neck, right    Late onset Alzheimer's disease without behavioral disturbance    Metastatic colon cancer in female    Recurrent falls    Closed fracture of neck of right femur    Normocytic anemia    Chronic ureteropelvic junction (UPJ) obstruction, right    Atrial fibrillation with RVR     Past Medical History:   Diagnosis Date    Anorexia     Arthritis     Asthma     Cataract     Colon cancer     Colon cancer metastasized to liver     DDD (degenerative disc disease), lumbar     Hearing loss     History of transfusion     Hypertension     Liver cancer     Macular degeneration     Onychomycosis     Urine frequency      Past Surgical History:   Procedure Laterality Date    CATARACT EXTRACTION Bilateral     COLON RESECTION  2009    Low anterior resection    GALLBLADDER SURGERY      Resection of gallbladder    HIP HEMIARTHROPLASTY Right 2018    Procedure: HIP HEMIARTHROPLASTY;  Surgeon: Fernando Dominguez MD;  Location: [facility];  Service:     LIVER SURGERY  2014    Liver tumor, 12 was removed.    SUBTOTAL HYSTERECTOMY         SLP Recommendation and Plan  SLP Swallowing Diagnosis: moderate, oral dysphagia, R/O pharyngeal dysphagia (24 0940)  SLP Diet Recommendation: puree thin  liquids (05/30/24 0940)  Recommended Precautions and Strategies: upright posture during/after eating, small bites of food and sips of liquid, check mouth frequently for oral residue/pocketing, general aspiration precautions, fatigue precautions, 1:1 supervision (05/30/24 0940)  SLP Rec. for Method of Medication Administration: meds crushed, with puree (05/30/24 0940)     Monitor for Signs of Aspiration: yes, notify SLP if any concerns (05/30/24 0940)  Recommended Diagnostics: other (see comments) (diet tolerance) (05/30/24 0940)     Anticipated Discharge Disposition (SLP): skilled nursing facility, anticipate therapy at next level of care (05/30/24 0940)  Rehab Potential/Prognosis, Swallowing: adequate, monitor progress closely (05/30/24 0940)  Therapy Frequency (Swallow): PRN (05/30/24 0940)     Oral Care Recommendations: Suction toothbrush (05/30/24 0940)                                               SWALLOW EVALUATION (Last 72 Hours)       SLP Adult Swallow Evaluation       Row Name 05/30/24 0940 05/29/24 0830                Rehab Evaluation    Document Type evaluation  -CH evaluation  -GA       Subjective Information no complaints  -CH no complaints  -GA       Patient Observations lethargic  - lethargic  -GA       Patient/Family/Caregiver Comments/Observations None present  - --       Patient Effort adequate  - --       Symptoms Noted During/After Treatment fatigue  - fatigue  -GA       Oral Care lip/mouth moisturizer applied  - patient refused intervention;other (see comments)  -GA          General Information    Patient Profile Reviewed yes  -CH yes  -GA       Pertinent History Of Current Problem -- 5 y.o. female with a history of dementia presenting with symptoms of increased lethargy and shortness of breath, she was found to be hypotensive with A-fib and RVR.   Chest x-ray appearance concerning for pulmonary edema  -GA       Current Method of Nutrition -- soft to chew textures;ground;thin liquids   -GA       Precautions/Limitations, Vision -- difficult to assess  -GA       Precautions/Limitations, Hearing -- difficult to assess  -GA       Prior Level of Function-Communication -- cognitive-linguistic impairment  -GA       Prior Level of Function-Swallowing -- other (see comments);unknown  Patient's admission diet is thin liquids/ground solids. However previous FEES conducted in 09/2028 recommended nectar liquids d/t deep penetration and inability to clear independently. Able to clear with cued secondary swallow but not always reliable  -GA       Plans/Goals Discussed with -- patient  -GA       Barriers to Rehab -- cognitive status;medically complex  -GA          Pain    Additional Documentation Pain Scale: FACES Pre/Post-Treatment (Group)  - Pain Scale: FACES Pre/Post-Treatment (Group)  -GA          Pain Scale: FACES Pre/Post-Treatment    Pain: FACES Scale, Pretreatment 0-->no hurt  - 0-->no hurt  -GA       Posttreatment Pain Rating 0-->no hurt  -CH 0-->no hurt  -GA          Oral Motor Structure and Function    Dentition Assessment edentulous  - edentulous  -GA       Secretion Management WNL/WFL  - dried secretions in oral cavity  -GA       Mucosal Quality dry  - --       Volitional Cough -- weak  -GA          Oral Musculature and Cranial Nerve Assessment    Oral Motor General Assessment generalized oral motor weakness  - generalized oral motor weakness  -GA       Oral Motor, Comment -- difficult to assess oral motor function d/t impaired cognitive status at baseline (dementia).  -GA          General Eating/Swallowing Observations    Respiratory Support Currently in Use nasal cannula  - nasal cannula  -GA       Eating/Swallowing Skills fed by SLP;unaware of safety concerns  - fed by SLP;needed assist;rate is too slow;unaware of safety concerns  -GA       Positioning During Eating upright 90 degree;upright in bed  - upright 90 degree  -GA       Utensils Used spoon;cup;straw  - spoon;cup;straw   -GA       Consistencies Trialed soft to chew textures;ground;thin liquids;pureed  - soft to chew textures;ground;thin liquids;pureed  -GA          Respiratory    Respiratory Status WFL  - WFL  -GA          Clinical Swallow Eval    Oral Prep Phase impaired  - impaired  -GA       Oral Transit impaired  - impaired  -GA       Oral Residue impaired  - impaired  -GA       Pharyngeal Phase suspected pharyngeal impairment  - suspected pharyngeal impairment  -GA       Esophageal Phase -- suspected esophageal impairment  -GA       Clinical Swallow Evaluation Summary Lethargy and cognitive status impacting. Oral holding with cues required to swallow bolus. No s/s of aspiration with pureed and thin liquids. Solid trials not attempted d/t cognitive status and lethargy. Recommend continuation of pureed and thin liquids. Adaptive cup provided.  - Patient's arousal and cognitive status impacting oral and pharyngeal swallow function. Patient presented with inadequate mastication of ground solids and poor awareness of bolus in oral cavity, oral stasis, and moderate residue. Required puree or liquid wash in order to clear ground solid residue. Patient coughing 1x during liquid wash (2 conseuctive sips via straw) of thin liquids when clearing egg residue. Very weak cough. No other overt s/sx of aspiration/penetration with puree solids, single cup sips, and single straw sips. Recommend puree solids and single cup/straw sips of thin liquids when patient upright and awake. Patient with belching with thin liquids. When discussing swallow function with MD, MD requested we place her on least restrictive diet and wait until family determines goal of care prior to completing instrumental assessment.  -GA          Oral Prep Concerns    Oral Prep Concerns oral holding;anterior loss  - oral holding;prolonged mastication;inefficient mastication;anterior loss;incomplete bolus preparation  -GA       Oral Holding pudding  - --           Oral Transit Concerns    Oral Transit Concerns delayed initiation of bolus transit;increased oral transit time  -CH delayed initiation of bolus transit;other (see comments)  required another bolus to clear ground solids bolus  -GA       Delayed Intiation of Bolus Transit all consistencies  -CH pudding;mechanical soft  -GA       Increased Oral Transit Time all consistencies  -CH --       Oral Transit Concerns, Comment Required verbal cues toswallow  - --          Oral Residue Concerns    Oral Residue Concerns -- sulci residue, bilateral  -GA       Sulci Residue, Bilateral -- mechanical soft  -GA          Pharyngeal Phase Concerns    Pharyngeal Phase Concerns -- cough  -GA       Cough -- other (see comments);thin  consecutive sips. Did not demonstrate s/s with small single cup or straw sips  -GA          Esophageal Phase Concerns    Esophageal Phase Concerns -- belching  -GA       Belching -- thin  -GA          SLP Evaluation Clinical Impression    SLP Swallowing Diagnosis moderate;oral dysphagia;R/O pharyngeal dysphagia  -CH mod-severe;oral dysphagia;suspected pharyngeal dysphagia;suspected esophageal dysphagia  -GA       Functional Impact risk of aspiration/pneumonia;risk of dehydration;risk of malnutrition  - risk of aspiration/pneumonia;risk of dehydration;risk of malnutrition  -GA       Rehab Potential/Prognosis, Swallowing adequate, monitor progress closely  - adequate, monitor progress closely  -GA          Recommendations    Therapy Frequency (Swallow) PRN  -CH evaluation only  -GA       SLP Diet Recommendation puree;thin liquids  - puree;thin liquids  -GA       Recommended Diagnostics other (see comments)  diet tolerance  - reassess via clinical swallow evaluation;other (see comments)  If MD agrees consider instrumental. At time of eval MD waiting to see if family wanting comfort measures.  -GA       Recommended Precautions and Strategies upright posture during/after eating;small bites of food  and sips of liquid;check mouth frequently for oral residue/pocketing;general aspiration precautions;fatigue precautions;1:1 supervision  - upright posture during/after eating;small bites of food and sips of liquid;check mouth frequently for oral residue/pocketing;general aspiration precautions;fatigue precautions;1:1 supervision  -GA       Oral Care Recommendations Suction toothbrush  - Suction toothbrush  -GA       SLP Rec. for Method of Medication Administration meds crushed;with puree  - meds crushed;with puree  -GA       Monitor for Signs of Aspiration yes;notify SLP if any concerns  - yes;notify SLP if any concerns  -GA       Anticipated Discharge Disposition (SLP) skilled nursing facility;anticipate therapy at next level of care  - skilled nursing facility;anticipate therapy at next level of care  -GA                 User Key  (r) = Recorded By, (t) = Taken By, (c) = Cosigned By      Initials Name Effective Dates     Shima Cleaning, MS CCC-SLP 06/16/21 -     GA Stacia Meyer MS CCC-SLP 09/14/23 -                     EDUCATION  The patient has been educated in the following areas:   Dysphagia (Swallowing Impairment) Oral Care/Hydration Modified Diet Instruction.        SLP GOALS       Row Name 05/30/24 1300 05/30/24 0940          (LTG) Patient will demonstrate functional swallow for    Diet Texture (Demonstrate functional swallow) --  -CH pureed textures  -CH     Liquid viscosity (Demonstrate functional swallow) --  -CH thin liquids  -CH     [place] (Demonstrate functional swallow) --  -CH with minimal cues (75-90% accuracy)  -CH     Time Frame (Demonstrate functional swallow) --  -CH by discharge  -        ([facility]) Patient will tolerate trials of    Consistencies Trialed (Tolerate trials) --  -CH pureed textures;thin liquids  -     Desired Outcome (Tolerate trials) --  -CH without signs/symptoms of aspiration;with adequate oral prep/transit/clearance  -CH     [place] (Tolerate  trials) --  -CH with minimal cues (75-90% accuracy)  -CH     Time Frame (Tolerate trials) --  -CH 1 week  -CH               User Key  (r) = Recorded By, (t) = Taken By, (c) = Cosigned By      Initials Name Provider Type    Shima Manjarrez MS CCC-SLP Speech and Language Pathologist                         Time Calculation:    Time Calculation- SLP       Row Name 05/30/24 1337             Time Calculation- SLP    SLP Start Time 0940  -CH      SLP Received On 05/30/24  -         Untimed Charges    12060-TY Eval Oral Pharyng Swallow Minutes 45  -CH         Total Minutes    Untimed Charges Total Minutes 45  -CH       Total Minutes 45  -CH                User Key  (r) = Recorded By, (t) = Taken By, (c) = Cosigned By      Initials Name Provider Type    Shima Manjarrez MS CCC-SLP Speech and Language Pathologist                    Therapy Charges for Today       Code Description Service Date Service Provider Modifiers Qty    [id number] HC ST EVAL ORAL PHARYNG SWALLOW 3 5/30/2024 Shima Cleaning MS CCC-SLP GN 1                 Shima Cleaning MS CCC-JUDIT  5/30/2024

## 2024-05-30 NOTE — PLAN OF CARE
Problem: Adult Inpatient Plan of Care  Goal: Absence of Hospital-Acquired Illness or Injury  Outcome: Ongoing, Not Progressing  Intervention: Identify and Manage Fall Risk  Description: Perform standard risk assessment on admission using a validated tool or comprehensive approach appropriate to the patient; reassess fall risk frequently, with change in status or transfer to another level of care.  Communicate fall injury risk to interprofessional healthcare team.  Determine need for increased observation, equipment and environmental modification, such as low bed, signage and supportive, nonskid footwear.  Adjust safety measures to individual developmental age, stage and identified risk factors.  Reinforce the importance of safety and physical activity with patient and family.  Perform regular intentional rounding to assess need for position change, pain assessment and personal needs, including assistance with toileting.  Recent Flowsheet Documentation  Taken 5/30/2024 1830 by eLonor Carranza RN  Safety Promotion/Fall Prevention:   activity supervised   assistive device/personal items within reach   clutter free environment maintained   fall prevention program maintained   nonskid shoes/slippers when out of bed   room organization consistent   safety round/check completed   toileting scheduled  Taken 5/30/2024 1605 by Leonor Carranza RN  Safety Promotion/Fall Prevention:   activity supervised   assistive device/personal items within reach   clutter free environment maintained   fall prevention program maintained   nonskid shoes/slippers when out of bed   room organization consistent   safety round/check completed   toileting scheduled  Taken 5/30/2024 1400 by Leonor Carranza RN  Safety Promotion/Fall Prevention:   activity supervised   assistive device/personal items within reach   clutter free environment maintained   fall prevention program maintained   nonskid shoes/slippers when out of bed   room organization  consistent   safety round/check completed   toileting scheduled  Taken 5/30/2024 1210 by Leonor Carranza RN  Safety Promotion/Fall Prevention:   activity supervised   assistive device/personal items within reach   clutter free environment maintained   fall prevention program maintained   nonskid shoes/slippers when out of bed   room organization consistent   safety round/check completed   toileting scheduled  Taken 5/30/2024 1040 by Leonor Carranza RN  Safety Promotion/Fall Prevention:   activity supervised   assistive device/personal items within reach   clutter free environment maintained   fall prevention program maintained   nonskid shoes/slippers when out of bed   room organization consistent   safety round/check completed   toileting scheduled  Taken 5/30/2024 0840 by Leonor Carranza RN  Safety Promotion/Fall Prevention:   activity supervised   assistive device/personal items within reach   clutter free environment maintained   fall prevention program maintained   nonskid shoes/slippers when out of bed   room organization consistent   safety round/check completed   toileting scheduled  Intervention: Prevent Skin Injury  Description: Perform a screening for skin injury risk, such as pressure or moisture associated skin damage on admission and at regular intervals throughout hospital stay.  Keep all areas of skin (especially folds) clean and dry.  Maintain adequate skin hydration.  Relieve and redistribute pressure and protect bony prominences; implement measures based on patient-specific risk factors.  Match turning and repositioning schedule to clinical condition.  Encourage weight shift frequently; assist with reposition if unable to complete independently.  Float heels off bed; avoid pressure on the Achilles tendon.  Keep skin free from extended contact with medical devices.  Encourage functional activity and mobility, as early as tolerated.  Use aids (e.g., slide boards, mechanical lift) during  transfer.  Recent Flowsheet Documentation  Taken 5/30/2024 1830 by Leonor Carranza RN  Body Position: neutral body alignment  Skin Protection:   adhesive use limited   skin-to-device areas padded   skin-to-skin areas padded   transparent dressing maintained   tubing/devices free from skin contact  Taken 5/30/2024 1605 by Leonor Carranza RN  Body Position:   turned   left  Skin Protection:   adhesive use limited   skin-to-device areas padded   skin-to-skin areas padded   transparent dressing maintained   tubing/devices free from skin contact  Taken 5/30/2024 1400 by Leonor Carranza RN  Body Position: neutral body alignment  Skin Protection:   adhesive use limited   skin-to-device areas padded   skin-to-skin areas padded   transparent dressing maintained   tubing/devices free from skin contact  Taken 5/30/2024 1210 by Leonor Carranza RN  Body Position:   turned   right  Skin Protection:   adhesive use limited   skin-to-device areas padded   skin-to-skin areas padded   transparent dressing maintained   tubing/devices free from skin contact  Taken 5/30/2024 1040 by Leonor Carranza RN  Body Position: neutral body alignment  Skin Protection:   adhesive use limited   skin-to-device areas padded   skin-to-skin areas padded   transparent dressing maintained   tubing/devices free from skin contact  Taken 5/30/2024 0840 by Leonor Carranza RN  Body Position:   weight shifting   neutral body alignment  Skin Protection:   adhesive use limited   skin-to-device areas padded   skin-to-skin areas padded   transparent dressing maintained   tubing/devices free from skin contact  Intervention: Prevent and Manage VTE (Venous Thromboembolism) Risk  Description: Assess for VTE (venous thromboembolism) risk.  Encourage and assist with early ambulation.  Initiate and maintain compression or other therapy, as indicated, based on identified risk in accordance with organizational protocol and provider order.  Encourage both active and passive  leg exercises while in bed, if unable to ambulate.  Recent Flowsheet Documentation  Taken 5/30/2024 1830 by Leonor Carranza RN  Activity Management: activity minimized  Taken 5/30/2024 1605 by Leonor Carranza RN  Activity Management: activity minimized  Taken 5/30/2024 1400 by Leonor Carranza RN  Activity Management: activity minimized  Taken 5/30/2024 1210 by Leonor Carranza RN  Activity Management: activity minimized  Taken 5/30/2024 1040 by Leonor Carranza RN  Activity Management: activity minimized  Taken 5/30/2024 0840 by Leonor Carranza RN  Activity Management: activity minimized  VTE Prevention/Management:   sequential compression devices off   other (see comments)  Range of Motion: active ROM (range of motion) encouraged  Intervention: Prevent Infection  Description: Maintain skin and mucous membrane integrity; promote hand, oral and pulmonary hygiene.  Optimize fluid balance, nutrition, sleep and glycemic control to maximize infection resistance.  Identify potential sources of infection early to prevent or mitigate progression of infection (e.g., wound, lines, devices).  Evaluate ongoing need for invasive devices; remove promptly when no longer indicated.  Recent Flowsheet Documentation  Taken 5/30/2024 1830 by Leonor Carranza RN  Infection Prevention:   environmental surveillance performed   rest/sleep promoted   single patient room provided  Taken 5/30/2024 1605 by Leonor Carranza RN  Infection Prevention:   environmental surveillance performed   rest/sleep promoted   single patient room provided  Taken 5/30/2024 1400 by Leonor Carranza RN  Infection Prevention:   environmental surveillance performed   rest/sleep promoted   single patient room provided  Taken 5/30/2024 1210 by Leonor Carranza RN  Infection Prevention:   environmental surveillance performed   rest/sleep promoted   single patient room provided  Taken 5/30/2024 1040 by Leonor Carranza RN  Infection Prevention:   environmental surveillance  performed   rest/sleep promoted   single patient room provided  Taken 5/30/2024 0840 by Leonor Carranza RN  Infection Prevention:   environmental surveillance performed   rest/sleep promoted   single patient room provided     Problem: Adult Inpatient Plan of Care  Goal: Optimal Comfort and Wellbeing  Outcome: Ongoing, Not Progressing  Intervention: Provide Person-Centered Care  Description: Use a family-focused approach to care.  Develop trust and rapport by proactively providing information, encouraging questions, addressing concerns and offering reassurance.  Acknowledge emotional response to hospitalization.  Recognize and utilize personal coping strategies.  Honor spiritual and cultural preferences.  Recent Flowsheet Documentation  Taken 5/30/2024 1830 by Leonor Carranza RN  Trust Relationship/Rapport:   care explained   choices provided   emotional support provided   empathic listening provided   questions answered   questions encouraged   reassurance provided   thoughts/feelings acknowledged  Taken 5/30/2024 1605 by Leonor Carranza RN  Trust Relationship/Rapport:   care explained   choices provided   emotional support provided   empathic listening provided   questions answered   questions encouraged   reassurance provided   thoughts/feelings acknowledged  Taken 5/30/2024 1400 by Leonor Carranza RN  Trust Relationship/Rapport:   care explained   choices provided   emotional support provided   empathic listening provided   questions answered   questions encouraged   reassurance provided   thoughts/feelings acknowledged  Taken 5/30/2024 1210 by Leonor Carranza RN  Trust Relationship/Rapport:   care explained   choices provided   emotional support provided   empathic listening provided   questions answered   questions encouraged   reassurance provided   thoughts/feelings acknowledged  Taken 5/30/2024 1040 by Leonor Carranza RN  Trust Relationship/Rapport:   care explained   choices provided   emotional support  provided   empathic listening provided   questions answered   questions encouraged   reassurance provided   thoughts/feelings acknowledged  Taken 5/30/2024 0840 by Leonor Carranza RN  Trust Relationship/Rapport:   care explained   choices provided   emotional support provided   empathic listening provided   questions answered   questions encouraged   reassurance provided   thoughts/feelings acknowledged     Problem: Skin Injury Risk Increased  Goal: Skin Health and Integrity  Outcome: Ongoing, Not Progressing  Intervention: Optimize Skin Protection  Description: Perform a full pressure injury risk assessment, as indicated by screening, upon admission to care unit.  Reassess skin (injury risk, full inspection) frequently (e.g., scheduled interval, with change in condition) to provide optimal early detection and prevention.  Maintain adequate tissue perfusion (e.g., encourage fluid balance; avoid crossing legs, constrictive clothing or devices) to promote tissue oxygenation.  Maintain head of bed at lowest degree of elevation tolerated, considering medical condition and other restrictions.  Avoid positioning onto an area that remains reddened.  Minimize incontinence and moisture (e.g., toileting schedule; moisture-wicking pad, diaper or incontinence collection device; skin moisture barrier).  Cleanse skin promptly and gently when soiled utilizing a pH-balanced cleanser.  Relieve and redistribute pressure (e.g., scheduled position changes, weight shifts, use of support surface, medical device repositioning, protective dressing application, use of positioning device, microclimate control, use of pressure-injury-monitor  Encourage increased activity, such as sitting in a chair at the bedside or early mobilization, when able to tolerate.  Recent Flowsheet Documentation  Taken 5/30/2024 1830 by Leonor Carranza RN  Pressure Reduction Techniques:   heels elevated off bed   weight shift assistance provided  Head of Bed (HOB)  Positioning: [facility] elevated  Pressure Reduction Devices:   heel offloading device utilized   positioning supports utilized   pressure-redistributing mattress utilized  Skin Protection:   adhesive use limited   skin-to-device areas padded   skin-to-skin areas padded   transparent dressing maintained   tubing/devices free from skin contact  Taken 5/30/2024 1605 by Leonor Carranza RN  Pressure Reduction Techniques:   heels elevated off bed   weight shift assistance provided  Head of Bed ([facility]) Positioning: [facility] elevated  Pressure Reduction Devices:   heel offloading device utilized   positioning supports utilized   pressure-redistributing mattress utilized  Skin Protection:   adhesive use limited   skin-to-device areas padded   skin-to-skin areas padded   transparent dressing maintained   tubing/devices free from skin contact  Taken 5/30/2024 1400 by Leonor Carranza RN  Pressure Reduction Techniques:   heels elevated off bed   weight shift assistance provided  Head of Bed ([facility]) Positioning: [facility] elevated  Pressure Reduction Devices:   heel offloading device utilized   positioning supports utilized   pressure-redistributing mattress utilized  Skin Protection:   adhesive use limited   skin-to-device areas padded   skin-to-skin areas padded   transparent dressing maintained   tubing/devices free from skin contact  Taken 5/30/2024 1210 by Leonor Carranza RN  Pressure Reduction Techniques:   heels elevated off bed   weight shift assistance provided  Head of Bed ([facility]) Positioning: [facility] elevated  Pressure Reduction Devices:   heel offloading device utilized   positioning supports utilized   pressure-redistributing mattress utilized  Skin Protection:   adhesive use limited   skin-to-device areas padded   skin-to-skin areas padded   transparent dressing maintained   tubing/devices free from skin contact  Taken 5/30/2024 1040 by Leonor Carranza RN  Pressure Reduction Techniques:   heels elevated off bed   weight shift assistance  provided  Head of Bed (HOB) Positioning: HOB elevated  Pressure Reduction Devices:   heel offloading device utilized   positioning supports utilized   pressure-redistributing mattress utilized  Skin Protection:   adhesive use limited   skin-to-device areas padded   skin-to-skin areas padded   transparent dressing maintained   tubing/devices free from skin contact  Taken 5/30/2024 0840 by Leonor Carranza RN  Pressure Reduction Techniques:   heels elevated off bed   weight shift assistance provided  Head of Bed (HOB) Positioning: HOB elevated  Pressure Reduction Devices:   heel offloading device utilized   positioning supports utilized   pressure-redistributing mattress utilized  Skin Protection:   adhesive use limited   skin-to-device areas padded   skin-to-skin areas padded   transparent dressing maintained   tubing/devices free from skin contact     Problem: Fall Injury Risk  Goal: Absence of Fall and Fall-Related Injury  Outcome: Ongoing, Not Progressing  Intervention: Identify and Manage Contributors  Description: Develop a fall prevention plan with the patient and caregiver/family.  Provide reorientation, appropriate sensory stimulation and routines with changes in mental status to decrease risk of fall.  Promote use of personal vision and auditory aids.  Assess assistance level required for safe and effective self-care; provide support as needed, such as toileting, mobilization. For age 65 and older, implement timed toileting with assistance.  Encourage physical activity, such as performance of mobility and self-care at highest level of patient ability, multicomponent exercise program and provision of appropriate assistive devices.  If fall occurs, assess the severity of injury; implement fall injury protocol. Determine the cause and revise fall injury prevention plan.  Regularly review medication contribution to fall risk; adjust medication administration times to minimize risk of falling.  Consider risk  related to polypharmacy and age.  Balance adequate pain management with potential for oversedation.  Recent Flowsheet Documentation  Taken 5/30/2024 1830 by Leonor Carranza RN  Medication Review/Management: medications reviewed  Self-Care Promotion:   BADL personal objects within reach   BADL personal routines maintained  Taken 5/30/2024 1605 by Leonor Carranza RN  Medication Review/Management: medications reviewed  Self-Care Promotion:   BADL personal objects within reach   BADL personal routines maintained  Taken 5/30/2024 1400 by Leonor Carranza RN  Medication Review/Management: medications reviewed  Self-Care Promotion:   BADL personal objects within reach   BADL personal routines maintained  Taken 5/30/2024 1210 by Leonor Carranza RN  Medication Review/Management: medications reviewed  Self-Care Promotion:   BADL personal objects within reach   BADL personal routines maintained  Taken 5/30/2024 1040 by Leonor Carranza RN  Medication Review/Management: medications reviewed  Self-Care Promotion:   BADL personal objects within reach   BADL personal routines maintained  Taken 5/30/2024 0840 by Leonor Carranza RN  Medication Review/Management: medications reviewed  Self-Care Promotion:   BADL personal objects within reach   BADL personal routines maintained  Intervention: Promote Injury-Free Environment  Description: Provide a safe, barrier-free environment that encourages independent activity.  Keep care area uncluttered and well-lighted.  Determine need for increased observation or monitoring.  Avoid use of devices that minimize mobility, such as restraints or indwelling urinary catheter.  Recent Flowsheet Documentation  Taken 5/30/2024 1830 by Leonor Carranza RN  Safety Promotion/Fall Prevention:   activity supervised   assistive device/personal items within reach   clutter free environment maintained   fall prevention program maintained   nonskid shoes/slippers when out of bed   room organization consistent    safety round/check completed   toileting scheduled  Taken 5/30/2024 1605 by Leonor Carranza RN  Safety Promotion/Fall Prevention:   activity supervised   assistive device/personal items within reach   clutter free environment maintained   fall prevention program maintained   nonskid shoes/slippers when out of bed   room organization consistent   safety round/check completed   toileting scheduled  Taken 5/30/2024 1400 by Leonor Carranza RN  Safety Promotion/Fall Prevention:   activity supervised   assistive device/personal items within reach   clutter free environment maintained   fall prevention program maintained   nonskid shoes/slippers when out of bed   room organization consistent   safety round/check completed   toileting scheduled  Taken 5/30/2024 1210 by Leonor Carranza RN  Safety Promotion/Fall Prevention:   activity supervised   assistive device/personal items within reach   clutter free environment maintained   fall prevention program maintained   nonskid shoes/slippers when out of bed   room organization consistent   safety round/check completed   toileting scheduled  Taken 5/30/2024 1040 by Leonor Carranza RN  Safety Promotion/Fall Prevention:   activity supervised   assistive device/personal items within reach   clutter free environment maintained   fall prevention program maintained   nonskid shoes/slippers when out of bed   room organization consistent   safety round/check completed   toileting scheduled  Taken 5/30/2024 0840 by Leonor Carranza RN  Safety Promotion/Fall Prevention:   activity supervised   assistive device/personal items within reach   clutter free environment maintained   fall prevention program maintained   nonskid shoes/slippers when out of bed   room organization consistent   safety round/check completed   toileting scheduled     Problem: Adjustment to Illness (Sepsis/Septic Shock)  Goal: Optimal Coping  Outcome: Ongoing, Not Progressing  Intervention: Optimize Psychosocial  Adjustment to Illness  Description: Acknowledge, normalize, validate intensity and complexity of patient and support system response to situation.  Provide opportunity for expression of thoughts, feelings and concerns; respond with compassion and reassurance.  Decrease stress and anxiety by providing information about patient’s status and treatment.  Facilitate support system presence and participation in care; consider providing a diary in intensive care situation.  Support coping by recognizing current coping strategies; provide aid in developing new strategies.  Acknowledge and normalize difficulty in managing life-long lifestyle changes and expectations.  Assess and monitor for signs and symptoms of psychologic distress, anxiety and depression.  Consider palliative care consult for goals of care conversation, if the condition is worsening despite treatment.  Recent Flowsheet Documentation  Taken 5/30/2024 1830 by Leonor Carranza RN  Supportive Measures:   active listening utilized   self-care encouraged   self-reflection promoted   self-responsibility promoted   verbalization of feelings encouraged   positive reinforcement provided  Family/Support System Care:   support provided   self-care encouraged  Taken 5/30/2024 1605 by Leonor Carranza, RN  Supportive Measures:   active listening utilized   self-care encouraged   self-reflection promoted   self-responsibility promoted   verbalization of feelings encouraged   positive reinforcement provided  Family/Support System Care:   support provided   self-care encouraged  Taken 5/30/2024 1400 by Leonor Carranza RN  Supportive Measures:   active listening utilized   self-care encouraged   self-reflection promoted   self-responsibility promoted   verbalization of feelings encouraged   positive reinforcement provided  Family/Support System Care:   support provided   self-care encouraged  Taken 5/30/2024 1210 by Leonor Carranza RN  Supportive Measures:   active listening  utilized   self-care encouraged   self-reflection promoted   self-responsibility promoted   verbalization of feelings encouraged   positive reinforcement provided  Family/Support System Care:   support provided   self-care encouraged  Taken 5/30/2024 1040 by Leonor Carranza RN  Supportive Measures:   active listening utilized   self-care encouraged   self-reflection promoted   self-responsibility promoted   verbalization of feelings encouraged   positive reinforcement provided  Family/Support System Care:   support provided   self-care encouraged  Taken 5/30/2024 0840 by Leonor Carranza RN  Supportive Measures:   active listening utilized   self-care encouraged   self-reflection promoted   self-responsibility promoted   verbalization of feelings encouraged   positive reinforcement provided  Family/Support System Care:   support provided   self-care encouraged   Goal Outcome Evaluation:  Plan of Care Reviewed With: patient        Progress: declining  Outcome Evaluation: Pt is very dousy, but arouses to voice and gentle touch. Pt in aFib, even after morning meds, provider notified, see new orders. Pt denies pain and did take crushed meds in applesauce. Speech confirmed thin liquids and pureed diet, added a sippy lid to add in drinking. Oral moisture provided. Pt. Positioned and awakenings minimized. Continue monitoring.

## 2024-05-30 NOTE — PROGRESS NOTES
[facility] Medicine Services  PROGRESS NOTE    Patient Name: Zainab Worthy  : 2/3/1929  MRN: [id number]    Date of Admission: 2024  Primary Care Physician: Aguilar Kelly MD    Subjective   Subjective     CC:  F/u afib rvr    HPI:  Net -350 mL, had a bowel movement yesterday.  Afebrile.  Lethargic.  Not eating much of anything.  Barely wakes up states she has no pain.    Objective   Objective     Vital Signs:   Temp:  [96.3 °F (35.7 °C)-98.1 °F (36.7 °C)] 96.3 °F (35.7 °C)  Heart Rate:  [] 86  Resp:  [14-18] 14  BP: (106-144)/(53-78) 107/76  Flow (L/min):  [2] 2     Physical Exam:  Constitutional: Lethargic, laying in the bed  HENT: NCAT, mucous membranes moist  Respiratory: Clear to auscultation bilaterally, respiratory effort normal   Cardiovascular: IRIR, -150  Gastrointestinal: Normoactive, bowel sounds, soft, nontender, nondistended  Musculoskeletal: No bilateral ankle edema  Psychiatric: Appropriate affect, cooperative  Neurologic: PERRL, symmetric facies, follows some simple commands, equal  strength  Skin: No rashes on exposed skin    Results Reviewed:  LAB RESULTS:      Lab 24  0604 24  0617 24  2135 24  1844 24  1823 24  1814   WBC 8.91 6.81  --   --   --  8.41   HEMOGLOBIN 14.1 12.5  --   --   --  13.8   HEMOGLOBIN, POC  --   --   --  12.6  --   --    HEMATOCRIT 44.2 39.0  --   --   --  42.5   HEMATOCRIT POC  --   --   --  37*  --   --    PLATELETS 214 199  --   --   --  220   NEUTROS ABS 6.54 4.26  --   --   --  6.14   IMMATURE GRANS (ABS) 0.08* 0.06*  --   --   --  0.06*   LYMPHS ABS 1.43 1.85  --   --   --  1.59   MONOS ABS 0.72 0.56  --   --   --  0.59   EOS ABS 0.11 0.06  --   --   --  0.01   MCV 90.8 87.2  --   --   --  88.7   PROCALCITONIN  --   --   --   --   --  0.16   LACTATE  --   --  2.0  --  2.8*  --          Lab 24  0604 24  0617 24  1844 24  1814   SODIUM 148* 148*  --  147*    POTASSIUM 3.8 3.9  --  3.8   CHLORIDE 113* 117*  --  111*   CO2 23.0 20.0*  --  23.0   ANION GAP 12.0 11.0  --  13.0   BUN 28* 42*  --  63*   CREATININE 0.74 0.86 1.20 1.25*   EGFR 74.6 62.3 41.8* 39.8*   GLUCOSE 82 92  --  116*   CALCIUM 8.0* 7.5*  --  8.1*   MAGNESIUM  --  2.3  --  2.7*   TSH  --   --   --  1.650  1.650         Lab 05/28/24  0617 05/27/24  1814   TOTAL PROTEIN 5.4* 6.1   ALBUMIN 2.6* 3.0*   GLOBULIN 2.8 3.1   ALT (SGPT) 20 15   AST (SGOT) 34* 28   BILIRUBIN 0.3 0.5   ALK PHOS 101 106         Lab 05/30/24  0302 05/29/24  0604 05/28/24  0828 05/28/24  0617 05/27/24  1814   PROBNP 8,994.0* 10,626.0*  --   --  9,951.0*   HSTROP T  --   --  38* 38* 38*                 Brief Urine Lab Results       None            Microbiology Results Abnormal       Procedure Component Value - Date/Time    COVID PRE-OP / PRE-PROCEDURE SCREENING ORDER (NO ISOLATION) - Swab, Nasal Cavity [[id number]]  (Normal) Collected: 05/27/24 1730    Lab Status: Final result Specimen: Swab from Nasal Cavity Updated: 05/27/24 1908    Narrative:      The following orders were created for panel order COVID PRE-OP / PRE-PROCEDURE SCREENING ORDER (NO ISOLATION) - Swab, Nasal Cavity.  Procedure                               Abnormality         Status                     ---------                               -----------         ------                     COVID-19 and FLU A/B PCR...[[id number]]  Normal              Final result                 Please view results for these tests on the individual orders.    COVID-19 and FLU A/B PCR, 1 HR TAT - Swab, Nasopharynx [[id number]]  (Normal) Collected: 05/27/24 1730    Lab Status: Final result Specimen: Swab from Nasopharynx Updated: 05/27/24 1908     COVID19 Not Detected     Influenza A PCR Not Detected     Influenza B PCR Not Detected    Narrative:      Fact sheet for providers: https://www.fda.gov/media/115526/download    Fact sheet for patients: https://www.fda.gov/media/659381/download    Test  performed by [facility].            Adult Transthoracic Echo Complete w/ Color, Spectral and Contrast if necessary per protocol    Result Date: 5/28/2024    Left ventricular systolic function is normal. Calculated left ventricular EF = 51.7%   The left atrial cavity is dilated.   Left atrial volume is mildly increased.   Estimated right ventricular systolic pressure from tricuspid regurgitation is normal (<35 mmHg). Calculated right ventricular systolic pressure from tricuspid regurgitation is 34 mmHg.   mitral valve and aortic valve thickened.  mild AI and mild MR      Results for orders placed during the hospital encounter of 05/27/24    Adult Transthoracic Echo Complete w/ Color, Spectral and Contrast if necessary per protocol    Interpretation Summary    Left ventricular systolic function is normal. Calculated left ventricular EF = 51.7%    The left atrial cavity is dilated.    Left atrial volume is mildly increased.    Estimated right ventricular systolic pressure from tricuspid regurgitation is normal (<35 mmHg). Calculated right ventricular systolic pressure from tricuspid regurgitation is 34 mmHg.    mitral valve and aortic valve thickened.  mild AI and mild MR      Current medications:  Scheduled Meds:apixaban, 2.5 mg, Oral, Q12H  digoxin, 125 mcg, Oral, Daily  donepezil, 10 mg, Oral, Nightly  famotidine, 10 mg, Oral, BID AC  memantine, 10 mg, Oral, Q12H  metoprolol tartrate, 25 mg, Oral, Q12H  mirtazapine, 15 mg, Oral, Nightly  oxybutynin, 5 mg, Oral, TID  sodium chloride, 10 mL, Intravenous, Q12H      Continuous Infusions:     PRN Meds:.  acetaminophen    bisacodyl    Calcium Replacement - Follow Nurse / BPA Driven Protocol    HYDROcodone-acetaminophen    LORazepam    Magnesium Standard Dose Replacement - Follow Nurse / BPA Driven Protocol    nitroglycerin    ondansetron ODT **OR** ondansetron    Phosphorus Replacement - Follow Nurse / BPA Driven Protocol    Potassium Replacement - Follow Nurse / BPA Driven  Protocol    [COMPLETED] Insert Peripheral IV **AND** sodium chloride    sodium chloride    sodium chloride    Assessment & Plan   Assessment & Plan     Active Hospital Problems    Diagnosis  POA    **Atrial fibrillation with RVR [I48.91]  Yes      Resolved Hospital Problems   No resolved problems to display.        Brief Hospital Course to date:  Zainab Worthy is a 89 y.o. female with a history of dementia presenting with symptoms of increased lethargy and shortness of breath, she was found to be hypotensive with A-fib and RVR.  She received a dose of digoxin and bolused amiodarone.  Cardiology consulted.    This patient's problems and plans were partially entered by my partner and updated as appropriate by me 05/30/24.    New onset Afib w RVR  Pulm edema  HFpEF  --Cardiology consulted  --Continue Eliquis  --S/p amiodarone, digoxin load  -- Continue oral digoxin, metoprolol    Worsening hypernatremia  -- Likely related to diuresis, will give small amount of fluids; discussed with patient's son (Chad Worthy) on 5/30 and he and the family would like to transition to comfort measures and stop lab draws    Dementia  --Gradual decline over 7 years; over the past 18 months, she has significantly more difficulty with recognizing family, does not know the year or where she is at baseline, unable to feed herself currently  --cont home meds  --complicates all aspects of care  -- Hospice appropriate; discussed with patient's son on 5/30 he would like to transition to hospice and comfort measures; hospice consulted    Dysphagia  --Discussed with Chad Worthy over the phone. He stated the family has decided not to proceed with any swallow evaluation    Hx of colon cancer s/p partial resection  Hx of liver mets s/p partial hepatectomy      Expected Discharge Location and Transportation: back to facility with hospice  Expected Discharge   Expected Discharge Date: 6/2/2024; Expected Discharge Time:      DVT prophylaxis:  Medical and  mechanical DVT prophylaxis orders are present.         AM-PAC 6 Clicks Score (PT): 6 (05/29/24 0800)    CODE STATUS:   Code Status and Medical Interventions:   Ordered at: 05/29/24 9817     Medical Intervention Limits:    NO intubation (DNI)     Level Of Support Discussed With:    Next of Kin (If No Surrogate)     Code Status (Patient has no pulse and is not breathing):    No CPR (Do Not Attempt to Resuscitate)     Medical Interventions (Patient has pulse or is breathing):    Limited Support       Leila Harden MD  05/30/24

## 2024-05-30 NOTE — PROGRESS NOTES
"  [facility]  PROGRESS NOTE    Date of Admission: 5/27/2024  Date of Service: 05/30/24    Primary Care Physician: Aguilar Kelly MD    Chief Complaint: Follow-up atrial fibrillation RVR      Subjective      No acute events overnight.  Patient somnolent but arousable.  Will answer yes or no questions and follow commands but otherwise does not respond.  Denies any pain.  Remains in atrial fibrillation with RVR on telemetry.  No family at bedside.      Objective   Vitals: /85 (BP Location: Left arm, Patient Position: Lying)   Pulse 86   Temp 96.7 °F (35.9 °C) (Axillary)   Resp 18   Ht 152.4 cm (60\")   Wt 60.8 kg (134 lb)   SpO2 96%   BMI 26.17 kg/m²     Physical Exam:  GENERAL: Frail, ill-appearing in no acute distress.   HEENT: no jugular venous distention  HEART: Irregular rhythm and tachycardic rate  LUNGS: Clear to auscultation bilaterally. No wheezing, rales or rhonchi.  EXTREMITIES: No edema noted.     Results:  Results from last 7 days   Lab Units 05/29/24  0604 05/28/24  0617 05/27/24  1844 05/27/24  1814   WBC 10*3/mm3 8.91 6.81  --  8.41   HEMOGLOBIN g/dL 14.1 12.5  --  13.8   HEMOGLOBIN, POC g/dL  --   --  12.6  --    HEMATOCRIT % 44.2 39.0  --  42.5   HEMATOCRIT POC %  --   --  37*  --    PLATELETS 10*3/mm3 214 199  --  220     Results from last 7 days   Lab Units 05/30/24  0302 05/29/24  0604 05/28/24  0617   SODIUM mmol/L 151* 148* 148*   POTASSIUM mmol/L 3.9 3.8 3.9   CHLORIDE mmol/L 115* 113* 117*   CO2 mmol/L 20.0* 23.0 20.0*   BUN mg/dL 27* 28* 42*   CREATININE mg/dL 0.68 0.74 0.86   GLUCOSE mg/dL 77 82 92      Lab Results   Component Value Date    AST 34 (H) 05/28/2024    ALT 20 05/28/2024             Results from last 7 days   Lab Units 05/27/24  1814   TSH uIU/mL 1.650  1.650             Results from last 7 days   Lab Units 05/28/24  0828 05/28/24  0617 05/27/24  1814   HSTROP T ng/L 38* 38* 38*     Results from last 7 days   Lab Units " 05/30/24  0302   PROBNP pg/mL 8,994.0*         Intake/Output Summary (Last 24 hours) at 5/30/2024 0827  Last data filed at 5/30/2024 0542  Gross per 24 hour   Intake --   Output 350 ml   Net -350 ml       I personally reviewed the patient's EKG/Telemetry data    Radiology Data:   Results for orders placed during the hospital encounter of 05/27/24    Adult Transthoracic Echo Complete w/ Color, Spectral and Contrast if necessary per protocol    Interpretation Summary    Left ventricular systolic function is normal. Calculated left ventricular EF = 51.7%    The left atrial cavity is dilated.    Left atrial volume is mildly increased.    Estimated right ventricular systolic pressure from tricuspid regurgitation is normal (<35 mmHg). Calculated right ventricular systolic pressure from tricuspid regurgitation is 34 mmHg.    mitral valve and aortic valve thickened.  mild AI and mild MR        Current Medications:  apixaban, 2.5 mg, Oral, Q12H  digoxin, 125 mcg, Oral, Daily  donepezil, 10 mg, Oral, Nightly  famotidine, 10 mg, Oral, BID AC  memantine, 10 mg, Oral, Q12H  metoprolol tartrate, 25 mg, Oral, Q12H  mirtazapine, 15 mg, Oral, Nightly  oxybutynin, 5 mg, Oral, TID  sodium chloride, 10 mL, Intravenous, Q12H           Assessment:   Atrial fibrillation with RVR, new onset  HFpEF, volume overloaded and elevated proBNP  Hypernatremia, hospital medicine giving gentle IV fluids  Severe dementia    Plan:   Discussed the possibility of increasing metoprolol with hospital medicine.  Hospital medicine has called the patient's family and patient has been moved to comfort measures only.  Medications withdrawn.  Agree with gentle IV fluids due to hypernatremia.  Cardiology will sign off.  Please call with any questions.      Yissel Skaggs PA-C

## 2024-05-30 NOTE — PLAN OF CARE
Goal Outcome Evaluation:                     Anticipated Discharge Disposition (SLP): skilled nursing facility, anticipate therapy at next level of care          SLP Swallowing Diagnosis: moderate, oral dysphagia, R/O pharyngeal dysphagia (05/30/24 2545)

## 2024-05-30 NOTE — CONSULTS
Continued Stay Note  [facility]     Patient Name: Zainab Worthy  MRN: [id number]  Today's Date: 5/30/2024    Admit Date: 5/27/2024    Plan: To be determined   Discharge Plan       Row Name 05/30/24 1648       Plan    Plan To be determined    Plan Comments Hospice referral received, chart reviewed. Visit made to pt, pt with eyes closed, appears comfortable-no s/s pain or respiratory distress. Daughter Val present. Pt lives at [facility]. Val stated at this time no discharge plans have been made. Teaching done on hospice services at a facility, Val stated has been told by a physician that pt may not be able to leave the hospital due to decline in condition. Teaching done on inpatient hospice, informed Val at this time pt does not meet inpatient criteria as pt does not have any unmanaged symptoms that require IV administration of comfort medications. Val verbalized understanding. Will continue to follow. Please call [phone] if can be of further assistance.                   Discharge Codes    No documentation.                 Expected Discharge Date and Time       Expected Discharge Date Expected Discharge Time    Jun 2, 2024               Helen Liu RN

## 2024-05-31 NOTE — PROGRESS NOTES
"Visit made today: patient resting in bed with eyes closed and Oxygen NC in opened mouth. No family/visitors at bedside. Spoke with nurse and she states patient requiring help with all ADL's including feedings. Patient ate couple bites and couple sips and that was all.     Per CM note today: \"Per hospice note, pt does not meet inpatient hospice requirements. I called and spoke with Mor(ALENA) at [facility] and they can accept pt back, even hospice but will need to do an onsite visit prior to pt returning. Per Mor, they are unable to perform onsite visit until Monday and wont be able to accept pt over weekend. I attempted to call Chad(son) and had to leave a voice message. I spoke with daughter Val on phone regarding discharge plan. Val states she would like for her mother to stay in the hospital with hospice but understands that pt does not meet inpatient hospice requirements per Helen(hospice CM)note. Daughter states, then the plan would be to return to [facility] and aware that the pt would be in hospital over weekend with Mor(ALENA at [facility]) seeing pt Monday for onsite visit. Transportation with [facility] tentatively arranged for Monday, 6/3 at 1300. CM will follow up Monday. Written by Rebecca Hines.     Hospice to continue to follow. For further assistance please call [phone].     Pippa Peralta, RN Hospital Hospice Liaison  "

## 2024-05-31 NOTE — CASE MANAGEMENT/SOCIAL WORK
Continued Stay Note  [facility]     Patient Name: Zainab Worthy  MRN: [id number]  Today's Date: 5/31/2024    Admit Date: 5/27/2024    Plan: discharge plan   Discharge Plan       Row Name 05/31/24 1444       Plan    Plan discharge plan    Plan Comments Per hospice note, pt does not meet inpatient hospice requirements. I called and spoke with Allie) at [facility] and they can accept pt back, even hospice but will need to do an onsite visit prior to pt returning.  Per oMr,  they are unable to perform onsite visit  until Monday and wont be able to accept pt over weekend.  I attempted to call Chad(son) and had to leave a voice message. I spoke with daughter Val on phone regarding discharge plan. Val states she would like for her mother to stay in the hospital with hospice but understands that pt does not meet inpatient hospice requirements per Helen(hospice CM)note. Daughter states, then the plan would be to return to [facility] and aware that the pt would be in hospital over weekend with Mor(ALENA at [facility]) seeing pt Monday for onsite visit.  Transportation with [facility] tentatively arranged for Monday, 6/3 at 1300. CM will follow up Monday.    Final Discharge Disposition Code 01 - home or self-care                   Discharge Codes    No documentation.                 Expected Discharge Date and Time       Expected Discharge Date Expected Discharge Time    Jun 2, 2024               Rebecca Hines RN

## 2024-05-31 NOTE — PROGRESS NOTES
[facility] Medicine Services  PROGRESS NOTE    Patient Name: Zainab Worthy  : 2/3/1929  MRN: [id number]    Date of Admission: 2024  Primary Care Physician: Aguilar Kelly MD    Subjective   Subjective     CC:  F/u afib rvr    HPI:  Very frail female sleeping but opened eyes to verbal then fell right back asleep.  Did not answer any questions.  Net -100 mL.. Last BM documented was .    Objective   Objective     Vital Signs:   Temp:  [97.7 °F (36.5 °C)-97.9 °F (36.6 °C)] 97.7 °F (36.5 °C)  Heart Rate:  [] 101  Resp:  [12-16] 16  BP: (115-137)/(70-97) 137/70  Flow (L/min):  [2] 2     Physical Exam:  Constitutional: Asleep very frail female that open dry eyes to verbal but fell right back asleep in NAD  ENT: [place], dry mucous membranes   Respiratory: Nonlabored, symmetrical chest expansion, CTAB, 95% 2 L  Cardiovascular: IRR IRR-tachycardic, no M/R/G  Gastrointestinal: Soft, NT, ND +BS-very hypoactive  Musculoskeletal: RODRIGUES; no LE edema bilaterally  Neurologic: NEISHA d/t sleepiness  Skin: No rashes on exposed skin  Psychiatric: NEISHA d/t sleepiness    Results Reviewed:  LAB RESULTS:      Lab 24  0604 24  0617 24  2135 24  1844 24  1823 24  1814   WBC 8.91 6.81  --   --   --  8.41   HEMOGLOBIN 14.1 12.5  --   --   --  13.8   HEMOGLOBIN, POC  --   --   --  12.6  --   --    HEMATOCRIT 44.2 39.0  --   --   --  42.5   HEMATOCRIT POC  --   --   --  37*  --   --    PLATELETS 214 199  --   --   --  220   NEUTROS ABS 6.54 4.26  --   --   --  6.14   IMMATURE GRANS (ABS) 0.08* 0.06*  --   --   --  0.06*   LYMPHS ABS 1.43 1.85  --   --   --  1.59   MONOS ABS 0.72 0.56  --   --   --  0.59   EOS ABS 0.11 0.06  --   --   --  0.01   MCV 90.8 87.2  --   --   --  88.7   PROCALCITONIN  --   --   --   --   --  0.16   LACTATE  --   --  2.0  --  2.8*  --          Lab 24  0302 24  0604 24  0617 24  1844 24  1814   SODIUM 151* 148*  148*  --  147*   POTASSIUM 3.9 3.8 3.9  --  3.8   CHLORIDE 115* 113* 117*  --  111*   CO2 20.0* 23.0 20.0*  --  23.0   ANION GAP 16.0* 12.0 11.0  --  13.0   BUN 27* 28* 42*  --  63*   CREATININE 0.68 0.74 0.86 1.20 1.25*   EGFR 80.3 74.6 62.3 41.8* 39.8*   GLUCOSE 77 82 92  --  116*   CALCIUM 8.3 8.0* 7.5*  --  8.1*   MAGNESIUM  --   --  2.3  --  2.7*   TSH  --   --   --   --  1.650  1.650         Lab 05/28/24  0617 05/27/24  1814   TOTAL PROTEIN 5.4* 6.1   ALBUMIN 2.6* 3.0*   GLOBULIN 2.8 3.1   ALT (SGPT) 20 15   AST (SGOT) 34* 28   BILIRUBIN 0.3 0.5   ALK PHOS 101 106         Lab 05/30/24  0302 05/29/24  0604 05/28/24  0828 05/28/24  0617 05/27/24  1814   PROBNP 8,994.0* 10,626.0*  --   --  9,951.0*   HSTROP T  --   --  38* 38* 38*                 Brief Urine Lab Results       None            Microbiology Results Abnormal       Procedure Component Value - Date/Time    COVID PRE-OP / PRE-PROCEDURE SCREENING ORDER (NO ISOLATION) - Swab, Nasal Cavity [[id number]]  (Normal) Collected: 05/27/24 1730    Lab Status: Final result Specimen: Swab from Nasal Cavity Updated: 05/27/24 1908    Narrative:      The following orders were created for panel order COVID PRE-OP / PRE-PROCEDURE SCREENING ORDER (NO ISOLATION) - Swab, Nasal Cavity.  Procedure                               Abnormality         Status                     ---------                               -----------         ------                     COVID-19 and FLU A/B PCR...[[id number]]  Normal              Final result                 Please view results for these tests on the individual orders.    COVID-19 and FLU A/B PCR, 1 HR TAT - Swab, Nasopharynx [[id number]]  (Normal) Collected: 05/27/24 1730    Lab Status: Final result Specimen: Swab from Nasopharynx Updated: 05/27/24 1908     COVID19 Not Detected     Influenza A PCR Not Detected     Influenza B PCR Not Detected    Narrative:      Fact sheet for providers: https://www.fda.gov/media/821413/download    Fact  sheet for patients: https://www.fda.gov/media/804098/download    Test performed by PCR.            No radiology results from the last 24 hrs    Results for orders placed during the hospital encounter of 05/27/24    Adult Transthoracic Echo Complete w/ Color, Spectral and Contrast if necessary per protocol    Interpretation Summary    Left ventricular systolic function is normal. Calculated left ventricular EF = 51.7%    The left atrial cavity is dilated.    Left atrial volume is mildly increased.    Estimated right ventricular systolic pressure from tricuspid regurgitation is normal (<35 mmHg). Calculated right ventricular systolic pressure from tricuspid regurgitation is 34 mmHg.    mitral valve and aortic valve thickened.  mild AI and mild MR      Current medications:  Scheduled Meds:apixaban, 2.5 mg, Oral, Q12H  digoxin, 125 mcg, Oral, Daily  metoprolol tartrate, 37.5 mg, Oral, Q12H  sodium chloride, 10 mL, Intravenous, Q12H      Continuous Infusions:     PRN Meds:.  acetaminophen    Biotene dry mouth **AND** hydrogen peroxide **AND** mineral oil    bisacodyl    Calcium Replacement - Follow Nurse / BPA Driven Protocol    HYDROcodone-acetaminophen    LORazepam    Magnesium Standard Dose Replacement - Follow Nurse / BPA Driven Protocol    morphine    nitroglycerin    ondansetron ODT **OR** ondansetron    Phosphorus Replacement - Follow Nurse / BPA Driven Protocol    Potassium Replacement - Follow Nurse / BPA Driven Protocol    [COMPLETED] Insert Peripheral IV **AND** sodium chloride    sodium chloride    sodium chloride    Assessment & Plan   Assessment & Plan     Active Hospital Problems    Diagnosis  POA    **Atrial fibrillation with RVR [I48.91]  Yes      Resolved Hospital Problems   No resolved problems to display.        Brief Hospital Course to date:  Zainab Worthy is a 89 y.o. female with a history of dementia presenting with symptoms of increased lethargy and shortness of breath, she was found to be  hypotensive with A-fib and RVR.  She received a dose of digoxin and bolused amiodarone.  Cardiology consulted.    These problems are new to me today    This patient's problems and plans were partially entered by my partner and updated as appropriate by me 05/31/24.    New onset Afib w RVR  Pulm edema  HFpEF  --Cardiology consulted and signed off when family made patient comfort care  --Continue Eliquis  --S/p amiodarone, digoxin load  -- Continue oral digoxin, metoprolol    Worsening hypernatremia  --Na 150   --Likely related to diuresis, will give small amount of fluids; discussed with patient's son (Chad Worthy) on 5/30 and he and the family would like to transition to comfort measures   --stop lab draws    Dementia  --Gradual decline over 7 years; over the past 18 months, she has significantly more difficulty with recognizing family, does not know the year or where she is at baseline, unable to feed herself currently  --cont home meds  --complicates all aspects of care  -- Hospice appropriate; discussed with patient's son on 5/30 he would like to transition to hospice and comfort measures; hospice consulted  -- Does not meet inpatient hospice requirements    Dysphagia  --Discussed with Chad Worthy over the phone. He stated the family has decided not to proceed with any swallow evaluation    Hx of colon cancer s/p partial resection  Hx of liver mets s/p partial hepatectomy    Expected Discharge Location and Transportation: back to facility with hospice  Expected Discharge   Expected Discharge Date: 6/2/2024; Expected Discharge Time:      DVT prophylaxis:  Medical and mechanical DVT prophylaxis orders are present.         AM-PAC 6 Clicks Score (PT): 6 (05/30/24 2344)    CODE STATUS:   Code Status and Medical Interventions:   Ordered at: 05/30/24 1111     Level Of Support Discussed With:    Next of Kin (If No Surrogate)     Code Status (Patient has no pulse and is not breathing):    No CPR (Do Not Attempt to  Resuscitate)     Medical Interventions (Patient has pulse or is breathing):    Comfort Measures       Nhi Arenas, KIMBERLEY  05/31/24

## 2024-05-31 NOTE — SIGNIFICANT NOTE
05/31/24 1438   SLP Deferred Reason   SLP Deferred Reason   (No further SLP needs. Confirmed with RN. Signing off.)

## 2024-06-01 NOTE — PROGRESS NOTES
[facility] Medicine Services  PROGRESS NOTE    Patient Name: Zainab Worthy  : 2/3/1929  MRN: [id number]    Date of Admission: 2024  Primary Care Physician: Aguilar Kelly MD    Subjective   Subjective     CC:  F/u afib rvr    HPI:  Patient sitting up in bed sleeping but does open eyes to verbal.  She did not respond and fell right back asleep.  She is comfort care waiting to go to [facility] on Monday, 6/3/2024.    Objective   Objective     Vital Signs:   Temp:  [97.8 °F (36.6 °C)] 97.8 °F (36.6 °C)  Heart Rate:  [] 88  Resp:  [15-18] 16  BP: (119-141)/(65-92) 130/72  Flow (L/min):  [2] 2     Physical Exam:  Constitutional: Asleep very frail female that open dry eyes to verbal but falls back asleep in NAD  ENT: [place], dry mucous membranes   Respiratory: Nonlabored, symmetrical chest expansion, CTAB, 92% 2 L  Cardiovascular: IRR IRR-tachycardic, no M/R/G  Gastrointestinal: Soft, NT, ND +BS-very hypoactive  Musculoskeletal: RODRIGUES; no LE edema bilaterally  Neurologic: NEISHA d/t sleepiness  Skin: No rashes on exposed skin  Psychiatric: NEISHA d/t sleepiness    Results Reviewed:  LAB RESULTS:      Lab 24  0604 24  0617 24  2135 24  1844 24  1823 24  1814   WBC 8.91 6.81  --   --   --  8.41   HEMOGLOBIN 14.1 12.5  --   --   --  13.8   HEMOGLOBIN, POC  --   --   --  12.6  --   --    HEMATOCRIT 44.2 39.0  --   --   --  42.5   HEMATOCRIT POC  --   --   --  37*  --   --    PLATELETS 214 199  --   --   --  220   NEUTROS ABS 6.54 4.26  --   --   --  6.14   IMMATURE GRANS (ABS) 0.08* 0.06*  --   --   --  0.06*   LYMPHS ABS 1.43 1.85  --   --   --  1.59   MONOS ABS 0.72 0.56  --   --   --  0.59   EOS ABS 0.11 0.06  --   --   --  0.01   MCV 90.8 87.2  --   --   --  88.7   PROCALCITONIN  --   --   --   --   --  0.16   LACTATE  --   --  2.0  --  2.8*  --          Lab 24  0302 24  0604 24  0617 24  1844 24  1814   SODIUM 151*  148* 148*  --  147*   POTASSIUM 3.9 3.8 3.9  --  3.8   CHLORIDE 115* 113* 117*  --  111*   CO2 20.0* 23.0 20.0*  --  23.0   ANION GAP 16.0* 12.0 11.0  --  13.0   BUN 27* 28* 42*  --  63*   CREATININE 0.68 0.74 0.86 1.20 1.25*   EGFR 80.3 74.6 62.3 41.8* 39.8*   GLUCOSE 77 82 92  --  116*   CALCIUM 8.3 8.0* 7.5*  --  8.1*   MAGNESIUM  --   --  2.3  --  2.7*   TSH  --   --   --   --  1.650  1.650         Lab 05/28/24  0617 05/27/24  1814   TOTAL PROTEIN 5.4* 6.1   ALBUMIN 2.6* 3.0*   GLOBULIN 2.8 3.1   ALT (SGPT) 20 15   AST (SGOT) 34* 28   BILIRUBIN 0.3 0.5   ALK PHOS 101 106         Lab 05/30/24  0302 05/29/24  0604 05/28/24  0828 05/28/24  0617 05/27/24  1814   PROBNP 8,994.0* 10,626.0*  --   --  9,951.0*   HSTROP T  --   --  38* 38* 38*                 Brief Urine Lab Results       None            Microbiology Results Abnormal       Procedure Component Value - Date/Time    COVID PRE-OP / PRE-PROCEDURE SCREENING ORDER (NO ISOLATION) - Swab, Nasal Cavity [[id number]]  (Normal) Collected: 05/27/24 1730    Lab Status: Final result Specimen: Swab from Nasal Cavity Updated: 05/27/24 1908    Narrative:      The following orders were created for panel order COVID PRE-OP / PRE-PROCEDURE SCREENING ORDER (NO ISOLATION) - Swab, Nasal Cavity.  Procedure                               Abnormality         Status                     ---------                               -----------         ------                     COVID-19 and FLU A/B PCR...[[id number]]  Normal              Final result                 Please view results for these tests on the individual orders.    COVID-19 and FLU A/B PCR, 1 HR TAT - Swab, Nasopharynx [[id number]]  (Normal) Collected: 05/27/24 1730    Lab Status: Final result Specimen: Swab from Nasopharynx Updated: 05/27/24 1908     COVID19 Not Detected     Influenza A PCR Not Detected     Influenza B PCR Not Detected    Narrative:      Fact sheet for providers:  https://www.fda.gov/media/441372/download    Fact sheet for patients: https://www.fda.gov/media/832461/download    Test performed by PCR.            No radiology results from the last 24 hrs    Results for orders placed during the hospital encounter of 05/27/24    Adult Transthoracic Echo Complete w/ Color, Spectral and Contrast if necessary per protocol    Interpretation Summary    Left ventricular systolic function is normal. Calculated left ventricular EF = 51.7%    The left atrial cavity is dilated.    Left atrial volume is mildly increased.    Estimated right ventricular systolic pressure from tricuspid regurgitation is normal (<35 mmHg). Calculated right ventricular systolic pressure from tricuspid regurgitation is 34 mmHg.    mitral valve and aortic valve thickened.  mild AI and mild MR      Current medications:  Scheduled Meds:apixaban, 2.5 mg, Oral, Q12H  digoxin, 125 mcg, Oral, Daily  metoprolol tartrate, 37.5 mg, Oral, Q12H  sodium chloride, 10 mL, Intravenous, Q12H      Continuous Infusions:     PRN Meds:.  acetaminophen    Biotene dry mouth **AND** hydrogen peroxide **AND** mineral oil    bisacodyl    Calcium Replacement - Follow Nurse / BPA Driven Protocol    HYDROcodone-acetaminophen    LORazepam    Magnesium Standard Dose Replacement - Follow Nurse / BPA Driven Protocol    morphine    nitroglycerin    ondansetron ODT **OR** ondansetron    Phosphorus Replacement - Follow Nurse / BPA Driven Protocol    Potassium Replacement - Follow Nurse / BPA Driven Protocol    [COMPLETED] Insert Peripheral IV **AND** sodium chloride    sodium chloride    sodium chloride    Assessment & Plan   Assessment & Plan     Active Hospital Problems    Diagnosis  POA    **Atrial fibrillation with RVR [I48.91]  Yes      Resolved Hospital Problems   No resolved problems to display.        Brief Hospital Course to date:  Zainab Worthy is a 89 y.o. female with a history of dementia presenting with symptoms of increased lethargy and  shortness of breath, she was found to be hypotensive with A-fib and RVR.  She received a dose of digoxin and bolused amiodarone.  Cardiology consulted.    New onset Afib w RVR  Pulm edema  HFpEF  --Cardiology consulted and signed off when family made patient comfort care  --Continue Eliquis  --S/p amiodarone, digoxin load  -- Continue oral digoxin, metoprolol    Worsening hypernatremia  --Na 150   --Likely related to diuresis, will give small amount of fluids; discussed with patient's son (Chad Worthy) on 5/30 and he and the family would like to transition to comfort measures   --stop lab draws    Dementia  --Gradual decline over 7 years; over the past 18 months, she has significantly more difficulty with recognizing family, does not know the year or where she is at baseline, unable to feed herself currently  --cont home meds  --complicates all aspects of care  -- Hospice appropriate; discussed with patient's son on 5/30 he would like to transition to hospice and comfort measures; hospice consulted  -- Does not meet inpatient hospice requirements    Dysphagia  --Discussed with Chad Worthy over the phone. He stated the family has decided not to proceed with any swallow evaluation    Hx of colon cancer s/p partial resection  Hx of liver mets s/p partial hepatectomy    Expected Discharge Location and Transportation: back to facility with hospice  Expected Discharge   Expected Discharge Date: 6/3/2024; Expected Discharge Time:      DVT prophylaxis:  Medical and mechanical DVT prophylaxis orders are present.         AM-PAC 6 Clicks Score (PT): 6 (06/01/24 0928)    CODE STATUS:   Code Status and Medical Interventions:   Ordered at: 05/30/24 1111     Level Of Support Discussed With:    Next of Kin (If No Surrogate)     Code Status (Patient has no pulse and is not breathing):    No CPR (Do Not Attempt to Resuscitate)     Medical Interventions (Patient has pulse or is breathing):    Comfort Measures       Nhi Arenas  APRN  06/01/24

## 2024-06-01 NOTE — PROGRESS NOTES
Continued Stay Note   [place]     Patient Name: Zainab Worthy  MRN: [id number]  Today's Date: 6/1/2024    Admit Date: 5/27/2024    Plan: TBD   Discharge Plan       Row Name 06/01/24 1229       Plan    Plan TBD    Patient/Family in Agreement with Plan yes    Plan Comments Hospice visit made. Pt was lying in bed asleep & appeared comfortable w/o s/s of distress noted. Pt had no family present. Hospice is aware that the family would like for the pt to remain in the acute care setting for hospice care. Per MAR review, pt has not required medications for sx management @ this time. Home/out pt hospice will not be in the office on 6/2/24. Hospice will continue to follow. If further assistance is needed, please call [phone].                   Discharge Codes    No documentation.                 Expected Discharge Date and Time       Expected Discharge Date Expected Discharge Time    Jun 2, 2024               Salena Colunga

## 2024-06-01 NOTE — PLAN OF CARE
Problem: Adult Inpatient Plan of Care  Goal: Absence of Hospital-Acquired Illness or Injury  Outcome: Ongoing, Not Progressing  Intervention: Identify and Manage Fall Risk  Description: Perform standard risk assessment on admission using a validated tool or comprehensive approach appropriate to the patient; reassess fall risk frequently, with change in status or transfer to another level of care.  Communicate fall injury risk to interprofessional healthcare team.  Determine need for increased observation, equipment and environmental modification, such as low bed, signage and supportive, nonskid footwear.  Adjust safety measures to individual developmental age, stage and identified risk factors.  Reinforce the importance of safety and physical activity with patient and family.  Perform regular intentional rounding to assess need for position change, pain assessment and personal needs, including assistance with toileting.  Recent Flowsheet Documentation  Taken 6/1/2024 1810 by Leonor Carranza RN  Safety Promotion/Fall Prevention:   activity supervised   assistive device/personal items within reach   clutter free environment maintained   fall prevention program maintained   nonskid shoes/slippers when out of bed   room organization consistent   safety round/check completed   toileting scheduled  Taken 6/1/2024 1615 by Leonor Carranza RN  Safety Promotion/Fall Prevention:   activity supervised   assistive device/personal items within reach   clutter free environment maintained   fall prevention program maintained   nonskid shoes/slippers when out of bed   room organization consistent   safety round/check completed   toileting scheduled  Taken 6/1/2024 1415 by Leonor Carranza RN  Safety Promotion/Fall Prevention:   activity supervised   assistive device/personal items within reach   clutter free environment maintained   fall prevention program maintained   nonskid shoes/slippers when out of bed   room organization  consistent   safety round/check completed   toileting scheduled  Taken 6/1/2024 1215 by Leonor Carranza RN  Safety Promotion/Fall Prevention:   activity supervised   assistive device/personal items within reach   clutter free environment maintained   fall prevention program maintained   nonskid shoes/slippers when out of bed   room organization consistent   safety round/check completed   toileting scheduled  Taken 6/1/2024 1030 by Leonor Carranza RN  Safety Promotion/Fall Prevention:   activity supervised   assistive device/personal items within reach   clutter free environment maintained   fall prevention program maintained   nonskid shoes/slippers when out of bed   room organization consistent   safety round/check completed   toileting scheduled  Taken 6/1/2024 0928 by Leonor Carranza RN  Safety Promotion/Fall Prevention:   activity supervised   assistive device/personal items within reach   clutter free environment maintained   fall prevention program maintained   nonskid shoes/slippers when out of bed   room organization consistent   safety round/check completed   toileting scheduled  Taken 6/1/2024 0800 by Leonor Carranza RN  Safety Promotion/Fall Prevention:   activity supervised   assistive device/personal items within reach   clutter free environment maintained   fall prevention program maintained   nonskid shoes/slippers when out of bed   room organization consistent   safety round/check completed   toileting scheduled  Intervention: Prevent Skin Injury  Description: Perform a screening for skin injury risk, such as pressure or moisture associated skin damage on admission and at regular intervals throughout hospital stay.  Keep all areas of skin (especially folds) clean and dry.  Maintain adequate skin hydration.  Relieve and redistribute pressure and protect bony prominences; implement measures based on patient-specific risk factors.  Match turning and repositioning schedule to clinical  condition.  Encourage weight shift frequently; assist with reposition if unable to complete independently.  Float heels off bed; avoid pressure on the Achilles tendon.  Keep skin free from extended contact with medical devices.  Encourage functional activity and mobility, as early as tolerated.  Use aids (e.g., slide boards, mechanical lift) during transfer.  Recent Flowsheet Documentation  Taken 6/1/2024 1810 by Leonor Carranza RN  Body Position: weight shifting  Skin Protection:   adhesive use limited   skin-to-device areas padded   skin-to-skin areas padded   transparent dressing maintained   tubing/devices free from skin contact  Taken 6/1/2024 1615 by Leonor Carranza RN  Body Position: weight shifting  Skin Protection:   adhesive use limited   skin-to-device areas padded   skin-to-skin areas padded   transparent dressing maintained   tubing/devices free from skin contact  Taken 6/1/2024 1415 by Leonor Carranza RN  Body Position: weight shifting  Skin Protection:   adhesive use limited   skin-to-device areas padded   skin-to-skin areas padded   transparent dressing maintained   tubing/devices free from skin contact  Taken 6/1/2024 1215 by Leonor Carranza RN  Body Position: weight shifting  Skin Protection:   adhesive use limited   skin-to-device areas padded   skin-to-skin areas padded   transparent dressing maintained   tubing/devices free from skin contact  Taken 6/1/2024 1030 by Leonor Carranza RN  Body Position: weight shifting  Skin Protection:   adhesive use limited   skin-to-device areas padded   skin-to-skin areas padded   transparent dressing maintained   tubing/devices free from skin contact  Taken 6/1/2024 0928 by Leonor Carranza RN  Body Position:   weight shifting   neutral body alignment  Skin Protection:   adhesive use limited   skin-to-device areas padded   skin-to-skin areas padded   transparent dressing maintained   tubing/devices free from skin contact  Taken 6/1/2024 0800 by Leonor Carranza  RN  Body Position: weight shifting  Skin Protection:   adhesive use limited   skin-to-device areas padded   skin-to-skin areas padded   transparent dressing maintained   tubing/devices free from skin contact  Intervention: Prevent and Manage VTE (Venous Thromboembolism) Risk  Description: Assess for VTE (venous thromboembolism) risk.  Encourage and assist with early ambulation.  Initiate and maintain compression or other therapy, as indicated, based on identified risk in accordance with organizational protocol and provider order.  Encourage both active and passive leg exercises while in bed, if unable to ambulate.  Recent Flowsheet Documentation  Taken 6/1/2024 1810 by Leonor Carranza RN  Activity Management: activity minimized  Taken 6/1/2024 1615 by Leonor Carranza RN  Activity Management: activity minimized  Taken 6/1/2024 1415 by Leonor Carranza RN  Activity Management: activity minimized  Taken 6/1/2024 1215 by Leonor Craranza RN  Activity Management: activity minimized  Taken 6/1/2024 1030 by Leonor Carranza RN  Activity Management: activity minimized  Taken 6/1/2024 0928 by Leonor Carranza RN  Activity Management: activity minimized  VTE Prevention/Management:   sequential compression devices off   other (see comments)  Range of Motion: active ROM (range of motion) encouraged  Taken 6/1/2024 0800 by Leonor Carranza RN  Activity Management: activity minimized  Intervention: Prevent Infection  Description: Maintain skin and mucous membrane integrity; promote hand, oral and pulmonary hygiene.  Optimize fluid balance, nutrition, sleep and glycemic control to maximize infection resistance.  Identify potential sources of infection early to prevent or mitigate progression of infection (e.g., wound, lines, devices).  Evaluate ongoing need for invasive devices; remove promptly when no longer indicated.  Recent Flowsheet Documentation  Taken 6/1/2024 1810 by Leonor Carranza RN  Infection Prevention:   environmental  surveillance performed   rest/sleep promoted   single patient room provided  Taken 6/1/2024 1615 by Leonor Carranza RN  Infection Prevention:   environmental surveillance performed   rest/sleep promoted   single patient room provided  Taken 6/1/2024 1415 by Leonor Carranza RN  Infection Prevention:   environmental surveillance performed   rest/sleep promoted   single patient room provided  Taken 6/1/2024 1215 by Leonor Carranza RN  Infection Prevention:   environmental surveillance performed   rest/sleep promoted   single patient room provided  Taken 6/1/2024 1030 by Leonor Carranza RN  Infection Prevention:   environmental surveillance performed   rest/sleep promoted   single patient room provided  Taken 6/1/2024 0928 by Leonor Carranza RN  Infection Prevention:   environmental surveillance performed   rest/sleep promoted   single patient room provided  Taken 6/1/2024 0800 by Leonor Carranza RN  Infection Prevention:   environmental surveillance performed   rest/sleep promoted   single patient room provided     Problem: Adult Inpatient Plan of Care  Goal: Optimal Comfort and Wellbeing  Outcome: Ongoing, Not Progressing  Intervention: Provide Person-Centered Care  Description: Use a family-focused approach to care.  Develop trust and rapport by proactively providing information, encouraging questions, addressing concerns and offering reassurance.  Acknowledge emotional response to hospitalization.  Recognize and utilize personal coping strategies.  Honor spiritual and cultural preferences.  Recent Flowsheet Documentation  Taken 6/1/2024 1810 by Leonor Carranza RN  Trust Relationship/Rapport:   care explained   choices provided   emotional support provided   empathic listening provided   questions answered   questions encouraged   reassurance provided   thoughts/feelings acknowledged  Taken 6/1/2024 1615 by Leonor Carranza RN  Trust Relationship/Rapport:   care explained   choices provided   emotional support  provided   empathic listening provided   questions answered   questions encouraged   reassurance provided   thoughts/feelings acknowledged  Taken 6/1/2024 1415 by Leonor Carranza RN  Trust Relationship/Rapport:   care explained   choices provided   emotional support provided   empathic listening provided   questions answered   questions encouraged   reassurance provided   thoughts/feelings acknowledged  Taken 6/1/2024 1215 by Leonor Carranza RN  Trust Relationship/Rapport:   care explained   choices provided   emotional support provided   empathic listening provided   questions answered   questions encouraged   reassurance provided   thoughts/feelings acknowledged  Taken 6/1/2024 1030 by Leonor Carranza RN  Trust Relationship/Rapport:   care explained   choices provided   emotional support provided   empathic listening provided   questions answered   questions encouraged   reassurance provided   thoughts/feelings acknowledged  Taken 6/1/2024 0928 by Leonor Carranza RN  Trust Relationship/Rapport:   care explained   choices provided   emotional support provided   empathic listening provided   questions answered   questions encouraged   reassurance provided   thoughts/feelings acknowledged  Taken 6/1/2024 0800 by Leonor Carranza RN  Trust Relationship/Rapport:   care explained   choices provided   emotional support provided   empathic listening provided   questions answered   questions encouraged   reassurance provided   thoughts/feelings acknowledged     Problem: Skin Injury Risk Increased  Goal: Skin Health and Integrity  Outcome: Ongoing, Not Progressing  Intervention: Optimize Skin Protection  Description: Perform a full pressure injury risk assessment, as indicated by screening, upon admission to care unit.  Reassess skin (injury risk, full inspection) frequently (e.g., scheduled interval, with change in condition) to provide optimal early detection and prevention.  Maintain adequate tissue perfusion (e.g.,  encourage fluid balance; avoid crossing legs, constrictive clothing or devices) to promote tissue oxygenation.  Maintain head of bed at lowest degree of elevation tolerated, considering medical condition and other restrictions.  Avoid positioning onto an area that remains reddened.  Minimize incontinence and moisture (e.g., toileting schedule; moisture-wicking pad, diaper or incontinence collection device; skin moisture barrier).  Cleanse skin promptly and gently when soiled utilizing a pH-balanced cleanser.  Relieve and redistribute pressure (e.g., scheduled position changes, weight shifts, use of support surface, medical device repositioning, protective dressing application, use of positioning device, microclimate control, use of pressure-injury-monitor  Encourage increased activity, such as sitting in a chair at the bedside or early mobilization, when able to tolerate.  Recent Flowsheet Documentation  Taken 6/1/2024 1810 by Leonor Carranza RN  Pressure Reduction Techniques:   heels elevated off bed   weight shift assistance provided  Head of Bed (HOB) Positioning: [facility] elevated  Pressure Reduction Devices:   heel offloading device utilized   positioning supports utilized   pressure-redistributing mattress utilized  Skin Protection:   adhesive use limited   skin-to-device areas padded   skin-to-skin areas padded   transparent dressing maintained   tubing/devices free from skin contact  Taken 6/1/2024 1615 by Leonor Carranza RN  Pressure Reduction Techniques:   heels elevated off bed   weight shift assistance provided  Head of Bed (HOB) Positioning: [facility] elevated  Pressure Reduction Devices:   heel offloading device utilized   positioning supports utilized   pressure-redistributing mattress utilized  Skin Protection:   adhesive use limited   skin-to-device areas padded   skin-to-skin areas padded   transparent dressing maintained   tubing/devices free from skin contact  Taken 6/1/2024 1415 by Leonor Carranza  RN  Pressure Reduction Techniques:   heels elevated off bed   weight shift assistance provided  Head of Bed ([facility]) Positioning: [facility] elevated  Pressure Reduction Devices:   heel offloading device utilized   positioning supports utilized   pressure-redistributing mattress utilized  Skin Protection:   adhesive use limited   skin-to-device areas padded   skin-to-skin areas padded   transparent dressing maintained   tubing/devices free from skin contact  Taken 6/1/2024 1215 by Leonor Carranza RN  Pressure Reduction Techniques:   heels elevated off bed   weight shift assistance provided  Head of Bed ([facility]) Positioning: [facility] elevated  Pressure Reduction Devices:   heel offloading device utilized   positioning supports utilized   pressure-redistributing mattress utilized  Skin Protection:   adhesive use limited   skin-to-device areas padded   skin-to-skin areas padded   transparent dressing maintained   tubing/devices free from skin contact  Taken 6/1/2024 1030 by Leonor Carranza RN  Pressure Reduction Techniques:   heels elevated off bed   weight shift assistance provided  Head of Bed ([facility]) Positioning: [facility] elevated  Pressure Reduction Devices:   heel offloading device utilized   positioning supports utilized   pressure-redistributing mattress utilized  Skin Protection:   adhesive use limited   skin-to-device areas padded   skin-to-skin areas padded   transparent dressing maintained   tubing/devices free from skin contact  Taken 6/1/2024 0928 by Leonor Carranza RN  Pressure Reduction Techniques:   heels elevated off bed   weight shift assistance provided  Head of Bed ([facility]) Positioning: [facility] elevated  Pressure Reduction Devices:   heel offloading device utilized   positioning supports utilized   pressure-redistributing mattress utilized  Skin Protection:   adhesive use limited   skin-to-device areas padded   skin-to-skin areas padded   transparent dressing maintained   tubing/devices free from skin contact  Taken 6/1/2024 0800  by Leonor Carranza RN  Pressure Reduction Techniques:   heels elevated off bed   weight shift assistance provided  Head of Bed (HOB) Positioning: HOB elevated  Pressure Reduction Devices:   heel offloading device utilized   positioning supports utilized   pressure-redistributing mattress utilized  Skin Protection:   adhesive use limited   skin-to-device areas padded   skin-to-skin areas padded   transparent dressing maintained   tubing/devices free from skin contact     Problem: Fall Injury Risk  Goal: Absence of Fall and Fall-Related Injury  Outcome: Ongoing, Not Progressing  Intervention: Identify and Manage Contributors  Description: Develop a fall prevention plan with the patient and caregiver/family.  Provide reorientation, appropriate sensory stimulation and routines with changes in mental status to decrease risk of fall.  Promote use of personal vision and auditory aids.  Assess assistance level required for safe and effective self-care; provide support as needed, such as toileting, mobilization. For age 65 and older, implement timed toileting with assistance.  Encourage physical activity, such as performance of mobility and self-care at highest level of patient ability, multicomponent exercise program and provision of appropriate assistive devices.  If fall occurs, assess the severity of injury; implement fall injury protocol. Determine the cause and revise fall injury prevention plan.  Regularly review medication contribution to fall risk; adjust medication administration times to minimize risk of falling.  Consider risk related to polypharmacy and age.  Balance adequate pain management with potential for oversedation.  Recent Flowsheet Documentation  Taken 6/1/2024 1810 by Leonor Carranza RN  Medication Review/Management: medications reviewed  Self-Care Promotion:   BADL personal objects within reach   BADL personal routines maintained  Taken 6/1/2024 1615 by Leonor Carranza RN  Medication  Review/Management: medications reviewed  Self-Care Promotion:   BADL personal objects within reach   BADL personal routines maintained  Taken 6/1/2024 1415 by Leonor Carranza RN  Medication Review/Management: medications reviewed  Self-Care Promotion:   BADL personal objects within reach   BADL personal routines maintained  Taken 6/1/2024 1215 by Leonor Carranza RN  Medication Review/Management: medications reviewed  Self-Care Promotion:   BADL personal objects within reach   BADL personal routines maintained  Taken 6/1/2024 1030 by Leonor Carranza RN  Medication Review/Management: medications reviewed  Self-Care Promotion:   BADL personal objects within reach   BADL personal routines maintained  Taken 6/1/2024 0928 by Leonor Carranza RN  Medication Review/Management: medications reviewed  Self-Care Promotion:   BADL personal objects within reach   BADL personal routines maintained  Taken 6/1/2024 0800 by Leonor Carranza RN  Medication Review/Management: medications reviewed  Self-Care Promotion:   BADL personal objects within reach   BADL personal routines maintained  Intervention: Promote Injury-Free Environment  Description: Provide a safe, barrier-free environment that encourages independent activity.  Keep care area uncluttered and well-lighted.  Determine need for increased observation or monitoring.  Avoid use of devices that minimize mobility, such as restraints or indwelling urinary catheter.  Recent Flowsheet Documentation  Taken 6/1/2024 1810 by Leonor Carranza RN  Safety Promotion/Fall Prevention:   activity supervised   assistive device/personal items within reach   clutter free environment maintained   fall prevention program maintained   nonskid shoes/slippers when out of bed   room organization consistent   safety round/check completed   toileting scheduled  Taken 6/1/2024 1615 by Leonor Carranza RN  Safety Promotion/Fall Prevention:   activity supervised   assistive device/personal items within  reach   clutter free environment maintained   fall prevention program maintained   nonskid shoes/slippers when out of bed   room organization consistent   safety round/check completed   toileting scheduled  Taken 6/1/2024 1415 by Leonor Carranza RN  Safety Promotion/Fall Prevention:   activity supervised   assistive device/personal items within reach   clutter free environment maintained   fall prevention program maintained   nonskid shoes/slippers when out of bed   room organization consistent   safety round/check completed   toileting scheduled  Taken 6/1/2024 1215 by Leonor Carranza RN  Safety Promotion/Fall Prevention:   activity supervised   assistive device/personal items within reach   clutter free environment maintained   fall prevention program maintained   nonskid shoes/slippers when out of bed   room organization consistent   safety round/check completed   toileting scheduled  Taken 6/1/2024 1030 by Leonor Carranza RN  Safety Promotion/Fall Prevention:   activity supervised   assistive device/personal items within reach   clutter free environment maintained   fall prevention program maintained   nonskid shoes/slippers when out of bed   room organization consistent   safety round/check completed   toileting scheduled  Taken 6/1/2024 0928 by Leonor Carranza RN  Safety Promotion/Fall Prevention:   activity supervised   assistive device/personal items within reach   clutter free environment maintained   fall prevention program maintained   nonskid shoes/slippers when out of bed   room organization consistent   safety round/check completed   toileting scheduled  Taken 6/1/2024 0800 by Leonor Carranza RN  Safety Promotion/Fall Prevention:   activity supervised   assistive device/personal items within reach   clutter free environment maintained   fall prevention program maintained   nonskid shoes/slippers when out of bed   room organization consistent   safety round/check completed   toileting scheduled      Problem: Adjustment to Illness (Sepsis/Septic Shock)  Goal: Optimal Coping  Outcome: Ongoing, Not Progressing  Intervention: Optimize Psychosocial Adjustment to Illness  Description: Acknowledge, normalize, validate intensity and complexity of patient and support system response to situation.  Provide opportunity for expression of thoughts, feelings and concerns; respond with compassion and reassurance.  Decrease stress and anxiety by providing information about patient’s status and treatment.  Facilitate support system presence and participation in care; consider providing a diary in intensive care situation.  Support coping by recognizing current coping strategies; provide aid in developing new strategies.  Acknowledge and normalize difficulty in managing life-long lifestyle changes and expectations.  Assess and monitor for signs and symptoms of psychologic distress, anxiety and depression.  Consider palliative care consult for goals of care conversation, if the condition is worsening despite treatment.  Recent Flowsheet Documentation  Taken 6/1/2024 1810 by Leonor Carranza RN  Supportive Measures:   active listening utilized   self-care encouraged   self-reflection promoted   self-responsibility promoted   verbalization of feelings encouraged   positive reinforcement provided  Family/Support System Care:   support provided   self-care encouraged  Taken 6/1/2024 1615 by Leonor Carranza RN  Supportive Measures:   active listening utilized   self-care encouraged   self-reflection promoted   self-responsibility promoted   verbalization of feelings encouraged   positive reinforcement provided  Family/Support System Care:   support provided   self-care encouraged  Taken 6/1/2024 1415 by Leonor Carranza RN  Supportive Measures:   active listening utilized   self-care encouraged   self-reflection promoted   self-responsibility promoted   verbalization of feelings encouraged   positive reinforcement  provided  Family/Support System Care:   support provided   self-care encouraged  Taken 6/1/2024 1215 by Leonor Carranza, RN  Supportive Measures:   active listening utilized   self-care encouraged   self-reflection promoted   self-responsibility promoted   verbalization of feelings encouraged   positive reinforcement provided  Family/Support System Care:   support provided   self-care encouraged  Taken 6/1/2024 1030 by Leonor Carranza RN  Supportive Measures:   active listening utilized   self-care encouraged   self-reflection promoted   self-responsibility promoted   verbalization of feelings encouraged   positive reinforcement provided  Family/Support System Care:   support provided   self-care encouraged  Taken 6/1/2024 0928 by Leonor Carranza RN  Supportive Measures:   active listening utilized   self-care encouraged   self-reflection promoted   self-responsibility promoted   verbalization of feelings encouraged   positive reinforcement provided  Family/Support System Care:   support provided   self-care encouraged  Taken 6/1/2024 0800 by Leonor Carranza, RN  Supportive Measures:   active listening utilized   self-care encouraged   self-reflection promoted   self-responsibility promoted   verbalization of feelings encouraged   positive reinforcement provided  Family/Support System Care:   support provided   self-care encouraged     Problem: Bleeding (Sepsis/Septic Shock)  Goal: Absence of Bleeding  Outcome: Ongoing, Not Progressing  Intervention: Monitor and Manage Bleeding  Description: Maintain bleeding precautions; provide safe environment and gentle care activities, such as positioning, oral and skin care.  Avoid invasive procedures and medication that increase risk of bleeding; monitor for signs of bleeding frequently.  Anticipate need for fluid volume replacement (e.g., intravenous fluid, blood products); use weight-based calculations.  Consider adjunctive supportive therapy, such as platelet infusion or  heparin.  Provide protective hemostasis by applying direct pressure to a visible bleeding site.  Recent Flowsheet Documentation  Taken 6/1/2024 1810 by Leonor Carranza RN  Bleeding Precautions: monitored for signs of bleeding  Taken 6/1/2024 1615 by Leonor Carranza RN  Bleeding Precautions: monitored for signs of bleeding  Taken 6/1/2024 1415 by Leonor Carranza RN  Bleeding Precautions: monitored for signs of bleeding  Taken 6/1/2024 1215 by Leonor Carranza RN  Bleeding Precautions: monitored for signs of bleeding  Taken 6/1/2024 1030 by Leonor Carranza RN  Bleeding Precautions: monitored for signs of bleeding  Taken 6/1/2024 0928 by Leonor Carranza RN  Bleeding Precautions: monitored for signs of bleeding  Taken 6/1/2024 0800 by Leonor Carranza RN  Bleeding Precautions: monitored for signs of bleeding     Problem: Infection Progression (Sepsis/Septic Shock)  Goal: Absence of Infection Signs and Symptoms  Outcome: Ongoing, Not Progressing  Intervention: Initiate Sepsis Management  Description: Provide fluid therapy, such as crystalloid or albumin, to increase intravascular volume, organ perfusion and oxygen delivery.  Provide respiratory support, such as oxygen therapy, noninvasive or invasive positive pressure ventilation, to achieve oxygenation and ventilation goal; avoid hyperoxemia.  Obtain cultures prior to initiating antimicrobial therapy when possible. Do not delay for laboratory results in the presence of high suspicion or clinical indicators.  Administer intravenous broad-spectrum antimicrobial therapy promptly.  Implement hemodynamic monitoring to guide intravascular support based on individual targeted parameters.  Determine and address underlying source of infection aggressively; implement transmission-based precautions and isolation, as indicated.  Recent Flowsheet Documentation  Taken 6/1/2024 1810 by Leonor Carranza RN  Infection Prevention:   environmental surveillance performed   rest/sleep  promoted   single patient room provided  Taken 6/1/2024 1615 by Leonor Carranza RN  Infection Prevention:   environmental surveillance performed   rest/sleep promoted   single patient room provided  Taken 6/1/2024 1415 by Leonor Carranza RN  Infection Prevention:   environmental surveillance performed   rest/sleep promoted   single patient room provided  Taken 6/1/2024 1215 by Leonor Carranza RN  Infection Prevention:   environmental surveillance performed   rest/sleep promoted   single patient room provided  Taken 6/1/2024 1030 by Leonor Carranza RN  Infection Prevention:   environmental surveillance performed   rest/sleep promoted   single patient room provided  Taken 6/1/2024 0928 by Leonor Carranza RN  Infection Prevention:   environmental surveillance performed   rest/sleep promoted   single patient room provided  Taken 6/1/2024 0800 by Leonor Carranza RN  Infection Prevention:   environmental surveillance performed   rest/sleep promoted   single patient room provided  Intervention: Promote Recovery  Description: Encourage pulmonary hygiene, such as cough-enhancement and airway-clearance techniques, that may include use of incentive spirometry, deep breathing and cough.  Encourage early rehabilitation and physical activity to optimize functional ability and activity tolerance, as well as minimize delirium.  Promote energy conservation; minimize oxygen demand and consumption by adjusting environment, decreasing stimulation, maintaining normothermia and treating pain.  Optimize fluid balance, nutrition intake, sleep and glycemic control to maintain tissue perfusion and enhance immune response.  Recent Flowsheet Documentation  Taken 6/1/2024 1810 by Leonor Carranza RN  Activity Management: activity minimized  Sleep/Rest Enhancement:   awakenings minimized   regular sleep/rest pattern promoted   relaxation techniques promoted  Taken 6/1/2024 1615 by Leonor Carranza RN  Activity Management: activity  minimized  Sleep/Rest Enhancement:   awakenings minimized   regular sleep/rest pattern promoted   relaxation techniques promoted  Taken 6/1/2024 1415 by Leonor Carranza RN  Activity Management: activity minimized  Sleep/Rest Enhancement:   awakenings minimized   regular sleep/rest pattern promoted   relaxation techniques promoted  Taken 6/1/2024 1215 by Leonor Carranza RN  Activity Management: activity minimized  Sleep/Rest Enhancement:   awakenings minimized   regular sleep/rest pattern promoted   relaxation techniques promoted  Taken 6/1/2024 1030 by Leonor Carranza RN  Activity Management: activity minimized  Sleep/Rest Enhancement:   awakenings minimized   regular sleep/rest pattern promoted   relaxation techniques promoted  Taken 6/1/2024 0928 by Leonor Carranza RN  Activity Management: activity minimized  Sleep/Rest Enhancement:   awakenings minimized   regular sleep/rest pattern promoted   relaxation techniques promoted  Taken 6/1/2024 0800 by Leonor Carranza RN  Activity Management: activity minimized  Sleep/Rest Enhancement:   awakenings minimized   regular sleep/rest pattern promoted   relaxation techniques promoted  Intervention: Promote Stabilization  Description: Monitor for signs of fluid responsiveness and overload; consider fluid adjustment and diuretic therapy.  Anticipate use of vasoactive agent to support microperfusion and oxygen delivery; titrate to response.  Monitor laboratory value, diagnostic test and clinical status trends for signs of infection progression and multiple organ failure.  Assess effectiveness of and potential for de-escalation of the antimicrobial regimen daily.  Provide fever-reduction and comfort measures.  Monitor and manage electrolyte imbalance, such as hypocalcemia.  Use lung protective ventilation measures, such as low volume, inspiratory pressure, optimal positive end-expiratory pressure, to minimize the risk of ventilator-induced lung injury; ensure minute volume  demands.  Prepare for supportive therapy, such as corticosteroid therapy, coagulopathy management, CRRT (continuous renal replacement therapy), hemofiltration and cardiac-assist device.  Recent Flowsheet Documentation  Taken 6/1/2024 1810 by Leonor Carranza RN  Fluid/Electrolyte Management: fluids provided  Fever Reduction/Comfort Measures: lightweight clothing  Taken 6/1/2024 1615 by Leonor Carranza RN  Fluid/Electrolyte Management: fluids provided  Fever Reduction/Comfort Measures: lightweight clothing  Taken 6/1/2024 1415 by Leonor Carranza RN  Fluid/Electrolyte Management: fluids provided  Fever Reduction/Comfort Measures: lightweight clothing  Taken 6/1/2024 1215 by Leonor Carranza RN  Fluid/Electrolyte Management: fluids provided  Fever Reduction/Comfort Measures: lightweight clothing  Taken 6/1/2024 1030 by Leonor Carranza RN  Fluid/Electrolyte Management: fluids provided  Fever Reduction/Comfort Measures: lightweight clothing  Taken 6/1/2024 0928 by Leonor Carranza RN  Fluid/Electrolyte Management: fluids provided  Fever Reduction/Comfort Measures: lightweight clothing  Taken 6/1/2024 0800 by Leonor Carranza RN  Fluid/Electrolyte Management: fluids provided  Fever Reduction/Comfort Measures: lightweight clothing     Problem: Nutrition Impaired (Sepsis/Septic Shock)  Goal: Optimal Nutrition Intake  Outcome: Ongoing, Not Progressing     Problem: Social or Functional Impairment (Dementia Signs/Symptoms)  Goal: Enhanced Social or Functional Skills and Ability (Dementia Signs/Symptoms)  Outcome: Ongoing, Not Progressing  Intervention: Promote Social and Functional Ability  Description: Complete a functional assessment; identify deficit areas.  Explore the effect of symptoms and behavior on quality-of-life.  Assess quality of relationships and interactions; support social engagement; model appropriate social skills.  Promote and encourage autonomy and independence, when possible; assist with toileting, feeding or  personal hygiene, as needed.  Utilize psychosocial interventions to provide sensory stimulation, engagement and social interaction.  Encourage and facilitate participation in expressive therapy and therapeutic recreational exercises.  Review advance care plan, if present; revise as needs and wishes evolve; consider palliative care referral and consult.  Assess for caregiver stress, grief and loss; provide family-based services.  Recent Flowsheet Documentation  Taken 6/1/2024 1810 by Leonor Carranza RN  Trust Relationship/Rapport:   care explained   choices provided   emotional support provided   empathic listening provided   questions answered   questions encouraged   reassurance provided   thoughts/feelings acknowledged  Taken 6/1/2024 1615 by Leonor Carranza RN  Trust Relationship/Rapport:   care explained   choices provided   emotional support provided   empathic listening provided   questions answered   questions encouraged   reassurance provided   thoughts/feelings acknowledged  Taken 6/1/2024 1415 by Leonor Carranza RN  Trust Relationship/Rapport:   care explained   choices provided   emotional support provided   empathic listening provided   questions answered   questions encouraged   reassurance provided   thoughts/feelings acknowledged  Taken 6/1/2024 1215 by Leonor Carranza RN  Trust Relationship/Rapport:   care explained   choices provided   emotional support provided   empathic listening provided   questions answered   questions encouraged   reassurance provided   thoughts/feelings acknowledged  Taken 6/1/2024 1030 by Leonor Carranza RN  Trust Relationship/Rapport:   care explained   choices provided   emotional support provided   empathic listening provided   questions answered   questions encouraged   reassurance provided   thoughts/feelings acknowledged  Taken 6/1/2024 0928 by Leonor Carranza RN  Trust Relationship/Rapport:   care explained   choices provided   emotional support provided   empathic  listening provided   questions answered   questions encouraged   reassurance provided   thoughts/feelings acknowledged  Taken 6/1/2024 0800 by Leonor Carranza RN  Trust Relationship/Rapport:   care explained   choices provided   emotional support provided   empathic listening provided   questions answered   questions encouraged   reassurance provided   thoughts/feelings acknowledged   Goal Outcome Evaluation:  Plan of Care Reviewed With: patient, daughter        Progress: improving  Outcome Evaluation: Pt condition has maintained over the past few days, aFib continues, see MAR for orders/ 2L NC 95%. Pt will drink and eat with very small sips and bites with small sips after each bite as directed by Speech Therapy, pt given many breaks and not given too much to help reduce aspiration risk. Pt seemed uncomfortable at times with increased respiration and heart rate, some twitching, PRN meds given. Discussed ongoing care with daughter in the afternoon. Continue monitoring.

## 2024-06-02 NOTE — PROGRESS NOTES
[facility] Medicine Services  PROGRESS NOTE    Patient Name: Zainab Worthy  : 2/3/1929  MRN: [id number]    Date of Admission: 2024  Primary Care Physician: Aguilar Kelly MD    Subjective   Subjective     CC:  F/u Afib RVR     HPI:  Patient is resting in bed. She is moaning at times. HR is fluctuating at times up to 170's. Will dc tele and switch digoxin over to IV to help with rate control.       Objective   Objective     Vital Signs:   Temp:  [97.7 °F (36.5 °C)-97.9 °F (36.6 °C)] 97.7 °F (36.5 °C)  Heart Rate:  [107-167] 167  Resp:  [19-20] 19  BP: ()/(54-75) 91/66  Flow (L/min):  [2-4] 4     Physical Exam:  Constitutional: No acute distress, sleeping   HENT: NCAT, mucous membranes moist  Respiratory: Clear to auscultation bilaterally, respiratory effort normal room air 91%  Cardiovascular: irregular, no murmurs, rubs, or gallops  Gastrointestinal: Positive bowel sounds, soft, nontender, nondistended  Musculoskeletal: No bilateral ankle edema  Psychiatric: when touched patient would moan   Neurologic: moving ext spontaneously   Skin: No rashes, pale       Results Reviewed:  LAB RESULTS:      Lab 24  0236 24  0604 24  0617 24  2135 24  1844 24  1823 24  1814   WBC 16.64* 8.91 6.81  --   --   --  8.41   HEMOGLOBIN 13.8 14.1 12.5  --   --   --  13.8   HEMOGLOBIN, POC  --   --   --   --  12.6  --   --    HEMATOCRIT 43.6 44.2 39.0  --   --   --  42.5   HEMATOCRIT POC  --   --   --   --  37*  --   --    PLATELETS 250 214 199  --   --   --  220   NEUTROS ABS  --  6.54 4.26  --   --   --  6.14   IMMATURE GRANS (ABS)  --  0.08* 0.06*  --   --   --  0.06*   LYMPHS ABS  --  1.43 1.85  --   --   --  1.59   MONOS ABS  --  0.72 0.56  --   --   --  0.59   EOS ABS  --  0.11 0.06  --   --   --  0.01   MCV 90.1 90.8 87.2  --   --   --  88.7   PROCALCITONIN  --   --   --   --   --   --  0.16   LACTATE  --   --   --  2.0  --  2.8*  --          Lab  06/02/24  0236 05/30/24  0302 05/29/24  0604 05/28/24  0617 05/27/24  1844 05/27/24  1814   SODIUM 148* 151* 148* 148*  --  147*   POTASSIUM 3.5 3.9 3.8 3.9  --  3.8   CHLORIDE 112* 115* 113* 117*  --  111*   CO2 26.0 20.0* 23.0 20.0*  --  23.0   ANION GAP 10.0 16.0* 12.0 11.0  --  13.0   BUN 34* 27* 28* 42*  --  63*   CREATININE 0.76 0.68 0.74 0.86 1.20 1.25*   EGFR 72.3 80.3 74.6 62.3 41.8* 39.8*   GLUCOSE 111* 77 82 92  --  116*   CALCIUM 8.6 8.3 8.0* 7.5*  --  8.1*   MAGNESIUM  --   --   --  2.3  --  2.7*   TSH  --   --   --   --   --  1.650  1.650         Lab 05/28/24  0617 05/27/24  1814   TOTAL PROTEIN 5.4* 6.1   ALBUMIN 2.6* 3.0*   GLOBULIN 2.8 3.1   ALT (SGPT) 20 15   AST (SGOT) 34* 28   BILIRUBIN 0.3 0.5   ALK PHOS 101 106         Lab 05/30/24  0302 05/29/24  0604 05/28/24  0828 05/28/24  0617 05/27/24  1814   PROBNP 8,994.0* 10,626.0*  --   --  9,951.0*   HSTROP T  --   --  38* 38* 38*                 Brief Urine Lab Results       None            Microbiology Results Abnormal       Procedure Component Value - Date/Time    COVID PRE-OP / PRE-PROCEDURE SCREENING ORDER (NO ISOLATION) - Swab, Nasal Cavity [[id number]]  (Normal) Collected: 05/27/24 1730    Lab Status: Final result Specimen: Swab from Nasal Cavity Updated: 05/27/24 1908    Narrative:      The following orders were created for panel order COVID PRE-OP / PRE-PROCEDURE SCREENING ORDER (NO ISOLATION) - Swab, Nasal Cavity.  Procedure                               Abnormality         Status                     ---------                               -----------         ------                     COVID-19 and FLU A/B PCR...[[id number]]  Normal              Final result                 Please view results for these tests on the individual orders.    COVID-19 and FLU A/B PCR, 1 HR TAT - Swab, Nasopharynx [[id number]]  (Normal) Collected: 05/27/24 8093    Lab Status: Final result Specimen: Swab from Nasopharynx Updated: 05/27/24 1908     COVID19 Not  Detected     Influenza A PCR Not Detected     Influenza B PCR Not Detected    Narrative:      Fact sheet for providers: https://www.fda.gov/media/448939/download    Fact sheet for patients: https://www.fda.gov/media/351447/download    Test performed by PCR.            No radiology results from the last 24 hrs    Results for orders placed during the hospital encounter of 05/27/24    Adult Transthoracic Echo Complete w/ Color, Spectral and Contrast if necessary per protocol    Interpretation Summary    Left ventricular systolic function is normal. Calculated left ventricular EF = 51.7%    The left atrial cavity is dilated.    Left atrial volume is mildly increased.    Estimated right ventricular systolic pressure from tricuspid regurgitation is normal (<35 mmHg). Calculated right ventricular systolic pressure from tricuspid regurgitation is 34 mmHg.    mitral valve and aortic valve thickened.  mild AI and mild MR      Current medications:  Scheduled Meds:apixaban, 2.5 mg, Oral, Q12H  digoxin, 125 mcg, Intravenous, Daily  metoprolol tartrate, 37.5 mg, Oral, Q12H  sodium chloride, 10 mL, Intravenous, Q12H      Continuous Infusions:   PRN Meds:.  acetaminophen    Biotene dry mouth **AND** hydrogen peroxide **AND** mineral oil    bisacodyl    Calcium Replacement - Follow Nurse / BPA Driven Protocol    Magnesium Standard Dose Replacement - Follow Nurse / BPA Driven Protocol    midazolam    morphine    Morphine    nitroglycerin    ondansetron ODT **OR** ondansetron    Phosphorus Replacement - Follow Nurse / BPA Driven Protocol    Potassium Replacement - Follow Nurse / BPA Driven Protocol    [COMPLETED] Insert Peripheral IV **AND** sodium chloride    sodium chloride    sodium chloride    Assessment & Plan   Assessment & Plan     Active Hospital Problems    Diagnosis  POA    **Atrial fibrillation with RVR [I48.91]  Yes      Resolved Hospital Problems   No resolved problems to display.        Brief Hospital Course to  date:  Zainab Worthy is a 89 y.o. female  with a history of dementia presenting with symptoms of increased lethargy and shortness of breath, she was found to be hypotensive with A-fib and RVR.  She received a dose of digoxin and bolused amiodarone.  Cardiology consulted.    Plan was partially entered by my partner and I have reviewed and updated as appropriate on 6/2/24     New onset Afib w RVR  Pulm edema  HFpEF  --Cardiology consulted and signed off when family made patient comfort care  --Continue Eliquis  --S/p amiodarone, digoxin load  -- Continue oral digoxin, metoprolol  -- not taking anything by mouth  and HR is up to 170's and borderline bp, will change digoxin over to IV for comfort   -- will dc tele     Worsening hypernatremia  --Na  up to 150   --Likely related to diuresis, will give small amount of fluids; discussed with patient's son (Chad Worthy) on 5/30 and he and the family would like to transition to comfort measures   --stop lab draws  -- s/p IVF's down to 148     Dementia  --Gradual decline over 7 years; over the past 18 months, she has significantly more difficulty with recognizing family, does not know the year or where she is at baseline, unable to feed herself currently  --cont home meds  --complicates all aspects of care  -- Hospice appropriate; discussed with patient's son on 5/30 he would like to transition to hospice and comfort measures; hospice consulted  -- Does not meet inpatient hospice requirements when seen on 5/31 will have onsite  visit from facility on 6/3, will talk with hospice to reevaluate since now not taking po and needing IV meds      Dysphagia  --my partner Discussed with Chad Worthy over the phone. He stated the family has decided not to proceed with any swallow evaluation     Hx of colon cancer s/p partial resection  Hx of liver mets s/p partial hepatectomy    Talked with inpatient hospice who states if patient still the same in am and requiring iv meds to place consult  for them to re-evaluate. Will try and call daughter and update        Expected Discharge Location and Transportation: facility with hospice or inpatient hospice.   Expected Discharge   Expected Discharge Date: 6/3/2024; Expected Discharge Time:      DVT prophylaxis:  Medical and mechanical DVT prophylaxis orders are present.         AM-PAC 6 Clicks Score (PT): 6 (06/01/24 4058)    CODE STATUS:   Code Status and Medical Interventions:   Ordered at: 05/30/24 1111     Level Of Support Discussed With:    Next of Kin (If No Surrogate)     Code Status (Patient has no pulse and is not breathing):    No CPR (Do Not Attempt to Resuscitate)     Medical Interventions (Patient has pulse or is breathing):    Comfort Measures       Hayley Olivo, KIMBERLEY  06/02/24

## 2024-06-03 PROBLEM — I51.9 HEART DISEASE: Status: ACTIVE | Noted: 2024-01-01

## 2024-06-03 NOTE — PROGRESS NOTES
Continued Stay Note  [facility]     Patient Name: Zainab Worthy  MRN: [id number]  Today's Date: 6/3/2024    Admit Date: 6/3/2024    Plan: Inpatient hospice   Discharge Plan       Row Name 06/03/24 5020       Plan    Plan Inpatient hospice    Plan Comments LD 10% pps 89yo  female admitted to inpatient hospice services. Visit to bedside with Val Bonilla and Dereje via speaker phone. Chad is poa, paperwork provided. Rn and SW discuss hospice, picture of care with inpatient hospice, assess of patient, medication requirements, eol signs/symptoms, and self care. Rn and SW Actively listened as Val Bonilla David relay desire for comfort care and inpatient hospice. Rn and SW validate. Patient approved for inpatient hospice services per Dr. Christian/Dr. Geiger. Telephone order per Dr. Christian verified and read back. Dr. Harden aware of discharge/readmit. Care coordinated with Evita HINOJOSA [facility]. Rn discussed with pharmacy preference of morphine pca without bolus dose versus morphine infusion. Noted preference of pca without bolus. Orders went over with Evita HINOJOSA [facility]. Rn discussed medication ordered, plan for comfort, and plan of care with Val and Chad who verbalized understanding and appreciation of services. Rn provided for reflective communication and ongoing support. Patient requires injectable medication for symptom palliation necessitating skilled nursing care. Disposition dependent on survival of hospitalization and ability to provide care needs in an alternate setting.     Final Discharge Disposition Code 51 - hospice medical facility                   Discharge Codes    No documentation.                       Capri Mathis RN

## 2024-06-03 NOTE — CASE MANAGEMENT/SOCIAL WORK
Continued Stay Note  [facility]     Patient Name: Zainab Worthy  MRN: [id number]  Today's Date: 6/3/2024    Admit Date: 5/27/2024    Plan: discharge plan   Discharge Plan       Row Name 06/03/24 1035       Plan    Plan discharge plan    Plan Comments Per hospice note and discussion in MDR, family plans to meet with inpatient hospice today. Transportation with [facility] cancelled. I updated Mor(ALENA) at [place]. CM will cont to follow      Row Name 06/03/24 5383       Plan    Plan Comments Inpatient                   Discharge Codes    No documentation.                 Expected Discharge Date and Time       Expected Discharge Date Expected Discharge Time    Luis Miguel 3, 2024               Rebecca Hines RN

## 2024-06-03 NOTE — PROGRESS NOTES
Continued Stay Note  [facility]     Patient Name: Zainab Worthy  MRN: [id number]  Today's Date: 6/3/2024    Admit Date: 5/27/2024    Plan: TBD   Discharge Plan       Row Name 06/03/24 0946       Plan    Plan Comments Inpatient hospice has been in contact with daughter, Val. Attempted to contact ariana Bonilla, first without success. Per Val, Chad is the hcs and is working today. She relays that Chad is the hcs. She is going to call Chad and get back with inpatient hospice. Rn provided contact information. Val relays that the family does want inpatient hospice. Rn contacted Evita HINOJOSA [facility] to update. Rn received call that Chad called would like return call. Rn spoke with ariana Bonilla/hcs. Via telephone. Discussed inpatient hospice briefly. He relays appreciation, desire to meet today. He relays that he will get with his sister and she will call back with time to meet inpatient hospice today. Please call [phone] with needs/concerns.                    Discharge Codes    No documentation.                 Expected Discharge Date and Time       Expected Discharge Date Expected Discharge Time    Luis Miguel 3, 2024               Capri Mathis RN

## 2024-06-03 NOTE — DISCHARGE PLACEMENT REQUEST
"Zainab Worthy (89 y.o. Female)       Date of Birth   02/03/1929    Social Security Number       Address   [address] [place] [address]  [place] [address]    Home Phone   [phone]    MRN   [id number]       [place]ai   Zoroastrianism    Marital Status                               Admission Date   5/27/24    Admission Type   Emergency    Admitting Provider   Leila Harden MD    Attending Provider   Leila Harden MD    Department, Room/Bed   [facility] 6A, N605/1       Discharge Date       Discharge Disposition       Discharge Destination                                 Attending Provider: Leila Harden MD    Allergies: Asa [Aspirin], Bactrim [Sulfamethoxazole-trimethoprim], Penicillins, Ultram [Tramadol]    Isolation: None   Infection: None   Code Status: No CPR    Ht: 152.4 cm (60\")   Wt: 60.8 kg (134 lb)    Admission Cmt: None   Principal Problem: Atrial fibrillation with RVR [I48.91]                   Active Insurance as of 5/27/2024       Primary Coverage       Payor Plan Insurance Group Employer/Plan Group    MEDICARE MEDICARE A & B        Payor Plan Address Payor Plan Phone Number Payor Plan Fax Number Effective Dates    [address] [phone]  2/1/1994 - None Entered    [facility] [id number]         Subscriber Name Subscriber Birth Date Member ID       CHANDRIKA WORTHYZAK SALMERON 2/3/1929 [id number]               Secondary Coverage       Payor Plan Insurance Group Employer/Plan Group     FOR LIFE  FOR LIFE  SUP         Payor Plan Address Payor Plan Phone Number Payor Plan Fax Number Effective Dates    [address] [phone]  5/18/2016 - None Entered    [facility] [id number]         Subscriber Name Subscriber Birth Date Member ID       ZAINAB WORTHY JARON 2/3/1929 [id number]                     Emergency Contacts        (Rel.) Home Phone Work Phone Mobile Phone    Chad Worthy (Son) [phone] -- [phone]    Val Dawson (Daughter) [phone] -- [phone]    Dereje Worthy " (Son) [phone] -- [phone]              Emergency Contact Information       Name Relation Home Work Mobile    Chad Worthy Son [phone]    Val Dawson Daughter [phone]    Dereje Worthy Son [phone]          Insurance Information                  MEDICARE/MEDICARE A & B Phone: [phone]    Subscriber: Zainab Worthy Subscriber#: [id number]    Group#: -- Precert#: --         FOR LIFE/ FOR LIFE MC SUP  Phone: [phone]    Subscriber: Zainab Worthy Subscriber#: [id number]    Group#: -- Precert#: --             History & Physical        Renzo Adam III, DO at 24 2214              [facility]  HISTORY AND PHYSICAL    Patient Name: Zainab Worthy  : 2/3/1929  MRN: [id number]  Primary Care Physician: Aguilar Kelly MD  Date of admission: 2024      Subjective  Subjective     Chief Complaint:  Lethargy    HPI:  Zainab Worthy is a 89 y.o. female who has history of dementia and cannot provide any HPI information; all information comes from her children who accompany her in the ED room during my visit.  One of her children states that he was called earlier today () by the nursing home staff at the facility where the patient resides, at which time they voiced concern due to increased lethargy and some apparent shortness of breath exhibited by the patient.  Family agreed with nursing home plan to send the patient to the ED here for further evaluation.  After arrival, she was found to be in atrial fibrillation with RVR, which she has never had before.  Family also states that staff at the nursing home had mentioned that the patient had been increasingly lethargic over the last 2 days, and she has had significantly decreased oral intake of food and fluids over that time.  She was initially hypotensive here, according to the ED provider, and she received a dose of digoxin and  then bolus dose of amiodarone, followed by amiodarone drip.  She had some improvement in her rhythm down to the 120-130s.  IV fluids were also started in the ED.  According to the family, the patient's medical history is significant for Alzheimer's dementia, history of colon cancer s/p partial resection, with metastases to the liver and subsequent partial hepatectomy; she currently sees no treatment for the cancer diagnosis.  Family also confirms DNR/DNI CODE STATUS.      Personal History     Past Medical History:   Diagnosis Date    Anorexia     Arthritis     Asthma     Cataract     Colon cancer     Colon cancer metastasized to liver     DDD (degenerative disc disease), lumbar     Hearing loss     History of transfusion     Hypertension     Liver cancer     Macular degeneration     Onychomycosis     Urine frequency          Oncology Problem List:  Metastatic colon cancer in female (02/07/2018; Status: Active)  Adenocarcinoma of colon metastatic to liver (10/14/2016; Status:   Resolved)  Rectal cancer (07/19/2016; Status: Active)  Endometrial cancer (07/02/2016; Status: Active)  Oncology/Hematology History   Rectal cancer   7/19/2016 Initial Diagnosis    Rectal cancer     Adenocarcinoma of colon metastatic to liver (Resolved)   12/9/2014 Initial Diagnosis    Adenocarcinoma of colon metastatic to liver       Past Surgical History:   Procedure Laterality Date    CATARACT EXTRACTION Bilateral     COLON RESECTION  06/16/2009    Low anterior resection    GALLBLADDER SURGERY      Resection of gallbladder    HIP HEMIARTHROPLASTY Right 2/8/2018    Procedure: HIP HEMIARTHROPLASTY;  Surgeon: Fernando Dominguez MD;  Location: [facility];  Service:     LIVER SURGERY  12/09/2014    Liver tumor, 1/2 was removed.    SUBTOTAL HYSTERECTOMY         Family History: family history includes Arthritis in an other family member; Cervical cancer in an other family member; Colon cancer in an other family member; Uterine cancer in an other  family member.     Social History:  reports that she has never smoked. She has never used smokeless tobacco. She reports that she does not drink alcohol and does not use drugs.  Social History     Social History Narrative    Not on file       Medications:  Available home medication information reviewed.  LORazepam, acetaminophen, b complex-C-folic acid, bisacodyl, ciprofloxacin, diclofenac, docusate sodium, donepezil, famotidine, memantine, mirtazapine, multivitamin-antioxidants, nystatin, oxybutynin, polyethylene glycol, and solifenacin    Allergies   Allergen Reactions    Asa [Aspirin] Unknown (See Comments)     UNKNOWN    Bactrim [Sulfamethoxazole-Trimethoprim] Unknown (See Comments)     UNKNOWN    Penicillins Unknown (See Comments)     UNKNOWN      Ultram [Tramadol] Unknown (See Comments)     UNKNOWN         Objective  Objective     Vital Signs:   Temp:  [96 °F (35.6 °C)] 96 °F (35.6 °C)  Heart Rate:  [104-184] 133  Resp:  [22-36] 36  BP: ()/() 96/69  Flow (L/min):  [2] 2       Physical Exam   Constitutional: Awake, alert, NAD.  Eyes: PERRLA, sclerae anicteric, dry eyes but no conjunctival injection  HENT: NCAT, mucous membranes dry  Neck: Supple, no thyromegaly, no lymphadenopathy, trachea midline  Respiratory: Clear to auscultation bilaterally, nonlabored respirations   Cardiovascular: Tachycardic and irregularly irregular, no murmurs, rubs, or gallops, palpable pedal pulses bilaterally  Gastrointestinal: Positive but hypoactive bowel sounds, soft, nontender, nondistended  Musculoskeletal: No bilateral ankle edema, no clubbing or cyanosis to extremities  Psychiatric: Appropriate affect, cooperative; confused.  Neurologic: Does not answer orientation questions appropriately.  Answers yes to all questions. CHYNA.  Skin: No rashes, poor turgor.    Result Review:  I have personally reviewed the results from the time of this admission to 5/27/2024 22:14 EDT and agree with these findings:  [x]  Laboratory  list / accordion  []  Microbiology  [x]  Radiology  [x]  EKG/Telemetry   []  Cardiology/Vascular   []  Pathology  []  Old records  []  Other:  Most notable findings include: I reviewed EKG which by my read shows atrial fibrillation, RVR, rate approximately 160 bpm, normal axis, nonspecific ST/T wave changes.  I reviewed chest x-ray which is a single AP view in my mind and shows no infiltrate suggestive of pneumonia but some increased interstitial markings which raise suspicion for mild edema.      LAB RESULTS:      Lab 05/27/24 2135 05/27/24 1844 05/27/24 1823 05/27/24 1814   WBC  --   --   --  8.41   HEMOGLOBIN  --   --   --  13.8   HEMOGLOBIN, POC  --  12.6  --   --    HEMATOCRIT  --   --   --  42.5   HEMATOCRIT POC  --  37*  --   --    PLATELETS  --   --   --  220   NEUTROS ABS  --   --   --  6.14   IMMATURE GRANS (ABS)  --   --   --  0.06*   LYMPHS ABS  --   --   --  1.59   MONOS ABS  --   --   --  0.59   EOS ABS  --   --   --  0.01   MCV  --   --   --  88.7   PROCALCITONIN  --   --   --  0.16   LACTATE 2.0  --  2.8*  --          Lab 05/27/24 1844 05/27/24 1814   SODIUM  --  147*   POTASSIUM  --  3.8   CHLORIDE  --  111*   CO2  --  23.0   ANION GAP  --  13.0   BUN  --  63*   CREATININE 1.20 1.25*   EGFR 41.8* 39.8*   GLUCOSE  --  116*   CALCIUM  --  8.1*   MAGNESIUM  --  2.7*   TSH  --  1.650         Lab 05/27/24 1814   TOTAL PROTEIN 6.1   ALBUMIN 3.0*   GLOBULIN 3.1   ALT (SGPT) 15   AST (SGOT) 28   BILIRUBIN 0.5   ALK PHOS 106         Lab 05/27/24 1814   PROBNP 9,951.0*   HSTROP T 38*                     Microbiology Results (last 10 days)       Procedure Component Value - Date/Time    COVID PRE-OP / PRE-PROCEDURE SCREENING ORDER (NO ISOLATION) - Swab, Nasal Cavity [[id number]]  (Normal) Collected: 05/27/24 1730    Lab Status: Final result Specimen: Swab from Nasal Cavity Updated: 05/27/24 0828    Narrative:      The following orders were created for panel order COVID PRE-OP / PRE-PROCEDURE SCREENING  ORDER (NO ISOLATION) - Swab, Nasal Cavity.  Procedure                               Abnormality         Status                     ---------                               -----------         ------                     COVID-19 and FLU A/B PCR...[[id number]]  Normal              Final result                 Please view results for these tests on the individual orders.    COVID-19 and FLU A/B PCR, 1 HR TAT - Swab, Nasopharynx [[id number]]  (Normal) Collected: 05/27/24 1730    Lab Status: Final result Specimen: Swab from Nasopharynx Updated: 05/27/24 1908     COVID19 Not Detected     Influenza A PCR Not Detected     Influenza B PCR Not Detected    Narrative:      Fact sheet for providers: https://www.fda.gov/media/369581/download    Fact sheet for patients: https://www.fda.gov/media/156869/download    Test performed by PCR.            XR Chest 1 View    Result Date: 5/27/2024  XR CHEST 1 VW Date of Exam: 5/27/2024 5:31 PM EDT Indication: shortness of breath, coarse left breath sounds Comparison: Chest x-ray 8/30/2018 Findings: There is mild cardiomegaly. There is diffuse interstitial prominence. No focal consolidation. No definite pleural effusion. No pneumothorax. The bones are demineralized without acute osseous findings.     Impression: Impression: Diffuse interstitial prominence which may reflect chronic/senescent interstitial change with although component of interstitial edema is difficult to entirely exclude. Electronically Signed: Humphrey Santillan MD  5/27/2024 5:54 PM EDT  Workstation ID: [id number]         Assessment & Plan  Assessment & Plan       Atrial fibrillation with RVR      89 F with atrial fibrillation/RVR, new diagnosis for her    Atrial fibrillation with RVR  Chest x-ray appearance concerning for pulmonary edema  - Will continue light IVF hydration for now, she is clinically dry and lungs are clear on exam.  - Will check follow-up chest x-ray for Tuesday morning.  - Continue amiodarone drip; also  received digoxin in the ED.  - Continue telemetry.  - Check a.m. troponin.  - Check 2D echo.  - Cardiology consult requested, input appreciated, will follow up with their recommendations.    Dementia  - Continue Namenda and Aricept from home regimen.  - Continue as needed Ativan, also Remeron at night.    History of colon cancer s/p partial resection  History of liver metastasis s/p partial hepatectomy  - No current treatment, chronic comorbidities.        Total time spent: 79 minutes  Time spent includes time reviewing chart, face-to-face time, counseling patient/family/caregiver, ordering medications/tests/procedures, communicating with other health care professionals, documenting clinical information in the electronic health record, and coordination of care.       DVT prophylaxis:  Mechanical DVT prophylaxis orders are present.          CODE STATUS: DNR/DNI  Code Status and Medical Interventions:   Ordered at: 05/27/24 6375     Code Status (Patient has no pulse and is not breathing):    No CPR (Do Not Attempt to Resuscitate)     Medical Interventions (Patient has pulse or is breathing):    Full Support     Comments:    DNR/DNI in the event of cardiac and/or respiratory arrest, d/w children       Expected Discharge   tbd    Renzo Adam III,   05/27/24     Electronically signed by Renzo Adam III, DO at 05/27/24 1478

## 2024-06-03 NOTE — PLAN OF CARE
Goal Outcome Evaluation:         Pt resting comfortably in bed. Continues to be on PCA pump. Medications administered as ordered by MD. Family at the bedside and denies any needs at this time.

## 2024-06-03 NOTE — DISCHARGE SUMMARY
[facility] Services  DISCHARGE TO INPATIENT HOSPICE    Patient Name: Zainab Worthy  : 2/3/1929  MRN: [id number]    Date of Admission: 2024  Date of Discharge: 6/3/2024  Primary Care Physician: Aguilar Kelly MD    Consults       Date and Time Order Name Status Description    2024  6:19 PM Inpatient Palliative Care MD Consult Completed     2024  9:31 PM Inpatient Cardiology Consult Completed           Hospital Course     Presenting Problem:   Atrial fibrillation with RVR [I48.91]    Active Hospital Problems    Diagnosis  POA    **Atrial fibrillation with RVR [I48.91]  Yes      Resolved Hospital Problems   No resolved problems to display.          Hospital Course:  Zainab Worthy is a 89 y.o. female  with a history of dementia presenting with symptoms of increased lethargy and shortness of breath, she was found to be hypotensive with A-fib and RVR.  She received a dose of digoxin and bolused amiodarone.  Cardiology consulted.     This patient's problems and plans were partially entered by my partner and updated as appropriate by me 24.     New onset Afib w RVR  Pulm edema  HFpEF  --Cardiology consulted and signed off when family made patient comfort care  --Stop Eliquis  --S/p amiodarone, digoxin load  -- not taking anything by mouth  and HR is up to 170's and borderline bp, changed digoxin over to IV for comfort --> stop when admitted to inpatient hospice  --dc tele     Worsening hypernatremia  --Na  up to 150   --Discussed with patient's son (Chad Worthy) on  and he and the family would like to transition to comfort measures   --stopped lab draws, no additional IVF per discussion with family     Dementia  --Gradual decline over 7 years; over the past 18 months, she has significantly more difficulty with recognizing family, does not know the year or where she is at baseline, unable to feed herself currently  --complicates all aspects of care  -- Hospice  appropriate; discussed with patient's son on 5/30, he would like to transition to hospice and comfort measures; hospice consulted  --As needed benzodiazepines, opiates, Robinul  --Currently meets criteria for inpatient hospice given significant IV pain and benzodiazepine use; admitted to inpatient hospice today     Dysphagia  --Discussed with Chad Worthy over the phone. He stated the family has decided not to proceed with any swallow evaluation     Hx of colon cancer s/p partial resection  Hx of liver mets s/p partial hepatectomy    Day of Discharge     HPI:   Versed IV x 4, morphine x 7 since yesterday.    ROS:  Unable to obtain given altered mental status    Vital Signs:   Temp:  [96.2 °F (35.7 °C)] 96.2 °F (35.7 °C)  Heart Rate:  [103-175] 123  Resp:  [18] 18  BP: ()/() 87/75     Physical Exam:  Laying in bed  Does not awaken to verbal stimulation  Rattling secretions  IRIR, HR 120s    Discharge Details     Discharge Disposition:  Transfer care to inpatient Hospice at [facility]    Time Spent on Discharge: 32 minutes    Leila Harden MD  06/03/24

## 2024-06-03 NOTE — CONSULTS
Inpatient hospice to meet with family at 1330 today. Patient's symptoms managed with current IV medications. If not admitted to inpatient hospice, Palliative will see patient for full consult.

## 2024-06-03 NOTE — PROGRESS NOTES
Continued Stay Note  [facility]     Patient Name: Zainab Worthy  MRN: [id number]  Today's Date: 6/3/2024    Admit Date: 5/27/2024    Plan: discharge plan   Discharge Plan       Row Name 06/03/24 1045       Plan    Plan Comments Inpatient hospice received call from Val Dawson, daughter of patient. She relays meeting in patient room at 1330 today. Please call [phone] with needs.                     Discharge Codes    No documentation.                 Expected Discharge Date and Time       Expected Discharge Date Expected Discharge Time    Luis Miguel 3, 2024               Capri Mathis RN

## 2024-06-04 NOTE — H&P
Aguilar Kelly MD  Consulting physician: Anahi Olivares chief complaint on file.      Reason for consult: EOL care    HPI: 89 yo  female with Alzheimer's [facility] with SOA.ROWE showed heart failure with valvular regurg and pulmonary edema . New onset Afib with aggressive medical treatment but patient continued to decline. Placed on BiPap but declined and placed on NC O2 . Family decision for comfort , DNR and hospice GIP. Rapid decline and clinical presentation shows hospice eligible patient and acute symptom needs for GIP at this time. Family aware and supported       Pain assesment: none apparent  Dyspnea: mild    N/V: none    PPS: 10%      Past Medical History:   Diagnosis Date    Anorexia     Arthritis     Asthma     Cataract     Colon cancer     Colon cancer metastasized to liver     DDD (degenerative disc disease), lumbar     Hearing loss     History of transfusion     Hypertension     Liver cancer     Macular degeneration     Onychomycosis     Urine frequency      Past Surgical History:   Procedure Laterality Date    CATARACT EXTRACTION Bilateral     COLON RESECTION  06/16/2009    Low anterior resection    GALLBLADDER SURGERY      Resection of gallbladder    HIP HEMIARTHROPLASTY Right 2/8/2018    Procedure: HIP HEMIARTHROPLASTY;  Surgeon: Fernando Dominguez MD;  Location: [facility];  Service:     LIVER SURGERY  12/09/2014    Liver tumor, 1/2 was removed.    SUBTOTAL HYSTERECTOMY       Current Code Status       Date Active Code Status Order ID Comments User Context       6/3/2024 1421 No CPR (Do Not Attempt to Resuscitate) [id number]  Capri Mathis, MICAELA Inpatient        Question Answer    Code Status (Patient has no pulse and is not breathing) No CPR (Do Not Attempt to Resuscitate)    Medical Interventions (Patient has pulse or is breathing) Comfort Measures    Level Of Support Discussed With Health Care Surrogate                  Current Facility-Administered Medications   Medication  Dose Route Frequency Provider Last Rate Last Admin    Biotene dry mouth liquid 1 Application  1 Application Mouth/Throat 4x Daily PRN Polo Christian MD        And    hydrogen peroxide 3 % external solution   Mouth/Throat 4x Daily PRN Polo Christian MD        And    mineral oil liquid 30 mL  30 mL Mouth/Throat 4x Daily PRN Polo Christian MD        bisacodyl (DULCOLAX) suppository 10 mg  10 mg Rectal Daily PRN Polo Christian MD        glycopyrrolate (ROBINUL) injection 0.2 mg  0.2 mg Intravenous Q4H PRN Polo Christian MD        haloperidol lactate (HALDOL) injection 1 mg  1 mg Intravenous Q4H PRN Polo Christian MD        ketorolac (TORADOL) injection 15 mg  15 mg Intravenous Q6H PRN Polo Christian MD        midazolam (VERSED) injection 0.5 mg  0.5 mg Intravenous Q4H Polo Christian MD   0.5 mg at 06/04/24 0340    midazolam (VERSED) injection 0.5 mg  0.5 mg Intravenous Q2H PRN Polo Christian MD        morphine injection 0.5 mg  0.5 mg Intravenous Q20 Min PRN Polo Christian MD   0.5 mg at 06/04/24 0340    Morphine sulfate PCA 1 mg/mL 30 mL syringe   Intravenous Continuous Polo Christian MD   Currently Infusing at 06/04/24 0730    polyvinyl alcohol (LIQUIFILM) 1.4 % ophthalmic solution 1 drop  1 drop Both Eyes Q30 Min PRN Polo Christian MD        sodium chloride 0.9 % flush 10 mL  10 mL Intravenous Q12H Leila Harden MD   10 mL at 06/03/24 2057     Morphine,         Biotene dry mouth **AND** hydrogen peroxide **AND** mineral oil    bisacodyl    glycopyrrolate    haloperidol lactate    ketorolac    midazolam    Morphine    polyvinyl alcohol  Allergies   Allergen Reactions    Asa [Aspirin] Unknown (See Comments)     UNKNOWN    Bactrim [Sulfamethoxazole-Trimethoprim] Unknown (See Comments)     UNKNOWN    Penicillins Unknown (See Comments)     UNKNOWN      Ultram [Tramadol] Unknown (See Comments)     UNKNOWN       Family History   Problem Relation Age of Onset    Arthritis Other     Cervical cancer Other     Uterine cancer Other     Colon cancer Other       Social History     Socioeconomic History    Marital status:     Number of children: 3   Tobacco Use    Smoking status: Never    Smokeless tobacco: Never   Vaping Use    Vaping status: Never Used   Substance and Sexual Activity    Alcohol use: No    Drug use: No    Sexual activity: Never     Review of Systems - cannot elicit      There were no vitals taken for this visit.    Intake/Output Summary (Last 24 hours) at 6/4/2024 0851  Last data filed at 6/3/2024 1846  Gross per 24 hour   Intake 3.09 ml   Output 300 ml   Net -296.91 ml     Physical Exam:    No response , Tachy, RR 18 open mouth breathing. Inspir/ expir wheezes and rhonchi, abd soft No bs, ext muscle strophy , no mottling     Results from last 7 days   Lab Units 06/02/24  0236   WBC 10*3/mm3 16.64*   HEMOGLOBIN g/dL 13.8   HEMATOCRIT % 43.6   PLATELETS 10*3/mm3 250     Results from last 7 days   Lab Units 06/02/24  0236   SODIUM mmol/L 148*   POTASSIUM mmol/L 3.5   CHLORIDE mmol/L 112*   CO2 mmol/L 26.0   BUN mg/dL 34*   CREATININE mg/dL 0.76   CALCIUM mg/dL 8.6   GLUCOSE mg/dL 111*     Results from last 7 days   Lab Units 06/02/24  0236   SODIUM mmol/L 148*   POTASSIUM mmol/L 3.5   CHLORIDE mmol/L 112*   CO2 mmol/L 26.0   BUN mg/dL 34*   CREATININE mg/dL 0.76   GLUCOSE mg/dL 111*   CALCIUM mg/dL 8.6     Imaging Results (Last 72 Hours)       ** No results found for the last 72 hours. **          Impression: Plan: Dyspnea: Continue MS infusion, One dose extra given. RR ok at this time, Nebs if needed, Continue versed ATC and assess for agitation, Fever Toradol prn at this time, Continue Robinol. Needs GIP for above and skilled need. Actively dying at this time. May have up and down course. RN Capri spoke to Son this am     Time: 25 minutes    Polo Christian MD  06/04/24  08:51 EDT

## 2024-06-04 NOTE — PROGRESS NOTES
Continued Stay Note  [facility]     Patient Name: Zainab Worthy  MRN: [id number]  Today's Date: 6/4/2024    Admit Date: 6/3/2024    Plan: Inpatient hospice   Discharge Plan       Row Name 06/04/24 0852       Plan    Plan Comments LM 10% pps 89yo female inpatient hospice. Visit to bedside this am x2. 0730 with forest Peterson. Patient observed unresponsive with relaxed face, jaw, body posture, and respirations. Morphine infusion intact. Requirements of morphine x1 prn since hospice admission. Patient without response to oral care. Yesterday responded with faint moan. Change observed. Warm to the touch with exception of toes. RN reassured patient of safety and care. Visited bedside with Dr. Christian 2410. Patient unresponsive with relaxed face, jaw, body posture, respirations. Morphine infusion intact. Care coordinated with Scarlett [facility]INOJOSA [facility]. This RN contacted patient's son, Chad, via telephone to update. Support and open communication provided. Orders for toradol and prn nebs per Dr. Christian by this RN to [facility]. Patient continues to require injectable medications for symptom palliation necessitating skilled nursing care.     Final Discharge Disposition Code 51 - hospice medical facility                   Discharge Codes    No documentation.                       Capri Mathis RN

## 2024-06-04 NOTE — PROGRESS NOTES
Nut[facility] Services    Patient Name:  Zainab Worthy  YOB: 1929  MRN: [id number]  Admit Date:  6/3/2024    RD notes pt now in [facility] care, CMO. Will sign off, please consult RD for specific needs or in the event that GOC should change, thank you.       Electronically signed by:  Suzy Degroot MS,BRUNILDA,LD  06/04/24 10:08 EDT

## 2024-06-05 NOTE — SIGNIFICANT NOTE
Exam confirms with auscultation zero audible heart tones and zero audible respirations. Ms.Lou JARON Worthy was pronounced dead on 6/4/2024 at 2125pm.  MD notified by Patient's RN.    Martine Espino RN  Clinical House Supervisor  6/4/2024 21:59 EDT

## 2025-02-11 NOTE — PROGRESS NOTES
Discharge summary  This patient Lorrie Roy was admitted to the hospital on 2/10/2025  after undergoing Procedure(s) (LRB):  Arthroplasty Total Hip DIRECT  Anterior Approach (Right) without complications that morning.    During the postoperative period,while in hospital, patient was medically managed by the hospitalist. Please see medial notes and H&P for patients additional diagnoses.  Ortho agrees with all medical diagnoses and treatments while patient in hospital.  No significant or unexpected findings or abnormal ortho imaging were noted during the hospital stay    Hospital course      Patient tolerated surgical procedure well and there was no complications. Patient progressed adequately through their recovery during hospital stay including PT and rehabilitation.    Patient was then D/C on  to home  in stable condition.  Patient was instructed on the use of pain medications, the signs and symptoms of infection, and was given our number to call should they have any questions or concerns following discharge.    Based on my clinical judgment, the patient was provided with a 7-day prescription for opioid medication at 30 MED, indicated for treatment of acute pain in the setting of recent surgery. OARRS report was run and has demonstrated an appropriate time course.  The patient has been provided with counseling pertaining to safe use of opioid medication.    Patient may WBAT with anterior hip precautions to operative extremity with use of walker for assistance with ambulation   Mepilex dressing to be removed POD#7 and incision left open to air  Aspirin 81 mg BID for DVT prophylaxis started on 2/10/25 and to be taken for 30 days  Follow up with surgeon in 2 weeks    Case Management Discharge Note    Final Note: Per provider, pt [facility] Pt will not be on any IV antibiotics.  Pt's daughter will transport pt.  Dasha from [facility] will retrieve discharge summary for [facility]. Nurse can call report to [phone] and send a copy of discharge summary with pt along with any scripts.     Destination - Selection Complete     Service Request Status Selected Specialties Address Phone Number Fax Number    [facility] Selected Skilled Nursing Facility [address] [place] [address] [phone]      Durable Medical Equipment     No service has been selected for the patient.      Dialysis/Infusion     No service has been selected for the patient.      Home Medical Care     No service has been selected for the patient.      Social Care     No service has been selected for the patient.             Final Discharge Disposition Code: 03 - skilled nursing facility (SNF)

## (undated) DEVICE — ANTIBACTERIAL UNDYED BRAIDED (POLYGLACTIN 910), SYNTHETIC ABSORBABLE SUTURE: Brand: COATED VICRYL

## (undated) DEVICE — SOL POVIDONE IODINE 10PCT 3/4OZ STRL

## (undated) DEVICE — SKIN AFFIX SURG ADHESIVE 72/CS 0.55ML: Brand: MEDLINE

## (undated) DEVICE — GLV SURG SIGNATURE TOUCH PF LTX 8 STRL BX/50

## (undated) DEVICE — COATED BRAIDED POLYESTER: Brand: TI-CRON

## (undated) DEVICE — NDL SPINE 22G 31/2IN BLK

## (undated) DEVICE — DRAPE,REIN 53X77,STERILE: Brand: MEDLINE

## (undated) DEVICE — HEWSON SUTURE RETRIEVER: Brand: HEWSON SUTURE RETRIEVER

## (undated) DEVICE — HDRST POSTIN FM CRDL TRACH SLOT 9X8X4IN

## (undated) DEVICE — ELECTRD BLD EXT EDGE 1P COAT 6.5IN STRL

## (undated) DEVICE — PK HIP TOTL UNIV 10

## (undated) DEVICE — SUT MONOCRYL PLS ANTIB UND 3/0  PS1 27IN

## (undated) DEVICE — SST TWIST DRILL, STANDARD, 2.4MM DIA. X 127MM: Brand: MICROAIRE®

## (undated) DEVICE — GLV SURG TRIUMPH ORTHO W/ALOE PF LTX 8 STRL

## (undated) DEVICE — SHEET,DRAPE,53X77,STERILE: Brand: MEDLINE

## (undated) DEVICE — ENCORE® LATEX MICRO SIZE 8.5, STERILE LATEX POWDER-FREE SURGICAL GLOVE: Brand: ENCORE

## (undated) DEVICE — DRSNG SURG AQUACEL AG 9X25CM

## (undated) DEVICE — SYR LUERLOK 30CC